# Patient Record
Sex: MALE | Race: WHITE | NOT HISPANIC OR LATINO | Employment: OTHER | URBAN - METROPOLITAN AREA
[De-identification: names, ages, dates, MRNs, and addresses within clinical notes are randomized per-mention and may not be internally consistent; named-entity substitution may affect disease eponyms.]

---

## 2017-02-27 RX ORDER — PANTOPRAZOLE SODIUM 40 MG/1
TABLET, DELAYED RELEASE ORAL
Qty: 60 TABLET | Refills: 5 | Status: SHIPPED | OUTPATIENT
Start: 2017-02-27 | End: 2017-08-17 | Stop reason: SDUPTHER

## 2017-02-27 RX ORDER — PRAVASTATIN SODIUM 40 MG
TABLET ORAL
Qty: 30 TABLET | Refills: 5 | Status: SHIPPED | OUTPATIENT
Start: 2017-02-27 | End: 2017-09-16 | Stop reason: SDUPTHER

## 2017-03-03 ENCOUNTER — TELEPHONE (OUTPATIENT)
Dept: INTERNAL MEDICINE | Facility: CLINIC | Age: 57
End: 2017-03-03

## 2017-03-03 RX ORDER — ACYCLOVIR 800 MG/1
800 TABLET ORAL
Qty: 35 TABLET | Refills: 0 | Status: SHIPPED | OUTPATIENT
Start: 2017-03-03 | End: 2017-03-10

## 2017-03-03 NOTE — TELEPHONE ENCOUNTER
----- Message from Stacey Vasquez sent at 3/3/2017  9:01 AM EST -----  PT CALLED WANTING TO KNOW IF HE COULD HAVE ANOTHER ROUND OF MEDICINE CALLED IN FOR SHINGLES?  HE CAN BE REACHED [phone]    PHARMACY- [address]

## 2017-03-03 NOTE — TELEPHONE ENCOUNTER
Call pharmacy and see what we called in before as far as dose of Valtrex or Acyclovir.     Also ask he if he would like a prednisone 5 mg taper as directed, I suggest he take this if his symptoms started in last 48 hours.

## 2017-03-06 ENCOUNTER — TELEPHONE (OUTPATIENT)
Dept: INTERNAL MEDICINE | Facility: CLINIC | Age: 57
End: 2017-03-06

## 2017-03-06 RX ORDER — ALBUTEROL SULFATE 90 UG/1
AEROSOL, METERED RESPIRATORY (INHALATION)
Qty: 1 INHALER | Refills: 5 | Status: SHIPPED | OUTPATIENT
Start: 2017-03-06 | End: 2022-11-29 | Stop reason: SDUPTHER

## 2017-03-06 NOTE — TELEPHONE ENCOUNTER
----- Message from Jasmina Bridges sent at 3/6/2017  9:51 AM EST -----  PHARMACY NEEDS AN UPDATED PRESCRIPTION FOR HIS albuterol (PROAIR HFA) 108 (90 BASE) MCG/ACT inhaler.    [facility] [place], [place] - [address] [place] [address] [phone] [facility] [phone]

## 2017-03-20 RX ORDER — AMLODIPINE BESYLATE 5 MG/1
TABLET ORAL
Qty: 30 TABLET | Refills: 5 | Status: SHIPPED | OUTPATIENT
Start: 2017-03-20 | End: 2022-08-12 | Stop reason: SDUPTHER

## 2017-06-19 RX ORDER — CITALOPRAM 40 MG/1
TABLET ORAL
Qty: 30 TABLET | Refills: 5 | Status: SHIPPED | OUTPATIENT
Start: 2017-06-19 | End: 2022-04-19 | Stop reason: SDUPTHER

## 2017-08-17 RX ORDER — PANTOPRAZOLE SODIUM 40 MG/1
TABLET, DELAYED RELEASE ORAL
Qty: 60 TABLET | Refills: 1 | Status: SHIPPED | OUTPATIENT
Start: 2017-08-17 | End: 2022-07-26 | Stop reason: SDUPTHER

## 2017-08-17 RX ORDER — PRAVASTATIN SODIUM 40 MG
TABLET ORAL
Qty: 30 TABLET | Refills: 5 | OUTPATIENT
Start: 2017-08-17

## 2017-09-18 RX ORDER — PRAVASTATIN SODIUM 40 MG
TABLET ORAL
Qty: 30 TABLET | Refills: 0 | Status: SHIPPED | OUTPATIENT
Start: 2017-09-18 | End: 2022-08-12 | Stop reason: SDUPTHER

## 2017-10-17 ENCOUNTER — TELEPHONE (OUTPATIENT)
Dept: INTERNAL MEDICINE | Facility: CLINIC | Age: 57
End: 2017-10-17

## 2022-01-26 ENCOUNTER — OFFICE VISIT (OUTPATIENT)
Dept: INTERNAL MEDICINE | Facility: CLINIC | Age: 62
End: 2022-01-26

## 2022-01-26 ENCOUNTER — LAB (OUTPATIENT)
Dept: LAB | Facility: HOSPITAL | Age: 62
End: 2022-01-26

## 2022-01-26 VITALS
OXYGEN SATURATION: 96 % | BODY MASS INDEX: 22.55 KG/M2 | HEIGHT: 68 IN | DIASTOLIC BLOOD PRESSURE: 82 MMHG | RESPIRATION RATE: 20 BRPM | SYSTOLIC BLOOD PRESSURE: 130 MMHG | WEIGHT: 148.8 LBS | TEMPERATURE: 97.1 F | HEART RATE: 76 BPM

## 2022-01-26 DIAGNOSIS — E78.5 HYPERLIPIDEMIA, UNSPECIFIED HYPERLIPIDEMIA TYPE: Chronic | ICD-10-CM

## 2022-01-26 DIAGNOSIS — J44.9 CHRONIC OBSTRUCTIVE PULMONARY DISEASE, UNSPECIFIED COPD TYPE: Chronic | ICD-10-CM

## 2022-01-26 DIAGNOSIS — Z12.5 ENCOUNTER FOR PROSTATE CANCER SCREENING: ICD-10-CM

## 2022-01-26 DIAGNOSIS — Z12.11 ENCOUNTER FOR SCREENING FOR MALIGNANT NEOPLASM OF COLON: ICD-10-CM

## 2022-01-26 DIAGNOSIS — R63.4 WEIGHT LOSS: ICD-10-CM

## 2022-01-26 DIAGNOSIS — I10 PRIMARY HYPERTENSION: Primary | ICD-10-CM

## 2022-01-26 DIAGNOSIS — Z72.0 TOBACCO ABUSE: Chronic | ICD-10-CM

## 2022-01-26 DIAGNOSIS — M25.50 MULTIPLE JOINT PAIN: ICD-10-CM

## 2022-01-26 DIAGNOSIS — R39.198 DECREASED URINE STREAM: ICD-10-CM

## 2022-01-26 DIAGNOSIS — F41.9 ANXIETY: Chronic | ICD-10-CM

## 2022-01-26 LAB
ALBUMIN SERPL-MCNC: 4.6 G/DL (ref 3.5–5.2)
ALBUMIN/GLOB SERPL: 1.8 G/DL
ALP SERPL-CCNC: 71 U/L (ref 39–117)
ALT SERPL W P-5'-P-CCNC: 19 U/L (ref 1–41)
ANION GAP SERPL CALCULATED.3IONS-SCNC: 12.5 MMOL/L (ref 5–15)
ASO AB SERPL-ACNC: NEGATIVE [IU]/ML
AST SERPL-CCNC: 18 U/L (ref 1–40)
BASOPHILS # BLD AUTO: 0.06 10*3/MM3 (ref 0–0.2)
BASOPHILS NFR BLD AUTO: 1.3 % (ref 0–1.5)
BILIRUB BLD-MCNC: NEGATIVE MG/DL
BILIRUB SERPL-MCNC: 0.4 MG/DL (ref 0–1.2)
BUN SERPL-MCNC: 8 MG/DL (ref 8–23)
BUN/CREAT SERPL: 9.6 (ref 7–25)
CALCIUM SPEC-SCNC: 9.2 MG/DL (ref 8.6–10.5)
CHLORIDE SERPL-SCNC: 93 MMOL/L (ref 98–107)
CHROMATIN AB SERPL-ACNC: <10 IU/ML (ref 0–14)
CLARITY, POC: CLEAR
CO2 SERPL-SCNC: 23.5 MMOL/L (ref 22–29)
COLOR UR: YELLOW
CREAT SERPL-MCNC: 0.83 MG/DL (ref 0.76–1.27)
CRP SERPL-MCNC: <0.3 MG/DL (ref 0–0.5)
DEPRECATED RDW RBC AUTO: 42.4 FL (ref 37–54)
EOSINOPHIL # BLD AUTO: 0.15 10*3/MM3 (ref 0–0.4)
EOSINOPHIL NFR BLD AUTO: 3.3 % (ref 0.3–6.2)
ERYTHROCYTE [DISTWIDTH] IN BLOOD BY AUTOMATED COUNT: 11.8 % (ref 12.3–15.4)
EXPIRATION DATE: NORMAL
GFR SERPL CREATININE-BSD FRML MDRD: 94 ML/MIN/1.73
GLOBULIN UR ELPH-MCNC: 2.6 GM/DL
GLUCOSE SERPL-MCNC: 86 MG/DL (ref 65–99)
GLUCOSE UR STRIP-MCNC: NEGATIVE MG/DL
HCT VFR BLD AUTO: 43.6 % (ref 37.5–51)
HGB BLD-MCNC: 15.5 G/DL (ref 13–17.7)
IMM GRANULOCYTES # BLD AUTO: 0.01 10*3/MM3 (ref 0–0.05)
IMM GRANULOCYTES NFR BLD AUTO: 0.2 % (ref 0–0.5)
KETONES UR QL: NEGATIVE
LEUKOCYTE EST, POC: NEGATIVE
LYMPHOCYTES # BLD AUTO: 1.38 10*3/MM3 (ref 0.7–3.1)
LYMPHOCYTES NFR BLD AUTO: 30.7 % (ref 19.6–45.3)
Lab: NORMAL
MCH RBC QN AUTO: 34.8 PG (ref 26.6–33)
MCHC RBC AUTO-ENTMCNC: 35.6 G/DL (ref 31.5–35.7)
MCV RBC AUTO: 97.8 FL (ref 79–97)
MONOCYTES # BLD AUTO: 0.58 10*3/MM3 (ref 0.1–0.9)
MONOCYTES NFR BLD AUTO: 12.9 % (ref 5–12)
NEUTROPHILS NFR BLD AUTO: 2.31 10*3/MM3 (ref 1.7–7)
NEUTROPHILS NFR BLD AUTO: 51.6 % (ref 42.7–76)
NITRITE UR-MCNC: NEGATIVE MG/ML
NRBC BLD AUTO-RTO: 0 /100 WBC (ref 0–0.2)
PH UR: 6 [PH] (ref 5–8)
PLATELET # BLD AUTO: 383 10*3/MM3 (ref 140–450)
PMV BLD AUTO: 8.3 FL (ref 6–12)
POTASSIUM SERPL-SCNC: 4.2 MMOL/L (ref 3.5–5.2)
PROT SERPL-MCNC: 7.2 G/DL (ref 6–8.5)
PROT UR STRIP-MCNC: NEGATIVE MG/DL
PSA SERPL-MCNC: 0.51 NG/ML (ref 0–4)
RBC # BLD AUTO: 4.46 10*6/MM3 (ref 4.14–5.8)
RBC # UR STRIP: NEGATIVE /UL
SODIUM SERPL-SCNC: 129 MMOL/L (ref 136–145)
SP GR UR: 1.01 (ref 1–1.03)
T4 FREE SERPL-MCNC: 1.31 NG/DL (ref 0.93–1.7)
TSH SERPL DL<=0.05 MIU/L-ACNC: 1.9 UIU/ML (ref 0.27–4.2)
URATE SERPL-MCNC: 5.1 MG/DL (ref 3.4–7)
UROBILINOGEN UR QL: NORMAL
WBC NRBC COR # BLD: 4.49 10*3/MM3 (ref 3.4–10.8)

## 2022-01-26 PROCEDURE — 84443 ASSAY THYROID STIM HORMONE: CPT | Performed by: NURSE PRACTITIONER

## 2022-01-26 PROCEDURE — 80053 COMPREHEN METABOLIC PANEL: CPT | Performed by: NURSE PRACTITIONER

## 2022-01-26 PROCEDURE — 86431 RHEUMATOID FACTOR QUANT: CPT | Performed by: NURSE PRACTITIONER

## 2022-01-26 PROCEDURE — 99204 OFFICE O/P NEW MOD 45 MIN: CPT | Performed by: NURSE PRACTITIONER

## 2022-01-26 PROCEDURE — 86063 ANTISTREPTOLYSIN O SCREEN: CPT | Performed by: NURSE PRACTITIONER

## 2022-01-26 PROCEDURE — 86038 ANTINUCLEAR ANTIBODIES: CPT | Performed by: NURSE PRACTITIONER

## 2022-01-26 PROCEDURE — 84439 ASSAY OF FREE THYROXINE: CPT | Performed by: NURSE PRACTITIONER

## 2022-01-26 PROCEDURE — 84550 ASSAY OF BLOOD/URIC ACID: CPT | Performed by: NURSE PRACTITIONER

## 2022-01-26 PROCEDURE — 36415 COLL VENOUS BLD VENIPUNCTURE: CPT | Performed by: NURSE PRACTITIONER

## 2022-01-26 PROCEDURE — 81002 URINALYSIS NONAUTO W/O SCOPE: CPT | Performed by: NURSE PRACTITIONER

## 2022-01-26 PROCEDURE — 85025 COMPLETE CBC W/AUTO DIFF WBC: CPT | Performed by: NURSE PRACTITIONER

## 2022-01-26 PROCEDURE — G0103 PSA SCREENING: HCPCS | Performed by: NURSE PRACTITIONER

## 2022-01-26 PROCEDURE — 86140 C-REACTIVE PROTEIN: CPT | Performed by: NURSE PRACTITIONER

## 2022-01-26 PROCEDURE — 86747 PARVOVIRUS ANTIBODY: CPT | Performed by: NURSE PRACTITIONER

## 2022-01-26 RX ORDER — CELECOXIB 200 MG/1
200 CAPSULE ORAL DAILY
COMMUNITY
Start: 2022-01-16 | End: 2022-01-26

## 2022-01-26 RX ORDER — CELECOXIB 100 MG/1
100 CAPSULE ORAL 2 TIMES DAILY PRN
Qty: 30 CAPSULE | Refills: 0 | Status: SHIPPED | OUTPATIENT
Start: 2022-01-26 | End: 2022-02-27 | Stop reason: SDUPTHER

## 2022-01-26 NOTE — PROGRESS NOTES
New Patient Office Visit      Patient Name: Luis Antonio Fairchild  : 1960   MRN: [id number]     Chief Complaint:    Chief Complaint   Patient presents with   • Hypertension     new patient   • Hyperlipidemia   • COPD       History of Present Illness: Luis Antonio Fairchild is a 61 y.o. male presents to clinic today to establish care.    Past Medical History:   Diagnosis Date   • Anxiety    • Arthritis    • Cancer (HCC) 2018    Squamous carcinoma oropharynx   • GERD (gastroesophageal reflux disease)    • History of IBS    • Hyperlipidemia    • Hypertension    • Pneumonia      Are you currently seeing any other doctors or specialists?  Ej Sellers MD    [facility] follows him yearly  I have requested records from .     Concerns: 30 lb weight loss since throat surgery in      Hypertension  The patient denies headaches, chest pain, dyspnea, edema, syncope, blurred vision or palpitations. He state that he is taking her medication as prescribed. He is not having medication side effects.    Hyperlipidemia  He is following a low fat diet.  He is taking medication for hyperlipidemia. He denies chest pain, shortness of breath, orthopnea, paroxysmal nocturnal dyspnea, dyspnea on exertion, edema, palpitations or syncope.    GERD  The patient reports a history of reflux and heartburn. Denies dysphagia, belching, abdominal pain, nighttime cough, cough, nausea, vomiting, odynophagia or early satiety. He denies  rectal bleeding. The GERD has no known aggravating factors. He does smoke. The patient has had an EGD.    Anxiety   The patient's energy level is good. Denies agoraphobia, panic attacks, harming self or others.  The patient sleeps well. The current medication regimen consists of citalopram 40 mg daily. The patient denies having medication side effects including nausea, headaches, anxiety, increased depression or fatigue.     Arthritis  The patient has a history of arthritis.  It affects his hands,  shoulders, knees and wrists.  He has been taking Celecoxib 200 mg once daily.  He would like to change it to 100 mg twice a day.    Smokes  46 pack year history.  He is not interested in quitting.  He has not tolerated nicotine patches in the past due to nausea.    History of COPD  Symptoms controlled with albuterol as needed. Denies wheezing and has an occasional cough.     Subjective     Review of System: Review of Systems   Constitutional: Positive for appetite change and fatigue. Negative for fever.   HENT: Negative for congestion and rhinorrhea.    Respiratory: Negative for shortness of breath and wheezing.    Cardiovascular: Negative for chest pain and palpitations.   Gastrointestinal: Negative for abdominal pain, diarrhea, nausea and vomiting.        GERD   Genitourinary: Positive for difficulty urinating (Decreased stream). Negative for dysuria, frequency and urgency.   Musculoskeletal: Positive for arthralgias and joint swelling. Negative for myalgias.   Skin: Negative for rash.   Neurological: Negative for headaches.   Hematological: Negative for adenopathy.   Psychiatric/Behavioral: Negative for dysphoric mood. The patient is nervous/anxious.         Past Medical History:   Past Medical History:   Diagnosis Date   • Anxiety    • Arthritis    • Cancer (HCC) 2018    Squamous carcinoma oropharynx   • GERD (gastroesophageal reflux disease)    • History of IBS    • Hyperlipidemia    • Hypertension    • Pneumonia        Past Surgical History:   Past Surgical History:   Procedure Laterality Date   • COLONOSCOPY     • SHOULDER SURGERY      Onset Date    • THROAT SURGERY     • WRIST SURGERY      Onset Date:        Family History:   Family History   Problem Relation Age of Onset   • Anxiety disorder Mother    • Arthritis Mother    • Stroke Father            • Hypertension Father    • Cancer Brother    • Anxiety disorder Brother    • Cancer Brother                Social History:  "  Social History     Socioeconomic History   • Marital status:    Tobacco Use   • Smoking status: Current Every Day Smoker     Packs/day: 1.00     Years: 46.00     Pack years: 46.00     Types: Cigarettes   • Smokeless tobacco: Never Used   Substance and Sexual Activity   • Alcohol use: Yes     Alcohol/week: 35.0 standard drinks     Types: 35 Cans of beer per week     Comment: nightly   • Drug use: Never   • Sexual activity: Yes     Partners: Female     Comment:         Medications:     Current Outpatient Medications:   •  albuterol (PROAIR HFA) 108 (90 BASE) MCG/ACT inhaler, Inhale 1-2 puffs every 4-6 hours PRN, Disp: 1 inhaler, Rfl: 5  •  amLODIPine (NORVASC) 5 MG tablet, take 1 tablet by mouth once daily, Disp: 30 tablet, Rfl: 5  •  citalopram (CeleXA) 40 MG tablet, take 1 tablet by mouth once daily, Disp: 30 tablet, Rfl: 5  •  pantoprazole (PROTONIX) 40 MG EC tablet, take 1 tablet by mouth twice a day, Disp: 60 tablet, Rfl: 1  •  pravastatin (PRAVACHOL) 40 MG tablet, take 1 tablet by mouth once daily, Disp: 30 tablet, Rfl: 0  •  celecoxib (CeleBREX) 100 MG capsule, Take 1 capsule by mouth 2 (Two) Times a Day As Needed for Mild Pain ., Disp: 30 capsule, Rfl: 0    Allergies:   No Known Allergies    Objective     Physical Exam:   Vital Signs:   Vitals:    01/26/22 0838   BP: 130/82   BP Location: Right arm   Patient Position: Sitting   Cuff Size: Adult   Pulse: 76   Resp: 20   Temp: 97.1 °F (36.2 °C)   TempSrc: Infrared   SpO2: 96%   Weight: 67.5 kg (148 lb 12.8 oz)   Height: 172.1 cm (67.75\")   PainSc:   4     Body mass index is 22.79 kg/m². Patient's Body mass index is 22.79 kg/m². indicating that he is within normal range (BMI 18.5-24.9). No BMI management plan needed..      Physical Exam  Constitutional:       General: He is not in acute distress.     Appearance: He is not ill-appearing.   HENT:      Head: Normocephalic.      Right Ear: Tympanic membrane normal.      Left Ear: Tympanic membrane " normal.      Nose: No rhinorrhea.      Mouth/Throat:      Pharynx: No posterior oropharyngeal erythema.   Eyes:      Extraocular Movements: Extraocular movements intact.      Pupils: Pupils are equal, round, and reactive to light.   Cardiovascular:      Rate and Rhythm: Normal rate and regular rhythm.      Heart sounds: Normal heart sounds. No murmur heard.      Pulmonary:      Breath sounds: Normal breath sounds.   Abdominal:      General: Bowel sounds are normal.      Tenderness: There is no abdominal tenderness.   Musculoskeletal:         General: No swelling or tenderness. Normal range of motion.   Lymphadenopathy:      Cervical: No cervical adenopathy.   Skin:     General: Skin is warm and dry.   Neurological:      General: No focal deficit present.      Mental Status: He is oriented to person, place, and time.   Psychiatric:         Mood and Affect: Mood normal.         Assessment / Plan      Assessment/Plan:   Diagnoses and all orders for this visit:    1. Primary hypertension (Primary)  -     CBC & Differential; Future  -     Comprehensive Metabolic Panel; Future  -     CBC & Differential  -     Comprehensive Metabolic Panel    2. Hyperlipidemia, unspecified hyperlipidemia type  Continue current medications    3. Chronic obstructive pulmonary disease, unspecified COPD type (HCC)  Continue albuterol    4. Anxiety  Continue current medications    5. Tobacco abuse  Encouraged to quit smoking    6. Encounter for screening for malignant neoplasm of colon  -     Ambulatory Referral For Screening Colonoscopy    7.  Multiple joint pain  -     CBC & Differential; Future  -     CHANDA; Future  -     Antistreptolysin O Screen; Future  -     C-reactive Protein; Future  -     Rheumatoid Factor; Future  -     Uric Acid; Future  -     Comprehensive Metabolic Panel; Future  -     Parvovirus B19 Antibody, IgG & IgM; Future  -celecoxib (CeleBREX) 100 MG capsule; Take 1 capsule by mouth 2 (Two) Times a Day As Needed for Mild Pain  .  Dispense: 30 capsule; Refill: 0      8. Decreased urine stream  -     PSA SCREENING; Future  -     POC Urinalysis Dipstick  -     PSA SCREENING    9. Encounter for prostate cancer screening  -     PSA SCREENING; Future  -     PSA SCREENING    10. Weight loss  -     TSH; Future  -     T4, free; Future  -     TSH  -     T4, free    Follow Up:   Return in about 4 weeks (around 2/23/2022).    KALEB Barrios  [facility]

## 2022-01-27 ENCOUNTER — TELEPHONE (OUTPATIENT)
Dept: INTERNAL MEDICINE | Facility: CLINIC | Age: 62
End: 2022-01-27

## 2022-01-27 LAB — ANA SER QL: NEGATIVE

## 2022-01-28 LAB
B19V IGG SER IA-ACNC: 6.9 INDEX (ref 0–0.8)
B19V IGM SER IA-ACNC: 0.1 INDEX (ref 0–0.8)

## 2022-01-28 NOTE — TELEPHONE ENCOUNTER
PSA is normal; Rheumatoid factor is negative; zia is negative. Other labs are stable; labs are in the normal range or mild abnormalities which are are not concerning. Your kidneys and liver function is normal; no evidence of diabetes. Blood counts are normal and thyroid function is normal.Na is low. I would like to recheck in one month and  I would like to do a low dose CT for lung cancer screening.

## 2022-01-28 NOTE — TELEPHONE ENCOUNTER
Patient notified.   He will come in for labwork in a month, please place orders if you have not already done so.  He would like to do the low dose CT, he states he has a history of lung cancer.

## 2022-01-30 DIAGNOSIS — Z12.2 SCREENING FOR LUNG CANCER: Primary | ICD-10-CM

## 2022-02-01 DIAGNOSIS — Z12.11 SCREENING FOR COLON CANCER: Primary | ICD-10-CM

## 2022-02-03 ENCOUNTER — TELEPHONE (OUTPATIENT)
Dept: INTERNAL MEDICINE | Facility: CLINIC | Age: 62
End: 2022-02-03

## 2022-02-03 NOTE — TELEPHONE ENCOUNTER
I discussed lung cancer screening with patient. Various guidelines recommend screening for lung cancers for individuals with a smoking history or identification of other risk factors. Patient has a 46 pack year history of smoking and has a personal history of throat cancer. We had a discussion of potential benefits and harms  Potential benefits from early detection include increasing their life expectancy and quality of life while maintaining a low rate of false-positive results to prevent additional unnecessary testing.  Potential harms from screening include over diagnosis resulting in unnecessary testing and procedures and associated complications, treatment exposure, anxiety related to screening tests, and increased costs. The decision to screen for lung cancer should occur after a shared decision-making conversation between patient and provider. Patient verbalized an understanding and would like to proceed with lung cancer screening.

## 2022-02-10 ENCOUNTER — HOSPITAL ENCOUNTER (OUTPATIENT)
Dept: CT IMAGING | Facility: HOSPITAL | Age: 62
Discharge: HOME | End: 2022-02-10
Admitting: NURSE PRACTITIONER

## 2022-02-10 DIAGNOSIS — Z12.2 SCREENING FOR LUNG CANCER: ICD-10-CM

## 2022-02-10 PROCEDURE — 71271 CT THORAX LUNG CANCER SCR C-: CPT

## 2022-02-13 DIAGNOSIS — IMO0001 LUNG NODULE < 6CM ON CT: Primary | ICD-10-CM

## 2022-02-18 DIAGNOSIS — Z01.812 BLOOD TESTS PRIOR TO TREATMENT OR PROCEDURE: Primary | ICD-10-CM

## 2022-02-21 ENCOUNTER — CLINICAL SUPPORT NO REQUIREMENTS (OUTPATIENT)
Dept: PULMONOLOGY | Facility: CLINIC | Age: 62
End: 2022-02-21

## 2022-02-21 DIAGNOSIS — Z01.812 BLOOD TESTS PRIOR TO TREATMENT OR PROCEDURE: ICD-10-CM

## 2022-02-21 PROCEDURE — U0004 COV-19 TEST NON-CDC HGH THRU: HCPCS | Performed by: INTERNAL MEDICINE

## 2022-02-21 PROCEDURE — 99211 OFF/OP EST MAY X REQ PHY/QHP: CPT | Performed by: INTERNAL MEDICINE

## 2022-02-22 LAB — SARS-COV-2 RNA NOSE QL NAA+PROBE: NOT DETECTED

## 2022-02-23 ENCOUNTER — OFFICE VISIT (OUTPATIENT)
Dept: PULMONOLOGY | Facility: CLINIC | Age: 62
End: 2022-02-23

## 2022-02-23 VITALS
OXYGEN SATURATION: 97 % | TEMPERATURE: 97.8 F | WEIGHT: 152.2 LBS | DIASTOLIC BLOOD PRESSURE: 72 MMHG | HEIGHT: 69 IN | HEART RATE: 73 BPM | SYSTOLIC BLOOD PRESSURE: 134 MMHG | BODY MASS INDEX: 22.54 KG/M2

## 2022-02-23 DIAGNOSIS — J44.9 COPD, SEVERE: Primary | ICD-10-CM

## 2022-02-23 DIAGNOSIS — F17.218 CIGARETTE NICOTINE DEPENDENCE WITH OTHER NICOTINE-INDUCED DISORDER: ICD-10-CM

## 2022-02-23 DIAGNOSIS — R91.1 LUNG NODULE: ICD-10-CM

## 2022-02-23 PROCEDURE — 99204 OFFICE O/P NEW MOD 45 MIN: CPT | Performed by: INTERNAL MEDICINE

## 2022-02-23 PROCEDURE — 94729 DIFFUSING CAPACITY: CPT | Performed by: INTERNAL MEDICINE

## 2022-02-23 PROCEDURE — 94726 PLETHYSMOGRAPHY LUNG VOLUMES: CPT | Performed by: INTERNAL MEDICINE

## 2022-02-23 PROCEDURE — 94060 EVALUATION OF WHEEZING: CPT | Performed by: INTERNAL MEDICINE

## 2022-02-23 RX ORDER — TIOTROPIUM BROMIDE INHALATION SPRAY 3.12 UG/1
2 SPRAY, METERED RESPIRATORY (INHALATION)
Qty: 1 EACH | Refills: 6 | Status: SHIPPED | OUTPATIENT
Start: 2022-02-23 | End: 2022-04-12

## 2022-02-23 RX ORDER — ALBUTEROL SULFATE 90 UG/1
4 AEROSOL, METERED RESPIRATORY (INHALATION) ONCE
Status: COMPLETED | OUTPATIENT
Start: 2022-02-23 | End: 2022-02-23

## 2022-02-23 RX ADMIN — ALBUTEROL SULFATE 4 PUFF: 90 AEROSOL, METERED RESPIRATORY (INHALATION) at 16:43

## 2022-02-23 NOTE — PROGRESS NOTES
New Pulmonary Patient Office Visit      Patient Name: Luis Antonio Fairchild    Referring Physician: Sulma Woodward APRN    Chief Complaint:    Chief Complaint   Patient presents with   • Lung Nodule       History of Present Illness: Luis Antonio Fairchild is a 61 y.o. male who is here today to establish care with Pulmonary.      61-year-old male with past medical history of hypertension, dyslipidemia, GERD, depression, history of throat cancer status post surgery at [facility] in 2018, chronic smoker with ongoing smoking of 1 pack/day presenting for initial evaluation along with his wife.  Patient states that he is having problem with shortness of breath going on for a number of years.  He is also bothered by nighttime cough.  States that he generally does not bring up any phlegm but he is mostly dry cough.  Can happen anytime of the day.  Denies any syncopal episodes.  Denies any urinary incontinence symptoms.  Also has intermittent wheezing.  Denies any chest tightness.  Denies any fevers, chills or night sweats.  Denies any hemoptysis.  States that he feels that his symptoms are slowly getting worse.  He follows up with  ENT and has cancer is treated and he has no metastatic disease.    Patient smokes 1 pack a day which she has been doing for 45+ years.  Also drinks alcohol with 3-4 beers a night.  Denies any illicit drug use.    Patient does have reflux which is controlled with proton pump inhibitors.  Denies any leg edema.  Denies any orthopnea or PND symptoms.  Denies any frequent pneumonias or infections.  No hospitalizations but had mild pneumonia last year.  Denies any COVID-19 infection.  States that his feet and hands always stay cold and hurt when he goes out in the cold weather.  Denies any pets exposure at home.  No black mold exposure.  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      Subjective      Review of Systems:   Review of Systems   Constitutional: Positive  for unexpected weight loss.   HENT: Positive for trouble swallowing.    Respiratory: Positive for cough and choking.    Cardiovascular: Negative.    Gastrointestinal: Positive for constipation, diarrhea and GERD.   Endocrine: Positive for cold intolerance.   Musculoskeletal: Positive for arthralgias, myalgias and neck stiffness.   Skin: Negative.    Neurological: Negative.    Hematological: Negative.    Psychiatric/Behavioral: Negative.    All other systems reviewed and are negative.      Past Medical History:   Past Medical History:   Diagnosis Date   • Anxiety    • Arthritis    • Cancer (HCC)     Squamous carcinoma oropharynx   • GERD (gastroesophageal reflux disease)    • History of IBS    • Hyperlipidemia    • Hypertension    • Pneumonia        Past Surgical History:   Past Surgical History:   Procedure Laterality Date   • COLONOSCOPY     • SHOULDER SURGERY      Onset Date    • THROAT SURGERY     • WRIST SURGERY      Onset Date:        Family History:   Family History   Problem Relation Age of Onset   • Anxiety disorder Mother    • Arthritis Mother    • Stroke Father            • Hypertension Father    • Cancer Brother    • Anxiety disorder Brother    • Cancer Brother                Social History:   Social History     Socioeconomic History   • Marital status:    Tobacco Use   • Smoking status: Current Every Day Smoker     Packs/day: 1.00     Years: 46.00     Pack years: 46.00     Types: Cigarettes   • Smokeless tobacco: Never Used   Vaping Use   • Vaping Use: Never used   Substance and Sexual Activity   • Alcohol use: Yes     Alcohol/week: 35.0 standard drinks     Types: 35 Cans of beer per week     Comment: nightly   • Drug use: Never   • Sexual activity: Yes     Partners: Female     Comment:         Medications:     Current Outpatient Medications:   •  albuterol (PROAIR HFA) 108 (90 BASE) MCG/ACT inhaler, Inhale 1-2 puffs every 4-6 hours PRN, Disp: 1  "inhaler, Rfl: 5  •  amLODIPine (NORVASC) 5 MG tablet, take 1 tablet by mouth once daily, Disp: 30 tablet, Rfl: 5  •  celecoxib (CeleBREX) 100 MG capsule, Take 1 capsule by mouth 2 (Two) Times a Day As Needed for Mild Pain ., Disp: 30 capsule, Rfl: 0  •  citalopram (CeleXA) 40 MG tablet, take 1 tablet by mouth once daily, Disp: 30 tablet, Rfl: 5  •  pantoprazole (PROTONIX) 40 MG EC tablet, take 1 tablet by mouth twice a day, Disp: 60 tablet, Rfl: 1  •  pravastatin (PRAVACHOL) 40 MG tablet, take 1 tablet by mouth once daily, Disp: 30 tablet, Rfl: 0  •  Sod Picosulfate-Mag Ox-Cit Acd 10-3.5-12 MG-GM -GM/160ML solution, Take 160 mL by mouth Take As Directed for 2 doses., Disp: 320 mL, Rfl: 0  •  tiotropium bromide monohydrate (Spiriva Respimat) 2.5 MCG/ACT aerosol solution inhaler, Inhale 2 puffs Daily., Disp: 1 each, Rfl: 6    Allergies:   No Known Allergies    Objective     Physical Exam:  Vital Signs:   Vitals:    02/23/22 1417   BP: 134/72   Pulse: 73   Temp: 97.8 °F (36.6 °C)   SpO2: 97%   Weight: 69 kg (152 lb 3.2 oz)   Height: 175.3 cm (69\")       Physical Exam  Vitals and nursing note reviewed.   Constitutional:       General: He is not in acute distress.     Appearance: He is well-developed. He is not diaphoretic.   HENT:      Head: Normocephalic and atraumatic.      Comments: Post surgical changes noted soft palate.     Nose: Nose normal.      Mouth/Throat:      Pharynx: No oropharyngeal exudate.   Eyes:      General: No scleral icterus.        Right eye: No discharge.         Left eye: No discharge.      Pupils: Pupils are equal, round, and reactive to light.   Neck:      Thyroid: No thyromegaly.      Trachea: No tracheal deviation.   Cardiovascular:      Rate and Rhythm: Normal rate and regular rhythm.      Heart sounds: Normal heart sounds. No murmur heard.  No friction rub. No gallop.    Pulmonary:      Effort: Pulmonary effort is normal. No respiratory distress.      Breath sounds: No stridor. No wheezing " or rales.      Comments: Poor air entry  Abdominal:      General: There is no distension.      Palpations: Abdomen is soft.      Tenderness: There is no abdominal tenderness.   Musculoskeletal:         General: No tenderness.      Cervical back: Neck supple.      Right lower leg: No edema.      Left lower leg: No edema.      Comments: Clubbing: none   Lymphadenopathy:      Cervical: No cervical adenopathy.   Neurological:      Mental Status: He is alert and oriented to person, place, and time.      Cranial Nerves: No cranial nerve deficit.      Coordination: Coordination normal.   Psychiatric:         Behavior: Behavior normal.         Thought Content: Thought content normal.         Judgment: Judgment normal.         Results Review:   I reviewed the patient's new clinical results.  Low-dose CT scan reviewed personally and showed 3 mm nodule in the right upper lobe which appears more like a scar.  No suspicious lung nodules noted.  Hyperinflation and emphysematous changes noted bilaterally.  No pleural effusions or significant adenopathy noted.    PFT IMPRESSION:   1. Available data suggests severe obstruction. FEV1 1.68L, 49% predicted.     2. Significant bronchodilator response noted in FVC which improved by 440 cc and 14%.  3. Lung volume reveals air trapping.       4. Airway resistance is elevated.  5. DLCO is moderately reduced and could be suggestive of emphysema, interstitial lung disease, significant anemia, Pulmonary vascular disease etc. Clinical correlation will be required.      Assessment / Plan      Assessment:   Problem List Items Addressed This Visit     None      Visit Diagnoses     COPD, severe (HCC)    -  Primary    Relevant Medications    tiotropium bromide monohydrate (Spiriva Respimat) 2.5 MCG/ACT aerosol solution inhaler    Cigarette nicotine dependence with other nicotine-induced disorder        Lung nodule              Plan:   1.  Patient with complains of cough and shortness of breath.   Patient has chronic smoking history with ongoing smoking.  PFTs reviewed and shows severe obstruction with severe reduction in DLCO suggesting emphysematous changes.  CT scan also suggest bilateral emphysematous changes.  Reviewed all the results in detail with the patient and his wife.  We talked about prognosis which is guarded given severity of his lung disease.  We talked about working diligently to quit smoking.  He understood the importance of that and work on that aspect.    2.  Patient does take as needed albuterol inhaler.  Discussed that there is bronchodilator response on his PFTs and given his severity of disease I would like to try him on Spiriva HandiHaler as a bronchodilator to see if he can improve things.  We will put him on 2.5 mcg dose 2 puffs twice a day.  Correct technique of using the inhaler reviewed.  Side effects discussed in detail.  Prescription called in.  If symptoms do not improve with cough and shortness of breath will consider adding long-acting beta agonist and/or steroids as well.    3.  Advised patient to stay active.  Counseled strongly to work on quitting smoking.  He wants to think about that.    4.  3 mm lung nodule noted right upper lobe which appears like old scar.  You do not have any old films from [facility] available.  He apparently is going to see them next month for his routine follow-up and generally get CT scans.  We will follow up on the reports from those to make sure not seeing any worsening disease process.  He understands that he will be at high risk of lung malignancy given he already has throat cancer and with his ongoing smoking he will be at high risk.    5.  Will check alpha-1 antitrypsin level on next visit.  If he has work-up outside then will recommend that to be added on.    6.  Patient has significant leg pain issues with exertional activity.  He also has significant coronary artery calcification.  Patient may have underlying peripheral arterial disease.  Will  recommend further work-up to evaluate that issue.    We will follow closely in pulmonary clinic for ongoing pulmonary issues.  Thank you for allowing me to participate in the care of this patient.      Follow Up:   6 months    Discussed plan of care in detail with patient today. Patient verbally understands and agrees.I spent 45 minutes on this date of service. This time includes time spent by me in the following activities:preparing for the visit, reviewing tests, obtaining and/or reviewing a separately obtained history, performing a medically appropriate examination, counseling the patient/family, ordering medications, tests, or procedures, and/or documenting information in the medical record.     Aleksandar Maldonado MD  Pulmonary Critical Care and Sleep Medicine

## 2022-02-28 RX ORDER — CELECOXIB 100 MG/1
100 CAPSULE ORAL 2 TIMES DAILY PRN
Qty: 30 CAPSULE | Refills: 2 | Status: SHIPPED | OUTPATIENT
Start: 2022-02-28 | End: 2022-03-01 | Stop reason: SDUPTHER

## 2022-03-01 ENCOUNTER — TELEPHONE (OUTPATIENT)
Dept: INTERNAL MEDICINE | Facility: CLINIC | Age: 62
End: 2022-03-01

## 2022-03-01 RX ORDER — CELECOXIB 100 MG/1
100 CAPSULE ORAL 2 TIMES DAILY PRN
Qty: 60 CAPSULE | Refills: 2 | Status: SHIPPED | OUTPATIENT
Start: 2022-03-01 | End: 2022-04-27 | Stop reason: SDUPTHER

## 2022-03-03 ENCOUNTER — OUTSIDE FACILITY SERVICE (OUTPATIENT)
Dept: GASTROENTEROLOGY | Facility: CLINIC | Age: 62
End: 2022-03-03

## 2022-03-03 PROCEDURE — 88305 TISSUE EXAM BY PATHOLOGIST: CPT | Performed by: INTERNAL MEDICINE

## 2022-03-03 PROCEDURE — 45385 COLONOSCOPY W/LESION REMOVAL: CPT | Performed by: INTERNAL MEDICINE

## 2022-03-03 PROCEDURE — 45380 COLONOSCOPY AND BIOPSY: CPT | Performed by: INTERNAL MEDICINE

## 2022-03-04 ENCOUNTER — LAB REQUISITION (OUTPATIENT)
Dept: LAB | Facility: HOSPITAL | Age: 62
End: 2022-03-04

## 2022-03-04 ENCOUNTER — TELEPHONE (OUTPATIENT)
Dept: INTERNAL MEDICINE | Facility: CLINIC | Age: 62
End: 2022-03-04

## 2022-03-04 DIAGNOSIS — Z12.11 ENCOUNTER FOR SCREENING FOR MALIGNANT NEOPLASM OF COLON: ICD-10-CM

## 2022-03-04 DIAGNOSIS — K64.8 OTHER HEMORRHOIDS: ICD-10-CM

## 2022-03-04 DIAGNOSIS — D12.5 BENIGN NEOPLASM OF SIGMOID COLON: ICD-10-CM

## 2022-03-04 DIAGNOSIS — K57.30 DIVERTICULOSIS OF LARGE INTESTINE WITHOUT PERFORATION OR ABSCESS WITHOUT BLEEDING: ICD-10-CM

## 2022-03-04 NOTE — TELEPHONE ENCOUNTER
I read he had leg pain with walking. Let him know we could do some testing if he wants to address this. Check to see if he can make an appnt.

## 2022-03-07 LAB
CYTO UR: NORMAL
LAB AP CASE REPORT: NORMAL
LAB AP CLINICAL INFORMATION: NORMAL
PATH REPORT.FINAL DX SPEC: NORMAL
PATH REPORT.GROSS SPEC: NORMAL

## 2022-03-09 ENCOUNTER — OFFICE VISIT (OUTPATIENT)
Dept: INTERNAL MEDICINE | Facility: CLINIC | Age: 62
End: 2022-03-09

## 2022-03-09 ENCOUNTER — LAB (OUTPATIENT)
Dept: LAB | Facility: HOSPITAL | Age: 62
End: 2022-03-09

## 2022-03-09 VITALS
OXYGEN SATURATION: 95 % | HEART RATE: 78 BPM | WEIGHT: 148.8 LBS | TEMPERATURE: 97.1 F | SYSTOLIC BLOOD PRESSURE: 124 MMHG | DIASTOLIC BLOOD PRESSURE: 76 MMHG | RESPIRATION RATE: 20 BRPM | BODY MASS INDEX: 22.04 KG/M2 | HEIGHT: 69 IN

## 2022-03-09 DIAGNOSIS — R06.02 SHORTNESS OF BREATH: ICD-10-CM

## 2022-03-09 DIAGNOSIS — I10 PRIMARY HYPERTENSION: Chronic | ICD-10-CM

## 2022-03-09 DIAGNOSIS — K58.0 IRRITABLE BOWEL SYNDROME WITH DIARRHEA: ICD-10-CM

## 2022-03-09 DIAGNOSIS — R10.11 RIGHT UPPER QUADRANT PAIN: Primary | ICD-10-CM

## 2022-03-09 DIAGNOSIS — I73.9 CLAUDICATION: ICD-10-CM

## 2022-03-09 DIAGNOSIS — Z11.59 ENCOUNTER FOR HEPATITIS C SCREENING TEST FOR LOW RISK PATIENT: ICD-10-CM

## 2022-03-09 DIAGNOSIS — R53.83 FATIGUE, UNSPECIFIED TYPE: ICD-10-CM

## 2022-03-09 LAB
25(OH)D3 SERPL-MCNC: 74.5 NG/ML (ref 30–100)
ALBUMIN SERPL-MCNC: 4.6 G/DL (ref 3.5–5.2)
ALBUMIN/GLOB SERPL: 1.6 G/DL
ALP SERPL-CCNC: 70 U/L (ref 39–117)
ALPHA1 GLOB MFR UR ELPH: 138 MG/DL (ref 90–200)
ALT SERPL W P-5'-P-CCNC: 17 U/L (ref 1–41)
ANION GAP SERPL CALCULATED.3IONS-SCNC: 14.9 MMOL/L (ref 5–15)
AST SERPL-CCNC: 17 U/L (ref 1–40)
BASOPHILS # BLD AUTO: 0.07 10*3/MM3 (ref 0–0.2)
BASOPHILS NFR BLD AUTO: 1.4 % (ref 0–1.5)
BILIRUB SERPL-MCNC: 0.4 MG/DL (ref 0–1.2)
BUN SERPL-MCNC: 10 MG/DL (ref 8–23)
BUN/CREAT SERPL: 11.9 (ref 7–25)
CALCIUM SPEC-SCNC: 9.4 MG/DL (ref 8.6–10.5)
CHLORIDE SERPL-SCNC: 92 MMOL/L (ref 98–107)
CO2 SERPL-SCNC: 24.1 MMOL/L (ref 22–29)
CREAT SERPL-MCNC: 0.84 MG/DL (ref 0.76–1.27)
DEPRECATED RDW RBC AUTO: 42.3 FL (ref 37–54)
EGFRCR SERPLBLD CKD-EPI 2021: 98.6 ML/MIN/1.73
EOSINOPHIL # BLD AUTO: 0.22 10*3/MM3 (ref 0–0.4)
EOSINOPHIL NFR BLD AUTO: 4.6 % (ref 0.3–6.2)
ERYTHROCYTE [DISTWIDTH] IN BLOOD BY AUTOMATED COUNT: 11.8 % (ref 12.3–15.4)
FOLATE SERPL-MCNC: 18.8 NG/ML (ref 4.78–24.2)
GLOBULIN UR ELPH-MCNC: 2.8 GM/DL
GLUCOSE SERPL-MCNC: 73 MG/DL (ref 65–99)
HCT VFR BLD AUTO: 44.6 % (ref 37.5–51)
HCV AB SER DONR QL: NORMAL
HGB BLD-MCNC: 15.7 G/DL (ref 13–17.7)
IMM GRANULOCYTES # BLD AUTO: 0.01 10*3/MM3 (ref 0–0.05)
IMM GRANULOCYTES NFR BLD AUTO: 0.2 % (ref 0–0.5)
LYMPHOCYTES # BLD AUTO: 1.52 10*3/MM3 (ref 0.7–3.1)
LYMPHOCYTES NFR BLD AUTO: 31.5 % (ref 19.6–45.3)
MCH RBC QN AUTO: 34.4 PG (ref 26.6–33)
MCHC RBC AUTO-ENTMCNC: 35.2 G/DL (ref 31.5–35.7)
MCV RBC AUTO: 97.6 FL (ref 79–97)
MONOCYTES # BLD AUTO: 0.54 10*3/MM3 (ref 0.1–0.9)
MONOCYTES NFR BLD AUTO: 11.2 % (ref 5–12)
NEUTROPHILS NFR BLD AUTO: 2.47 10*3/MM3 (ref 1.7–7)
NEUTROPHILS NFR BLD AUTO: 51.1 % (ref 42.7–76)
NRBC BLD AUTO-RTO: 0 /100 WBC (ref 0–0.2)
PLATELET # BLD AUTO: 390 10*3/MM3 (ref 140–450)
PMV BLD AUTO: 8.7 FL (ref 6–12)
POTASSIUM SERPL-SCNC: 4.3 MMOL/L (ref 3.5–5.2)
PROT SERPL-MCNC: 7.4 G/DL (ref 6–8.5)
RBC # BLD AUTO: 4.57 10*6/MM3 (ref 4.14–5.8)
SODIUM SERPL-SCNC: 131 MMOL/L (ref 136–145)
VIT B12 BLD-MCNC: 595 PG/ML (ref 211–946)
WBC NRBC COR # BLD: 4.83 10*3/MM3 (ref 3.4–10.8)

## 2022-03-09 PROCEDURE — 99214 OFFICE O/P EST MOD 30 MIN: CPT | Performed by: NURSE PRACTITIONER

## 2022-03-09 PROCEDURE — 80053 COMPREHEN METABOLIC PANEL: CPT | Performed by: NURSE PRACTITIONER

## 2022-03-09 PROCEDURE — 82746 ASSAY OF FOLIC ACID SERUM: CPT | Performed by: NURSE PRACTITIONER

## 2022-03-09 PROCEDURE — 82306 VITAMIN D 25 HYDROXY: CPT | Performed by: NURSE PRACTITIONER

## 2022-03-09 PROCEDURE — 86803 HEPATITIS C AB TEST: CPT | Performed by: NURSE PRACTITIONER

## 2022-03-09 PROCEDURE — 82103 ALPHA-1-ANTITRYPSIN TOTAL: CPT | Performed by: NURSE PRACTITIONER

## 2022-03-09 PROCEDURE — 36415 COLL VENOUS BLD VENIPUNCTURE: CPT | Performed by: NURSE PRACTITIONER

## 2022-03-09 PROCEDURE — 85025 COMPLETE CBC W/AUTO DIFF WBC: CPT | Performed by: NURSE PRACTITIONER

## 2022-03-09 PROCEDURE — 82607 VITAMIN B-12: CPT | Performed by: NURSE PRACTITIONER

## 2022-03-09 NOTE — PROGRESS NOTES
Patient Name: Luis Antonio Fairchild  : 1960   MRN: [id number]     Chief Complaint:    Chief Complaint   Patient presents with   • Leg Pain   • Irritable Bowel Syndrome       History of Present Illness: Luis Antonio Fairchild is a 61 y.o. male presents to clinic for leg pain and irritable bowel.    Pain in bilateral legs  Location: thighes  Radiates: down the leg  Severity: moderate  Duration: A few months   Timing:  Intermittent  Progression: waxing and waning  Quality: sharp   Aggravating factors: activity  Alleviating factors: tried rest, takes celecoxib.   Associated symptoms: no numbness; patient has significant coronary artery calcification on his low-dose CT scan.    IBS  Patient states he has had a history of IBS in the past.  He used to take medication for relief but is unsure of the name.  He used to take Metamucil which helped in the past but it no longer helps.  He states  approximately 30 minutes to an hour after eating he has diarrhea associated with cramping. He also has complaints of right upper quadrant pain intermittently.  He states his bowel movements are rarely formed.  Last formed stool was 2 days ago.  During colonoscopy they did see a large amount of stool in his colon despite prep.  He has a history of GERD and takes Protonix.  He has had endoscopy in the past. He had a recent colonoscopy 3/2/22; showed several polyps.  They will repeat his colonoscopy in 2 years.  Patient continues to be concerned about his 30 pound weight loss since his throat cancer in 2018.      Hypertension  The patient denies headaches, chest pain, dyspnea, edema, syncope, blurred vision or palpitations. He state that he is taking his medication as prescribed. He is not having medication side effects.    Hyperlipidemia.  He is taking medication for hyperlipidemia. He denies chest pain, shortness of breath, orthopnea, paroxysmal nocturnal dyspnea, dyspnea on exertion, edema, palpitations or syncope.  He does have what appears  to be claudication.      GERD  The patient reports a history of reflux and heartburn. Denies dysphagia, belching, abdominal pain, nighttime cough, cough, nausea, vomiting, odynophagia or early satiety. He denies  rectal bleeding. The GERD has no known aggravating factors. He does smoke. The patient has had an EGD.    Anxiety   The patient's energy level is good.  He is sleeps well and denies any suicidal ideation.  The current medication regimen consists of citalopram 40 mg daily. The patient denies having medication side effects including nausea, headaches, anxiety, increased depression or fatigue.     Smokes  46 pack year history.  He is trying to cut back.  He is smoking a half a pack a day.  He has not tolerated nicotine patches in the past due to nausea.    History of COPD  Patient recently started on Spiriva.  He has not noticed an improvement since beginning Spiriva.   Denies wheezing and has an occasional cough and some shortness of breath. Diagnoses and all orders for this visit:         Review of System: Review of Systems   Constitutional: Positive for activity change, fatigue and unexpected weight change. Negative for fever.   HENT: Negative for congestion and rhinorrhea.    Respiratory: Positive for cough (Chronic) and shortness of breath (Chronic). Negative for wheezing.    Cardiovascular: Negative for chest pain and palpitations.   Gastrointestinal: Positive for abdominal pain and diarrhea. Negative for blood in stool, constipation, nausea and vomiting.   Genitourinary: Negative for dysuria.   Musculoskeletal: Positive for myalgias.        Claudication   Skin: Negative for rash.   Neurological: Negative for headaches.   Hematological: Negative for adenopathy.   Psychiatric/Behavioral: Negative for dysphoric mood.        Medications:     Current Outpatient Medications:   •  albuterol (PROAIR HFA) 108 (90 BASE) MCG/ACT inhaler, Inhale 1-2 puffs every 4-6 hours PRN, Disp: 1 inhaler, Rfl: 5  •  amLODIPine  "(NORVASC) 5 MG tablet, take 1 tablet by mouth once daily, Disp: 30 tablet, Rfl: 5  •  celecoxib (CeleBREX) 100 MG capsule, Take 1 capsule by mouth 2 (Two) Times a Day As Needed for Mild Pain ., Disp: 60 capsule, Rfl: 2  •  citalopram (CeleXA) 40 MG tablet, take 1 tablet by mouth once daily, Disp: 30 tablet, Rfl: 5  •  pantoprazole (PROTONIX) 40 MG EC tablet, take 1 tablet by mouth twice a day, Disp: 60 tablet, Rfl: 1  •  pravastatin (PRAVACHOL) 40 MG tablet, take 1 tablet by mouth once daily, Disp: 30 tablet, Rfl: 0  •  tiotropium bromide monohydrate (Spiriva Respimat) 2.5 MCG/ACT aerosol solution inhaler, Inhale 2 puffs Daily., Disp: 1 each, Rfl: 6  •  linaclotide (Linzess) 72 MCG capsule capsule, Take 1 capsule by mouth Every Morning Before Breakfast., Disp: 30 capsule, Rfl: 0    Allergies:   No Known Allergies    Objective     Physical Exam:   Vital Signs:   Vitals:    03/09/22 0830   BP: 124/76   BP Location: Right arm   Patient Position: Sitting   Cuff Size: Adult   Pulse: 78   Resp: 20   Temp: 97.1 °F (36.2 °C)   TempSrc: Infrared   SpO2: 95%   Weight: 67.5 kg (148 lb 12.8 oz)   Height: 175.3 cm (69\")   PainSc:   4     Body mass index is 21.97 kg/m². Patient's Body mass index is 21.97 kg/m². indicating that he is within normal range (BMI 18.5-24.9). No BMI management plan needed..      Physical Exam  Constitutional:       General: He is not in acute distress.     Appearance: He is not ill-appearing.   HENT:      Head: Normocephalic.   Cardiovascular:      Rate and Rhythm: Normal rate and regular rhythm.      Pulses: Normal pulses.           Popliteal pulses are 2+ on the right side and 2+ on the left side.        Dorsalis pedis pulses are 2+ on the right side and 2+ on the left side.      Heart sounds: Normal heart sounds. No murmur heard.  Pulmonary:      Breath sounds: Normal breath sounds.   Abdominal:      General: Bowel sounds are normal.      Tenderness: There is no abdominal tenderness. "   Musculoskeletal:         General: No swelling or tenderness. Normal range of motion.      Right lower leg: No edema.      Left lower leg: No edema.   Lymphadenopathy:      Cervical: No cervical adenopathy.   Skin:     General: Skin is warm and dry.   Neurological:      General: No focal deficit present.      Mental Status: He is oriented to person, place, and time.   Psychiatric:         Mood and Affect: Mood normal.         Assessment / Plan      Assessment/Plan:   Diagnoses and all orders for this visit:    1. Right upper quadrant pain (Primary)  -     H. Pylori Antigen, Stool - Stool, Per Rectum; Future  Patient will stop protonix for two weeks and we will check  h pylori.        RUQ pain    2. Irritable bowel syndrome with diarrhea  -     linaclotide (Linzess) 72 MCG capsule capsule; Take 1 capsule by mouth Every Morning Before Breakfast.  Dispense: 30 capsule; Refill: 0    3. Primary hypertension  -     Comprehensive Metabolic Panel; Future  -     CBC & Differential; Future  -     Comprehensive Metabolic Panel  -     CBC & Differential    4. Fatigue, unspecified type  -     Vitamin D 25 Hydroxy; Future  -     Vitamin B12; Future  -     Folate; Future  -     Vitamin D 25 Hydroxy  -     Vitamin B12  -     Folate    5. Encounter for hepatitis C screening test for low risk patient  -     Hepatitis C Antibody; Future  -     Hepatitis C Antibody    6. Shortness of breath  -     Alpha - 1 - Antitrypsin; Future  -     Alpha - 1 - Antitrypsin    7. Claudication (HCC)  -     Duplex Lower Extremity Art / Grafts - Bilateral CAR; Future               Follow Up:   Return in about 4 weeks (around 4/6/2022) for Annual.    KALEB Barrios  [facility]

## 2022-03-11 ENCOUNTER — LAB (OUTPATIENT)
Dept: LAB | Facility: HOSPITAL | Age: 62
End: 2022-03-11

## 2022-03-11 ENCOUNTER — TELEPHONE (OUTPATIENT)
Dept: INTERNAL MEDICINE | Facility: CLINIC | Age: 62
End: 2022-03-11

## 2022-03-11 ENCOUNTER — LAB (OUTPATIENT)
Dept: INTERNAL MEDICINE | Facility: CLINIC | Age: 62
End: 2022-03-11

## 2022-03-11 DIAGNOSIS — R10.11 RIGHT UPPER QUADRANT PAIN: ICD-10-CM

## 2022-03-11 PROCEDURE — 87338 HPYLORI STOOL AG IA: CPT | Performed by: NURSE PRACTITIONER

## 2022-03-14 LAB — H PYLORI AG STL QL IA: NEGATIVE

## 2022-03-15 DIAGNOSIS — K58.0 IRRITABLE BOWEL SYNDROME WITH DIARRHEA: ICD-10-CM

## 2022-03-15 RX ORDER — LUBIPROSTONE 24 UG/1
24 CAPSULE ORAL 2 TIMES DAILY WITH MEALS
Status: CANCELLED | OUTPATIENT
Start: 2022-03-15

## 2022-03-16 ENCOUNTER — TELEPHONE (OUTPATIENT)
Dept: INTERNAL MEDICINE | Facility: CLINIC | Age: 62
End: 2022-03-16

## 2022-03-16 NOTE — TELEPHONE ENCOUNTER
Left voicemail advising of clinical results, office number given if pt has any questions or concerns.

## 2022-03-16 NOTE — TELEPHONE ENCOUNTER
Please call patient and let him know  I would like him to try samples of Amitiza since we were unable to get Linzess approved  by insurance. There are 2 doses available.

## 2022-04-01 ENCOUNTER — TELEPHONE (OUTPATIENT)
Dept: INTERNAL MEDICINE | Facility: CLINIC | Age: 62
End: 2022-04-01

## 2022-04-01 DIAGNOSIS — J01.90 ACUTE SINUSITIS, RECURRENCE NOT SPECIFIED, UNSPECIFIED LOCATION: Primary | ICD-10-CM

## 2022-04-01 RX ORDER — AMOXICILLIN 875 MG/1
875 TABLET, COATED ORAL 2 TIMES DAILY
Qty: 20 TABLET | Refills: 0 | Status: SHIPPED | OUTPATIENT
Start: 2022-04-01 | End: 2022-04-12

## 2022-04-01 NOTE — TELEPHONE ENCOUNTER
I will send in amoxicillin 875 mg to take twice a day for 10 days. Finish all  10 days of antibiotics even if feeling better. Follow-up if no improvement in symptoms.Take with probiotics/yogurt several times daily.

## 2022-04-01 NOTE — TELEPHONE ENCOUNTER
Caller: Luis Antonio Fairchild    Relationship: Self    Best call back number: [phone]    Who are you requesting to speak with (clinical staff, provider,  specific staff member): KEELY MAURICIO     What was the call regarding: PATIENT THINKS HE HAS A BAD SINUS INFECTION.  PATIENT STATES THAT HE IS GETTING BLOOD OUT OF HIS NOSE WHERE IT'S CRACKING     Do you require a callback: YES

## 2022-04-01 NOTE — TELEPHONE ENCOUNTER
Spoke to patient he stated that the whole front of face is having extreme pressure. When he blows his nose he does have blood in it. Patient has tried alkaseltzer and that has not helped much at all.

## 2022-04-12 ENCOUNTER — HOSPITAL ENCOUNTER (OUTPATIENT)
Dept: GENERAL RADIOLOGY | Facility: HOSPITAL | Age: 62
Discharge: HOME | End: 2022-04-12

## 2022-04-12 ENCOUNTER — OFFICE VISIT (OUTPATIENT)
Dept: INTERNAL MEDICINE | Facility: CLINIC | Age: 62
End: 2022-04-12

## 2022-04-12 ENCOUNTER — LAB (OUTPATIENT)
Dept: LAB | Facility: HOSPITAL | Age: 62
End: 2022-04-12

## 2022-04-12 VITALS
HEIGHT: 69 IN | TEMPERATURE: 97.1 F | RESPIRATION RATE: 22 BRPM | OXYGEN SATURATION: 96 % | HEART RATE: 83 BPM | SYSTOLIC BLOOD PRESSURE: 156 MMHG | BODY MASS INDEX: 21.92 KG/M2 | DIASTOLIC BLOOD PRESSURE: 80 MMHG | WEIGHT: 148 LBS

## 2022-04-12 DIAGNOSIS — R19.7 DIARRHEA, UNSPECIFIED TYPE: ICD-10-CM

## 2022-04-12 DIAGNOSIS — M25.561 ACUTE PAIN OF RIGHT KNEE: ICD-10-CM

## 2022-04-12 DIAGNOSIS — Z00.00 WELLNESS EXAMINATION: Primary | ICD-10-CM

## 2022-04-12 LAB
BASOPHILS # BLD AUTO: 0.05 10*3/MM3 (ref 0–0.2)
BASOPHILS NFR BLD AUTO: 1.1 % (ref 0–1.5)
DEPRECATED RDW RBC AUTO: 40.4 FL (ref 37–54)
EOSINOPHIL # BLD AUTO: 0.12 10*3/MM3 (ref 0–0.4)
EOSINOPHIL NFR BLD AUTO: 2.7 % (ref 0.3–6.2)
ERYTHROCYTE [DISTWIDTH] IN BLOOD BY AUTOMATED COUNT: 11.7 % (ref 12.3–15.4)
HCT VFR BLD AUTO: 41 % (ref 37.5–51)
HGB BLD-MCNC: 14.8 G/DL (ref 13–17.7)
IMM GRANULOCYTES # BLD AUTO: 0.01 10*3/MM3 (ref 0–0.05)
IMM GRANULOCYTES NFR BLD AUTO: 0.2 % (ref 0–0.5)
LYMPHOCYTES # BLD AUTO: 1.52 10*3/MM3 (ref 0.7–3.1)
LYMPHOCYTES NFR BLD AUTO: 33.8 % (ref 19.6–45.3)
MCH RBC QN AUTO: 34.3 PG (ref 26.6–33)
MCHC RBC AUTO-ENTMCNC: 36.1 G/DL (ref 31.5–35.7)
MCV RBC AUTO: 94.9 FL (ref 79–97)
MONOCYTES # BLD AUTO: 0.65 10*3/MM3 (ref 0.1–0.9)
MONOCYTES NFR BLD AUTO: 14.4 % (ref 5–12)
NEUTROPHILS NFR BLD AUTO: 2.15 10*3/MM3 (ref 1.7–7)
NEUTROPHILS NFR BLD AUTO: 47.8 % (ref 42.7–76)
NRBC BLD AUTO-RTO: 0 /100 WBC (ref 0–0.2)
PLATELET # BLD AUTO: 352 10*3/MM3 (ref 140–450)
PMV BLD AUTO: 8.4 FL (ref 6–12)
RBC # BLD AUTO: 4.32 10*6/MM3 (ref 4.14–5.8)
WBC NRBC COR # BLD: 4.5 10*3/MM3 (ref 3.4–10.8)

## 2022-04-12 PROCEDURE — 36415 COLL VENOUS BLD VENIPUNCTURE: CPT | Performed by: NURSE PRACTITIONER

## 2022-04-12 PROCEDURE — 73560 X-RAY EXAM OF KNEE 1 OR 2: CPT

## 2022-04-12 PROCEDURE — 80061 LIPID PANEL: CPT | Performed by: NURSE PRACTITIONER

## 2022-04-12 PROCEDURE — 80053 COMPREHEN METABOLIC PANEL: CPT | Performed by: NURSE PRACTITIONER

## 2022-04-12 PROCEDURE — 99396 PREV VISIT EST AGE 40-64: CPT | Performed by: NURSE PRACTITIONER

## 2022-04-12 PROCEDURE — 85025 COMPLETE CBC W/AUTO DIFF WBC: CPT | Performed by: NURSE PRACTITIONER

## 2022-04-12 NOTE — PROGRESS NOTES
Male Physical Note      Patient Name: Luis Antonio Fairchild  : 1960   MRN: [id number]     Chief Complaint:    Chief Complaint   Patient presents with   • Annual Exam       History of Present Illness: Luis Antonio Fairchild is a 61 y.o. male who is here today for their annual health maintenance and physical.    Are you currently seeing any other doctors or specialists?  Pulmonary- Joel   [facility]- [facility]      Regular dental visits: Yes, every 6 months  Regular eye exams: Yes, yearly, wears glasses     IBS  Patient states he has had a history of IBS in the past.  He has diarrhea several times daily; he tried Metamucil and it didn't help.  He tried linzess and amitiza without relief. He cannot afford these medications. Patient continues to be concerned about his 30 pound weight loss since his throat cancer in 2018.      Liver US tomorrow          Subjective      Review of Systems:   Review of Systems  Review of Systems   Constitutional: Negative for activity change, appetite change and fever.  No change in weight.  HENT: Negative for congestion, ear pain, rhinorrhea and sore throat.    Eyes: Negative for discharge and visual disturbance.   Respiratory: Negative for cough, wheezing and shortness of breath.    Cardiovascular: Negative for chest pain or palpitations.   Gastrointestinal: Diarrhea and RUQ abdominal pain; denies blood in stool  and vomiting  Endocrine: Negative for polyuria.   Genitourinary: Negative for difficulty urinating and dysuria.   Musculoskeletal: Negative for neck pain and neck stiffness; no back pain; Knee pain and leg pain  Skin: Negative for rash.   Neurological: Negative for headache numbness and tingling.   Hematological: Negative for adenopathy.   Psychiatric/Behavioral: Negative for  depression and anxiety    Past Medical History:   Past Medical History:   Diagnosis Date   • Anxiety 2016   • Arthritis    • Cancer (HCC) 2018    Squamous carcinoma oropharynx   • GERD (gastroesophageal reflux  disease)    • History of IBS    • Hyperlipidemia    • Hypertension    • Pneumonia        Past Surgical History:   Past Surgical History:   Procedure Laterality Date   • COLONOSCOPY     • SHOULDER SURGERY      Onset Date    • THROAT SURGERY     • WRIST SURGERY      Onset Date:        Family History:   Family History   Problem Relation Age of Onset   • Anxiety disorder Mother    • Arthritis Mother    • Stroke Father            • Hypertension Father    • Cancer Brother    • Anxiety disorder Brother    • Cancer Brother                Social History:   Social History     Socioeconomic History   • Marital status:    Tobacco Use   • Smoking status: Current Every Day Smoker     Packs/day: 1.00     Years: 46.00     Pack years: 46.00     Types: Cigarettes   • Smokeless tobacco: Never Used   Vaping Use   • Vaping Use: Never used   Substance and Sexual Activity   • Alcohol use: Yes     Alcohol/week: 35.0 standard drinks     Types: 35 Cans of beer per week     Comment: nightly   • Drug use: Never   • Sexual activity: Yes     Partners: Female     Comment:         Medications:     Current Outpatient Medications:   •  albuterol (PROAIR HFA) 108 (90 BASE) MCG/ACT inhaler, Inhale 1-2 puffs every 4-6 hours PRN, Disp: 1 inhaler, Rfl: 5  •  amLODIPine (NORVASC) 5 MG tablet, take 1 tablet by mouth once daily, Disp: 30 tablet, Rfl: 5  •  celecoxib (CeleBREX) 100 MG capsule, Take 1 capsule by mouth 2 (Two) Times a Day As Needed for Mild Pain ., Disp: 60 capsule, Rfl: 2  •  pantoprazole (PROTONIX) 40 MG EC tablet, take 1 tablet by mouth twice a day, Disp: 60 tablet, Rfl: 1  •  pravastatin (PRAVACHOL) 40 MG tablet, take 1 tablet by mouth once daily, Disp: 30 tablet, Rfl: 0  •  ciprofloxacin (CIPRO) 500 MG tablet, Take 1 tablet by mouth 2 (Two) Times a Day., Disp: 6 tablet, Rfl: 0  •  citalopram (CeleXA) 40 MG tablet, Take 1 tablet by mouth Daily., Disp: 90 tablet, Rfl: 0  •   "fluticasone-salmeterol (Advair Diskus) 100-50 MCG/DOSE DISKUS, Inhale 1 puff 2 (Two) Times a Day., Disp: 60 each, Rfl: 3    Allergies:   No Known Allergies    Immunizations:  Flu (Yearly): yearly  Colorectal Screening:     Last Completed Colonoscopy          COLORECTAL CANCER SCREENING (COLONOSCOPY - Every 2 Years) Next due on 3/3/2024    03/03/2022  SCANNED - COLONOSCOPY              Diet/Physical activity:Regular activity and diet  Sexual health: no concerns  PHQ 9: 3.     Objective     Physical Exam:  Vital Signs:   Vitals:    04/12/22 1444   BP: 156/80   BP Location: Right arm   Patient Position: Sitting   Cuff Size: Adult   Pulse: 83   Resp: 22   Temp: 97.1 °F (36.2 °C)   TempSrc: Infrared   SpO2: 96%   Weight: 67.1 kg (148 lb)   Height: 175.3 cm (69\")   PainSc:   8     Body mass index is 21.86 kg/m². Patient's Body mass index is 21.86 kg/m². indicating that he is within normal range (BMI 18.5-24.9). No BMI management plan needed..      Physical Exam  Vitals and nursing note reviewed.   Constitutional:       General: He is not in acute distress.     Appearance: Normal appearance. He is not ill-appearing.   HENT:      Head: Normocephalic.      Right Ear: Tympanic membrane, ear canal and external ear normal. There is no impacted cerumen.      Left Ear: Tympanic membrane, ear canal and external ear normal. There is no impacted cerumen.      Nose: No septal deviation, nasal tenderness or rhinorrhea.      Mouth/Throat:      Mouth: Mucous membranes are moist.      Pharynx: Oropharynx is clear. No oropharyngeal exudate or posterior oropharyngeal erythema.   Eyes:      General:         Right eye: No discharge.         Left eye: No discharge.      Extraocular Movements: Extraocular movements intact.      Conjunctiva/sclera: Conjunctivae normal.      Pupils: Pupils are equal, round, and reactive to light.   Neck:      Thyroid: No thyromegaly.      Vascular: No carotid bruit.   Cardiovascular:      Rate and Rhythm: " Normal rate and regular rhythm.      Pulses: Normal pulses.      Heart sounds: Normal heart sounds. No murmur heard.    No gallop.   Pulmonary:      Effort: Pulmonary effort is normal.      Breath sounds: Normal breath sounds. No wheezing, rhonchi or rales.   Abdominal:      General: Bowel sounds are normal.      Palpations: Abdomen is soft. There is no mass.      Tenderness: There is no abdominal tenderness. There is no right CVA tenderness, left CVA tenderness or rebound.   Genitourinary:     Comments: deferred  Musculoskeletal:         General: Tenderness (right knee generalized pain) present.      Cervical back: Normal range of motion.   Lymphadenopathy:      Cervical: No cervical adenopathy.   Skin:     General: Skin is warm and dry.      Capillary Refill: Capillary refill takes less than 2 seconds.      Findings: No erythema or rash.   Neurological:      General: No focal deficit present.      Mental Status: He is alert and oriented to person, place, and time.      Motor: No weakness.   Psychiatric:         Mood and Affect: Mood normal.         Behavior: Behavior is cooperative.         Thought Content: Thought content normal.         Cognition and Memory: He does not exhibit impaired recent memory.         Judgment: Judgment normal.         Procedures    Assessment / Plan      Assessment/Plan:   Diagnoses and all orders for this visit:    1. Wellness examination (Primary)  -     Gastrointestinal Panel, PCR - Stool, Per Rectum; Future  -     Clostridium Difficile Toxin, PCR - Stool, Per Rectum; Future  -     Ova & Parasite Examination - Stool, Per Rectum; Future  -     Comprehensive metabolic panel; Future  -     Lipid panel; Future  -     CBC w AUTO Differential; Future  -     Comprehensive metabolic panel  -     Lipid panel  -     CBC w AUTO Differential    2. Diarrhea, unspecified type  -     Gastrointestinal Panel, PCR - Stool, Per Rectum; Future  -     Clostridium Difficile Toxin, PCR - Stool, Per Rectum;  Future  -     Ova & Parasite Examination - Stool, Per Rectum; Future    3. Acute pain of right knee  -     XR Knee 1 or 2 View Right; Future             Follow Up:   Will discuss follow-up after testing     Healthcare Maintenance:   Counseling provided on     Education includes wearing seatbelt, use of sunscreen, sleeping 8 hours every night, and drinking 64 ounces of water daily.  Discussed a healthy well-balanced diet of healthy fats, lean protein, and carbohydrates focusing on a variety of vegetables and fruits. Encouraging 30minutes of cardio 5d weekly and muscle strengthening 3x weekly.        Luis Antonio Fairchild voices understanding and acceptance of this advice and will call back with any further questions or concerns. AVS with preventive healthcare tips printed for patient.     KALEB Barrios   [facility]

## 2022-04-13 ENCOUNTER — TELEPHONE (OUTPATIENT)
Dept: INTERNAL MEDICINE | Facility: CLINIC | Age: 62
End: 2022-04-13

## 2022-04-13 LAB
ALBUMIN SERPL-MCNC: 4.6 G/DL (ref 3.5–5.2)
ALBUMIN/GLOB SERPL: 1.8 G/DL
ALP SERPL-CCNC: 83 U/L (ref 39–117)
ALT SERPL W P-5'-P-CCNC: 27 U/L (ref 1–41)
ANION GAP SERPL CALCULATED.3IONS-SCNC: 11 MMOL/L (ref 5–15)
AST SERPL-CCNC: 27 U/L (ref 1–40)
BILIRUB SERPL-MCNC: 0.2 MG/DL (ref 0–1.2)
BUN SERPL-MCNC: 12 MG/DL (ref 8–23)
BUN/CREAT SERPL: 13.5 (ref 7–25)
CALCIUM SPEC-SCNC: 8.9 MG/DL (ref 8.6–10.5)
CHLORIDE SERPL-SCNC: 94 MMOL/L (ref 98–107)
CHOLEST SERPL-MCNC: 185 MG/DL (ref 0–200)
CO2 SERPL-SCNC: 24 MMOL/L (ref 22–29)
CREAT SERPL-MCNC: 0.89 MG/DL (ref 0.76–1.27)
EGFRCR SERPLBLD CKD-EPI 2021: 96.9 ML/MIN/1.73
GLOBULIN UR ELPH-MCNC: 2.5 GM/DL
GLUCOSE SERPL-MCNC: 186 MG/DL (ref 65–99)
HDLC SERPL-MCNC: 74 MG/DL (ref 40–60)
LDLC SERPL CALC-MCNC: 81 MG/DL (ref 0–100)
LDLC/HDLC SERPL: 1.01 {RATIO}
POTASSIUM SERPL-SCNC: 4.3 MMOL/L (ref 3.5–5.2)
PROT SERPL-MCNC: 7.1 G/DL (ref 6–8.5)
SODIUM SERPL-SCNC: 129 MMOL/L (ref 136–145)
TRIGL SERPL-MCNC: 181 MG/DL (ref 0–150)
VLDLC SERPL-MCNC: 30 MG/DL (ref 5–40)

## 2022-04-13 NOTE — TELEPHONE ENCOUNTER
Caller: [facility] [address] [place] [address] - [address] [facility] - [phone]  - [phone] FX    Relationship: Pharmacy [facility]    Best call back number: [phone]    What is the best time to reach you: TIL 7P    Who are you requesting to speak with (clinical staff, provider,  specific staff member): CLINICAL STAFF    Do you know the name of the person who called: CORA    What was the call regarding: CALLING REGARDING DOSAGE OF CITALOPRAM, PATIENT ADVISED HE WAS TAKING THIS ONCE DAILY AND IT HAS BEEN INCREASED TO TWICE DAILY AND THEY NEED A NEW PRESCRIPTION TO REFLECT THIS    Do you require a callback: YES

## 2022-04-18 ENCOUNTER — LAB (OUTPATIENT)
Dept: LAB | Facility: HOSPITAL | Age: 62
End: 2022-04-18

## 2022-04-18 ENCOUNTER — LAB (OUTPATIENT)
Dept: INTERNAL MEDICINE | Facility: CLINIC | Age: 62
End: 2022-04-18

## 2022-04-18 DIAGNOSIS — Z00.00 WELLNESS EXAMINATION: ICD-10-CM

## 2022-04-18 DIAGNOSIS — R19.7 DIARRHEA, UNSPECIFIED TYPE: ICD-10-CM

## 2022-04-18 LAB
ADV 40+41 DNA STL QL NAA+NON-PROBE: NOT DETECTED
ASTRO TYP 1-8 RNA STL QL NAA+NON-PROBE: NOT DETECTED
C CAYETANENSIS DNA STL QL NAA+NON-PROBE: NOT DETECTED
C COLI+JEJ+UPSA DNA STL QL NAA+NON-PROBE: NOT DETECTED
C DIFF TOX GENS STL QL NAA+PROBE: NOT DETECTED
CRYPTOSP DNA STL QL NAA+NON-PROBE: NOT DETECTED
E HISTOLYT DNA STL QL NAA+NON-PROBE: NOT DETECTED
EAEC PAA PLAS AGGR+AATA ST NAA+NON-PRB: NOT DETECTED
EC STX1+STX2 GENES STL QL NAA+NON-PROBE: NOT DETECTED
EPEC EAE GENE STL QL NAA+NON-PROBE: NOT DETECTED
ETEC LTA+ST1A+ST1B TOX ST NAA+NON-PROBE: NOT DETECTED
G LAMBLIA DNA STL QL NAA+NON-PROBE: NOT DETECTED
NOROVIRUS GI+II RNA STL QL NAA+NON-PROBE: NOT DETECTED
P SHIGELLOIDES DNA STL QL NAA+NON-PROBE: NOT DETECTED
RVA RNA STL QL NAA+NON-PROBE: DETECTED
S ENT+BONG DNA STL QL NAA+NON-PROBE: NOT DETECTED
SAPO I+II+IV+V RNA STL QL NAA+NON-PROBE: NOT DETECTED
SHIGELLA SP+EIEC IPAH ST NAA+NON-PROBE: NOT DETECTED
V CHOL+PARA+VUL DNA STL QL NAA+NON-PROBE: NOT DETECTED
V CHOLERAE DNA STL QL NAA+NON-PROBE: NOT DETECTED
Y ENTEROCOL DNA STL QL NAA+NON-PROBE: NOT DETECTED

## 2022-04-18 PROCEDURE — 87209 SMEAR COMPLEX STAIN: CPT | Performed by: NURSE PRACTITIONER

## 2022-04-18 PROCEDURE — 87493 C DIFF AMPLIFIED PROBE: CPT | Performed by: NURSE PRACTITIONER

## 2022-04-18 PROCEDURE — 87177 OVA AND PARASITES SMEARS: CPT | Performed by: NURSE PRACTITIONER

## 2022-04-18 PROCEDURE — 87507 IADNA-DNA/RNA PROBE TQ 12-25: CPT | Performed by: NURSE PRACTITIONER

## 2022-04-19 DIAGNOSIS — J44.9 CHRONIC OBSTRUCTIVE PULMONARY DISEASE, UNSPECIFIED COPD TYPE: ICD-10-CM

## 2022-04-19 DIAGNOSIS — K52.9 GASTROENTERITIS: Primary | ICD-10-CM

## 2022-04-19 RX ORDER — CIPROFLOXACIN 500 MG/1
500 TABLET, FILM COATED ORAL 2 TIMES DAILY
Qty: 6 TABLET | Refills: 0 | Status: SHIPPED | OUTPATIENT
Start: 2022-04-19 | End: 2022-05-11

## 2022-04-19 RX ORDER — CITALOPRAM 40 MG/1
40 TABLET ORAL DAILY
Qty: 90 TABLET | Refills: 0 | Status: SHIPPED | OUTPATIENT
Start: 2022-04-19 | End: 2022-08-12 | Stop reason: SDUPTHER

## 2022-04-19 NOTE — TELEPHONE ENCOUNTER
I called and left him a message about his positive stool studies and an antibiotic I sent in. I refilled his citalopram. Also called him in a new inhaler. Please schedule him a f/u in four weeks.

## 2022-04-21 NOTE — TELEPHONE ENCOUNTER
Patient has been notified and verb good understanding. Patient has already picked up all of his medications. Patient has also been scheduled for 4 week follow up.

## 2022-04-21 NOTE — TELEPHONE ENCOUNTER
I left a message on the patients voicemail to call our office back, office number provided.

## 2022-04-25 ENCOUNTER — TELEPHONE (OUTPATIENT)
Dept: INTERNAL MEDICINE | Facility: CLINIC | Age: 62
End: 2022-04-25

## 2022-04-25 NOTE — TELEPHONE ENCOUNTER
I called patient and discussed the US of abdomen. Patient has intermittent LUQ pain.  I recommended a CT of abdomen/spleen to get a better look at the spleen (due to possible calcified granuloma).   Patient will let me know if his pain does not improve when he gets back from vacation.

## 2022-04-27 LAB
O+P SPEC MICRO: NORMAL
O+P STL TRI STN: NORMAL

## 2022-04-27 NOTE — TELEPHONE ENCOUNTER
Caller: Gurinder Luis Antoniohernesto Hanson    Relationship: Self    Best call back number: [phone]    Requested Prescriptions:   Requested Prescriptions     Pending Prescriptions Disp Refills   • celecoxib (CeleBREX) 100 MG capsule 60 capsule 2     Sig: Take 1 capsule by mouth 2 (Two) Times a Day As Needed for Mild Pain .        Pharmacy where request should be sent: [facility] [address] [place], [place] [phone] [place] [phone] FX     Additional details provided by patient: PATIENT NEEDS A NEW PRESCRIPTION    Does the patient have less than a 3 day supply:  [x] Yes  [] No    Sandra Neal Rep   04/27/22 14:26 EDT

## 2022-04-28 RX ORDER — CELECOXIB 100 MG/1
100 CAPSULE ORAL 2 TIMES DAILY PRN
Qty: 60 CAPSULE | Refills: 2 | Status: SHIPPED | OUTPATIENT
Start: 2022-04-28 | End: 2022-05-11

## 2022-05-11 ENCOUNTER — OFFICE VISIT (OUTPATIENT)
Dept: INTERNAL MEDICINE | Facility: CLINIC | Age: 62
End: 2022-05-11

## 2022-05-11 VITALS
HEART RATE: 81 BPM | TEMPERATURE: 98.2 F | DIASTOLIC BLOOD PRESSURE: 68 MMHG | SYSTOLIC BLOOD PRESSURE: 110 MMHG | OXYGEN SATURATION: 97 % | BODY MASS INDEX: 21.8 KG/M2 | WEIGHT: 147.6 LBS

## 2022-05-11 DIAGNOSIS — I73.9 CLAUDICATION: Primary | ICD-10-CM

## 2022-05-11 DIAGNOSIS — M19.90 OSTEOARTHRITIS, UNSPECIFIED OSTEOARTHRITIS TYPE, UNSPECIFIED SITE: Chronic | ICD-10-CM

## 2022-05-11 PROCEDURE — 99214 OFFICE O/P EST MOD 30 MIN: CPT | Performed by: NURSE PRACTITIONER

## 2022-05-11 RX ORDER — CELECOXIB 200 MG/1
200 CAPSULE ORAL 2 TIMES DAILY
Qty: 180 CAPSULE | Refills: 0 | Status: SHIPPED | OUTPATIENT
Start: 2022-05-11 | End: 2022-08-12 | Stop reason: SDUPTHER

## 2022-05-11 NOTE — PROGRESS NOTES
Patient Name: Luis Antonio Fairchild  : 1960   MRN: [id number]     Chief Complaint:    Chief Complaint   Patient presents with   • Weight Loss     4 wk fu       History of Present Illness: Luis Antonio Fairchild is a 61 y.o. male presents to clinic for follow-up of several concerns.    Abdominal pain improved after antibiotics. He had  Rotavirus.      Arthritis  The patient has a history of arthritis.  It affects his hands, shoulders, knees and wrists.  He has been taking Celecoxib 100 mg twice a day. He would like to increase it to 200 mg BID.     Smokes  46 pack year history.  He is trying nicotine patches prescribed by his head and neck surgeon.      History of COPD  Symptoms controlled with albuterol as needed; uses it 2 x week. . Denies wheezing and dyspnea and has an occasional cough. He is using Advair twice a day.    Patient has significant pain in bilateral legs with walking. He has to stop or sit down due to pain. The location is his thighs. Radiates down the legs. He rates the pain as moderate. The pain started over the last 3-4 months and is worsening. Quality is dull pain. Alleviating factors are  rest, takes celecoxib. He denies any numbness. Risk factors are significant coronary artery calcification on his low-dose CT scan.    Bilateral Artery Duplex  was scheduled in April but too expensive at tat location I will try a different location.     Subjective     Review of System: Review of Systems   Constitutional: Positive for unexpected weight change (stable for a few months). Negative for fatigue and fever.   HENT: Negative for congestion and rhinorrhea.    Respiratory: Positive for cough. Negative for shortness of breath and wheezing.    Cardiovascular: Negative for chest pain and palpitations.   Gastrointestinal: Negative for abdominal pain, diarrhea, nausea and vomiting.   Musculoskeletal: Positive for arthralgias and myalgias.        Medications:     Current Outpatient Medications:   •  albuterol (PROAIR  HFA) 108 (90 BASE) MCG/ACT inhaler, Inhale 1-2 puffs every 4-6 hours PRN, Disp: 1 inhaler, Rfl: 5  •  amLODIPine (NORVASC) 5 MG tablet, take 1 tablet by mouth once daily, Disp: 30 tablet, Rfl: 5  •  citalopram (CeleXA) 40 MG tablet, Take 1 tablet by mouth Daily., Disp: 90 tablet, Rfl: 0  •  fluticasone-salmeterol (Advair Diskus) 100-50 MCG/DOSE DISKUS, Inhale 1 puff 2 (Two) Times a Day., Disp: 60 each, Rfl: 3  •  pantoprazole (PROTONIX) 40 MG EC tablet, take 1 tablet by mouth twice a day, Disp: 60 tablet, Rfl: 1  •  pravastatin (PRAVACHOL) 40 MG tablet, take 1 tablet by mouth once daily, Disp: 30 tablet, Rfl: 0  •  celecoxib (CeleBREX) 200 MG capsule, Take 1 capsule by mouth 2 (Two) Times a Day., Disp: 180 capsule, Rfl: 0    Allergies:   No Known Allergies    Objective     Physical Exam:   Vital Signs:   Vitals:    05/11/22 1249   BP: 110/68   BP Location: Right arm   Patient Position: Sitting   Cuff Size: Adult   Pulse: 81   Temp: 98.2 °F (36.8 °C)   TempSrc: Temporal   SpO2: 97%   Weight: 67 kg (147 lb 9.6 oz)     Body mass index is 21.8 kg/m². BMI is within normal parameters. No follow-up required.      Physical Exam  Constitutional:       General: He is not in acute distress.     Appearance: He is not ill-appearing.   HENT:      Head: Normocephalic.   Cardiovascular:      Rate and Rhythm: Normal rate and regular rhythm.      Heart sounds: Normal heart sounds. No murmur heard.  Pulmonary:      Effort: No respiratory distress.      Breath sounds: Wheezing (left upper lobes ) present.   Abdominal:      General: Bowel sounds are normal.      Tenderness: There is no abdominal tenderness.   Musculoskeletal:         General: No tenderness.   Lymphadenopathy:      Cervical: No cervical adenopathy.   Skin:     General: Skin is warm and dry.   Neurological:      General: No focal deficit present.      Mental Status: He is oriented to person, place, and time.   Psychiatric:         Mood and Affect: Mood normal.          Assessment / Plan      Assessment/Plan:   Diagnoses and all orders for this visit:    1. Claudication (HCC) (Primary)  -     Duplex Lower Extremity Art / Grafts - Bilateral CAR; Future    2. Osteoarthritis, unspecified osteoarthritis type, unspecified site  -     celecoxib (CeleBREX) 200 MG capsule; Take 1 capsule by mouth 2 (Two) Times a Day.  Dispense: 180 capsule; Refill: 0     Explained and discussed patient's condition and plan of care.  Discussed when to follow-up.  Discussed possible red flags and how to follow-up with those.  Viewed patient's medications and discussed common side effects. Patient to continue current medications as advised.  Be compliant with medications. Patient to let me know if he has any changes in health, does not tolerate medication, or any future concerns about treatment. ER for emergencies. Patient verbalized an understanding and agreement with plan of care.              Follow Up:   Return in about 3 months (around 8/11/2022).    KALEB Barrios  [facility]

## 2022-05-31 ENCOUNTER — TELEPHONE (OUTPATIENT)
Dept: INTERNAL MEDICINE | Facility: CLINIC | Age: 62
End: 2022-05-31

## 2022-05-31 NOTE — TELEPHONE ENCOUNTER
Pt called stating he has had a bad episode of pain this weekend with both legs. Pt stated he has little or no strength in legs, starting at hip down to shin bone. Pt is taking Celebrex 200 mg tablet; 1 capsules by mouth 2 (two) Times a day.   Please advise?

## 2022-05-31 NOTE — TELEPHONE ENCOUNTER
I called patient and he is going to schedule his duplex of both legs. I will call him with results.

## 2022-06-07 ENCOUNTER — TELEPHONE (OUTPATIENT)
Dept: INTERNAL MEDICINE | Facility: CLINIC | Age: 62
End: 2022-06-07

## 2022-06-07 NOTE — TELEPHONE ENCOUNTER
Caller: Luis Antonio Fairchild    Relationship to patient: Self    Best call back number:[phone]    FYI: PATIENT STATED THAT HE WAS LETTING PROVIDER KNOW HE HAD ULTRASOUND DONE ON 06/06/22

## 2022-06-09 ENCOUNTER — TELEPHONE (OUTPATIENT)
Dept: INTERNAL MEDICINE | Facility: CLINIC | Age: 62
End: 2022-06-09

## 2022-06-13 NOTE — TELEPHONE ENCOUNTER
Attempt #3 I left a message on the patients voicemail to call our office back, office number provided.   Please advise

## 2022-06-20 NOTE — TELEPHONE ENCOUNTER
Attempt #3 I left a message on the patients voicemail to call our office back, office number provided.     Please advise. Ok to close note?

## 2022-07-25 ENCOUNTER — PATIENT MESSAGE (OUTPATIENT)
Dept: INTERNAL MEDICINE | Facility: CLINIC | Age: 62
End: 2022-07-25

## 2022-07-26 RX ORDER — PANTOPRAZOLE SODIUM 40 MG/1
40 TABLET, DELAYED RELEASE ORAL 2 TIMES DAILY
Qty: 60 TABLET | Refills: 0 | Status: SHIPPED | OUTPATIENT
Start: 2022-07-26 | End: 2022-08-12 | Stop reason: SDUPTHER

## 2022-07-26 NOTE — TELEPHONE ENCOUNTER
From: Luis Antonio Fairchild  To: KALEB Barrios  Sent: 7/25/2022 9:31 PM EDT  Subject: Refill     I need a refill on my Pantoprazole. I use [facility] [place]. Thank you

## 2022-08-12 ENCOUNTER — OFFICE VISIT (OUTPATIENT)
Dept: INTERNAL MEDICINE | Facility: CLINIC | Age: 62
End: 2022-08-12

## 2022-08-12 ENCOUNTER — HOSPITAL ENCOUNTER (OUTPATIENT)
Dept: GENERAL RADIOLOGY | Facility: HOSPITAL | Age: 62
Discharge: HOME | End: 2022-08-12
Admitting: NURSE PRACTITIONER

## 2022-08-12 VITALS
RESPIRATION RATE: 18 BRPM | DIASTOLIC BLOOD PRESSURE: 82 MMHG | BODY MASS INDEX: 21.48 KG/M2 | OXYGEN SATURATION: 99 % | HEART RATE: 62 BPM | SYSTOLIC BLOOD PRESSURE: 142 MMHG | TEMPERATURE: 97.8 F | HEIGHT: 69 IN | WEIGHT: 145 LBS

## 2022-08-12 DIAGNOSIS — F41.9 ANXIETY: ICD-10-CM

## 2022-08-12 DIAGNOSIS — M19.90 OSTEOARTHRITIS, UNSPECIFIED OSTEOARTHRITIS TYPE, UNSPECIFIED SITE: Chronic | ICD-10-CM

## 2022-08-12 DIAGNOSIS — M54.41 ACUTE RIGHT-SIDED LOW BACK PAIN WITH RIGHT-SIDED SCIATICA: Primary | ICD-10-CM

## 2022-08-12 DIAGNOSIS — K21.9 GASTROESOPHAGEAL REFLUX DISEASE, UNSPECIFIED WHETHER ESOPHAGITIS PRESENT: ICD-10-CM

## 2022-08-12 DIAGNOSIS — M54.41 ACUTE RIGHT-SIDED LOW BACK PAIN WITH RIGHT-SIDED SCIATICA: ICD-10-CM

## 2022-08-12 DIAGNOSIS — I10 PRIMARY HYPERTENSION: ICD-10-CM

## 2022-08-12 DIAGNOSIS — E78.5 HYPERLIPIDEMIA, UNSPECIFIED HYPERLIPIDEMIA TYPE: ICD-10-CM

## 2022-08-12 LAB
BASOPHILS # BLD AUTO: 0.07 10*3/MM3 (ref 0–0.2)
BASOPHILS NFR BLD AUTO: 1.1 % (ref 0–1.5)
CHOLEST SERPL-MCNC: 233 MG/DL (ref 0–200)
DEPRECATED RDW RBC AUTO: 43.4 FL (ref 37–54)
EOSINOPHIL # BLD AUTO: 0.16 10*3/MM3 (ref 0–0.4)
EOSINOPHIL NFR BLD AUTO: 2.6 % (ref 0.3–6.2)
ERYTHROCYTE [DISTWIDTH] IN BLOOD BY AUTOMATED COUNT: 12.1 % (ref 12.3–15.4)
HCT VFR BLD AUTO: 42.3 % (ref 37.5–51)
HDLC SERPL-MCNC: 77 MG/DL (ref 40–60)
HGB BLD-MCNC: 14.6 G/DL (ref 13–17.7)
IMM GRANULOCYTES # BLD AUTO: 0.02 10*3/MM3 (ref 0–0.05)
IMM GRANULOCYTES NFR BLD AUTO: 0.3 % (ref 0–0.5)
LDLC SERPL CALC-MCNC: 129 MG/DL (ref 0–100)
LDLC/HDLC SERPL: 1.62 {RATIO}
LYMPHOCYTES # BLD AUTO: 1.44 10*3/MM3 (ref 0.7–3.1)
LYMPHOCYTES NFR BLD AUTO: 23.5 % (ref 19.6–45.3)
MCH RBC QN AUTO: 34 PG (ref 26.6–33)
MCHC RBC AUTO-ENTMCNC: 34.5 G/DL (ref 31.5–35.7)
MCV RBC AUTO: 98.6 FL (ref 79–97)
MONOCYTES # BLD AUTO: 0.67 10*3/MM3 (ref 0.1–0.9)
MONOCYTES NFR BLD AUTO: 10.9 % (ref 5–12)
NEUTROPHILS NFR BLD AUTO: 3.77 10*3/MM3 (ref 1.7–7)
NEUTROPHILS NFR BLD AUTO: 61.6 % (ref 42.7–76)
NRBC BLD AUTO-RTO: 0 /100 WBC (ref 0–0.2)
PLATELET # BLD AUTO: 354 10*3/MM3 (ref 140–450)
PMV BLD AUTO: 8.5 FL (ref 6–12)
RBC # BLD AUTO: 4.29 10*6/MM3 (ref 4.14–5.8)
TRIGL SERPL-MCNC: 156 MG/DL (ref 0–150)
TSH SERPL DL<=0.05 MIU/L-ACNC: 1.51 UIU/ML (ref 0.27–4.2)
VLDLC SERPL-MCNC: 27 MG/DL (ref 5–40)
WBC NRBC COR # BLD: 6.13 10*3/MM3 (ref 3.4–10.8)

## 2022-08-12 PROCEDURE — 84443 ASSAY THYROID STIM HORMONE: CPT | Performed by: NURSE PRACTITIONER

## 2022-08-12 PROCEDURE — 85025 COMPLETE CBC W/AUTO DIFF WBC: CPT | Performed by: NURSE PRACTITIONER

## 2022-08-12 PROCEDURE — 80061 LIPID PANEL: CPT | Performed by: NURSE PRACTITIONER

## 2022-08-12 PROCEDURE — 72100 X-RAY EXAM L-S SPINE 2/3 VWS: CPT

## 2022-08-12 PROCEDURE — 99214 OFFICE O/P EST MOD 30 MIN: CPT | Performed by: NURSE PRACTITIONER

## 2022-08-12 PROCEDURE — 80053 COMPREHEN METABOLIC PANEL: CPT | Performed by: NURSE PRACTITIONER

## 2022-08-12 RX ORDER — FAMOTIDINE 20 MG/1
20 TABLET, FILM COATED ORAL 2 TIMES DAILY PRN
COMMUNITY
Start: 2022-05-10 | End: 2022-08-12 | Stop reason: SDUPTHER

## 2022-08-12 RX ORDER — CITALOPRAM 40 MG/1
40 TABLET ORAL DAILY
Qty: 90 TABLET | Refills: 1 | Status: SHIPPED | OUTPATIENT
Start: 2022-08-12 | End: 2023-02-08 | Stop reason: SDUPTHER

## 2022-08-12 RX ORDER — FAMOTIDINE 20 MG/1
20 TABLET, FILM COATED ORAL 2 TIMES DAILY PRN
Qty: 180 TABLET | Refills: 1 | Status: SHIPPED | OUTPATIENT
Start: 2022-08-12 | End: 2022-11-28

## 2022-08-12 RX ORDER — PRAVASTATIN SODIUM 40 MG
40 TABLET ORAL DAILY
Qty: 90 TABLET | Refills: 1 | Status: SHIPPED | OUTPATIENT
Start: 2022-08-12 | End: 2023-02-08 | Stop reason: SDUPTHER

## 2022-08-12 RX ORDER — PANTOPRAZOLE SODIUM 40 MG/1
40 TABLET, DELAYED RELEASE ORAL 2 TIMES DAILY
Qty: 180 TABLET | Refills: 1 | Status: SHIPPED | OUTPATIENT
Start: 2022-08-12 | End: 2022-09-14 | Stop reason: SDUPTHER

## 2022-08-12 RX ORDER — CELECOXIB 200 MG/1
200 CAPSULE ORAL 2 TIMES DAILY
Qty: 180 CAPSULE | Refills: 0 | Status: SHIPPED | OUTPATIENT
Start: 2022-08-12 | End: 2022-11-10 | Stop reason: SDUPTHER

## 2022-08-12 RX ORDER — AMLODIPINE BESYLATE 5 MG/1
5 TABLET ORAL DAILY
Qty: 30 TABLET | Refills: 5 | Status: SHIPPED | OUTPATIENT
Start: 2022-08-12 | End: 2023-02-09 | Stop reason: SDUPTHER

## 2022-08-12 NOTE — PROGRESS NOTES
Patient Name: Luis Antonio Fairchild  : 1960   MRN: [id number]     Chief Complaint:    Chief Complaint   Patient presents with   • Claudication     3 month follow up       History of Present Illness: Luis Antonio Fairchild is a 61 y.o. male presents to clinic for follow-up:    Arthritis  It affects his hands, shoulders, knees and wrists.  He has been taking Celecoxib 200 mg twice a day. He helps some.     Smokes  46 pack year history.  He is using nicotine patches.     History of COPD/asthma  Symptoms controlled with albuterol as needed; uses rarely; 2x-3x  a week. Denies wheezing and dyspnea; he has a slight cough.  He is using Advair twice a day.    Hypertension  The patient denies headaches, chest pain, dyspnea, edema, syncope, blurred vision or palpitations. They state that they are taking their medication as prescribed. They are not having medication side effects.    HLD  He is taking medication for hyperlipidemia. He denies chest pain, shortness of breath, orthopnea, paroxysmal nocturnal dyspnea, dyspnea on exertion, edema, palpitations or syncope.     GERD  Sx controlled with pantoprazole 40 mg BID and pepcid.     Right low back pain and radiates to hip and right thigh. He has noticed it worsening after sitting. He also has  Pain with walking excessive. He has to stop or sit down due to pain. The location is his right thigh. It radiates down the legs. He has left knee pain. He rates the pain as moderate. The pain started over the last 6 months and is worsening. Quality is dull pain. Timing is constant.  Alleviating factors are  rest, takes celecoxib. He denies any numbness. Risk factors are significant coronary artery calcification on his low-dose CT scan.  Duplex Lower Extremity Art / Grafts - Bilateral CAR (2022 12:44)    Subjective     Review of System: Review of Systems   Constitutional: Negative for fatigue and fever.   HENT: Negative for congestion and rhinorrhea.    Respiratory: Positive for cough.  "Negative for shortness of breath and wheezing.    Cardiovascular: Negative for chest pain and palpitations.   Gastrointestinal: Negative for abdominal pain, diarrhea, nausea and vomiting.   Genitourinary: Negative for dysuria.   Musculoskeletal: Positive for arthralgias and myalgias.   Skin: Negative for rash.   Neurological: Negative for headaches.   Hematological: Negative for adenopathy.   Psychiatric/Behavioral: Negative for dysphoric mood.        Medications:     Current Outpatient Medications:   •  albuterol (PROAIR HFA) 108 (90 BASE) MCG/ACT inhaler, Inhale 1-2 puffs every 4-6 hours PRN, Disp: 1 inhaler, Rfl: 5  •  amLODIPine (NORVASC) 5 MG tablet, Take 1 tablet by mouth Daily., Disp: 30 tablet, Rfl: 5  •  celecoxib (CeleBREX) 200 MG capsule, Take 1 capsule by mouth 2 (Two) Times a Day., Disp: 180 capsule, Rfl: 0  •  citalopram (CeleXA) 40 MG tablet, Take 1 tablet by mouth Daily., Disp: 90 tablet, Rfl: 1  •  famotidine (PEPCID) 20 MG tablet, Take 1 tablet by mouth 2 (Two) Times a Day As Needed for Heartburn., Disp: 180 tablet, Rfl: 1  •  fluticasone-salmeterol (Advair Diskus) 100-50 MCG/DOSE DISKUS, Inhale 1 puff 2 (Two) Times a Day., Disp: 60 each, Rfl: 3  •  pantoprazole (PROTONIX) 40 MG EC tablet, Take 1 tablet by mouth 2 (Two) Times a Day., Disp: 180 tablet, Rfl: 1  •  pravastatin (PRAVACHOL) 40 MG tablet, Take 1 tablet by mouth Daily., Disp: 90 tablet, Rfl: 1    Allergies:   No Known Allergies    Objective     Physical Exam:   Vital Signs:   Vitals:    08/12/22 0916   BP: 142/82   BP Location: Right arm   Patient Position: Sitting   Cuff Size: Adult   Pulse: 62   Resp: 18   Temp: 97.8 °F (36.6 °C)   TempSrc: Infrared   SpO2: 99%   Weight: 65.8 kg (145 lb)   Height: 175.3 cm (69\")   PainSc:   6     Body mass index is 21.41 kg/m². BMI is within normal parameters. No other follow-up for BMI required.      Physical Exam  Constitutional:       General: He is not in acute distress.     Appearance: He is not " ill-appearing.   HENT:      Head: Normocephalic.   Cardiovascular:      Rate and Rhythm: Normal rate and regular rhythm.      Heart sounds: Normal heart sounds. No murmur heard.  Pulmonary:      Breath sounds: Normal breath sounds.   Abdominal:      General: Bowel sounds are normal.      Tenderness: There is no abdominal tenderness.   Musculoskeletal:      Comments: The patient has a slight spinal curvature   Palpation reveals tenderness and tight paraspinal muscles in    Lumbar spine. The back has slight pain with flexion and nearly full ROM,  No pain with extension.    Lymphadenopathy:      Cervical: No cervical adenopathy.   Skin:     General: Skin is warm and dry.   Neurological:      General: No focal deficit present.      Mental Status: He is oriented to person, place, and time.   Psychiatric:         Mood and Affect: Mood normal.         Assessment / Plan      Assessment/Plan:   Diagnoses and all orders for this visit:    1. Acute right-sided low back pain with right-sided sciatica (Primary)  -     XR Spine Lumbar 2 or 3 View; Future    2. Osteoarthritis, unspecified osteoarthritis type, unspecified site  -     celecoxib (CeleBREX) 200 MG capsule; Take 1 capsule by mouth 2 (Two) Times a Day.  Dispense: 180 capsule; Refill: 0    3. Gastroesophageal reflux disease, unspecified whether esophagitis present  -     famotidine (PEPCID) 20 MG tablet; Take 1 tablet by mouth 2 (Two) Times a Day As Needed for Heartburn.  Dispense: 180 tablet; Refill: 1  -     pantoprazole (PROTONIX) 40 MG EC tablet; Take 1 tablet by mouth 2 (Two) Times a Day.  Dispense: 180 tablet; Refill: 1    4. Hyperlipidemia, unspecified hyperlipidemia type  -     pravastatin (PRAVACHOL) 40 MG tablet; Take 1 tablet by mouth Daily.  Dispense: 90 tablet; Refill: 1  -     Lipid Panel; Future  -     Lipid Panel    5. Anxiety  -     citalopram (CeleXA) 40 MG tablet; Take 1 tablet by mouth Daily.  Dispense: 90 tablet; Refill: 1    6. Primary  hypertension  -     amLODIPine (NORVASC) 5 MG tablet; Take 1 tablet by mouth Daily.  Dispense: 30 tablet; Refill: 5  -     Comprehensive Metabolic Panel; Future  -     TSH; Future  -     CBC & Differential; Future  -     Comprehensive Metabolic Panel  -     TSH  -     CBC & Differential       Explained and discussed patient's condition and plan of care.  Discussed when to follow-up.  Discussed possible red flags and how to follow-up with those.  Viewed patient's medications and discussed common side effects. Patient to continue current medications as advised.  Be compliant with medications. Patient to let me know if they have any worsening, no improvement, does not tolerate medication, or any future concerns about treatment. ER for emergencies.  Patient verbalized an understanding and agreement with plan of care.          Follow Up:   Return in about 6 months (around 2/12/2023) for Recheck.    KALEB Barrios  [facility]

## 2022-08-13 LAB
ALBUMIN SERPL-MCNC: 4.7 G/DL (ref 3.5–5.2)
ALBUMIN/GLOB SERPL: 2 G/DL
ALP SERPL-CCNC: 63 U/L (ref 39–117)
ALT SERPL W P-5'-P-CCNC: 15 U/L (ref 1–41)
ANION GAP SERPL CALCULATED.3IONS-SCNC: 10 MMOL/L (ref 5–15)
AST SERPL-CCNC: 18 U/L (ref 1–40)
BILIRUB SERPL-MCNC: 0.3 MG/DL (ref 0–1.2)
BUN SERPL-MCNC: 10 MG/DL (ref 8–23)
BUN/CREAT SERPL: 13.2 (ref 7–25)
CALCIUM SPEC-SCNC: 9.5 MG/DL (ref 8.6–10.5)
CHLORIDE SERPL-SCNC: 93 MMOL/L (ref 98–107)
CO2 SERPL-SCNC: 25 MMOL/L (ref 22–29)
CREAT SERPL-MCNC: 0.76 MG/DL (ref 0.76–1.27)
EGFRCR SERPLBLD CKD-EPI 2021: 101.6 ML/MIN/1.73
GLOBULIN UR ELPH-MCNC: 2.3 GM/DL
GLUCOSE SERPL-MCNC: 86 MG/DL (ref 65–99)
POTASSIUM SERPL-SCNC: 4.5 MMOL/L (ref 3.5–5.2)
PROT SERPL-MCNC: 7 G/DL (ref 6–8.5)
SODIUM SERPL-SCNC: 128 MMOL/L (ref 136–145)

## 2022-08-15 ENCOUNTER — TELEPHONE (OUTPATIENT)
Dept: INTERNAL MEDICINE | Facility: CLINIC | Age: 62
End: 2022-08-15

## 2022-08-15 DIAGNOSIS — I73.9 CLAUDICATION: Primary | ICD-10-CM

## 2022-08-15 RX ORDER — SODIUM BICARBONATE 325 MG/1
325 TABLET ORAL 2 TIMES DAILY
Qty: 60 TABLET | Refills: 0 | Status: SHIPPED | OUTPATIENT
Start: 2022-08-15 | End: 2022-10-03

## 2022-08-15 NOTE — TELEPHONE ENCOUNTER
I left a message on the patients voicemail to call our office back, office number provided.     HUB Read:  Lumbar spine shows minimal arthritis. Alignment is normal. Patient expressed he was going to see a chiropractor.  If he does not go to a chiropractor I would recommend physical therapy.  Let me know if you would rather do physical therapy.  If he goes to the chiropractor let me know if there is no improvement.  Leg pain could be multifactorial from the back or possibly atherosclerotic disease.  His CT scan shows calcifications in his heart and the x-ray shows calcifications in his aorta.   Although arterial ultrasound of legs was normal a CT angiogram could be checked of the legs to see if there is any deficits no seen on the US. His sodium remains low so I would like to try him on a sodium supplement to see if this would help legs.  I would like to recheck sodium in 1 month.  If worsening let me know.  Other labs were stable. Please let me know if you have any more questions or future concerns. Follow-up if worsening. Please let me know if you are tolerating medications.   KALEB Barrios

## 2022-08-15 NOTE — TELEPHONE ENCOUNTER
Caller: Luis Antonio Fairchild    Relationship: Self    Best call back number [phone]    I RELAYED THE MESSAGE TO THE PATIENT-     I TRANSFER THE PATIENT TO THE OFFICE DUE TO THE PATIENTS CONCERNS REGARDING THE CT SCAN     Angie Portillo LPN RB    8/15/22 10:06 AM  Note    I left a message on the patients voicemail to call our office back, office number provided.      HUB Read:  Lumbar spine shows minimal arthritis. Alignment is normal. Patient expressed he was going to see a chiropractor.  If he does not go to a chiropractor I would recommend physical therapy.  Let me know if you would rather do physical therapy.  If he goes to the chiropractor let me know if there is no improvement.  Leg pain could be multifactorial from the back or possibly atherosclerotic disease.  His CT scan shows calcifications in his heart and the x-ray shows calcifications in his aorta.   Although arterial ultrasound of legs was normal a CT angiogram could be checked of the legs to see if there is any deficits no seen on the US. His sodium remains low so I would like to try him on a sodium supplement to see if this would help legs.  I would like to recheck sodium in 1 month.  If worsening let me know.  Other labs were stable. Please let me know if you have any more questions or future concerns. Follow-up if worsening. Please let me know if you are tolerating medications.   KALEB Barrios

## 2022-08-15 NOTE — TELEPHONE ENCOUNTER
----- Message from KALEB Barrios sent at 8/14/2022  9:37 AM EDT -----  Lumbar spine shows minimal arthritis. Alignment is normal. Patient expressed he was going to see a chiropractor.  If he does not go to a chiropractor I would recommend physical therapy.  Let me know if you would rather do physical therapy.  If he goes to the chiropractor let me know if there is no improvement.  Leg pain could be multifactorial from the back or possibly atherosclerotic disease.  His CT scan shows calcifications in his heart and the x-ray shows calcifications in his aorta.   Although arterial ultrasound of legs was normal a CT angiogram could be checked of the legs to see if there is any deficits no seen on the US. His sodium remains low so I would like to try him on a sodium supplement to see if this would help legs.  I would like to recheck sodium in 1 month.  If worsening let me know.  Other labs were stable. Please let me know if you have any more questions or future concerns. Follow-up if worsening. Please let me know if you are tolerating medications.   KALEB Barrios

## 2022-08-15 NOTE — TELEPHONE ENCOUNTER
Patient notified and verbalized understanding.    He states is he going to go to chiropractor.     He agrees to having the CT angiogram done on his legs

## 2022-08-17 ENCOUNTER — TELEPHONE (OUTPATIENT)
Dept: INTERNAL MEDICINE | Facility: CLINIC | Age: 62
End: 2022-08-17

## 2022-09-02 ENCOUNTER — HOSPITAL ENCOUNTER (OUTPATIENT)
Dept: CT IMAGING | Facility: HOSPITAL | Age: 62
Discharge: HOME | End: 2022-09-02
Admitting: NURSE PRACTITIONER

## 2022-09-02 DIAGNOSIS — I73.9 CLAUDICATION: ICD-10-CM

## 2022-09-02 PROCEDURE — 75635 CT ANGIO ABDOMINAL ARTERIES: CPT

## 2022-09-02 PROCEDURE — 0 IOPAMIDOL PER 1 ML: Performed by: NURSE PRACTITIONER

## 2022-09-02 RX ADMIN — IOPAMIDOL 123 ML: 755 INJECTION, SOLUTION INTRAVENOUS at 08:50

## 2022-09-06 ENCOUNTER — TELEPHONE (OUTPATIENT)
Dept: INTERNAL MEDICINE | Facility: CLINIC | Age: 62
End: 2022-09-06

## 2022-09-06 RX ORDER — EZETIMIBE 10 MG/1
10 TABLET ORAL DAILY
Qty: 90 TABLET | Refills: 0 | Status: SHIPPED | OUTPATIENT
Start: 2022-09-06 | End: 2022-09-14 | Stop reason: SDUPTHER

## 2022-09-06 NOTE — TELEPHONE ENCOUNTER
----- Message from KALEB Barrios sent at 9/5/2022  4:16 PM EDT -----  CT showed mild atherosclerotic disease  in both legs as well as aortoiliac arteries, and renal arteries. I would like to add zetia to cholesterol regimen to lower bad cholesterol.  Let me know if chiropractor has helped his legs/back.

## 2022-09-08 ENCOUNTER — TELEPHONE (OUTPATIENT)
Dept: INTERNAL MEDICINE | Facility: CLINIC | Age: 62
End: 2022-09-08

## 2022-09-08 DIAGNOSIS — K21.9 GASTROESOPHAGEAL REFLUX DISEASE, UNSPECIFIED WHETHER ESOPHAGITIS PRESENT: ICD-10-CM

## 2022-09-08 NOTE — TELEPHONE ENCOUNTER
Caller: GurinderLuis Antonio Valentin    Relationship: Self    Best call back number: [phone]    What medications are you currently taking:   Current Outpatient Medications on File Prior to Visit   Medication Sig Dispense Refill   • albuterol (PROAIR HFA) 108 (90 BASE) MCG/ACT inhaler Inhale 1-2 puffs every 4-6 hours PRN 1 inhaler 5   • amLODIPine (NORVASC) 5 MG tablet Take 1 tablet by mouth Daily. 30 tablet 5   • celecoxib (CeleBREX) 200 MG capsule Take 1 capsule by mouth 2 (Two) Times a Day. 180 capsule 0   • citalopram (CeleXA) 40 MG tablet Take 1 tablet by mouth Daily. 90 tablet 1   • ezetimibe (Zetia) 10 MG tablet Take 1 tablet by mouth Daily. 90 tablet 0   • famotidine (PEPCID) 20 MG tablet Take 1 tablet by mouth 2 (Two) Times a Day As Needed for Heartburn. 180 tablet 1   • fluticasone-salmeterol (Advair Diskus) 100-50 MCG/DOSE DISKUS Inhale 1 puff 2 (Two) Times a Day. 60 each 3   • pantoprazole (PROTONIX) 40 MG EC tablet Take 1 tablet by mouth 2 (Two) Times a Day. 180 tablet 1   • pravastatin (PRAVACHOL) 40 MG tablet Take 1 tablet by mouth Daily. 90 tablet 1   • sodium bicarbonate 325 MG tablet Take 1 tablet by mouth 2 (Two) Times a Day. 60 tablet 0     No current facility-administered medications on file prior to visit.        Which medication are you concerned about: ezetimibe (Zetia) 10 MG tablet    Who prescribed you this medication: KEELY MAURICIO    What are your concerns: LUIS ANTONIO IS CONCERNED ABOUT ADDING THIS TO HIS CURRENT MEDICATIONS

## 2022-09-12 NOTE — TELEPHONE ENCOUNTER
I tried to call patient and was unable to get him.  I left a message for him to return my call and let me know  his questions and concerns.

## 2022-09-14 RX ORDER — PANTOPRAZOLE SODIUM 40 MG/1
40 TABLET, DELAYED RELEASE ORAL 2 TIMES DAILY
Qty: 60 TABLET | Refills: 5 | Status: SHIPPED | OUTPATIENT
Start: 2022-09-14 | End: 2023-02-28 | Stop reason: SDUPTHER

## 2022-09-14 RX ORDER — EZETIMIBE 10 MG/1
10 TABLET ORAL DAILY
Qty: 30 TABLET | Refills: 5 | Status: SHIPPED | OUTPATIENT
Start: 2022-09-14 | End: 2023-03-29

## 2022-09-14 NOTE — TELEPHONE ENCOUNTER
Patient called back and states his Protonix and Zetia was called in as a 90 day supply and it's too expensive. He states he needs those sent in as a month supply and with refills so he can afford it.

## 2022-10-03 RX ORDER — SODIUM BICARBONATE 325 MG/1
TABLET ORAL
Qty: 60 TABLET | Refills: 3 | Status: SHIPPED | OUTPATIENT
Start: 2022-10-03 | End: 2023-02-09 | Stop reason: SDUPTHER

## 2022-10-14 ENCOUNTER — TELEPHONE (OUTPATIENT)
Dept: INTERNAL MEDICINE | Facility: CLINIC | Age: 62
End: 2022-10-14

## 2022-10-14 NOTE — TELEPHONE ENCOUNTER
Caller: Luis Antonio Fairchild    Relationship to patient: Self    Best call back number: [phone]    Chief complaint: FOLLOW UP ON RIGHT ARM PAIN     Type of visit: OFFICE VISIT     Requested date: AS SOON AS POSSIBLE     Additional notes:  PATIENT WOULD LIKE TO BE SEEN IN THE OFFICE AS SOON AS POSSIBLE WITH KEELY MANUEL FOR A FOLLOW UP ON HIS RIGHT ARM PAIN     KEELY MANUEL IS BOOKED TIL NOVEMBER 2022

## 2022-10-18 ENCOUNTER — OFFICE VISIT (OUTPATIENT)
Dept: INTERNAL MEDICINE | Facility: CLINIC | Age: 62
End: 2022-10-18

## 2022-10-18 VITALS
DIASTOLIC BLOOD PRESSURE: 58 MMHG | SYSTOLIC BLOOD PRESSURE: 108 MMHG | HEART RATE: 71 BPM | RESPIRATION RATE: 18 BRPM | TEMPERATURE: 98.2 F | OXYGEN SATURATION: 96 % | WEIGHT: 151 LBS | BODY MASS INDEX: 22.3 KG/M2

## 2022-10-18 DIAGNOSIS — F17.200 TOBACCO USE DISORDER: ICD-10-CM

## 2022-10-18 DIAGNOSIS — M12.811 ROTATOR CUFF ARTHROPATHY OF RIGHT SHOULDER: Primary | ICD-10-CM

## 2022-10-18 PROCEDURE — 99406 BEHAV CHNG SMOKING 3-10 MIN: CPT | Performed by: STUDENT IN AN ORGANIZED HEALTH CARE EDUCATION/TRAINING PROGRAM

## 2022-10-18 PROCEDURE — 99213 OFFICE O/P EST LOW 20 MIN: CPT | Performed by: STUDENT IN AN ORGANIZED HEALTH CARE EDUCATION/TRAINING PROGRAM

## 2022-10-18 RX ORDER — LIDOCAINE 50 MG/G
1 PATCH TOPICAL EVERY 24 HOURS
Qty: 30 EACH | Refills: 0 | Status: SHIPPED | OUTPATIENT
Start: 2022-10-18 | End: 2023-01-30

## 2022-10-18 RX ORDER — BUPROPION HYDROCHLORIDE 150 MG/1
150 TABLET ORAL EVERY MORNING
Qty: 30 TABLET | Refills: 1 | Status: SHIPPED | OUTPATIENT
Start: 2022-10-18 | End: 2022-11-28

## 2022-10-18 NOTE — ASSESSMENT & PLAN NOTE
-   Injury occurred approximately 1 month prior to clinical exam on 10/18/2022  -   Patient with persistent right shoulder pain and decreased functionality  -   Physical exam indicative of right rotator cuff arthropathy with associated pain and weakness with empty can testing and external rotation  Plan:  -   Ambulatory referral to physical therapy ordered on 10/18/2022 for additional evaluation and management  -   Initiate 10/18/2022: Lidocaine transdermal patch and diclofenac gel for pain relief acutely  -   Continue ibuprofen/Tylenol as needed

## 2022-10-18 NOTE — ASSESSMENT & PLAN NOTE
Luis Antonio Fairchild  reports that he has been smoking cigarettes. He has a 46.00 pack-year smoking history. He has never used smokeless tobacco.. I have educated him on the risk of diseases from using tobacco products such as cancer, COPD and heart disease.     I advised him to quit and he is willing to quit. We have discussed the following method/s for tobacco cessation:  Counseling Prescription Medicaiton.  Together we have set a quit date for 1 month from today.  He will follow up with me in 4 months or sooner to check on his progress.  -   Initiate 10/18/2022: Bupropion 150 mg extended release every morning  -   Continue transdermal nicotine patches as previously prescribed    I spent 5 minutes counseling the patient.

## 2022-11-08 ENCOUNTER — PRIOR AUTHORIZATION (OUTPATIENT)
Dept: INTERNAL MEDICINE | Facility: CLINIC | Age: 62
End: 2022-11-08

## 2022-11-09 ENCOUNTER — TELEPHONE (OUTPATIENT)
Dept: INTERNAL MEDICINE | Facility: CLINIC | Age: 62
End: 2022-11-09

## 2022-11-09 NOTE — TELEPHONE ENCOUNTER
PA has been approved for the Lidocaine 5% patch.   Patient and pharmacy both notified   Good verb was given.

## 2022-11-10 DIAGNOSIS — M19.90 OSTEOARTHRITIS, UNSPECIFIED OSTEOARTHRITIS TYPE, UNSPECIFIED SITE: Chronic | ICD-10-CM

## 2022-11-10 RX ORDER — CELECOXIB 200 MG/1
200 CAPSULE ORAL 2 TIMES DAILY
Qty: 180 CAPSULE | Refills: 0 | Status: SHIPPED | OUTPATIENT
Start: 2022-11-10 | End: 2022-12-23

## 2022-11-10 NOTE — TELEPHONE ENCOUNTER
Caller: Gurinder Luis Antonio Lee    Relationship: Self    Best call back number: [phone]  Requested Prescriptions:   Requested Prescriptions     Pending Prescriptions Disp Refills   • celecoxib (CeleBREX) 200 MG capsule 180 capsule 0     Sig: Take 1 capsule by mouth 2 (Two) Times a Day.        Pharmacy where request should be sent: [facility] [address] [place], [place] [phone] [place] [phone] [place]     Additional details provided by patient:   PATIENT HAS 4 PILLS LEFT OF MEDICATION     Does the patient have less than a 3 day supply:  [x] Yes  [] No    Sandra Anne Rep   11/10/22 12:21 EST

## 2022-11-28 ENCOUNTER — OFFICE VISIT (OUTPATIENT)
Dept: INTERNAL MEDICINE | Facility: CLINIC | Age: 62
End: 2022-11-28

## 2022-11-28 VITALS
TEMPERATURE: 99.1 F | RESPIRATION RATE: 20 BRPM | WEIGHT: 144 LBS | SYSTOLIC BLOOD PRESSURE: 130 MMHG | DIASTOLIC BLOOD PRESSURE: 70 MMHG | OXYGEN SATURATION: 94 % | HEART RATE: 91 BPM | BODY MASS INDEX: 21.27 KG/M2

## 2022-11-28 DIAGNOSIS — J44.1 COPD WITH EXACERBATION: ICD-10-CM

## 2022-11-28 DIAGNOSIS — N40.1 BENIGN PROSTATIC HYPERPLASIA WITH URINARY FREQUENCY: Primary | ICD-10-CM

## 2022-11-28 DIAGNOSIS — R35.0 BENIGN PROSTATIC HYPERPLASIA WITH URINARY FREQUENCY: Primary | ICD-10-CM

## 2022-11-28 LAB
EXPIRATION DATE: NORMAL
FLUAV AG UPPER RESP QL IA.RAPID: NOT DETECTED
FLUBV AG UPPER RESP QL IA.RAPID: NOT DETECTED
INTERNAL CONTROL: NORMAL
Lab: NORMAL
SARS-COV-2 RNA RESP QL NAA+PROBE: NOT DETECTED

## 2022-11-28 PROCEDURE — 87428 SARSCOV & INF VIR A&B AG IA: CPT | Performed by: STUDENT IN AN ORGANIZED HEALTH CARE EDUCATION/TRAINING PROGRAM

## 2022-11-28 PROCEDURE — 99214 OFFICE O/P EST MOD 30 MIN: CPT | Performed by: STUDENT IN AN ORGANIZED HEALTH CARE EDUCATION/TRAINING PROGRAM

## 2022-11-28 RX ORDER — PREDNISONE 20 MG/1
20 TABLET ORAL DAILY
Qty: 5 TABLET | Refills: 0 | Status: SHIPPED | OUTPATIENT
Start: 2022-11-28 | End: 2022-12-03

## 2022-11-28 RX ORDER — TAMSULOSIN HYDROCHLORIDE 0.4 MG/1
1 CAPSULE ORAL DAILY
Qty: 30 CAPSULE | Refills: 2 | Status: SHIPPED | OUTPATIENT
Start: 2022-11-28 | End: 2023-02-28

## 2022-11-28 NOTE — PROGRESS NOTES
Follow Up Office Visit      Date: 2022   Patient Name: Luis Antonio Fairchild  : 1960   MRN: [id number]     Chief Complaint:    Chief Complaint   Patient presents with   • Prostate Check       History of Present Illness: Luis Antonio Fairchild is a 62 y.o. male with history of hypertension, hyperlipidemia, IBS, GERD with Anderson's esophagus, anxiety, COPD who is here today for changes in urination and cough.    HPI    Decreased urination  Patient states over the prior months he is noted increasing difficulty starting his urinary stream.  States his stream is much weaker and smaller in volume than previously.  He has been urinating more frequently throughout the day, with urgency to urinate.  He has also had increase in nocturia, waking to urinate 2-3 times per night.  Denies personal or family history of prostate disease or cancer.  No fevers, chills, weight loss, night sweats.  Denies symptoms of dysuria, burning with urination, hematuria, trauma to the area, perineal pain or groin pain.  PSA 10 months ago was 0.510    COPD  Compliant with wixela, albuterol.   No fevers or chills.  Over the past 1 to 2 weeks he has noted increase in cough and shortness of breath.  He has had change in his sputum production with increased sputum.  No fevers or chills, no pleuritic chest pain.  Continues to smoke 1/2 pack/day.  Previously tried Wellbutrin, which did not work for him.  Has also tried nicotine patches and nicotine gum.  Is not interested in any other medication at this time.  Denies known sick contacts.          Subjective      Review of Systems:   Review of Systems    I have reviewed the patients family history, social history, past medical history, past surgical history and have updated it as appropriate.     Medications:     Current Outpatient Medications:   •  albuterol (PROAIR HFA) 108 (90 BASE) MCG/ACT inhaler, Inhale 1-2 puffs every 4-6 hours PRN, Disp: 1 inhaler, Rfl: 5  •  amLODIPine (NORVASC) 5 MG tablet,  Take 1 tablet by mouth Daily., Disp: 30 tablet, Rfl: 5  •  celecoxib (CeleBREX) 200 MG capsule, Take 1 capsule by mouth 2 (Two) Times a Day., Disp: 180 capsule, Rfl: 0  •  citalopram (CeleXA) 40 MG tablet, Take 1 tablet by mouth Daily., Disp: 90 tablet, Rfl: 1  •  Diclofenac Sodium (VOLTAREN) 1 % gel gel, Apply 4 g topically to the appropriate area as directed 4 (Four) Times a Day As Needed (shoulder pain)., Disp: 50 g, Rfl: 0  •  ezetimibe (Zetia) 10 MG tablet, Take 1 tablet by mouth Daily., Disp: 30 tablet, Rfl: 5  •  Fluticasone-Salmeterol (ADVAIR/WIXELA) 100-50 MCG/ACT DISKUS, Inhale 1 puff 2 (Two) Times a Day., Disp: 1 each, Rfl: 3  •  lidocaine (LIDODERM) 5 %, Place 1 patch on the skin as directed by provider Daily. Remove & Discard patch within 12 hours or as directed by MD, Disp: 30 each, Rfl: 0  •  pantoprazole (PROTONIX) 40 MG EC tablet, Take 1 tablet by mouth 2 (Two) Times a Day., Disp: 60 tablet, Rfl: 5  •  pravastatin (PRAVACHOL) 40 MG tablet, Take 1 tablet by mouth Daily., Disp: 90 tablet, Rfl: 1  •  sodium bicarbonate 325 MG tablet, Take 1 tablet by mouth twice daily, Disp: 60 tablet, Rfl: 3  •  predniSONE (DELTASONE) 20 MG tablet, Take 1 tablet by mouth Daily for 5 days., Disp: 5 tablet, Rfl: 0  •  tamsulosin (FLOMAX) 0.4 MG capsule 24 hr capsule, Take 1 capsule by mouth Daily., Disp: 30 capsule, Rfl: 2    Allergies:   No Known Allergies    Objective     Physical Exam: Please see above  Vital Signs:   Vitals:    11/28/22 0920   BP: 130/70   BP Location: Left arm   Patient Position: Sitting   Cuff Size: Adult   Pulse: 91   Resp: 20   Temp: 99.1 °F (37.3 °C)   TempSrc: Temporal   SpO2: 94%   Weight: 65.3 kg (144 lb)   PainSc: 0-No pain     Body mass index is 21.27 kg/m².    Physical Exam  Vitals reviewed. Exam conducted with a chaperone present (Mildred reed present for prostate exam).   Constitutional:       General: He is not in acute distress.     Appearance: Normal appearance. He is normal weight.  He is not ill-appearing or toxic-appearing.   HENT:      Head: Normocephalic and atraumatic.      Right Ear: External ear normal.      Left Ear: External ear normal.      Nose: Congestion present.      Mouth/Throat:      Mouth: Mucous membranes are moist.      Pharynx: No oropharyngeal exudate or posterior oropharyngeal erythema.   Eyes:      General: No scleral icterus.        Right eye: No discharge.         Left eye: No discharge.      Extraocular Movements: Extraocular movements intact.      Pupils: Pupils are equal, round, and reactive to light.   Cardiovascular:      Rate and Rhythm: Normal rate and regular rhythm.      Pulses: Normal pulses.      Heart sounds: Normal heart sounds. No murmur heard.    No friction rub. No gallop.   Pulmonary:      Effort: Pulmonary effort is normal. No respiratory distress.      Breath sounds: No stridor. Wheezing (Faint end expiratory wheezes bilaterally) present. No rhonchi or rales.   Abdominal:      General: Abdomen is flat. There is no distension.      Palpations: Abdomen is soft.   Genitourinary:     Testes: Normal.      Prostate: Normal.      Comments: Prostate examined via digital rectal exam.  No pain or tenderness to palpation the prostate.  No focal nodularity noted.  Soft and boggy prostate noted.  Musculoskeletal:         General: No swelling or deformity. Normal range of motion.      Cervical back: Normal range of motion. No rigidity or tenderness.   Lymphadenopathy:      Cervical: No cervical adenopathy.   Skin:     General: Skin is warm and dry.      Capillary Refill: Capillary refill takes less than 2 seconds.      Coloration: Skin is not jaundiced or pale.   Neurological:      General: No focal deficit present.      Mental Status: He is alert and oriented to person, place, and time. Mental status is at baseline.   Psychiatric:         Mood and Affect: Mood normal.         Behavior: Behavior normal.         Judgment: Judgment normal.          Procedures    Results:   Labs:   TSH   Date Value Ref Range Status   08/12/2022 1.510 0.270 - 4.200 uIU/mL Final        Imaging:   No valid procedures specified.     Assessment / Plan      Assessment/Plan:   Problem List Items Addressed This Visit        Genitourinary and Reproductive     Benign prostatic hyperplasia with urinary frequency - Primary    Overview     Diagnosed 11/28/2022.  PSA within normal limits January 2022         Current Assessment & Plan     This is a new problem.  Patient notes increasing urinary frequency, nocturia, hesitancy and weakened stream.  Digital rectal exam normal today.  Prior PSA within normal limits.  We will start patient on Flomax 0.4 mg daily.  Informational handout on BPH and medication provided in AVS.         Relevant Medications    tamsulosin (FLOMAX) 0.4 MG capsule 24 hr capsule       Pulmonary and Pneumonias    COPD with exacerbation (HCC)    Overview     Compliant with Advair 1 puff twice daily.  Albuterol as needed.         Current Assessment & Plan     COPD is worsening.  Discussed monitoring symptoms and use of quick-relief medications and contacting us early in the course of exacerbations.  Warning signs of respiratory distress were reviewed with the patient.   Counseled to avoid exposure to cigarette smoke.  Medication changes per orders.   Patient with increasing shortness of breath and sputum production consistent with COPD exacerbation.  Will test for influenza and COVID today.  We will treat symptomatically with prednisone for 5-day course.  Counseled on compliance with his Advair.  Encourage use of as needed albuterol for cough and wheezing.             Relevant Medications    predniSONE (DELTASONE) 20 MG tablet    Other Relevant Orders    Covid-19 + Flu A&B AG, Veritor         Follow Up:   Return in about 3 months (around 2/28/2023) for Recheck BPH on flomax.      Nathen Mojica MD  [facility] [place], [place] [facility]

## 2022-11-28 NOTE — ASSESSMENT & PLAN NOTE
This is a new problem.  Patient notes increasing urinary frequency, nocturia, hesitancy and weakened stream.  Digital rectal exam normal today.  Prior PSA within normal limits.  We will start patient on Flomax 0.4 mg daily.  Informational handout on BPH and medication provided in AVS.

## 2022-11-28 NOTE — ASSESSMENT & PLAN NOTE
COPD is worsening.  Discussed monitoring symptoms and use of quick-relief medications and contacting us early in the course of exacerbations.  Warning signs of respiratory distress were reviewed with the patient.   Counseled to avoid exposure to cigarette smoke.  Medication changes per orders.   Patient with increasing shortness of breath and sputum production consistent with COPD exacerbation.  Will test for influenza and COVID today.  We will treat symptomatically with prednisone for 5-day course.  Counseled on compliance with his Advair.  Encourage use of as needed albuterol for cough and wheezing.

## 2022-11-29 RX ORDER — ALBUTEROL SULFATE 90 UG/1
AEROSOL, METERED RESPIRATORY (INHALATION)
Qty: 1 G | Refills: 5 | Status: SHIPPED | OUTPATIENT
Start: 2022-11-29 | End: 2022-12-06 | Stop reason: SDUPTHER

## 2022-11-29 NOTE — TELEPHONE ENCOUNTER
Caller: Gurinder Luis Antoniohernesto Hanson    Relationship: Self    Best call back number: [phone]    Requested Prescriptions:   Requested Prescriptions     Pending Prescriptions Disp Refills   • albuterol sulfate HFA (ProAir HFA) 108 (90 Base) MCG/ACT inhaler       Sig: Inhale 1-2 puffs every 4-6 hours PRN        Pharmacy where request should be sent: [place] [facility] [phone] [place] [phone] [facility]     Additional details provided by patient: PATIENT HAS AT LEAST 3 DAYS.    Does the patient have less than a 3 day supply:  [x] Yes  [] No    Would you like a call back once the refill request has been completed: [x] Yes [] No    If the office needs to give you a call back, can they leave a voicemail: [x] Yes [] No    Sandra Chicas Rep   11/29/22 09:29 EST

## 2022-12-06 ENCOUNTER — TELEMEDICINE (OUTPATIENT)
Dept: INTERNAL MEDICINE | Facility: CLINIC | Age: 62
End: 2022-12-06

## 2022-12-06 DIAGNOSIS — J18.9 COMMUNITY ACQUIRED PNEUMONIA, UNSPECIFIED LATERALITY: Primary | ICD-10-CM

## 2022-12-06 DIAGNOSIS — J44.1 COPD WITH EXACERBATION: ICD-10-CM

## 2022-12-06 PROCEDURE — 99214 OFFICE O/P EST MOD 30 MIN: CPT | Performed by: STUDENT IN AN ORGANIZED HEALTH CARE EDUCATION/TRAINING PROGRAM

## 2022-12-06 RX ORDER — AZITHROMYCIN 250 MG/1
TABLET, FILM COATED ORAL
Qty: 6 TABLET | Refills: 0 | Status: SHIPPED | OUTPATIENT
Start: 2022-12-06 | End: 2023-01-30

## 2022-12-06 RX ORDER — ALBUTEROL SULFATE 90 UG/1
AEROSOL, METERED RESPIRATORY (INHALATION)
Qty: 1 G | Refills: 5 | Status: SHIPPED | OUTPATIENT
Start: 2022-12-06

## 2022-12-06 NOTE — PROGRESS NOTES
Follow Up Office Visit      Date: 2022   Patient Name: Luis Antonio Fairchild  : 1960   MRN: [id number]     Chief Complaint:    Chief Complaint   Patient presents with   • Cough     This was an audio and video enabled telemedicine encounter. Patient was present in his home. Provider present in clinic at [address] [place] [address], [address] [place] [id number]. Patient consented to telehealth visit.       History of Present Illness: Luis Antonio Fairchild is a 62 y.o. male who is here today for cough and generalized malaise.    HPI   Patient was seen in clinic approximately 1 week ago at which time he was diagnosed with COPD with acute exacerbation.  He was treated with prednisone for 5 days.  At that time he tested negative for influenza and COVID. He reports he completed the steroid course and his symptoms improved slightly, but over the past 2-3 days he has had worsening cough productive of sputum, shortness of breath requiring his albuterol inhaler, subjective fever and chills, body aches and diarrhea. Has signficant sinus congestion and pain. Denies pleuritic chest pain, dyspnea, inability to tolerate PO.        Subjective      Review of Systems:   Review of Systems    I have reviewed the patients family history, social history, past medical history, past surgical history and have updated it as appropriate.     Medications:     Current Outpatient Medications:   •  albuterol sulfate HFA (ProAir HFA) 108 (90 Base) MCG/ACT inhaler, Inhale 1-2 puffs every 4-6 hours PRN, Disp: 1 g, Rfl: 5  •  amLODIPine (NORVASC) 5 MG tablet, Take 1 tablet by mouth Daily., Disp: 30 tablet, Rfl: 5  •  azithromycin (Zithromax Z-Forrest) 250 MG tablet, Take 2 tablets the first day, then 1 tablet daily for 4 days., Disp: 6 tablet, Rfl: 0  •  celecoxib (CeleBREX) 200 MG capsule, Take 1 capsule by mouth 2 (Two) Times a Day., Disp: 180 capsule, Rfl: 0  •  citalopram (CeleXA) 40 MG tablet, Take 1 tablet by mouth Daily., Disp: 90 tablet, Rfl: 1  •   Diclofenac Sodium (VOLTAREN) 1 % gel gel, Apply 4 g topically to the appropriate area as directed 4 (Four) Times a Day As Needed (shoulder pain)., Disp: 50 g, Rfl: 0  •  ezetimibe (Zetia) 10 MG tablet, Take 1 tablet by mouth Daily., Disp: 30 tablet, Rfl: 5  •  Fluticasone-Salmeterol (ADVAIR/WIXELA) 100-50 MCG/ACT DISKUS, Inhale 1 puff 2 (Two) Times a Day., Disp: 1 each, Rfl: 3  •  lidocaine (LIDODERM) 5 %, Place 1 patch on the skin as directed by provider Daily. Remove & Discard patch within 12 hours or as directed by MD, Disp: 30 each, Rfl: 0  •  pantoprazole (PROTONIX) 40 MG EC tablet, Take 1 tablet by mouth 2 (Two) Times a Day., Disp: 60 tablet, Rfl: 5  •  pravastatin (PRAVACHOL) 40 MG tablet, Take 1 tablet by mouth Daily., Disp: 90 tablet, Rfl: 1  •  sodium bicarbonate 325 MG tablet, Take 1 tablet by mouth twice daily, Disp: 60 tablet, Rfl: 3  •  tamsulosin (FLOMAX) 0.4 MG capsule 24 hr capsule, Take 1 capsule by mouth Daily., Disp: 30 capsule, Rfl: 2    Allergies:   No Known Allergies    Objective     Physical Exam: Please see above  Vital Signs: There were no vitals filed for this visit.  There is no height or weight on file to calculate BMI.    Physical Exam  Constitutional:       General: He is not in acute distress.     Appearance: Normal appearance. He is not ill-appearing or toxic-appearing.   HENT:      Head: Normocephalic and atraumatic.      Mouth/Throat:      Mouth: Mucous membranes are moist.   Eyes:      Extraocular Movements: Extraocular movements intact.   Pulmonary:      Effort: Pulmonary effort is normal. No respiratory distress.   Neurological:      General: No focal deficit present.      Mental Status: He is alert and oriented to person, place, and time.   Psychiatric:         Mood and Affect: Mood normal.         Behavior: Behavior normal.         Thought Content: Thought content normal.         Procedures    Results:   Labs:   TSH   Date Value Ref Range Status   08/12/2022 1.510 0.270 -  4.200 uIU/mL Final        Imaging:   No valid procedures specified.     Assessment / Plan      Assessment/Plan:   62yoM with history of COPD (recently treated with steroid for COPD exacerbation) and prior viral URI who presents with acute on chronic cough productive of sputum, wheezing, subjective fever, malaise. Will treat patient for CAP with Z-pack at this time. Counseled to use albuterol every 4 hours during this acute illness. Counseled on red flag symptoms and indications to seek emergency care. Patient voiced understanding.   Problem List Items Addressed This Visit        Pulmonary and Pneumonias    COPD with exacerbation (HCC)    Overview     Compliant with Advair 1 puff twice daily.  Albuterol as needed.         Relevant Medications    albuterol sulfate HFA (ProAir HFA) 108 (90 Base) MCG/ACT inhaler   Other Visit Diagnoses     Community acquired pneumonia, unspecified laterality    -  Primary    Relevant Medications    azithromycin (Zithromax Z-Forrest) 250 MG tablet    albuterol sulfate HFA (ProAir HFA) 108 (90 Base) MCG/ACT inhaler            Follow Up:   Return if symptoms worsen or fail to improve.    Nathen Mojica MD  [facility] [place] [facility]

## 2022-12-23 DIAGNOSIS — M19.90 OSTEOARTHRITIS, UNSPECIFIED OSTEOARTHRITIS TYPE, UNSPECIFIED SITE: Chronic | ICD-10-CM

## 2022-12-23 RX ORDER — CELECOXIB 200 MG/1
CAPSULE ORAL
Qty: 180 CAPSULE | Refills: 0 | Status: SHIPPED | OUTPATIENT
Start: 2022-12-23 | End: 2023-02-09 | Stop reason: SDUPTHER

## 2023-01-23 DIAGNOSIS — J44.9 CHRONIC OBSTRUCTIVE PULMONARY DISEASE, UNSPECIFIED COPD TYPE: ICD-10-CM

## 2023-01-23 RX ORDER — FLUTICASONE PROPIONATE AND SALMETEROL 100; 50 UG/1; UG/1
POWDER RESPIRATORY (INHALATION)
Qty: 60 EACH | Refills: 0 | Status: SHIPPED | OUTPATIENT
Start: 2023-01-23 | End: 2023-02-05

## 2023-01-23 NOTE — TELEPHONE ENCOUNTER
What was the call regarding: PATIENT WOULD LIKE TO KNOW IF THERE IS PRESCRIPTION FOR DELSYM THAT CAN BE PRESCRIBED. PATIENT HAS BEEN GOING THROUGH 2-3 BOTTLES A WEEK FOR HIS BREATHING AND BREAKING UP THE STUFF IN HIS CHEST. IT IS COSTLY OTC. PLEASE ADVISE        Rx Refill Note  Requested Prescriptions     Pending Prescriptions Disp Refills   • Fluticasone-Salmeterol (ADVAIR/WIXELA) 100-50 MCG/ACT DISKUS [Pharmacy Med Name: Fluticasone-Salmeterol 100-50 MCG/DOSE Inhalation Aerosol Powder Breath Activated] 60 each 0     Sig: INHALE 1 DOSE BY MOUTH TWICE DAILY      Last office visit with prescribing clinician: 8/12/2022   Last telemedicine visit with prescribing clinician: 2/6/2023   Next office visit with prescribing clinician: 2/6/2023

## 2023-01-23 NOTE — TELEPHONE ENCOUNTER
Caller: Luis Antonio Fairchild    Relationship: Self    Best call back number: [phone]  What is the best time to reach you: ANY TIME    Who are you requesting to speak with (clinical staff, provider,  specific staff member): KEELY MAURICIO    Do you know the name of the person who called: SELF    What was the call regarding: PATIENT WOULD LIKE TO KNOW IF THERE IS PRESCRIPTION FOR DELSYM THAT CAN BE PRESCRIBED. PATIENT HAS BEEN GOING THROUGH 2-3 BOTTLES A WEEK FOR HIS BREATHING AND BREAKING UP THE STUFF IN HIS CHEST. IT IS COSTLY OTC. PLEASE ADVISE    Do you require a callback: YES

## 2023-01-24 NOTE — TELEPHONE ENCOUNTER
LVM for pt to return call. Office number provided.     HUB MAY READ AND DOCUMENT/SCHEDULE     What inhalers is he using? I would recommend he come in for a visit to evaluate his COPD

## 2023-01-25 NOTE — TELEPHONE ENCOUNTER
PATIENT CALLED BACK AND PER HUB TO READ ADVISED THE PATIENT AN APPOINTMENT WAS MADE FOR Monday 1/30/23 PATIENT STATED HE TAKES ALBUTEROL FOR RESCUE INHALER AND HE TAKES FLUTICASONE ONCE IN THE MORNING AND ONCE AT NIGHT DAILY

## 2023-01-30 ENCOUNTER — LAB (OUTPATIENT)
Dept: LAB | Facility: HOSPITAL | Age: 63
End: 2023-01-30
Payer: COMMERCIAL

## 2023-01-30 ENCOUNTER — HOSPITAL ENCOUNTER (OUTPATIENT)
Dept: GENERAL RADIOLOGY | Facility: HOSPITAL | Age: 63
Discharge: HOME | End: 2023-01-30
Payer: COMMERCIAL

## 2023-01-30 ENCOUNTER — OFFICE VISIT (OUTPATIENT)
Dept: INTERNAL MEDICINE | Facility: CLINIC | Age: 63
End: 2023-01-30
Payer: COMMERCIAL

## 2023-01-30 VITALS
HEART RATE: 79 BPM | DIASTOLIC BLOOD PRESSURE: 72 MMHG | RESPIRATION RATE: 20 BRPM | WEIGHT: 143 LBS | BODY MASS INDEX: 21.18 KG/M2 | HEIGHT: 69 IN | TEMPERATURE: 98.7 F | OXYGEN SATURATION: 94 % | SYSTOLIC BLOOD PRESSURE: 140 MMHG

## 2023-01-30 DIAGNOSIS — G89.29 CHRONIC RIGHT SHOULDER PAIN: ICD-10-CM

## 2023-01-30 DIAGNOSIS — I10 PRIMARY HYPERTENSION: ICD-10-CM

## 2023-01-30 DIAGNOSIS — E78.5 HYPERLIPIDEMIA, UNSPECIFIED HYPERLIPIDEMIA TYPE: ICD-10-CM

## 2023-01-30 DIAGNOSIS — K21.9 GASTROESOPHAGEAL REFLUX DISEASE, UNSPECIFIED WHETHER ESOPHAGITIS PRESENT: ICD-10-CM

## 2023-01-30 DIAGNOSIS — R32 URINARY INCONTINENCE, UNSPECIFIED TYPE: ICD-10-CM

## 2023-01-30 DIAGNOSIS — R63.4 UNINTENTIONAL WEIGHT LOSS: ICD-10-CM

## 2023-01-30 DIAGNOSIS — R01.1 MURMUR: ICD-10-CM

## 2023-01-30 DIAGNOSIS — J44.9 COPD, SEVERE: Primary | ICD-10-CM

## 2023-01-30 DIAGNOSIS — R91.1 LUNG NODULE: ICD-10-CM

## 2023-01-30 DIAGNOSIS — M25.511 CHRONIC RIGHT SHOULDER PAIN: ICD-10-CM

## 2023-01-30 DIAGNOSIS — Z72.0 TOBACCO ABUSE: ICD-10-CM

## 2023-01-30 DIAGNOSIS — E87.1 CHRONIC HYPONATREMIA: Primary | ICD-10-CM

## 2023-01-30 DIAGNOSIS — I10 PRIMARY HYPERTENSION: Chronic | ICD-10-CM

## 2023-01-30 DIAGNOSIS — M67.911 TENDINOPATHY OF RIGHT ROTATOR CUFF: ICD-10-CM

## 2023-01-30 LAB
ALBUMIN SERPL-MCNC: 5 G/DL (ref 3.5–5.2)
ALBUMIN/GLOB SERPL: 1.7 G/DL
ALP SERPL-CCNC: 102 U/L (ref 39–117)
ALT SERPL W P-5'-P-CCNC: 18 U/L (ref 1–41)
ANION GAP SERPL CALCULATED.3IONS-SCNC: 11 MMOL/L (ref 5–15)
AST SERPL-CCNC: 24 U/L (ref 1–40)
BASOPHILS # BLD AUTO: 0.07 10*3/MM3 (ref 0–0.2)
BASOPHILS NFR BLD AUTO: 0.8 % (ref 0–1.5)
BILIRUB SERPL-MCNC: 0.4 MG/DL (ref 0–1.2)
BILIRUB UR QL STRIP: NEGATIVE
BUN SERPL-MCNC: 9 MG/DL (ref 8–23)
BUN/CREAT SERPL: 10 (ref 7–25)
CALCIUM SPEC-SCNC: 9.7 MG/DL (ref 8.6–10.5)
CHLORIDE SERPL-SCNC: 90 MMOL/L (ref 98–107)
CLARITY UR: CLEAR
CO2 SERPL-SCNC: 27 MMOL/L (ref 22–29)
COLOR UR: YELLOW
CREAT SERPL-MCNC: 0.9 MG/DL (ref 0.76–1.27)
DEPRECATED RDW RBC AUTO: 41.7 FL (ref 37–54)
EGFRCR SERPLBLD CKD-EPI 2021: 96 ML/MIN/1.73
EOSINOPHIL # BLD AUTO: 0.19 10*3/MM3 (ref 0–0.4)
EOSINOPHIL NFR BLD AUTO: 2.3 % (ref 0.3–6.2)
ERYTHROCYTE [DISTWIDTH] IN BLOOD BY AUTOMATED COUNT: 12 % (ref 12.3–15.4)
GLOBULIN UR ELPH-MCNC: 2.9 GM/DL
GLUCOSE SERPL-MCNC: 95 MG/DL (ref 65–99)
GLUCOSE UR STRIP-MCNC: NEGATIVE MG/DL
HCT VFR BLD AUTO: 43 % (ref 37.5–51)
HGB BLD-MCNC: 15.4 G/DL (ref 13–17.7)
HGB UR QL STRIP.AUTO: NEGATIVE
IMM GRANULOCYTES # BLD AUTO: 0.06 10*3/MM3 (ref 0–0.05)
IMM GRANULOCYTES NFR BLD AUTO: 0.7 % (ref 0–0.5)
KETONES UR QL STRIP: NEGATIVE
LEUKOCYTE ESTERASE UR QL STRIP.AUTO: NEGATIVE
LYMPHOCYTES # BLD AUTO: 1.62 10*3/MM3 (ref 0.7–3.1)
LYMPHOCYTES NFR BLD AUTO: 19.6 % (ref 19.6–45.3)
MCH RBC QN AUTO: 34.5 PG (ref 26.6–33)
MCHC RBC AUTO-ENTMCNC: 35.8 G/DL (ref 31.5–35.7)
MCV RBC AUTO: 96.2 FL (ref 79–97)
MONOCYTES # BLD AUTO: 0.85 10*3/MM3 (ref 0.1–0.9)
MONOCYTES NFR BLD AUTO: 10.3 % (ref 5–12)
NEUTROPHILS NFR BLD AUTO: 5.47 10*3/MM3 (ref 1.7–7)
NEUTROPHILS NFR BLD AUTO: 66.3 % (ref 42.7–76)
NITRITE UR QL STRIP: NEGATIVE
NRBC BLD AUTO-RTO: 0 /100 WBC (ref 0–0.2)
PH UR STRIP.AUTO: 8 [PH] (ref 5–8)
PLATELET # BLD AUTO: 449 10*3/MM3 (ref 140–450)
PMV BLD AUTO: 8.3 FL (ref 6–12)
POTASSIUM SERPL-SCNC: 4.5 MMOL/L (ref 3.5–5.2)
PROT SERPL-MCNC: 7.9 G/DL (ref 6–8.5)
PROT UR QL STRIP: NEGATIVE
RBC # BLD AUTO: 4.47 10*6/MM3 (ref 4.14–5.8)
SODIUM SERPL-SCNC: 128 MMOL/L (ref 136–145)
SP GR UR STRIP: 1.01 (ref 1–1.03)
UROBILINOGEN UR QL STRIP: NORMAL
WBC NRBC COR # BLD: 8.26 10*3/MM3 (ref 3.4–10.8)

## 2023-01-30 PROCEDURE — 99214 OFFICE O/P EST MOD 30 MIN: CPT | Performed by: NURSE PRACTITIONER

## 2023-01-30 PROCEDURE — 80053 COMPREHEN METABOLIC PANEL: CPT

## 2023-01-30 PROCEDURE — 85025 COMPLETE CBC W/AUTO DIFF WBC: CPT

## 2023-01-30 PROCEDURE — 81003 URINALYSIS AUTO W/O SCOPE: CPT

## 2023-01-30 PROCEDURE — 73030 X-RAY EXAM OF SHOULDER: CPT

## 2023-01-30 RX ORDER — MULTIPLE VITAMINS W/ MINERALS TAB 9MG-400MCG
1 TAB ORAL DAILY
COMMUNITY

## 2023-01-30 RX ORDER — FAMOTIDINE 20 MG/1
20 TABLET, FILM COATED ORAL 2 TIMES DAILY
COMMUNITY
End: 2023-02-28

## 2023-01-30 RX ORDER — VARENICLINE TARTRATE 1 MG/1
1 TABLET, FILM COATED ORAL 2 TIMES DAILY
Qty: 56 TABLET | Refills: 1 | Status: SHIPPED | OUTPATIENT
Start: 2023-02-27 | End: 2023-03-26

## 2023-01-30 NOTE — PROGRESS NOTES
"Patient Name: Luis Antonio Fairchild  : 1960   MRN: [id number]     Chief Complaint:    Chief Complaint   Patient presents with   • COPD     Cough especially when out in the cold air. Been going on for a few months. No fever. No other symptoms        History of Present Illness:     Luis Antonio Fairchild is a 62-year-old male who presents today for cough.      He states that when he coughs, he can feel it phlegm in his throat coming from lungs. When he feels like he has phlegm stuck in his throat and start coughing a lot, he will experience urinary incontinence. Currently, he is using his albuterol inhaler 3 or 4 times daily. He is also using Advair inhaler. He was prescribed Spiriva but has not used it. Over the past 2 months, his wheezing has increased. He will experience dyspnea upon exertion. Also, when he goes outside in the cold weather, or if the wind is blowing, he will experience shortness of breath and feel as if \"his lungs are freezing.\" He denies sinus symptoms. He states he is using Advair consistently. He will experience dyspnea daily depending on what he is doing.      He is currently still smoking. In the past, he has used Chantix, patches, and gum for smoking cessation. He quit smoking for 6 months in the past, however he started smoking again and has not been able to stop since. When he tries to quit smoking, he states he ends up smoking more. He is willing to try Chantix again for smoking cessation.     Approximately 2 weeks ago, he noticed the urinary incontinence. He denies fever of chills. 3 weeks ago, he was experiencing fever and chills and started taking Delsym. He states Delsym improved his breathing since it made it easier to get phlegm out of his throat. He also states he has issues with his urinary stream. He denies seeing a urologist.      He also has been losing weight with no changes to his diet.      In 2023, he has a follow up for his throat cancer. He denies feelings of swollen glands. His " symptom before finding out he had cancer was a sore throat. He denies chest tightness, sore throat, or bloody sputum. He denies angina or a history of COVID-19.      On 12/2022, he was treated with antibiotics and steroids for pneumonia.      He states his mood has been stable since taking citalopram.      He snores while sleeping, but denies being tested for sleep apnea in the past.     He is taking famotidine and pantoprazole daily.      The patient has been experiencing right shoulder pain. He had x-rays obtained in 2016, but since then his pain has moved into his arm and elbow. His range of motion is affected from his pain. He has been doing physical therapy at home for 1 year.         Subjective     Review of System: Review of Systems   Respiratory: Positive for cough, shortness of breath and wheezing.    Musculoskeletal: Positive for arthralgias.   All other systems reviewed and are negative.       Medications:     Current Outpatient Medications:   •  albuterol sulfate HFA (ProAir HFA) 108 (90 Base) MCG/ACT inhaler, Inhale 1-2 puffs every 4-6 hours PRN, Disp: 1 g, Rfl: 5  •  amLODIPine (NORVASC) 5 MG tablet, Take 1 tablet by mouth Daily., Disp: 30 tablet, Rfl: 5  •  Calcium Carbonate-Vit D-Min (CALCIUM 1200 PO), Take  by mouth., Disp: , Rfl:   •  celecoxib (CeleBREX) 200 MG capsule, Take 1 capsule by mouth twice daily, Disp: 180 capsule, Rfl: 0  •  citalopram (CeleXA) 40 MG tablet, Take 1 tablet by mouth Daily., Disp: 90 tablet, Rfl: 1  •  ezetimibe (Zetia) 10 MG tablet, Take 1 tablet by mouth Daily., Disp: 30 tablet, Rfl: 5  •  famotidine (PEPCID) 20 MG tablet, Take 20 mg by mouth 2 (Two) Times a Day., Disp: , Rfl:   •  Fluticasone-Salmeterol (ADVAIR/WIXELA) 100-50 MCG/ACT DISKUS, INHALE 1 DOSE BY MOUTH TWICE DAILY, Disp: 60 each, Rfl: 0  •  multivitamin with minerals (CENTRUM MEN PO), Take 1 tablet by mouth Daily., Disp: , Rfl:   •  pantoprazole (PROTONIX) 40 MG EC tablet, Take 1 tablet by mouth 2 (Two)  "Times a Day., Disp: 60 tablet, Rfl: 5  •  pravastatin (PRAVACHOL) 40 MG tablet, Take 1 tablet by mouth Daily., Disp: 90 tablet, Rfl: 1  •  sodium bicarbonate 325 MG tablet, Take 1 tablet by mouth twice daily, Disp: 60 tablet, Rfl: 3  •  tamsulosin (FLOMAX) 0.4 MG capsule 24 hr capsule, Take 1 capsule by mouth Daily., Disp: 30 capsule, Rfl: 2  •  VITAMIN D, CHOLECALCIFEROL, PO, Take  by mouth., Disp: , Rfl:   •  Diclofenac Sodium (VOLTAREN) 1 % gel gel, Apply 4 g topically to the appropriate area as directed 4 (Four) Times a Day As Needed (shoulder pain)., Disp: 50 g, Rfl: 0  •  tiotropium bromide monohydrate (SPIRIVA RESPIMAT) 2.5 MCG/ACT aerosol solution inhaler, Inhale 2 puffs Daily., Disp: 4 g, Rfl: 2  •  [START ON 2/27/2023] varenicline (Chantix Continuing Month Forrest) 1 MG tablet, Take 1 tablet by mouth 2 (Two) Times a Day for 56 days., Disp: 56 tablet, Rfl: 1    Allergies:   No Known Allergies    Objective     Physical Exam:   Vital Signs:   Vitals:    01/30/23 0851   BP: 140/72   BP Location: Right arm   Patient Position: Sitting   Cuff Size: Adult   Pulse: 79   Resp: 20   Temp: 98.7 °F (37.1 °C)   TempSrc: Infrared   SpO2: 94%   Weight: 64.9 kg (143 lb)   Height: 175.3 cm (69\")   PainSc:   4     Body mass index is 21.12 kg/m². BMI is within normal parameters. No other follow-up for BMI required.      Physical Exam  Constitutional:       General: He is not in acute distress.     Appearance: He is not ill-appearing.   HENT:      Head: Normocephalic.   Neck:      Vascular: No carotid bruit.   Cardiovascular:      Rate and Rhythm: Normal rate and regular rhythm.      Heart sounds: Murmur heard.   Pulmonary:      Comments: Diminished breath sounds  Musculoskeletal:      Comments: Right shoulder crepitus; decreased flexion and extension; pain with internal rotation; deltoid tenderness to palpation   Lymphadenopathy:      Cervical: No cervical adenopathy.   Neurological:      General: No focal deficit present.      " Mental Status: He is oriented to person, place, and time.   Psychiatric:         Mood and Affect: Mood normal.         Assessment / Plan      Assessment/Plan:   Diagnoses and all orders for this visit:    1. COPD, severe (HCC) (Primary)  -     tiotropium bromide monohydrate (SPIRIVA RESPIMAT) 2.5 MCG/ACT aerosol solution inhaler; Inhale 2 puffs Daily.  Dispense: 4 g; Refill: 2  -     CT Chest Without Contrast; Future    2. Tobacco abuse  -     varenicline (Chantix Continuing Month Forrest) 1 MG tablet; Take 1 tablet by mouth 2 (Two) Times a Day for 56 days.  Dispense: 56 tablet; Refill: 1    3. Urinary incontinence, unspecified type  -     Urinalysis With Culture If Indicated -; Future    4. Hyperlipidemia, unspecified hyperlipidemia type    5. Primary hypertension  -     Comprehensive Metabolic Panel; Future  -     CBC & Differential; Future    6. Gastroesophageal reflux disease, unspecified whether esophagitis present    7. Murmur  -     Adult Transthoracic Echo Complete W/ Cont if Necessary Per Protocol; Future    8. Lung nodule  -     CT Chest Without Contrast; Future    9. Chronic right shoulder pain  -     XR Shoulder 2+ View Right; Future    10. Unintentional weight loss    11. Tendinopathy of right rotator cuff  -     Ambulatory Referral to Orthopedic Surgery         1. COPD    - The patient was advised to start using Spiriva as prescribed. He was advised to continue Advair daily and albuterol as needed. A CT of the chest will also be obtained due to his history of lung nodules and emphysema. He was also advised to make a follow up with his pulmonologist.     2. Tobacco use    - Chantix was prescribed. The patient was counseled on the importance of smoking cessation.      3. Urinary incontinence   - Urinalysis will be obtained in the office today.      4. Gastroesophageal reflux disease   - Advised the patient to discontinue Pepcid and see if how he does. If his symptoms worsen, he was advised to start taking  Pepcid again.      5. Heart murmur   - Echocardiogram will be obtained for murmur heard during exam today and his increasing dyspnea.      6. Right shoulder pain  - X-ray will be obtained for further evaluation of worsening pain.  Referral to ortho for rotator cuff tendinopathy. Patient has not had improvement with home exercise program.   XR Shoulder 2+ View Right (01/30/2023 09:45)  IMPRESSION:  No acute osseous findings. Progression of mild degenerative change of  the acromioclavicular joint, and worsened enthesopathic change at the  greater tuberosity suggestive of underlying rotator cuff tendinopathy.    7. Labs   - Labs will be obtained today in office.      - The patient will follow up in 2 weeks. I would like to do a walking test and check for desaturations. Also I will discuss referral for sleep study.     Follow Up:   Return in about 2 weeks (around 2/13/2023) for Recheck.    KALEB Barrios  [facility]    Transcribed from ambient dictation for KALEB Barrios by Alessia Man, Quality .  01/30/23   11:22 EST    Patient or patient representative verbalized consent to the visit recording.  I have personally performed the services described in this document as transcribed by the above individual, and it is both accurate and complete.

## 2023-01-31 ENCOUNTER — TELEPHONE (OUTPATIENT)
Dept: INTERNAL MEDICINE | Facility: CLINIC | Age: 63
End: 2023-01-31
Payer: COMMERCIAL

## 2023-01-31 DIAGNOSIS — E87.1 CHRONIC HYPONATREMIA: Primary | ICD-10-CM

## 2023-02-02 ENCOUNTER — OFFICE VISIT (OUTPATIENT)
Dept: ORTHOPEDIC SURGERY | Facility: CLINIC | Age: 63
End: 2023-02-02
Payer: COMMERCIAL

## 2023-02-02 VITALS
HEIGHT: 69 IN | SYSTOLIC BLOOD PRESSURE: 130 MMHG | WEIGHT: 143.08 LBS | DIASTOLIC BLOOD PRESSURE: 80 MMHG | BODY MASS INDEX: 21.19 KG/M2

## 2023-02-02 DIAGNOSIS — M25.511 RIGHT SHOULDER PAIN, UNSPECIFIED CHRONICITY: Primary | ICD-10-CM

## 2023-02-02 DIAGNOSIS — M19.011 OSTEOARTHRITIS OF RIGHT AC (ACROMIOCLAVICULAR) JOINT: ICD-10-CM

## 2023-02-02 DIAGNOSIS — F17.200 TOBACCO USE DISORDER: ICD-10-CM

## 2023-02-02 DIAGNOSIS — M75.101 ROTATOR CUFF SYNDROME OF RIGHT SHOULDER: ICD-10-CM

## 2023-02-02 DIAGNOSIS — M75.51 BURSITIS OF RIGHT SHOULDER: ICD-10-CM

## 2023-02-02 DIAGNOSIS — M75.41 IMPINGEMENT SYNDROME OF RIGHT SHOULDER: ICD-10-CM

## 2023-02-02 PROCEDURE — 20610 DRAIN/INJ JOINT/BURSA W/O US: CPT | Performed by: PHYSICIAN ASSISTANT

## 2023-02-02 PROCEDURE — 99204 OFFICE O/P NEW MOD 45 MIN: CPT | Performed by: PHYSICIAN ASSISTANT

## 2023-02-02 RX ADMIN — TRIAMCINOLONE ACETONIDE 40 MG: 40 INJECTION, SUSPENSION INTRA-ARTICULAR; INTRAMUSCULAR at 14:01

## 2023-02-02 RX ADMIN — LIDOCAINE HYDROCHLORIDE 4 ML: 10 INJECTION, SOLUTION EPIDURAL; INFILTRATION; INTRACAUDAL; PERINEURAL at 14:01

## 2023-02-02 NOTE — PROGRESS NOTES
Procedure   - Large Joint Arthrocentesis: R subacromial bursa on 2/2/2023 2:01 PM  Indications: pain  Details: (23g) needle, posterior approach  Medications: 4 mL lidocaine PF 1% 1 %; 40 mg triamcinolone acetonide 40 MG/ML  Outcome: tolerated well, no immediate complications  Procedure, treatment alternatives, risks and benefits explained, specific risks discussed. Consent was given by the patient. Immediately prior to procedure a time out was called to verify the correct patient, procedure, equipment, support staff and site/side marked as required. Patient was prepped and draped in the usual sterile fashion.

## 2023-02-02 NOTE — PROGRESS NOTES
[facility] Note    Subjective     Chief Complaint   Patient presents with   • Right Shoulder - Pain        HPI  Luis Antonio Fairchild is a 62 y.o. male.  Right-hand-dominant.  New patient presents for evaluation of right shoulder pain.  Symptoms/pain have been ongoing for over 6 months.  CYNTHIA: Reports he was lifting a heavy dresser lost balance and attempted to catch it resulting in an injury to his right shoulder.  Localizes the pain predominantly to the posterior and lateral aspect of the shoulder.    Pain scale: 7/10.  Severity of the pain moderate to severe.  Quality of the pain aching, shooting.  Associated symptoms giving away/buckling.  Activity related to pain lying on the affected side, movement of shoulder.  Pain eased by ice, heat, medication.  No reported numbness or tingling.  Prior treatments Tylenol, home exercise program (using the exercises he used when he injured his left shoulder).    Notes difficulty with lifting, reaching, dressing, overhead activities.    Denies fever, chills, night sweats or other constitutional symptoms.      Past Medical History:   Diagnosis Date   • Anxiety    • Arthritis    • Cancer ([facility]) 2018    Squamous carcinoma oropharynx   • COPD (chronic obstructive pulmonary disease) ([facility])    • GERD (gastroesophageal reflux disease)    • History of IBS    • Hyperlipidemia    • Hypertension    • Irritable bowel syndrome    • Pneumonia       Past Surgical History:   Procedure Laterality Date   • COLONOSCOPY     • SHOULDER SURGERY      Onset Date    • THROAT SURGERY     • WRIST SURGERY      Onset Date:       Family History   Problem Relation Age of Onset   • Anxiety disorder Mother    • Arthritis Mother    • Stroke Father            • Hypertension Father    • Cancer Brother    • Anxiety disorder Brother    • Cancer Brother              Social History     Socioeconomic History   • Marital status:    Tobacco Use   • Smoking  status: Every Day     Packs/day: 1.00     Years: 46.00     Pack years: 46.00     Types: Cigarettes   • Smokeless tobacco: Never   Vaping Use   • Vaping Use: Never used   Substance and Sexual Activity   • Alcohol use: Yes     Alcohol/week: 35.0 standard drinks     Types: 35 Cans of beer per week     Comment: nightly   • Drug use: Never   • Sexual activity: Yes     Partners: Female     Birth control/protection: None     Comment:        Current Outpatient Medications on File Prior to Visit   Medication Sig Dispense Refill   • albuterol sulfate HFA (ProAir HFA) 108 (90 Base) MCG/ACT inhaler Inhale 1-2 puffs every 4-6 hours PRN 1 g 5   • amLODIPine (NORVASC) 5 MG tablet Take 1 tablet by mouth Daily. 30 tablet 5   • Calcium Carbonate-Vit D-Min (CALCIUM 1200 PO) Take  by mouth.     • celecoxib (CeleBREX) 200 MG capsule Take 1 capsule by mouth twice daily 180 capsule 0   • citalopram (CeleXA) 40 MG tablet Take 1 tablet by mouth Daily. 90 tablet 1   • Diclofenac Sodium (VOLTAREN) 1 % gel gel Apply 4 g topically to the appropriate area as directed 4 (Four) Times a Day As Needed (shoulder pain). 50 g 0   • ezetimibe (Zetia) 10 MG tablet Take 1 tablet by mouth Daily. 30 tablet 5   • famotidine (PEPCID) 20 MG tablet Take 20 mg by mouth 2 (Two) Times a Day.     • Fluticasone-Salmeterol (ADVAIR/WIXELA) 100-50 MCG/ACT DISKUS INHALE 1 DOSE BY MOUTH TWICE DAILY 60 each 0   • multivitamin with minerals tablet tablet Take 1 tablet by mouth Daily.     • pantoprazole (PROTONIX) 40 MG EC tablet Take 1 tablet by mouth 2 (Two) Times a Day. 60 tablet 5   • pravastatin (PRAVACHOL) 40 MG tablet Take 1 tablet by mouth Daily. 90 tablet 1   • sodium bicarbonate 325 MG tablet Take 1 tablet by mouth twice daily 60 tablet 3   • tamsulosin (FLOMAX) 0.4 MG capsule 24 hr capsule Take 1 capsule by mouth Daily. 30 capsule 2   • tiotropium bromide monohydrate (SPIRIVA RESPIMAT) 2.5 MCG/ACT aerosol solution inhaler Inhale 2 puffs Daily. 4 g 2   •  "[START ON 2/27/2023] varenicline (Chantix Continuing Month Forrest) 1 MG tablet Take 1 tablet by mouth 2 (Two) Times a Day for 56 days. 56 tablet 1   • VITAMIN D, CHOLECALCIFEROL, PO Take  by mouth.       No current facility-administered medications on file prior to visit.      No Known Allergies     The following portions of the patient's history were reviewed and updated as appropriate: allergies, current medications, past family history, past medical history, past social history, past surgical history and problem list.    Review of Systems   Constitutional: Negative.    HENT: Negative.    Eyes: Negative.    Respiratory: Negative.    Cardiovascular: Negative.    Gastrointestinal: Negative.    Endocrine: Negative.    Genitourinary: Negative.    Musculoskeletal: Positive for arthralgias.   Skin: Negative.    Allergic/Immunologic: Negative.    Neurological: Negative.    Hematological: Negative.    Psychiatric/Behavioral: Negative.         Objective      Physical Exam  /80   Ht 175.3 cm (69.02\")   Wt 64.9 kg (143 lb 1.3 oz)   BMI 21.12 kg/m²     Body mass index is 21.12 kg/m².    GENERAL APPEARANCE: awake, alert & oriented x 3, in no acute distress and well developed, well nourished  PSYCH: normal mood and affect  LUNGS:  breathing nonlabored, no wheezing  EYES: sclera anicteric, pupils equal  CARDIOVASCULAR: palpable pulses. Capillary refill less than 2 seconds  INTEGUMENTARY: skin intact, no clubbing, cyanosis  NEUROLOGIC:  Normal Sensation         Ortho Exam  Right shoulder  Skin: Intact without any erythema, warmth or swelling.  Tenderness: Positive global tenderness with increased pain noted to the posterior lateral aspect of the shoulder.    Motion: Active /170, Abd 120, ER (elbows at side) 65, IR L5.  Impingement: Neer positive.  Hernández positive.  Rotator cuff: Jaron/Empty can positive. Drop arm negative. Liff-off/modified lift-off negative. Bear hug positive.  ACJ: Adduction cross body " positive.  Biceps: Speed's negative.  Deltoid: Intact  Strength: 4-/5 SS, IS, SubSc  Motor: Grossly intact to Ax/MSC/R/U/M/AIN/PIN  Sensory: Grossly intact to Ax/MSC/R/U/M nerve distributions.  Vascular: 2+ radial pulse with brisk capillary refill into each digit.      Imaging/Studies  Reviewed plain film imaging performed on 12/30/2023.    DATE OF EXAM: 1/30/2023 9:42 AM     PROCEDURE: XR SHOULDER 2+ VW RIGHT-     INDICATIONS: right shoulder pain; M25.511-Pain in right shoulder;  G89.29-Other chronic pain     COMPARISON: No comparisons available.     TECHNIQUE: Right shoulder radiographs 11/23/2016     FINDINGS:  No soft tissue swelling. New mild enthesopathic change at the greater  tuberosity. No acute fracture or malalignment. No bony erosion or  periostitis. Normal appearance of the glenohumeral joint. Mild  degenerative change of the acromioclavicular joint, progressed since  2016. No high-grade narrowing of the subacromial space to suggest  massive rotator cuff tearing. Grossly unremarkable appearance of the  partially imaged right hemithorax.      IMPRESSION:  No acute osseous findings. Progression of mild degenerative change of  the acromioclavicular joint, and worsened enthesopathic change at the  greater tuberosity suggestive of underlying rotator cuff tendinopathy.     This report was finalized on 1/30/2023 9:50 AM by John Hicks MD.    Assessment/Plan        ICD-10-CM ICD-9-CM   1. Right shoulder pain, unspecified chronicity  M25.511 719.41   2. Rotator cuff syndrome of right shoulder  M75.101 726.10   3. Impingement syndrome of right shoulder  M75.41 726.2   4. Bursitis of right shoulder  M75.51 726.10   5. Osteoarthritis of right AC (acromioclavicular) joint  M19.011 715.91   6. Tobacco use disorder  F17.200 305.1       Orders Placed This Encounter   Procedures   • - Large Joint Arthrocentesis: R subacromial bursa        -Right shoulder pain due to rotator cuff syndrome, impingement, bursitis, AC  joint osteoarthritis.  -Reviewed imaging with the patient.  -Offered formal therapy but patient declined.  He will continue with home shoulder exercises.  -Offered and accepted right shoulder subacromial corticosteroid injection. Injection given today.  -Tobacco use--discussed the importance of smoking cessation, reviewed the adverse effects of tobacco use: increased risk of tendonitis (more difficult to treat and resolve), hardening of the arteries, gangrene, amputation, etc. Included in the counseling were treatments options for cessation: quitting cold turkey, medication, nicotine step-down programs and smoking cessation programs. Furthermore, I explained to the patient the importance of abstaining from nicotine usage as nicotine is known to increase risk of complications associated with surgery including but not limited to infection, decreased wound healing, fracture/fusion nonunion, slowed soft tissue healing, and increased surgery associated pain. (5 minutes spent counseling patient)--patient currently on Chantix.  -Recommend OTC NSAIDs/pain medication as needed.  -Follow up in 6 weeks for repeat evaluation.  -Questions and concerns answered.    After discussing the risks, benefits, indications of injection, the patient gave consent to proceed.  His right shoulder, subacromial space was confirmed as the correct site to be injected with a timeout.  It was then prepped using Hibiclens and injected with a mixture of 4 cc of 1% plain lidocaine and 1 cc of Kenalog (40 mg per mL), without any resistance through the posterior lateral approach, patient in seated position.  Area was cleaned, hemostasis was achieved and a Band-Aid was applied over the injection site.  The patient tolerated procedure well.  I instructed the patient on signs and symptoms of infection.  They should report to the emergency department or return to clinic if any of these develop, for further evaluation and treatment.  Recommended modifying  activity for the next 48 hours to include rest, ice, elevation and oral pain medication as needed.        Medical Decision Making  Management Options : over-the-counter medicine, prescription/IM medicine and physical/occupational therapy  Data/Risk: radiology tests       Nedra Duffy PA-C  02/02/23  14:09 EST               EMR Dragon/Transcription disclaimer:  Much of this encounter note is an electronic transcription of spoken language to printed text. Electronic transcription of spoken language may permit erroneous, or at times, nonsensical words or phrases to be inadvertently transcribed. Although I have reviewed the note for such errors, some may still exist.

## 2023-02-03 ENCOUNTER — TELEPHONE (OUTPATIENT)
Dept: INTERNAL MEDICINE | Facility: CLINIC | Age: 63
End: 2023-02-03
Payer: COMMERCIAL

## 2023-02-03 NOTE — TELEPHONE ENCOUNTER
Caller: ANGELIA NOBLE      Best call back number: [phone]    What medications are you currently taking:   Current Outpatient Medications on File Prior to Visit   Medication Sig Dispense Refill   • albuterol sulfate HFA (ProAir HFA) 108 (90 Base) MCG/ACT inhaler Inhale 1-2 puffs every 4-6 hours PRN 1 g 5   • amLODIPine (NORVASC) 5 MG tablet Take 1 tablet by mouth Daily. 30 tablet 5   • Calcium Carbonate-Vit D-Min (CALCIUM 1200 PO) Take  by mouth.     • celecoxib (CeleBREX) 200 MG capsule Take 1 capsule by mouth twice daily 180 capsule 0   • citalopram (CeleXA) 40 MG tablet Take 1 tablet by mouth Daily. 90 tablet 1   • Diclofenac Sodium (VOLTAREN) 1 % gel gel Apply 4 g topically to the appropriate area as directed 4 (Four) Times a Day As Needed (shoulder pain). 50 g 0   • ezetimibe (Zetia) 10 MG tablet Take 1 tablet by mouth Daily. 30 tablet 5   • famotidine (PEPCID) 20 MG tablet Take 20 mg by mouth 2 (Two) Times a Day.     • Fluticasone-Salmeterol (ADVAIR/WIXELA) 100-50 MCG/ACT DISKUS INHALE 1 DOSE BY MOUTH TWICE DAILY 60 each 0   • multivitamin with minerals tablet tablet Take 1 tablet by mouth Daily.     • pantoprazole (PROTONIX) 40 MG EC tablet Take 1 tablet by mouth 2 (Two) Times a Day. 60 tablet 5   • pravastatin (PRAVACHOL) 40 MG tablet Take 1 tablet by mouth Daily. 90 tablet 1   • sodium bicarbonate 325 MG tablet Take 1 tablet by mouth twice daily 60 tablet 3   • tamsulosin (FLOMAX) 0.4 MG capsule 24 hr capsule Take 1 capsule by mouth Daily. 30 capsule 2   • tiotropium bromide monohydrate (SPIRIVA RESPIMAT) 2.5 MCG/ACT aerosol solution inhaler Inhale 2 puffs Daily. 4 g 2   • [START ON 2/27/2023] varenicline (Chantix Continuing Month Forrest) 1 MG tablet Take 1 tablet by mouth 2 (Two) Times a Day for 56 days. 56 tablet 1   • VITAMIN D, CHOLECALCIFEROL, PO Take  by mouth.       No current facility-administered medications on file prior to visit.        Which medication are you concerned about: tiotropium  bromide monohydrate (SPIRIVA RESPIMAT) 2.5 MCG/ACT aerosol solution inhaler      What are your concerns: PATIENT STATED THE ABOVE MEDICATION IS $400.00 A MONTH AND HE CAN NOT AFFORD THAT . IS THERE ANYTHING ELSE HE CAN BE PRESCRIBED CALLBACK PATIENT TO ADVISE

## 2023-02-05 RX ORDER — FLUTICASONE FUROATE, UMECLIDINIUM BROMIDE AND VILANTEROL TRIFENATATE 100; 62.5; 25 UG/1; UG/1; UG/1
1 POWDER RESPIRATORY (INHALATION)
Qty: 28 EACH | Refills: 2 | Status: SHIPPED | OUTPATIENT
Start: 2023-02-05 | End: 2023-02-06

## 2023-02-06 ENCOUNTER — TELEPHONE (OUTPATIENT)
Dept: INTERNAL MEDICINE | Facility: CLINIC | Age: 63
End: 2023-02-06

## 2023-02-06 DIAGNOSIS — J44.9 COPD, SEVERE: Primary | ICD-10-CM

## 2023-02-06 RX ORDER — TRIAMCINOLONE ACETONIDE 40 MG/ML
40 INJECTION, SUSPENSION INTRA-ARTICULAR; INTRAMUSCULAR
Status: COMPLETED | OUTPATIENT
Start: 2023-02-02 | End: 2023-02-02

## 2023-02-06 RX ORDER — LIDOCAINE HYDROCHLORIDE 10 MG/ML
4 INJECTION, SOLUTION EPIDURAL; INFILTRATION; INTRACAUDAL; PERINEURAL
Status: COMPLETED | OUTPATIENT
Start: 2023-02-02 | End: 2023-02-02

## 2023-02-06 NOTE — TELEPHONE ENCOUNTER
I sent in trelegy. If this is affordable he will no longer need to use the fluticasone/salmeterol (advair/wixela) with the Trelegy. He can use albuterol as needed. If its too expensive let me know.

## 2023-02-06 NOTE — TELEPHONE ENCOUNTER
Left voice mail message for Pt to call back for message. Office number given.    Hub please relay message from PCP  I sent in trelegy. If this is affordable he will no longer need to use the fluticasone/salmeterol (advair/wixela) with the Trelegy. He can use albuterol as needed. If its too expensive let me know.

## 2023-02-06 NOTE — TELEPHONE ENCOUNTER
Caller: Luis Antonio Fairchild    Relationship: Self    Best call back number:[phone]    What medications are you currently taking:   Current Outpatient Medications on File Prior to Visit   Medication Sig Dispense Refill   • albuterol sulfate HFA (ProAir HFA) 108 (90 Base) MCG/ACT inhaler Inhale 1-2 puffs every 4-6 hours PRN 1 g 5   • amLODIPine (NORVASC) 5 MG tablet Take 1 tablet by mouth Daily. 30 tablet 5   • Calcium Carbonate-Vit D-Min (CALCIUM 1200 PO) Take  by mouth.     • celecoxib (CeleBREX) 200 MG capsule Take 1 capsule by mouth twice daily 180 capsule 0   • citalopram (CeleXA) 40 MG tablet Take 1 tablet by mouth Daily. 90 tablet 1   • Diclofenac Sodium (VOLTAREN) 1 % gel gel Apply 4 g topically to the appropriate area as directed 4 (Four) Times a Day As Needed (shoulder pain). 50 g 0   • ezetimibe (Zetia) 10 MG tablet Take 1 tablet by mouth Daily. 30 tablet 5   • famotidine (PEPCID) 20 MG tablet Take 20 mg by mouth 2 (Two) Times a Day.     • Fluticasone-Umeclidin-Vilant (Trelegy Ellipta) 100-62.5-25 MCG/ACT inhaler Inhale 1 puff Daily. 28 each 2   • multivitamin with minerals tablet tablet Take 1 tablet by mouth Daily.     • pantoprazole (PROTONIX) 40 MG EC tablet Take 1 tablet by mouth 2 (Two) Times a Day. 60 tablet 5   • pravastatin (PRAVACHOL) 40 MG tablet Take 1 tablet by mouth Daily. 90 tablet 1   • sodium bicarbonate 325 MG tablet Take 1 tablet by mouth twice daily 60 tablet 3   • tamsulosin (FLOMAX) 0.4 MG capsule 24 hr capsule Take 1 capsule by mouth Daily. 30 capsule 2   • [START ON 2/27/2023] varenicline (Chantix Continuing Month Forrest) 1 MG tablet Take 1 tablet by mouth 2 (Two) Times a Day for 56 days. 56 tablet 1   • VITAMIN D, CHOLECALCIFEROL, PO Take  by mouth.       No current facility-administered medications on file prior to visit.            Which medication are you concerned about: SAPPHIRE SAENZ    Who prescribed you this medication: KEELY MAURICIO    What are your concerns: PATIENT  STATES THIS MEDICATION WOULD COST OVER $600 OOP. IS THERE ANYTHING COMPARABLE TO THIS THAT HIS INSURANCE WOULD COVER OR WOULD BE CHEAPER OOP? MAYBE ANY COPAY CARDS OR SAMPLES. PLEASE ADVISE AND CALL PATIENT BACK

## 2023-02-07 ENCOUNTER — TELEPHONE (OUTPATIENT)
Dept: INTERNAL MEDICINE | Facility: CLINIC | Age: 63
End: 2023-02-07
Payer: COMMERCIAL

## 2023-02-07 NOTE — TELEPHONE ENCOUNTER
I sent in Spiriva HandiHaler.. This is a little different then the spiriva Respimat.  If this is affordable he will use it with advair. Let me know if he is able to get it.

## 2023-02-08 DIAGNOSIS — F41.9 ANXIETY: ICD-10-CM

## 2023-02-08 DIAGNOSIS — E78.5 HYPERLIPIDEMIA, UNSPECIFIED HYPERLIPIDEMIA TYPE: ICD-10-CM

## 2023-02-08 NOTE — TELEPHONE ENCOUNTER
Caller: Luis Antonio Fairchild Valentin    Relationship: Self    Best call back number: [phone]    Requested Prescriptions:   Requested Prescriptions     Pending Prescriptions Disp Refills   • citalopram (CeleXA) 40 MG tablet 90 tablet 1     Sig: Take 1 tablet by mouth Daily.   • pravastatin (PRAVACHOL) 40 MG tablet 90 tablet 1     Sig: Take 1 tablet by mouth Daily.        Pharmacy where request should be sent: [facility] [address] [place], [place] [phone] [place], [place] [phone] FX     Additional details provided by patient: PATIENT IS COMPLETELY OUT AND NEEDS ASAP     Does the patient have less than a 3 day supply:  [x] Yes  [] No    Would you like a call back once the refill request has been completed: [x] Yes [] No    If the office needs to give you a call back, can they leave a voicemail: [x] Yes [] No    Sandra Hall Rep   02/08/23 15:54 EST

## 2023-02-08 NOTE — TELEPHONE ENCOUNTER
"  Denied. Coverage is provided when the member has had a 30-day trial of Spiriva Respimat. Standards Used in Making the Decision for Non-Formulary medication coverage include: Sections 6 and 7 in your Evidence of Coverage (\"Prescription Drugs\" and \"What is Not Covered\"), the [facility] Policy for Medical Necessity for Non-Formulary Medications, and Coverage Review Incruse Ellipta, Seebri, Spiriva Handihaler, and [facility] Criteria.    Please advise  "

## 2023-02-09 ENCOUNTER — LAB (OUTPATIENT)
Dept: LAB | Facility: HOSPITAL | Age: 63
End: 2023-02-09
Payer: COMMERCIAL

## 2023-02-09 ENCOUNTER — OFFICE VISIT (OUTPATIENT)
Dept: INTERNAL MEDICINE | Facility: CLINIC | Age: 63
End: 2023-02-09
Payer: COMMERCIAL

## 2023-02-09 ENCOUNTER — HOSPITAL ENCOUNTER (OUTPATIENT)
Dept: GENERAL RADIOLOGY | Facility: HOSPITAL | Age: 63
Discharge: HOME | End: 2023-02-09
Payer: COMMERCIAL

## 2023-02-09 VITALS
SYSTOLIC BLOOD PRESSURE: 100 MMHG | WEIGHT: 146.38 LBS | DIASTOLIC BLOOD PRESSURE: 60 MMHG | TEMPERATURE: 96.8 F | RESPIRATION RATE: 18 BRPM | OXYGEN SATURATION: 94 % | HEART RATE: 55 BPM | BODY MASS INDEX: 21.61 KG/M2

## 2023-02-09 DIAGNOSIS — E87.1 CHRONIC HYPONATREMIA: ICD-10-CM

## 2023-02-09 DIAGNOSIS — R09.89 LUNG CRACKLES: ICD-10-CM

## 2023-02-09 DIAGNOSIS — J44.1 COPD WITH EXACERBATION: ICD-10-CM

## 2023-02-09 DIAGNOSIS — I10 PRIMARY HYPERTENSION: ICD-10-CM

## 2023-02-09 DIAGNOSIS — J06.9 VIRAL URI WITH COUGH: Primary | ICD-10-CM

## 2023-02-09 DIAGNOSIS — M19.90 OSTEOARTHRITIS, UNSPECIFIED OSTEOARTHRITIS TYPE, UNSPECIFIED SITE: Chronic | ICD-10-CM

## 2023-02-09 LAB
EXPIRATION DATE: NORMAL
EXPIRATION DATE: NORMAL
FLUAV AG NPH QL: NEGATIVE
FLUBV AG NPH QL: NEGATIVE
INTERNAL CONTROL: NORMAL
INTERNAL CONTROL: NORMAL
Lab: NORMAL
Lab: NORMAL
OSMOLALITY SERPL: 267 MOSM/KG (ref 280–301)
OSMOLALITY UR: 377 MOSM/KG
SARS-COV-2 AG UPPER RESP QL IA.RAPID: NOT DETECTED
SODIUM UR-SCNC: 26 MMOL/L
T4 FREE SERPL-MCNC: 1.39 NG/DL (ref 0.93–1.7)
TSH SERPL DL<=0.05 MIU/L-ACNC: 1.47 UIU/ML (ref 0.27–4.2)

## 2023-02-09 PROCEDURE — 83930 ASSAY OF BLOOD OSMOLALITY: CPT

## 2023-02-09 PROCEDURE — 84439 ASSAY OF FREE THYROXINE: CPT

## 2023-02-09 PROCEDURE — 84300 ASSAY OF URINE SODIUM: CPT

## 2023-02-09 PROCEDURE — 87804 INFLUENZA ASSAY W/OPTIC: CPT | Performed by: STUDENT IN AN ORGANIZED HEALTH CARE EDUCATION/TRAINING PROGRAM

## 2023-02-09 PROCEDURE — 99214 OFFICE O/P EST MOD 30 MIN: CPT | Performed by: STUDENT IN AN ORGANIZED HEALTH CARE EDUCATION/TRAINING PROGRAM

## 2023-02-09 PROCEDURE — 84443 ASSAY THYROID STIM HORMONE: CPT

## 2023-02-09 PROCEDURE — 71046 X-RAY EXAM CHEST 2 VIEWS: CPT

## 2023-02-09 PROCEDURE — 83935 ASSAY OF URINE OSMOLALITY: CPT

## 2023-02-09 PROCEDURE — 36415 COLL VENOUS BLD VENIPUNCTURE: CPT

## 2023-02-09 PROCEDURE — 87426 SARSCOV CORONAVIRUS AG IA: CPT | Performed by: STUDENT IN AN ORGANIZED HEALTH CARE EDUCATION/TRAINING PROGRAM

## 2023-02-09 RX ORDER — PRAVASTATIN SODIUM 40 MG
40 TABLET ORAL DAILY
Qty: 90 TABLET | Refills: 1 | Status: SHIPPED | OUTPATIENT
Start: 2023-02-09

## 2023-02-09 RX ORDER — CITALOPRAM 40 MG/1
40 TABLET ORAL DAILY
Qty: 90 TABLET | Refills: 1 | Status: SHIPPED | OUTPATIENT
Start: 2023-02-09

## 2023-02-09 RX ORDER — PREDNISONE 20 MG/1
40 TABLET ORAL DAILY
Qty: 10 TABLET | Refills: 0 | Status: SHIPPED | OUTPATIENT
Start: 2023-02-09 | End: 2023-02-14

## 2023-02-09 RX ORDER — IPRATROPIUM BROMIDE 17 UG/1
2 AEROSOL, METERED RESPIRATORY (INHALATION)
Qty: 12.9 G | Refills: 11 | Status: SHIPPED | OUTPATIENT
Start: 2023-02-09 | End: 2023-03-29

## 2023-02-09 NOTE — PROGRESS NOTES
Follow Up Office Visit      Date: 2023   Patient Name: Luis Antonio Fairchild  : 1960   MRN: [id number]     Chief Complaint:    Chief Complaint   Patient presents with   • Cough   • Chills   • Generalized Body Aches       History of Present Illness: Luis Antonio Fairchild is a 62 y.o. male.    HPI    Chest congestion.  The patient reports that for a couple of days he has had a cough that is sometimes productive a thick, dark green, almost brown phlegm. He denies seeing blood in the phlegm. He has had fevers, chills, and myalgias. The patient awakened this morning wet with perspiration. Occasionally it is painful to takes a deep breath, and he is more short of breath than usual. He is nauseous, no emesis. He has been unable to eat since yesterday, and is forcing himself to drink water. Denies diarrhea. He has been with his brother-in-law who was diagnosed with COVID-19 3 to 4 days ago. He is concerned that he has COVID-19 in light of his COPD. The patient took Lien-Ada Plus Cold.    COPD.  He last had steroids in 2022. He is using his as-needed inhaler.    Medication refills.  The patient states he needs refills, but left his list at home. He will make the request via [employer].    Subjective      Review of Systems:   Review of Systems   Constitutional: Positive for chills, diaphoresis and fever.   Respiratory: Positive for cough, chest tightness and shortness of breath.         Phlegm is thick, dark green, almost brown.  Occasionally painful to take a deep breath.   Gastrointestinal: Positive for nausea. Negative for diarrhea and vomiting.        No appetite.   Musculoskeletal: Positive for myalgias.       I have reviewed the patients family history, social history, past medical history, past surgical history and have updated it as appropriate.     Medications:     Current Outpatient Medications:   •  albuterol sulfate HFA (ProAir HFA) 108 (90 Base) MCG/ACT inhaler, Inhale 1-2 puffs every 4-6 hours PRN, Disp:  1 g, Rfl: 5  •  amLODIPine (NORVASC) 5 MG tablet, Take 1 tablet by mouth Daily., Disp: 30 tablet, Rfl: 5  •  Calcium Carbonate-Vit D-Min (CALCIUM 1200 PO), Take  by mouth., Disp: , Rfl:   •  celecoxib (CeleBREX) 200 MG capsule, Take 1 capsule by mouth twice daily, Disp: 180 capsule, Rfl: 0  •  citalopram (CeleXA) 40 MG tablet, Take 1 tablet by mouth Daily., Disp: 90 tablet, Rfl: 1  •  Diclofenac Sodium (VOLTAREN) 1 % gel gel, Apply 4 g topically to the appropriate area as directed 4 (Four) Times a Day As Needed (shoulder pain)., Disp: 50 g, Rfl: 0  •  ezetimibe (Zetia) 10 MG tablet, Take 1 tablet by mouth Daily., Disp: 30 tablet, Rfl: 5  •  famotidine (PEPCID) 20 MG tablet, Take 20 mg by mouth 2 (Two) Times a Day., Disp: , Rfl:   •  multivitamin with minerals tablet tablet, Take 1 tablet by mouth Daily., Disp: , Rfl:   •  pantoprazole (PROTONIX) 40 MG EC tablet, Take 1 tablet by mouth 2 (Two) Times a Day., Disp: 60 tablet, Rfl: 5  •  pravastatin (PRAVACHOL) 40 MG tablet, Take 1 tablet by mouth Daily., Disp: 90 tablet, Rfl: 1  •  sodium bicarbonate 325 MG tablet, Take 1 tablet by mouth twice daily, Disp: 60 tablet, Rfl: 3  •  tamsulosin (FLOMAX) 0.4 MG capsule 24 hr capsule, Take 1 capsule by mouth Daily., Disp: 30 capsule, Rfl: 2  •  tiotropium (Spiriva HandiHaler) 18 MCG per inhalation capsule, Place 1 capsule into inhaler and inhale Daily., Disp: 90 capsule, Rfl: 1  •  [START ON 2/27/2023] varenicline (Chantix Continuing Month Forrest) 1 MG tablet, Take 1 tablet by mouth 2 (Two) Times a Day for 56 days., Disp: 56 tablet, Rfl: 1  •  VITAMIN D, CHOLECALCIFEROL, PO, Take  by mouth., Disp: , Rfl:   •  ipratropium (Atrovent HFA) 17 MCG/ACT inhaler, Inhale 2 puffs 4 (Four) Times a Day., Disp: 12.9 g, Rfl: 11  •  predniSONE (DELTASONE) 20 MG tablet, Take 2 tablets by mouth Daily for 5 days., Disp: 10 tablet, Rfl: 0    Allergies:   No Known Allergies    Objective     Physical Exam: Please see above  Vital Signs:   Vitals:     02/09/23 0841   BP: 100/60   BP Location: Left arm   Patient Position: Sitting   Cuff Size: Adult   Pulse: 55   Resp: 18   Temp: 96.8 °F (36 °C)   TempSrc: Temporal   SpO2: 94%   Weight: 66.4 kg (146 lb 6 oz)   PainSc: 0-No pain     Body mass index is 21.61 kg/m².    Physical Exam  Vitals reviewed.   Constitutional:       General: He is not in acute distress.     Appearance: Normal appearance. He is normal weight. He is not ill-appearing or toxic-appearing.   HENT:      Head: Normocephalic and atraumatic.      Right Ear: External ear normal.      Left Ear: External ear normal.      Nose: Congestion present.      Mouth/Throat:      Mouth: Mucous membranes are moist.   Eyes:      General: No scleral icterus.        Right eye: No discharge.         Left eye: No discharge.      Extraocular Movements: Extraocular movements intact.      Pupils: Pupils are equal, round, and reactive to light.   Cardiovascular:      Rate and Rhythm: Normal rate and regular rhythm.      Pulses: Normal pulses.      Heart sounds: Normal heart sounds. No murmur heard.    No friction rub. No gallop.   Pulmonary:      Effort: Pulmonary effort is normal. No respiratory distress.      Breath sounds: No stridor. Rales (faint crackles at LLL, cleared with cough) present. No rhonchi. Wheezes: Faint end expiratory wheezes bilaterally.   Abdominal:      General: Abdomen is flat. There is no distension.      Palpations: Abdomen is soft.   Musculoskeletal:         General: No swelling or deformity. Normal range of motion.      Cervical back: Normal range of motion. No rigidity or tenderness.   Lymphadenopathy:      Cervical: No cervical adenopathy.   Skin:     General: Skin is warm and dry.      Capillary Refill: Capillary refill takes less than 2 seconds.      Coloration: Skin is not jaundiced or pale.   Neurological:      General: No focal deficit present.      Mental Status: He is alert and oriented to person, place, and time. Mental status is at  baseline.   Psychiatric:         Mood and Affect: Mood normal.         Behavior: Behavior normal.         Judgment: Judgment normal.         Procedures    Results:   Labs:   TSH   Date Value Ref Range Status   08/12/2022 1.510 0.270 - 4.200 uIU/mL Final        Imaging:   No valid procedures specified.     Assessment / Plan      Assessment/Plan:   Problem List Items Addressed This Visit        Pulmonary and Pneumonias    COPD with exacerbation (HCC)    Overview     Compliant with Advair 1 puff twice daily.  Albuterol as needed.         Current Assessment & Plan     Chronic. Worsening. Symptoms consistent with acute exacerbation. Atrovent inhaler prescribed. Prednisone 40 mg for 5 days prescribed.             Relevant Medications    predniSONE (DELTASONE) 20 MG tablet    ipratropium (Atrovent HFA) 17 MCG/ACT inhaler   Other Visit Diagnoses     Viral URI with cough    -  Primary    COVID-19 and influenza tests negative. Chest x-ray ordered.    Relevant Orders    POC Influenza A / B (Completed)    POCT VERITOR SARS-CoV-2 Antigen (Completed)    Lung crackles        Relevant Orders    XR Chest PA & Lateral (Completed)            Follow Up:   Return if symptoms worsen or fail to improve.      Nathen Mojica MD  [facility]    Transcribed from ambient dictation for Nathen Mojica MD by Suma Abreu.  02/09/23   10:49 EST    Patient or patient representative verbalized consent to the visit recording.  I have personally performed the services described in this document as transcribed by the above individual, and it is both accurate and complete.

## 2023-02-09 NOTE — ASSESSMENT & PLAN NOTE
Chronic. Worsening. Symptoms consistent with acute exacerbation. Atrovent inhaler prescribed. Prednisone 40 mg for 5 days prescribed.

## 2023-02-10 RX ORDER — CELECOXIB 200 MG/1
200 CAPSULE ORAL 2 TIMES DAILY
Qty: 180 CAPSULE | Refills: 0 | Status: SHIPPED | OUTPATIENT
Start: 2023-02-10

## 2023-02-10 RX ORDER — AMLODIPINE BESYLATE 5 MG/1
5 TABLET ORAL DAILY
Qty: 30 TABLET | Refills: 5 | Status: SHIPPED | OUTPATIENT
Start: 2023-02-10

## 2023-02-10 RX ORDER — SODIUM BICARBONATE 325 MG/1
325 TABLET ORAL 2 TIMES DAILY
Qty: 60 TABLET | Refills: 3 | Status: SHIPPED | OUTPATIENT
Start: 2023-02-10 | End: 2023-02-10

## 2023-02-10 NOTE — TELEPHONE ENCOUNTER
Rx Refill Note  Requested Prescriptions     Pending Prescriptions Disp Refills   • sodium bicarbonate 325 MG tablet 60 tablet 3     Sig: Take 1 tablet by mouth 2 (Two) Times a Day.      Last office visit with prescribing clinician: 1/30/2023   Last telemedicine visit with prescribing clinician: 2/9/2023   Next office visit with prescribing clinician: 2/9/2023                           Jeffrey Tran MA  02/10/23, 10:07 EST

## 2023-02-14 ENCOUNTER — TELEPHONE (OUTPATIENT)
Dept: INTERNAL MEDICINE | Facility: CLINIC | Age: 63
End: 2023-02-14

## 2023-02-14 NOTE — TELEPHONE ENCOUNTER
Please call patient and please schedule first available.  You can contact me if you have a question on the time.

## 2023-02-28 ENCOUNTER — OFFICE VISIT (OUTPATIENT)
Dept: INTERNAL MEDICINE | Facility: CLINIC | Age: 63
End: 2023-02-28
Payer: COMMERCIAL

## 2023-02-28 VITALS
RESPIRATION RATE: 18 BRPM | OXYGEN SATURATION: 95 % | HEIGHT: 69 IN | DIASTOLIC BLOOD PRESSURE: 80 MMHG | HEART RATE: 76 BPM | TEMPERATURE: 98.4 F | SYSTOLIC BLOOD PRESSURE: 138 MMHG | BODY MASS INDEX: 21.95 KG/M2 | WEIGHT: 148.2 LBS

## 2023-02-28 DIAGNOSIS — K21.9 GASTROESOPHAGEAL REFLUX DISEASE, UNSPECIFIED WHETHER ESOPHAGITIS PRESENT: ICD-10-CM

## 2023-02-28 DIAGNOSIS — D00.00 SQUAMOUS CELL CARCINOMA IN SITU OF ORAL CAVITY: Primary | ICD-10-CM

## 2023-02-28 DIAGNOSIS — E87.1 CHRONIC HYPONATREMIA: ICD-10-CM

## 2023-02-28 DIAGNOSIS — J44.9 COPD, SEVERE: ICD-10-CM

## 2023-02-28 LAB
ANION GAP SERPL CALCULATED.3IONS-SCNC: 12.2 MMOL/L (ref 5–15)
BUN SERPL-MCNC: 7 MG/DL (ref 8–23)
BUN/CREAT SERPL: 9.6 (ref 7–25)
CALCIUM SPEC-SCNC: 9.6 MG/DL (ref 8.6–10.5)
CHLORIDE SERPL-SCNC: 90 MMOL/L (ref 98–107)
CO2 SERPL-SCNC: 24.8 MMOL/L (ref 22–29)
CREAT SERPL-MCNC: 0.73 MG/DL (ref 0.76–1.27)
EGFRCR SERPLBLD CKD-EPI 2021: 102.2 ML/MIN/1.73
GLUCOSE SERPL-MCNC: 85 MG/DL (ref 65–99)
POTASSIUM SERPL-SCNC: 4.4 MMOL/L (ref 3.5–5.2)
SODIUM SERPL-SCNC: 127 MMOL/L (ref 136–145)

## 2023-02-28 PROCEDURE — 36415 COLL VENOUS BLD VENIPUNCTURE: CPT | Performed by: NURSE PRACTITIONER

## 2023-02-28 PROCEDURE — 80048 BASIC METABOLIC PNL TOTAL CA: CPT | Performed by: NURSE PRACTITIONER

## 2023-02-28 PROCEDURE — 99214 OFFICE O/P EST MOD 30 MIN: CPT | Performed by: NURSE PRACTITIONER

## 2023-02-28 RX ORDER — PANTOPRAZOLE SODIUM 40 MG/1
40 TABLET, DELAYED RELEASE ORAL 2 TIMES DAILY
Qty: 180 TABLET | Refills: 1 | Status: SHIPPED | OUTPATIENT
Start: 2023-02-28

## 2023-02-28 NOTE — PROGRESS NOTES
"Patient Name: Luis Antonio Fairchild  : 1960   MRN: [id number]     Chief Complaint:    Chief Complaint   Patient presents with   • Hypertension     Follow up       History of Present Illness: Luis Antonio Fairchild is a 62 y.o. male presents to clinic     Luis Antonio Fairchild is a 62-year-old male who presents today for cough.        CT Soft Tissue Neck w IV Contrast (2022 08:48)      He states that when he coughs, he can feel it phlegm in his throat coming from lungs. When he feels like he has phlegm stuck in his throat and start coughing a lot, he will experience urinary incontinence. Currently, he is using his albuterol inhaler 3 or 4 times daily. He is also using Advair inhaler. He was prescribed Spiriva but has not used it. Over the past 2 months, his wheezing has increased. He will experience dyspnea upon exertion. Also, when he goes outside in the cold weather, or if the wind is blowing, he will experience shortness of breath and feel as if \"his lungs are freezing.\" He denies sinus symptoms. He states he is using Advair consistently. He will experience dyspnea daily depending on what he is doing.      He is currently still smoking. In the past, he has used Chantix, patches, and gum for smoking cessation. He quit smoking for 6 months in the past, however he started smoking again and has not been able to stop since. When he tries to quit smoking, he states he ends up smoking more. He is willing to try Chantix again for smoking cessation.     Approximately 2 weeks ago, he noticed the urinary incontinence. He denies fever of chills. 3 weeks ago, he was experiencing fever and chills and started taking Delsym. He states Delsym improved his breathing since it made it easier to get phlegm out of his throat. He also states he has issues with his urinary stream. He denies seeing a urologist.      He also has been losing weight with no changes to his diet.      In 2023, he has a follow up for his throat cancer. He denies " feelings of swollen glands. His symptom before finding out he had cancer was a sore throat. He denies chest tightness, sore throat, or bloody sputum. He denies angina or a history of COVID-19.      On 12/2022, he was treated with antibiotics and steroids for pneumonia.      He states his mood has been stable since taking citalopram.

## 2023-02-28 NOTE — PROGRESS NOTES
Patient Name: Luis Antonio Fairchild  : 1960   MRN: [id number]     Chief Complaint:    Chief Complaint   Patient presents with   • Hypertension     Follow up       History of Present Illness: Luis Antonio Fairchild is a 62 y.o. male who presents for follow up.  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 Symptoms at the time of diagnosis included sore throat and trouble swallowing. He admits to smoking 1 to 2 packs of cigarettes daily and notes that he uses Chantix. He does not feel any masses. The patient previously had an unexplainable 30 pound weight loss in  but has since maintained his weight around 150 pounds. He occasionally has a 5 pound fluctuation. He admits that although significantly improved, his cough persists and occasionally he goes into a coughing attack. He notes coughing up phlegm, an occasional wheeze, lingering non-productive chest congestion,  Without chest pain, and palpitations. He reports shortness of breath on exertion or when moving heavy objects. He does not use oxygen. The patient was recently prescribed antibiotics and steroids by Dr. Mojica. He denies effectiveness. He reports occasionally feeling tired.    Today in the clinic his blood pressure is normal.    He admits reflux is managed with Protonix and denies taking Pepcid. He is taking Zetia, Cymbalta, and Celebrex as prescribed.  Lab Results   Component Value Date    GLUCOSE 95 2023    BUN 9 2023    CREATININE 0.90 2023    EGFR 96.0 2023    BCR 10.0 2023    K 4.5 2023    CO2 27.0  01/30/2023    CALCIUM 9.7 01/30/2023    ALBUMIN 5.0 01/30/2023    AST 24 01/30/2023    ALT 18 01/30/2023     Sodium has been 128 over the last 6 months.  Urine sodium was 26, urine osm 377, serum osm 267 concerning for SIADH.  He has a follow-up with his head and neck surgeon in April 2023. CT of chest is pending.       Subjective     Review of System: Review of Systems   Constitutional: Positive for unexpected weight change.   Respiratory: Positive for cough (productive) and wheezing.         Medications:     Current Outpatient Medications:   •  albuterol sulfate HFA (ProAir HFA) 108 (90 Base) MCG/ACT inhaler, Inhale 1-2 puffs every 4-6 hours PRN, Disp: 1 g, Rfl: 5  •  amLODIPine (NORVASC) 5 MG tablet, Take 1 tablet by mouth Daily., Disp: 30 tablet, Rfl: 5  •  Calcium Carbonate-Vit D-Min (CALCIUM 1200 PO), Take  by mouth., Disp: , Rfl:   •  celecoxib (CeleBREX) 200 MG capsule, Take 1 capsule by mouth 2 (Two) Times a Day., Disp: 180 capsule, Rfl: 0  •  citalopram (CeleXA) 40 MG tablet, Take 1 tablet by mouth Daily., Disp: 90 tablet, Rfl: 1  •  Diclofenac Sodium (VOLTAREN) 1 % gel gel, Apply 4 g topically to the appropriate area as directed 4 (Four) Times a Day As Needed (shoulder pain)., Disp: 50 g, Rfl: 0  •  ezetimibe (Zetia) 10 MG tablet, Take 1 tablet by mouth Daily., Disp: 30 tablet, Rfl: 5  •  ipratropium (Atrovent HFA) 17 MCG/ACT inhaler, Inhale 2 puffs 4 (Four) Times a Day., Disp: 12.9 g, Rfl: 11  •  multivitamin with minerals tablet tablet, Take 1 tablet by mouth Daily., Disp: , Rfl:   •  pantoprazole (PROTONIX) 40 MG EC tablet, Take 1 tablet by mouth 2 (Two) Times a Day., Disp: 180 tablet, Rfl: 1  •  pravastatin (PRAVACHOL) 40 MG tablet, Take 1 tablet by mouth Daily., Disp: 90 tablet, Rfl: 1  •  varenicline (Chantix Continuing Month Forrest) 1 MG tablet, Take 1 tablet by mouth 2 (Two) Times a Day for 56 days., Disp: 56 tablet, Rfl: 1  •  VITAMIN D, CHOLECALCIFEROL, PO, Take  by mouth., Disp: , Rfl:   •   "Budeson-Glycopyrrol-Formoterol (BREZTRI) 160-9-4.8 MCG/ACT aerosol inhaler, Inhale 2 puffs 2 (Two) Times a Day., Disp: 10.7 g, Rfl: 0    Allergies:   No Known Allergies    Objective     Physical Exam:   Vital Signs:   Vitals:    02/28/23 0805   BP: 138/80   BP Location: Right arm   Patient Position: Sitting   Cuff Size: Adult   Pulse: 76   Resp: 18   Temp: 98.4 °F (36.9 °C)   TempSrc: Infrared   SpO2: 95%   Weight: 67.2 kg (148 lb 3.2 oz)   Height: 175.3 cm (69\")   PainSc: 0-No pain     Body mass index is 21.89 kg/m². BMI is within normal parameters. No other follow-up for BMI required.      Physical Exam  HENT:      Mouth/Throat:      Comments: Pharynx is clear.  Neck:      Comments: No cervical adenopathy  Cardiovascular:      Rate and Rhythm: Normal rate.      Heart sounds: No murmur heard.  Pulmonary:      Breath sounds: Normal breath sounds.   Abdominal:      General: Bowel sounds are normal.      Palpations: Abdomen is soft.      Tenderness: There is no abdominal tenderness.         Assessment / Plan      Assessment/Plan:   Diagnoses and all orders for this visit:    1. 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 Chronic hyponatremia  -     Basic metabolic panel    3. Gastroesophageal reflux disease, unspecified whether esophagitis present  -     pantoprazole (PROTONIX) 40 MG EC tablet; Take 1 tablet by mouth 2 (Two) Times a Day.  Dispense: 180 tablet; Refill: 1    4. COPD, severe (HCC)  -     Budeson-Glycopyrrol-Formoterol (BREZTRI) 160-9-4.8 MCG/ACT aerosol inhaler; Inhale 2 puffs 2 (Two) Times a Day.  Dispense: 10.7 g; Refill: 0       1 .History Head and neck cancer.  Repeat CT of head and neck at .    2. COPD.  Patient provided with 2 month supply of Breztri. He will take 2 puffs in the morning and 2 at night. Chest x-ray 2 weeks ago revealed emphysema. The patient will continue on Chantix.  Continue to quit smoking    3.  " Hyponatremia/SIADH symptoms.  Labs reveal concerning for  SIADH symptoms, will rule out reoccurrence of head and neck cancer. Recheck sodium.    4. GERD  I have refilled his medications.        Patient verbalized an understanding and agreement with plan of care.      Follow Up:   I will discuss follow-up pending tests.   KALEB Barrios  [facility]  Transcribed from ambient dictation for KALEB Barrios by Madison Pollard.  02/28/23   11:23 EST    Patient or patient representative verbalized consent to the visit recording.  I have personally performed the services described in this document as transcribed by the above individual, and it is both accurate and complete.

## 2023-03-01 ENCOUNTER — TELEPHONE (OUTPATIENT)
Dept: INTERNAL MEDICINE | Facility: CLINIC | Age: 63
End: 2023-03-01
Payer: COMMERCIAL

## 2023-03-01 NOTE — TELEPHONE ENCOUNTER
I left a message on the patients voicemail to call our office back, office number provided.     HUB read:  Sodium continues to be low.  follow-up as discussed at last appointment.

## 2023-03-01 NOTE — TELEPHONE ENCOUNTER
----- Message from KALEB Barrios sent at 3/1/2023  2:46 PM EST -----  Sodium continues to be low.  follow-up as discussed at last appointment.

## 2023-03-02 NOTE — TELEPHONE ENCOUNTER
Left third message for him to call back regarding his lab work with office phone number. I have left a message with his wife, Jada that we need him to return our call. Message forwarded to Sulma for review.

## 2023-03-03 ENCOUNTER — TELEPHONE (OUTPATIENT)
Dept: ORTHOPEDIC SURGERY | Facility: CLINIC | Age: 63
End: 2023-03-03
Payer: COMMERCIAL

## 2023-03-03 ENCOUNTER — TRANSCRIBE ORDERS (OUTPATIENT)
Dept: ADMINISTRATIVE | Facility: HOSPITAL | Age: 63
End: 2023-03-03
Payer: COMMERCIAL

## 2023-03-03 DIAGNOSIS — N18.31 CHRONIC KIDNEY DISEASE (CKD) STAGE G3A/A1, MODERATELY DECREASED GLOMERULAR FILTRATION RATE (GFR) BETWEEN 45-59 ML/MIN/1.73 SQUARE METER AND ALBUMINURIA CREATININE RATIO LESS THAN 30 MG/G (CMS/H*: Primary | ICD-10-CM

## 2023-03-03 NOTE — TELEPHONE ENCOUNTER
Patient returned the call to our office. He was read the message from Angie Portillo LPN. He scheduled a follow up appointment for 3/29/23. Call: [phone]

## 2023-03-10 DIAGNOSIS — J44.9 CHRONIC OBSTRUCTIVE PULMONARY DISEASE, UNSPECIFIED COPD TYPE: ICD-10-CM

## 2023-03-10 RX ORDER — FLUTICASONE PROPIONATE AND SALMETEROL 100; 50 UG/1; UG/1
POWDER RESPIRATORY (INHALATION)
Qty: 60 EACH | Refills: 0 | Status: SHIPPED | OUTPATIENT
Start: 2023-03-10 | End: 2023-03-29

## 2023-03-16 ENCOUNTER — HOSPITAL ENCOUNTER (OUTPATIENT)
Dept: CT IMAGING | Facility: HOSPITAL | Age: 63
Discharge: HOME | End: 2023-03-16
Admitting: NURSE PRACTITIONER
Payer: COMMERCIAL

## 2023-03-16 DIAGNOSIS — D00.00 SQUAMOUS CELL CARCINOMA IN SITU OF ORAL CAVITY: ICD-10-CM

## 2023-03-16 DIAGNOSIS — R91.1 LUNG NODULE: ICD-10-CM

## 2023-03-16 DIAGNOSIS — J44.9 COPD, SEVERE: ICD-10-CM

## 2023-03-16 PROCEDURE — 70491 CT SOFT TISSUE NECK W/DYE: CPT

## 2023-03-16 PROCEDURE — 25510000001 IOPAMIDOL 61 % SOLUTION: Performed by: NURSE PRACTITIONER

## 2023-03-16 PROCEDURE — 71250 CT THORAX DX C-: CPT

## 2023-03-16 RX ADMIN — IOPAMIDOL 85 ML: 612 INJECTION, SOLUTION INTRAVENOUS at 10:45

## 2023-03-17 ENCOUNTER — TELEPHONE (OUTPATIENT)
Dept: INTERNAL MEDICINE | Facility: CLINIC | Age: 63
End: 2023-03-17
Payer: COMMERCIAL

## 2023-03-17 NOTE — TELEPHONE ENCOUNTER
I left a message on the patients voicemail to call our office back, office number provided.     HUB read: CT of neck    Please send to Dr. Sellers at .     Let patient know there are several small lymph nodes that appear benign.

## 2023-03-17 NOTE — TELEPHONE ENCOUNTER
----- Message from KALEB Barrios sent at 3/17/2023 12:52 PM EDT -----  Please send to Dr. Sellers at .     Let patient know there are several small lymph nodes that appear benign.

## 2023-03-18 ENCOUNTER — DOCUMENTATION (OUTPATIENT)
Dept: INTERNAL MEDICINE | Facility: CLINIC | Age: 63
End: 2023-03-18
Payer: COMMERCIAL

## 2023-03-18 DIAGNOSIS — J18.9 COMMUNITY ACQUIRED PNEUMONIA, UNSPECIFIED LATERALITY: Primary | ICD-10-CM

## 2023-03-18 RX ORDER — DOXYCYCLINE HYCLATE 100 MG/1
100 CAPSULE ORAL 2 TIMES DAILY
Qty: 20 CAPSULE | Refills: 0 | Status: SHIPPED | OUTPATIENT
Start: 2023-03-18 | End: 2023-03-29

## 2023-03-19 ENCOUNTER — HOSPITAL ENCOUNTER (OUTPATIENT)
Dept: CARDIOLOGY | Facility: HOSPITAL | Age: 63
Discharge: HOME | End: 2023-03-19
Admitting: NURSE PRACTITIONER
Payer: COMMERCIAL

## 2023-03-19 ENCOUNTER — APPOINTMENT (OUTPATIENT)
Dept: CT IMAGING | Facility: HOSPITAL | Age: 63
End: 2023-03-19
Payer: COMMERCIAL

## 2023-03-19 VITALS — HEIGHT: 69 IN | BODY MASS INDEX: 21.92 KG/M2 | WEIGHT: 148 LBS

## 2023-03-19 DIAGNOSIS — R01.1 MURMUR: ICD-10-CM

## 2023-03-19 LAB
BH CV ECHO MEAS - AO MAX PG: 8.5 MMHG
BH CV ECHO MEAS - AO MEAN PG: 4.6 MMHG
BH CV ECHO MEAS - AO ROOT DIAM: 4.1 CM
BH CV ECHO MEAS - AO V2 MAX: 143.3 CM/SEC
BH CV ECHO MEAS - AO V2 VTI: 24.9 CM
BH CV ECHO MEAS - AVA(I,D): 1.52 CM2
BH CV ECHO MEAS - EDV(CUBED): 70.8 ML
BH CV ECHO MEAS - EDV(MOD-SP2): 66.4 ML
BH CV ECHO MEAS - EDV(MOD-SP4): 70 ML
BH CV ECHO MEAS - EF(MOD-BP): 56.2 %
BH CV ECHO MEAS - EF(MOD-SP2): 55.9 %
BH CV ECHO MEAS - EF(MOD-SP4): 62.1 %
BH CV ECHO MEAS - ESV(CUBED): 16.6 ML
BH CV ECHO MEAS - ESV(MOD-SP2): 29.3 ML
BH CV ECHO MEAS - ESV(MOD-SP4): 26.5 ML
BH CV ECHO MEAS - FS: 38.4 %
BH CV ECHO MEAS - IVS/LVPW: 0.97 CM
BH CV ECHO MEAS - IVSD: 0.9 CM
BH CV ECHO MEAS - LA DIMENSION: 3.8 CM
BH CV ECHO MEAS - LAT PEAK E' VEL: 11.1 CM/SEC
BH CV ECHO MEAS - LV DIASTOLIC VOL/BSA (35-75): 38.6 CM2
BH CV ECHO MEAS - LV MASS(C)D: 118.6 GRAMS
BH CV ECHO MEAS - LV MAX PG: 1.6 MMHG
BH CV ECHO MEAS - LV MEAN PG: 0.87 MMHG
BH CV ECHO MEAS - LV SYSTOLIC VOL/BSA (12-30): 14.6 CM2
BH CV ECHO MEAS - LV V1 MAX: 63.3 CM/SEC
BH CV ECHO MEAS - LV V1 VTI: 11.2 CM
BH CV ECHO MEAS - LVIDD: 4.1 CM
BH CV ECHO MEAS - LVIDS: 2.5 CM
BH CV ECHO MEAS - LVOT AREA: 3.4 CM2
BH CV ECHO MEAS - LVOT DIAM: 2.08 CM
BH CV ECHO MEAS - LVPWD: 0.93 CM
BH CV ECHO MEAS - MED PEAK E' VEL: 7.6 CM/SEC
BH CV ECHO MEAS - MV A MAX VEL: 53.3 CM/SEC
BH CV ECHO MEAS - MV DEC SLOPE: 333.1 CM/SEC2
BH CV ECHO MEAS - MV DEC TIME: 0.19 MSEC
BH CV ECHO MEAS - MV E MAX VEL: 62.2 CM/SEC
BH CV ECHO MEAS - MV E/A: 1.17
BH CV ECHO MEAS - MV MAX PG: 2.45 MMHG
BH CV ECHO MEAS - MV MEAN PG: 0.77 MMHG
BH CV ECHO MEAS - MV V2 VTI: 12.2 CM
BH CV ECHO MEAS - MVA(VTI): 3.1 CM2
BH CV ECHO MEAS - PA ACC TIME: 0.11 SEC
BH CV ECHO MEAS - PA PR(ACCEL): 30.7 MMHG
BH CV ECHO MEAS - PA V2 MAX: 96.4 CM/SEC
BH CV ECHO MEAS - RAP SYSTOLE: 3 MMHG
BH CV ECHO MEAS - RVSP: 23 MMHG
BH CV ECHO MEAS - SI(MOD-SP2): 20.4 ML/M2
BH CV ECHO MEAS - SI(MOD-SP4): 24 ML/M2
BH CV ECHO MEAS - SV(LVOT): 38 ML
BH CV ECHO MEAS - SV(MOD-SP2): 37.1 ML
BH CV ECHO MEAS - SV(MOD-SP4): 43.5 ML
BH CV ECHO MEAS - TAPSE (>1.6): 2.17 CM
BH CV ECHO MEAS - TR MAX PG: 20 MMHG
BH CV ECHO MEAS - TR MAX VEL: 223.7 CM/SEC
BH CV ECHO MEASUREMENTS AVERAGE E/E' RATIO: 6.65
BH CV VAS BP RIGHT ARM: NORMAL MMHG
BH CV XLRA - RV BASE: 3.5 CM
BH CV XLRA - RV LENGTH: 6.7 CM
BH CV XLRA - RV MID: 2.7 CM
BH CV XLRA - TDI S': 16.3 CM/SEC
IVRT: 109 MSEC
MAXIMAL PREDICTED HEART RATE: 157 BPM
STRESS TARGET HR: 133 BPM

## 2023-03-19 PROCEDURE — 93306 TTE W/DOPPLER COMPLETE: CPT

## 2023-03-19 PROCEDURE — 93306 TTE W/DOPPLER COMPLETE: CPT | Performed by: INTERNAL MEDICINE

## 2023-03-26 DIAGNOSIS — J44.9 CHRONIC OBSTRUCTIVE PULMONARY DISEASE, UNSPECIFIED COPD TYPE: Primary | ICD-10-CM

## 2023-03-26 DIAGNOSIS — Z72.0 TOBACCO ABUSE: ICD-10-CM

## 2023-03-26 RX ORDER — VARENICLINE TARTRATE 1 MG/1
TABLET, FILM COATED ORAL
Qty: 56 TABLET | Refills: 0 | Status: SHIPPED | OUTPATIENT
Start: 2023-03-26 | End: 2023-03-29

## 2023-03-26 NOTE — TELEPHONE ENCOUNTER
Please call patient and make sure he started his antibiotics. I would like to get him in with pulmonary; I will put in a referral since it has been a year. I would like to do a speech consult and have them check his swallowing and make sure it is not going into his lung. Let me know if he is agreeable.

## 2023-03-27 ENCOUNTER — TELEPHONE (OUTPATIENT)
Dept: INTERNAL MEDICINE | Facility: CLINIC | Age: 63
End: 2023-03-27

## 2023-03-27 NOTE — TELEPHONE ENCOUNTER
Caller: Luis Antonio Fairchild    Relationship to patient: Self    Best call back number: [phone]    THE HUB READ THE FOLLOWING MESSAGE TO THE PATIENT    HUB read:  Please call patient and make sure he started his antibiotics. I would like to get him in with pulmonary; I will put in a referral since it has been a year. I would like to do a speech consult and have them check his swallowing and make sure it is not going into his lung. Let me know if he is agreeable.       PATIENT SAID TO MILLER RIGGS KNOW HE HAS SCHEDULED AN APPOINTMENT FOR Friday 03/31/23

## 2023-03-27 NOTE — TELEPHONE ENCOUNTER
I left a message on the patients voicemail to call our office back, office number provided.     HUB read:  Please call patient and make sure he started his antibiotics. I would like to get him in with pulmonary; I will put in a referral since it has been a year. I would like to do a speech consult and have them check his swallowing and make sure it is not going into his lung. Let me know if he is agreeable.

## 2023-03-29 ENCOUNTER — TELEPHONE (OUTPATIENT)
Dept: INTERNAL MEDICINE | Facility: CLINIC | Age: 63
End: 2023-03-29

## 2023-03-29 ENCOUNTER — OFFICE VISIT (OUTPATIENT)
Dept: INTERNAL MEDICINE | Facility: CLINIC | Age: 63
End: 2023-03-29
Payer: COMMERCIAL

## 2023-03-29 VITALS
BODY MASS INDEX: 22.04 KG/M2 | WEIGHT: 148.8 LBS | HEIGHT: 69 IN | SYSTOLIC BLOOD PRESSURE: 138 MMHG | TEMPERATURE: 98.6 F | OXYGEN SATURATION: 95 % | DIASTOLIC BLOOD PRESSURE: 72 MMHG | HEART RATE: 82 BPM | RESPIRATION RATE: 16 BRPM

## 2023-03-29 DIAGNOSIS — J44.9 COPD, SEVERE: Primary | ICD-10-CM

## 2023-03-29 DIAGNOSIS — Z01.89 ENCOUNTER FOR LABORATORY TEST: ICD-10-CM

## 2023-03-29 DIAGNOSIS — E87.1 HYPONATREMIA: ICD-10-CM

## 2023-03-29 NOTE — PROGRESS NOTES
"Patient Name: Luis Antonio Fairchild  : 1960   MRN: [id number]     Chief Complaint:    Chief Complaint   Patient presents with   • COPD     Follow up       History of Present Illness: Luis Antonio Fairchild is a 62 y.o. male.    Aspiration  The patient reports he has noticed improvement in his symptoms with the antibiotic that he finished last night. He reports he has an appointment with the pulmonologist on 2023. He states he occasionally gets \"choked\", although it has improved from the past. The patient states he knows he is getting fluid and possibly food, in his lungs. He is inquiring about another round of antibiotics to further help hid breathing. He states he has not been able to take a deep breath before finishing the antibiotic. The patient inquires if is symptoms can be due to his emphysema progressing. He states he is still using Breztri, Advair, and albuterol inhalers.    History of cancer  He reports a history of cancer in the back of his throat. The patient states he was informed by the surgeon that he may have scar tissues and would be able to remove it if needed but the patient originally opted out. The patient does have an appointment with Dr. Sol who ordered a CT neck for him. He denies any symptoms of memory issues, dizziness, headaches, or weakness.    Social history  The patient reports he drinks 3-4 beers a day.  CT Soft Tissue Neck With Contrast (2023 10:55)  Findings:  Evaluation is limited by lack of prior comparison, given provided history of for pharyngeal cancer. Evaluation of the oropharyngeal and remaining aerodigestive tract soft tissues demonstrates no evidence of focal mass or other definite luminal   irregularity. Posttreatment and postoperative changes are present along the left neck consistent with prior radiation and fang dissection. Several small lymph nodes are present on the left, nonspecific in the absence of prior exam for comparison,   however nonenlarged, for example " measuring 6 mm short axis on image 30. The thyroid is homogeneous. Vascular structures appear patent. The right-sided neck soft tissues demonstrate no pathologic adenopathy. The osseous structures demonstrate mild   spondylosis, without evidence of fracture or aggressive osseous lesion. A posterior cervical lymph node in the low neck on the left measures 8 mm, somewhat abnormal pathology appearing rounded.     IMPRESSION:  Impression:  Several small lymph nodes are present, favored benign and reactive, with evaluation limited due to lack of prior exams for comparison, given provided history of treated head and neck cancer. There is otherwise no definite evidence of current   aerodigestive tract mass concerning for local recurrence. No additional acute findings are present.     CT Chest Without Contrast Diagnostic (03/16/2023 10:55)    Findings:  There is coronary artery calcification. There are few small mediastinal lymph nodes which are probably not clinically significant. There is calcification in the left hilar area compatible with prior granulomatous exposure.     There is some atelectasis in the right middle lobe and lingula as well as basilar areas. There is suggested debris within the right lower lobe bronchi. There is some prominence of markings within the right lower lobe that may relate to more acute   inflammatory infectious process. There are no pleural effusions. There is a noncalcified pulmonary nodule in the left lower lobe measuring 5.4 mm on image 40.     There are some underlying emphysematous changes.     Lower slices through the upper abdomen reveal nodularity to the right adrenal gland measuring about 1.16 cm and on the left 1 cm that could be reflective of benign adenomas.     IMPRESSION:  Impression:  1.There is some bronchial mucous plugging/debris in the right lower lobe with some underlying interstitial changes noted this could reflect an underlying inflammatory infectious process.  2.There  are some atelectatic changes within the lungs.  3.Emphysematous changes are present.  4.Noncalcified pulmonary nodule left lower lobe. Based on the Fleischner criteria follow-up CT in 12 months could be obtained. Patient may also be a candidate for low dose screening CT.  5.Small adrenal nodular areas that could be reflective of incidental adenomas     Subjective     Review of System: Review of Systems   Respiratory: Cough: productive.    Gastrointestinal:        Choking on food and liquids        Medications:     Current Outpatient Medications:   •  albuterol sulfate HFA (ProAir HFA) 108 (90 Base) MCG/ACT inhaler, Inhale 1-2 puffs every 4-6 hours PRN, Disp: 1 g, Rfl: 5  •  amLODIPine (NORVASC) 5 MG tablet, Take 1 tablet by mouth Daily., Disp: 30 tablet, Rfl: 5  •  Calcium Carbonate-Vit D-Min (CALCIUM 1200 PO), Take  by mouth., Disp: , Rfl:   •  celecoxib (CeleBREX) 200 MG capsule, Take 1 capsule by mouth 2 (Two) Times a Day., Disp: 180 capsule, Rfl: 0  •  citalopram (CeleXA) 40 MG tablet, Take 1 tablet by mouth Daily., Disp: 90 tablet, Rfl: 1  •  multivitamin with minerals tablet tablet, Take 1 tablet by mouth Daily., Disp: , Rfl:   •  pantoprazole (PROTONIX) 40 MG EC tablet, Take 1 tablet by mouth 2 (Two) Times a Day., Disp: 180 tablet, Rfl: 1  •  pravastatin (PRAVACHOL) 40 MG tablet, Take 1 tablet by mouth Daily., Disp: 90 tablet, Rfl: 1  •  VITAMIN D, CHOLECALCIFEROL, PO, Take  by mouth., Disp: , Rfl:   •  Budeson-Glycopyrrol-Formoterol (BREZTRI) 160-9-4.8 MCG/ACT aerosol inhaler, Inhale 2 puffs 2 (Two) Times a Day., Disp: 10.7 g, Rfl: 11  •  dexamethasone (DECADRON) 1 MG tablet, Take 1 tablet by mouth 1 (One) Time for 1 dose. At 11 PM the night before labs., Disp: 1 tablet, Rfl: 0  •  ezetimibe (ZETIA) 10 MG tablet, Take 1 tablet by mouth Daily., Disp: , Rfl:     Allergies:   No Known Allergies    Objective     Physical Exam:   Vital Signs:   Vitals:    03/29/23 1212   BP: 138/72   BP Location: Right arm  "  Patient Position: Sitting   Cuff Size: Adult   Pulse: 82   Resp: 16   Temp: 98.6 °F (37 °C)   TempSrc: Infrared   SpO2: 95%   Weight: 67.5 kg (148 lb 12.8 oz)   Height: 175.3 cm (69\")   PainSc:   3     Body mass index is 21.97 kg/m². BMI is within normal parameters. No other follow-up for BMI required.      Physical Exam  Constitutional:       General: He is not in acute distress.     Appearance: He is not ill-appearing.   HENT:      Head: Normocephalic.   Cardiovascular:      Rate and Rhythm: Normal rate and regular rhythm.      Heart sounds: Normal heart sounds. No murmur heard.  Pulmonary:      Breath sounds: Normal breath sounds.   Abdominal:      General: Bowel sounds are normal.      Tenderness: There is no abdominal tenderness.   Neurological:      General: No focal deficit present.      Mental Status: He is oriented to person, place, and time.   Psychiatric:         Mood and Affect: Mood normal.         Assessment / Plan      Assessment/Plan:   Diagnoses and all orders for this visit:    1. COPD, severe (Primary)    2. Hyponatremia  -     Cortisol; Future    3. Encounter for laboratory test  -     dexamethasone (DECADRON) 1 MG tablet; Take 1 tablet by mouth 1 (One) Time for 1 dose. At 11 PM the night before labs.  Dispense: 1 tablet; Refill: 0    COPD  I advised the patient I will hold off on prescribing another antibiotic until he is seen by pulmonary.   Patient wants to delay swallow test until he says his surgeon at .  I will send ct of chest and neck to .   I discussed with patient that his symptoms could be due to a COPD flare-up or infectious process.  He may need to additional imaging of his neck and chest.   I advised the patient to let me know if he gets worse.  I advised the patient he can discontinue Advair; continue Breztri; samples provided.    Hyponatremia  Discussed imaging to determine cause of low sodium. Will consider imaging of brain and abdomen/pelvis. He would like to talk to " Gal about this.   We will obtain dexamethasone suppression testing to check his cortisol.    I'll send a steroid for him to take just 1 pill the night before.    I spent 45  minutes on this date of service. This time includes time spent by me in the following activities: preparing for the visit, reviewing tests, obtaining and/or reviewing a separately obtained history, performing a medically appropriate examination, counseling the patient/family/caregiver, ordering medications, tests,  and/or documenting information in the medical record.    Follow Up:   Return in about 4 weeks (around 4/26/2023) for Recheck.    KALEB Barrios  [facility]    Transcribed from ambient dictation for KALEB Barrios by Talia Long.  03/29/23   17:53 EDT    Patient or patient representative verbalized consent to the visit recording.  I have personally performed the services described in this document as transcribed by the above individual, and it is both accurate and complete.

## 2023-03-31 ENCOUNTER — OFFICE VISIT (OUTPATIENT)
Dept: PULMONOLOGY | Facility: CLINIC | Age: 63
End: 2023-03-31
Payer: COMMERCIAL

## 2023-03-31 VITALS
SYSTOLIC BLOOD PRESSURE: 144 MMHG | WEIGHT: 146.38 LBS | RESPIRATION RATE: 16 BRPM | HEIGHT: 69 IN | TEMPERATURE: 98.4 F | BODY MASS INDEX: 21.68 KG/M2 | OXYGEN SATURATION: 95 % | HEART RATE: 70 BPM | DIASTOLIC BLOOD PRESSURE: 88 MMHG

## 2023-03-31 DIAGNOSIS — F17.211 CIGARETTE NICOTINE DEPENDENCE IN REMISSION: ICD-10-CM

## 2023-03-31 DIAGNOSIS — Z23 IMMUNIZATION DUE: Primary | ICD-10-CM

## 2023-03-31 DIAGNOSIS — J44.9 COPD, MODERATE: ICD-10-CM

## 2023-03-31 DIAGNOSIS — R91.1 LUNG NODULE: ICD-10-CM

## 2023-03-31 PROCEDURE — 99214 OFFICE O/P EST MOD 30 MIN: CPT | Performed by: INTERNAL MEDICINE

## 2023-03-31 PROCEDURE — 94060 EVALUATION OF WHEEZING: CPT | Performed by: INTERNAL MEDICINE

## 2023-03-31 PROCEDURE — 94726 PLETHYSMOGRAPHY LUNG VOLUMES: CPT | Performed by: INTERNAL MEDICINE

## 2023-03-31 PROCEDURE — 94729 DIFFUSING CAPACITY: CPT | Performed by: INTERNAL MEDICINE

## 2023-03-31 PROCEDURE — 90677 PCV20 VACCINE IM: CPT | Performed by: INTERNAL MEDICINE

## 2023-03-31 PROCEDURE — 90471 IMMUNIZATION ADMIN: CPT | Performed by: INTERNAL MEDICINE

## 2023-03-31 RX ORDER — EZETIMIBE 10 MG/1
1 TABLET ORAL DAILY
COMMUNITY
Start: 2023-03-29

## 2023-03-31 RX ORDER — ALBUTEROL SULFATE 90 UG/1
4 AEROSOL, METERED RESPIRATORY (INHALATION) ONCE
Status: COMPLETED | OUTPATIENT
Start: 2023-03-31 | End: 2023-03-31

## 2023-03-31 RX ADMIN — ALBUTEROL SULFATE 4 PUFF: 90 AEROSOL, METERED RESPIRATORY (INHALATION) at 09:54

## 2023-03-31 NOTE — PROGRESS NOTES
Follow Up Office Visit      Patient Name: Luis Antonio Fairchild    Chief Complaint:    Chief Complaint   Patient presents with   • COPD     F/U       History of Present Illness: Luis Antonio Fairchild is a 62 y.o. male who is here today for follow up of COPD, lung nodule    61-year-old male with past medical history of hypertension, dyslipidemia, GERD, depression, history of throat cancer status post surgery at [facility] in 2018, chronic smoker.  Patient was previously seen with worsening shortness of breath and found to have severe COPD with FEV1 of 49% predicted and also lung nodule in the left lower lobe.  Patient was placed on Bretzri inhaler and he is here for follow-up today.  He states that things are going well.  He has not had any flareup of his COPD in the interim.  He denies any ER visits or hospitalization.  No prednisone use.  Clinically he is improved significantly as far as breathing is concerned.  He still has some cough in the morning.  His mucus is thick.  He has tried Mucinex.  He keeps himself hydrated.  After morning he normally does not have any significant sputum production.  Denies any fevers, chills.  Denies any unexpected weight loss.  He feels that he may be aspirating things where he had throat cancer in the past.  He is due to see ENT soon.  Previously has been seen by speech and underwent speech evaluation and no clear-cut pathology noted palpation.  Will defer further management of that to ENT team.  There are some changes noted in the right lower lobe which could be suggestive of ongoing slow aspiration.  Advised patient to monitor his food intake and take smaller portions and small pieces of food so as to avoid further aspiration.    Offered him alpha-1 antitrypsin level and we will do it today.  Will also give Prevnar 20 immunization today.    Subjective      Review of Systems:   Review of Systems   Constitutional: Negative.    HENT: Negative.    Respiratory: Positive for cough  "(with thick sputum) and shortness of breath.    Cardiovascular: Negative.    Gastrointestinal: Negative.    Endocrine: Negative.    Musculoskeletal: Positive for arthralgias and myalgias.   Skin: Negative.    Neurological: Negative.    Hematological: Negative.    Psychiatric/Behavioral: Negative.    All other systems reviewed and are negative.      The following portions of the patient's history were reviewed and updated as appropriate: allergies, current medications, past family history, past medical history, past social history, past surgical history and problem list.    Objective     Physical Exam:  Vital Signs:   Vitals:    03/31/23 0909   BP: 144/88   BP Location: Left arm   Patient Position: Sitting   Cuff Size: Adult   Pulse: 70   Resp: 16   Temp: 98.4 °F (36.9 °C)   SpO2: 95%  Comment: room air at rest   Weight: 66.4 kg (146 lb 6 oz)   Height: 175.3 cm (69.02\")     Body mass index is 21.61 kg/m².    Physical Exam  Vitals and nursing note reviewed.   Constitutional:       General: He is not in acute distress.     Appearance: He is well-developed. He is not diaphoretic.   HENT:      Head: Normocephalic and atraumatic.      Comments: Post surgical changes noted soft palate.     Nose: Nose normal.      Mouth/Throat:      Pharynx: No oropharyngeal exudate.   Eyes:      General: No scleral icterus.        Right eye: No discharge.         Left eye: No discharge.      Pupils: Pupils are equal, round, and reactive to light.    Cardiovascular:      Rate and Rhythm: Normal rate and regular rhythm.      Heart sounds: Normal heart sounds. No murmur heard.  No friction rub. No gallop.    Pulmonary:      Effort: Pulmonary effort is normal. No respiratory distress.      Breath sounds: No stridor. No wheezing or rales.      Comments: Poor air entry  Musculoskeletal:         General: No tenderness.      Cervical back: Neck supple.      Right lower leg: No edema.      Left lower leg: No edema.      Comments: Clubbing: none  "   Neurological:      Mental Status: He is alert and oriented to person, place, and time.      Cranial Nerves: No cranial nerve deficit.      Coordination: Coordination normal.   Psychiatric:         Behavior: Behavior normal.         Thought Content: Thought content normal.         Judgment: Judgment normal.       Results Review:   I reviewed the patient's new clinical results.     Recent CT scan of the chest reviewed and compared with older films.  Left lower lobe lung nodule is stable.  No significant adenopathy noted.  There is some mucous plugging and inflammatory changes noted in right lower lobe.  No pleural effusions.  No other suspicious lung nodules or masses noted.    PFT IMPRESSION:   1. Available data suggests moderate obstruction.    2. No significant bronchodilator response, but this does not preclude their clinical use.  3. Lung volume reveals normal.       4. Airway resistance is elevated.  5. DLCO is mildly reduced and could be suggestive of emphysema, interstitial lung disease, significant anemia, Pulmonary vascular disease etc. Clinical correlation will be required.  6. Comparing this study to study done on 2/22, best FVC was 3.51 and now it is 4.93, best FEV1 was 1.8 and now it is 2.2 and DLCO was 9.92 and now it is 13.3. So overall these PFTs are showing significant improvement.    Assessment / Plan      Assessment:   Problem List Items Addressed This Visit    None  Visit Diagnoses     Immunization due    -  Primary    Relevant Orders    Pneumococcal Conjugate Vaccine 20-Valent (PCV20) (Completed)    COPD, moderate (HCC)        Relevant Medications    Budeson-Glycopyrrol-Formoterol (BREZTRI) 160-9-4.8 MCG/ACT aerosol inhaler    albuterol sulfate HFA (PROVENTIL HFA;VENTOLIN HFA;PROAIR HFA) inhaler 4 puff (Completed)    Other Relevant Orders    Pulmonary Function Test (Completed)    Cigarette nicotine dependence in remission        Lung nodule        Relevant Medications     Budeson-Glycopyrrol-Formoterol (BREZTRI) 160-9-4.8 MCG/ACT aerosol inhaler    Other Relevant Orders    CT Chest Without Contrast Diagnostic          Plan:   1.  Patient with a history of severe COPD currently doing well with current inhaler therapy.  PFTs reviewed and show significant improvement from before.  Reviewed with patient that this is all reassuring.  He is doing okay with current medications and denies any side effects.  We will continue the same prescription.  He will continue to take precautions to prevent thrush.  Prescription renewed.  2.  CT scan reviewed and lung nodule is stable.   he will need continued annual follow-up with CT scans for lung cancer screening.  3.  Right lower lobe shows some inflammatory changes and some mucus plugging.  Wondering if it is due to ongoing aspiration.  He is due to see ENT soon and he will discuss with them about further management.  Patient also feels that sometimes food going windpipe and then he feels choked and has to cough it out.  Previously was seen by speech therapy as well and no adjustments to her diet needed to be made at that time.  May need to revisit them again.  Will defer management to ENT team for now.  4.  Continue current inhaler therapy.  Advised to keep himself hydrated. Mucinex as needed for now.  5.  Patient tells me that he has quit smoking last month.  He was commended on that and advised to stay off of smoking.  He seems determined not to resume smoking again.  6.  Alpha-1 antitrypsin level offered and implications of that discussed with patient.  We will go ahead and test him today.  7.  Offered Prevnar 20 immunization and that was given to him today as well.  Annual influenza vaccination recommended.    We will follow him annually with a repeat CT scan.  We will see him sooner if needed.    Follow Up:   1 year or sooner as needed    Discussed plan of care in detail with patient today. Patient verbally understands and agrees.     Aleksandar  MARIE Maldonado MD  Pulmonary Critical Care and Sleep Medicine

## 2023-04-02 ENCOUNTER — TELEPHONE (OUTPATIENT)
Dept: INTERNAL MEDICINE | Facility: CLINIC | Age: 63
End: 2023-04-02
Payer: COMMERCIAL

## 2023-04-02 RX ORDER — DEXAMETHASONE 1 MG
1 TABLET ORAL ONCE
Qty: 1 TABLET | Refills: 0 | Status: SHIPPED | OUTPATIENT
Start: 2023-04-02 | End: 2023-04-02

## 2023-04-03 NOTE — TELEPHONE ENCOUNTER
Please call patient and let him know I sent in dexamethasone 1 mg to take at 11 pm. He will come to lab at 8 am for labs.

## 2023-04-04 ENCOUNTER — LAB (OUTPATIENT)
Dept: INTERNAL MEDICINE | Facility: CLINIC | Age: 63
End: 2023-04-04
Payer: COMMERCIAL

## 2023-04-04 DIAGNOSIS — E87.1 HYPONATREMIA: ICD-10-CM

## 2023-04-04 LAB — CORTIS SERPL-MCNC: 1.2 MCG/DL

## 2023-04-04 PROCEDURE — 36415 COLL VENOUS BLD VENIPUNCTURE: CPT | Performed by: NURSE PRACTITIONER

## 2023-04-04 PROCEDURE — 82533 TOTAL CORTISOL: CPT | Performed by: NURSE PRACTITIONER

## 2023-04-10 ENCOUNTER — TELEPHONE (OUTPATIENT)
Dept: INTERNAL MEDICINE | Facility: CLINIC | Age: 63
End: 2023-04-10
Payer: COMMERCIAL

## 2023-04-10 DIAGNOSIS — J44.9 COPD, SEVERE: Primary | ICD-10-CM

## 2023-04-10 DIAGNOSIS — K21.9 GASTROESOPHAGEAL REFLUX DISEASE, UNSPECIFIED WHETHER ESOPHAGITIS PRESENT: ICD-10-CM

## 2023-04-10 DIAGNOSIS — J44.1 COPD WITH EXACERBATION: ICD-10-CM

## 2023-04-10 RX ORDER — ALBUTEROL SULFATE 90 UG/1
AEROSOL, METERED RESPIRATORY (INHALATION)
Qty: 1 G | Refills: 5 | Status: SHIPPED | OUTPATIENT
Start: 2023-04-10

## 2023-04-10 RX ORDER — PANTOPRAZOLE SODIUM 40 MG/1
40 TABLET, DELAYED RELEASE ORAL 2 TIMES DAILY
Qty: 180 TABLET | Refills: 0 | Status: SHIPPED | OUTPATIENT
Start: 2023-04-10

## 2023-04-10 NOTE — TELEPHONE ENCOUNTER
Caller: Luis Antonio Fairchild    Relationship: Self    Best call back number: [phone]    Requested Prescriptions:   Requested Prescriptions     Pending Prescriptions Disp Refills   • pantoprazole (PROTONIX) 40 MG EC tablet 180 tablet 1     Sig: Take 1 tablet by mouth 2 (Two) Times a Day.   • albuterol sulfate HFA (ProAir HFA) 108 (90 Base) MCG/ACT inhaler 1 g 5     Sig: Inhale 1-2 puffs every 4-6 hours PRN        Pharmacy where request should be sent: [facility] [phone] [place] [phone] [facility]     Last office visit with prescribing clinician: 3/29/2023   Last telemedicine visit with prescribing clinician: 4/26/2023   Next office visit with prescribing clinician: 4/26/2023    Additional details provided by patient:     Does the patient have less than a 3 day supply:  [x] Yes  [] No    Would you like a call back once the refill request has been completed: [] Yes [x] No    If the office needs to give you a call back, can they leave a voicemail: [] Yes [x] No    Yovani Hanson, PCT   04/10/23 12:50 EDT

## 2023-04-26 ENCOUNTER — HOSPITAL ENCOUNTER (OUTPATIENT)
Dept: GENERAL RADIOLOGY | Facility: HOSPITAL | Age: 63
Discharge: HOME | End: 2023-04-26
Payer: COMMERCIAL

## 2023-04-26 ENCOUNTER — OFFICE VISIT (OUTPATIENT)
Dept: INTERNAL MEDICINE | Facility: CLINIC | Age: 63
End: 2023-04-26
Payer: COMMERCIAL

## 2023-04-26 VITALS
HEIGHT: 69 IN | TEMPERATURE: 98.2 F | DIASTOLIC BLOOD PRESSURE: 62 MMHG | SYSTOLIC BLOOD PRESSURE: 120 MMHG | BODY MASS INDEX: 22.07 KG/M2 | HEART RATE: 80 BPM | WEIGHT: 149 LBS | RESPIRATION RATE: 16 BRPM

## 2023-04-26 DIAGNOSIS — E87.1 HYPONATREMIA: ICD-10-CM

## 2023-04-26 DIAGNOSIS — M25.551 RIGHT HIP PAIN: ICD-10-CM

## 2023-04-26 DIAGNOSIS — M25.551 RIGHT HIP PAIN: Primary | ICD-10-CM

## 2023-04-26 DIAGNOSIS — J69.0 ASPIRATION PNEUMONIA OF RIGHT LOWER LOBE, UNSPECIFIED ASPIRATION PNEUMONIA TYPE: ICD-10-CM

## 2023-04-26 PROCEDURE — 80048 BASIC METABOLIC PNL TOTAL CA: CPT | Performed by: NURSE PRACTITIONER

## 2023-04-26 PROCEDURE — 73502 X-RAY EXAM HIP UNI 2-3 VIEWS: CPT

## 2023-04-26 RX ORDER — VARENICLINE TARTRATE 1 MG/1
1 TABLET, FILM COATED ORAL
COMMUNITY
Start: 2023-03-26

## 2023-04-26 RX ORDER — DOXYCYCLINE HYCLATE 100 MG/1
1 CAPSULE ORAL 2 TIMES DAILY
COMMUNITY
Start: 2023-03-18 | End: 2023-04-26

## 2023-04-26 RX ORDER — METHYLPREDNISOLONE 4 MG/1
TABLET ORAL
Qty: 21 TABLET | Refills: 0 | Status: SHIPPED | OUTPATIENT
Start: 2023-04-26

## 2023-04-26 RX ORDER — FLUTICASONE PROPIONATE AND SALMETEROL 100; 50 UG/1; UG/1
1 POWDER RESPIRATORY (INHALATION) 2 TIMES DAILY
COMMUNITY
Start: 2023-03-10

## 2023-04-26 NOTE — PROGRESS NOTES
"Patient Name: Luis Antonio Fairchild  : 1960   MRN: [id number]     Chief Complaint:    Chief Complaint   Patient presents with   • COPD, severe     Follow up.        History of Present Illness: Luis Antonio Fairchild is a 62 y.o. male presents to clinic today for a follow-up visit and evaluation of right hip pain.    Right hip pain  Mr. Fairchild reports that approximately 2 weeks ago he started experiencing a pain in his right hip that will occasionally radiate down his right upper leg and even down into his right ankle on occasion. He does not remember what he was doing at the time the pain started. His right hip pain is exacerbated by movement but notes that if he pushes through the pain will eventually ease up. However, when he relaxes for a while, the pain resumes. He can bend and flex his hips and legs; however, he experiences pain in his right hip joint with flexion. He denies any tenderness to the right hip area. He has tried over-the-counter Tylenol Arthritis and 2 or 3 kinds of arthritis rubs. He denies any numbness or tingling. He denies any loss of bowel or bladder control. He denies any back problems or pain. He does currently utilize celecoxib (Celebrex) 200 mg twice a day for osteoarthritis. He reports his pain level right now is 3 or 4 out of 10.     Difficulty swallowing  He followed up with his otolaryngologist and was advised that \"everything looked fine.\" He did not undergo an endoscopy. The provider reviewed the patient's previous CT soft tissue neck. He did mention to the ENT provider that he was having trouble swallowing, but the provider did not seem too concerned. The patient has undergone a swallowing test in the past.     Hyponatremia  He has a history of low sodium. He queries if there is any food or supplement that he could take to improve his sodium level. He questions if low sodium could cause lack of energy, noting that his energy level has decreased.      Chronic obstructive pulmonary disease " (COPD)  The patient reports his breathing has improved. The inhaler has really helped.     Medication management  He currently utilizes Celexa for anxiety. He takes Chantix for smoking cessation which has helped, noting craving only once or twice daily.         Subjective     Review of System: Review of Systems   Constitutional: Positive for fatigue.   HENT: Positive for trouble swallowing.    Musculoskeletal: Positive for arthralgias. Negative for back pain.        Positive for chronic bilateral knee pain. Positive for right hip pain and upper right leg with occasional radiating pain to right ankle. Negative for right hip tenderness. Negative for numbness and tingling.   All other systems reviewed and are negative.         Medications:     Current Outpatient Medications:   •  albuterol sulfate HFA (ProAir HFA) 108 (90 Base) MCG/ACT inhaler, Inhale 1-2 puffs every 4-6 hours PRN, Disp: 1 g, Rfl: 5  •  amLODIPine (NORVASC) 5 MG tablet, Take 1 tablet by mouth Daily., Disp: 30 tablet, Rfl: 5  •  Budeson-Glycopyrrol-Formoterol (BREZTRI) 160-9-4.8 MCG/ACT aerosol inhaler, Inhale 2 puffs 2 (Two) Times a Day., Disp: 10.7 g, Rfl: 11  •  Calcium Carbonate-Vit D-Min (CALCIUM 1200 PO), Take  by mouth., Disp: , Rfl:   •  celecoxib (CeleBREX) 200 MG capsule, Take 1 capsule by mouth 2 (Two) Times a Day., Disp: 180 capsule, Rfl: 0  •  citalopram (CeleXA) 40 MG tablet, Take 1 tablet by mouth Daily., Disp: 90 tablet, Rfl: 1  •  ezetimibe (ZETIA) 10 MG tablet, Take 1 tablet by mouth Daily., Disp: , Rfl:   •  Fluticasone-Salmeterol (ADVAIR/WIXELA) 100-50 MCG/ACT DISKUS, Inhale 1 each 2 (Two) Times a Day., Disp: , Rfl:   •  multivitamin with minerals tablet tablet, Take 1 tablet by mouth Daily., Disp: , Rfl:   •  pantoprazole (PROTONIX) 40 MG EC tablet, Take 1 tablet by mouth 2 (Two) Times a Day., Disp: 180 tablet, Rfl: 0  •  pravastatin (PRAVACHOL) 40 MG tablet, Take 1 tablet by mouth Daily., Disp: 90 tablet, Rfl: 1  •  varenicline  "(CHANTIX) 1 MG tablet, Take 1 tablet by mouth., Disp: , Rfl:   •  VITAMIN D, CHOLECALCIFEROL, PO, Take  by mouth., Disp: , Rfl:   •  methylPREDNISolone (MEDROL) 4 MG dose pack, Take as directed on package instructions., Disp: 21 tablet, Rfl: 0    Allergies:   No Known Allergies    Objective     Physical Exam:   Vital Signs:   Vitals:    04/26/23 1148   BP: 120/62   BP Location: Right arm   Patient Position: Sitting   Cuff Size: Adult   Pulse: 80   Resp: 16   Temp: 98.2 °F (36.8 °C)   TempSrc: Infrared   Weight: 67.6 kg (149 lb)   Height: 175.3 cm (69\")   PainSc:   4   PainLoc: Hip  Comment: right     Body mass index is 22 kg/m². BMI is within normal parameters. No other follow-up for BMI required.      Physical Exam  Constitutional:       General: He is not in acute distress.     Appearance: He is not ill-appearing.   HENT:      Head: Normocephalic.   Cardiovascular:      Rate and Rhythm: Normal rate and regular rhythm.      Heart sounds: Normal heart sounds. No murmur heard.  Pulmonary:      Breath sounds: Normal breath sounds.   Abdominal:      General: Bowel sounds are normal.      Tenderness: There is no abdominal tenderness.   Musculoskeletal:      Comments: Palpation of spine reveals no tenderness. The back has full ROM with flexion  and  extension, with a little pain radiates to right hip . Negative leg raise bilaterally. ROM of right hip painful on lateral hip. Left hip ROM non-painful.    Neurological:      General: No focal deficit present.      Mental Status: He is oriented to person, place, and time.   Psychiatric:         Mood and Affect: Mood normal.         Assessment / Plan      Assessment/Plan:   Diagnoses and all orders for this visit:    1. Right hip pain (Primary)  -     XR Hip With or Without Pelvis 2 - 3 View Right; Future  -     methylPREDNISolone (MEDROL) 4 MG dose pack; Take as directed on package instructions.  Dispense: 21 tablet; Refill: 0    2. Hyponatremia  -     Basic metabolic panel; " Future  -     Basic metabolic panel    3. Aspiration pneumonia of right lower lobe, unspecified aspiration pneumonia type    1. Right hip pain  The patient will continue Celebrex twice daily. He will start a course in steroids. A prescription for methylprednisolone (Medrol) 4 mg dose pack has been sent. An order has been placed for an x-ray hip. If he feels no improvement in right hip pain within a couple of weeks, he will contact the office and we will discuss physical therapy.     2. Difficulty swallowing  An order has been placed for a swallow test to rule out aspiration.     3.Hyponatremia   Lab work completed on 02/28/2023 with basic metabolic panel showing sodium level is low at 127 mmol/L. An order has been placed for lab work to include BMP.       Follow Up:     KALEB Barrios  [facility]    Transcribed from ambient dictation for KALEB Barrios by Amanda Owens.  04/26/23   14:25 EDT    Patient or patient representative verbalized consent to the visit recording.  I have personally performed the services described in this document as transcribed by the above individual, and it is both accurate and complete.

## 2023-04-27 LAB
ANION GAP SERPL CALCULATED.3IONS-SCNC: 14.5 MMOL/L (ref 5–15)
BUN SERPL-MCNC: 12 MG/DL (ref 8–23)
BUN/CREAT SERPL: 12.9 (ref 7–25)
CALCIUM SPEC-SCNC: 9.5 MG/DL (ref 8.6–10.5)
CHLORIDE SERPL-SCNC: 93 MMOL/L (ref 98–107)
CO2 SERPL-SCNC: 23.5 MMOL/L (ref 22–29)
CREAT SERPL-MCNC: 0.93 MG/DL (ref 0.76–1.27)
EGFRCR SERPLBLD CKD-EPI 2021: 92.3 ML/MIN/1.73
GLUCOSE SERPL-MCNC: 75 MG/DL (ref 65–99)
POTASSIUM SERPL-SCNC: 4.4 MMOL/L (ref 3.5–5.2)
SODIUM SERPL-SCNC: 131 MMOL/L (ref 136–145)

## 2023-05-04 ENCOUNTER — TELEPHONE (OUTPATIENT)
Dept: INTERNAL MEDICINE | Facility: CLINIC | Age: 63
End: 2023-05-04
Payer: COMMERCIAL

## 2023-05-04 NOTE — TELEPHONE ENCOUNTER
----- Message from KALEB Barrios sent at 5/4/2023  1:06 PM EDT -----  Hip x-ray shows arthritis in both hips and SI joint.  There is no evidence of a fracture.  Sodium has improved a little.  We can evaluate your abdomen and pelvis with a CT to further evaluate hyponatremia.

## 2023-05-04 NOTE — TELEPHONE ENCOUNTER
I left a message on the patients voicemail to call our office back, office number provided.     HUB read:  Hip x-ray shows arthritis in both hips and SI joint.  There is no evidence of a fracture.  Sodium has improved a little.  We can evaluate your abdomen and pelvis with a CT to further evaluate hyponatremia.

## 2023-05-05 NOTE — TELEPHONE ENCOUNTER
Called patient and good verb was given. Stated he is going out of town and the CT would have to be sometime after the 27th.

## 2023-05-12 DIAGNOSIS — R63.4 UNINTENTIONAL WEIGHT LOSS: Primary | ICD-10-CM

## 2023-05-12 DIAGNOSIS — D00.00 SQUAMOUS CELL CARCINOMA IN SITU OF ORAL CAVITY: ICD-10-CM

## 2023-05-12 DIAGNOSIS — E87.1 HYPONATREMIA: ICD-10-CM

## 2023-05-15 ENCOUNTER — TELEPHONE (OUTPATIENT)
Dept: INTERNAL MEDICINE | Facility: CLINIC | Age: 63
End: 2023-05-15
Payer: COMMERCIAL

## 2023-05-15 DIAGNOSIS — J44.1 COPD WITH EXACERBATION: Primary | ICD-10-CM

## 2023-05-15 RX ORDER — LEVOFLOXACIN 750 MG/1
750 TABLET ORAL DAILY
Qty: 5 TABLET | Refills: 0 | Status: SHIPPED | OUTPATIENT
Start: 2023-05-15 | End: 2023-05-20 | Stop reason: HOSPADM

## 2023-05-15 NOTE — ADDENDUM NOTE
Addended by: FERNANDEZ LUGO on: 5/15/2023 03:35 PM     Modules accepted: Level of Service, SmartSet

## 2023-05-15 NOTE — TELEPHONE ENCOUNTER
I called patient and he is having shortness of breath without wheezing. He is having body aches. I will send in an antibiotic to start tonight. He will go to ER if severe symptoms or worsening. He will come to the clinic in the morning for an appointment and xray

## 2023-05-15 NOTE — TELEPHONE ENCOUNTER
Patient and states he has COPD and gets short of breath at rest often. He states he doesn't want an appointment. He states Sulma usually calls something in for a lung infection.

## 2023-05-16 ENCOUNTER — TELEPHONE (OUTPATIENT)
Dept: INTERNAL MEDICINE | Facility: CLINIC | Age: 63
End: 2023-05-16

## 2023-05-16 NOTE — TELEPHONE ENCOUNTER
Caller: Luis Antonio Fairchild    Relationship: Self    Best call back number: [phone]    What is the best time to reach you: ANYTIME      What was the call regarding: PATIENT STATED HE WENT TO THE [facility] LAST NIGHT AND WAS DIAGNOSED WITH PNEUMONIA AND TOOK XRAYS. HE WENT ON AND CANCELLED THE APPT FOR TODAY BUT HE WANTED TO LET PCP KNOW AND IF SHE STILL NEEDS HIM TO COME IN TO CALL AND HE CAN MAKE ANOTHER APPT    Do you require a callback: YES

## 2023-05-16 NOTE — TELEPHONE ENCOUNTER
I called patient and left a message. He can continue levaquin; follow-up in two weeks or sooner if worsening.

## 2023-05-17 ENCOUNTER — TELEPHONE (OUTPATIENT)
Dept: INTERNAL MEDICINE | Facility: CLINIC | Age: 63
End: 2023-05-17

## 2023-05-18 ENCOUNTER — HOSPITAL ENCOUNTER (INPATIENT)
Facility: HOSPITAL | Age: 63
LOS: 2 days | Discharge: HOME | End: 2023-05-20
Attending: HOSPITALIST | Admitting: INTERNAL MEDICINE
Payer: COMMERCIAL

## 2023-05-18 ENCOUNTER — APPOINTMENT (OUTPATIENT)
Dept: CT IMAGING | Facility: HOSPITAL | Age: 63
End: 2023-05-18
Payer: COMMERCIAL

## 2023-05-18 ENCOUNTER — OFFICE VISIT (OUTPATIENT)
Dept: INTERNAL MEDICINE | Facility: CLINIC | Age: 63
End: 2023-05-18
Payer: COMMERCIAL

## 2023-05-18 VITALS
DIASTOLIC BLOOD PRESSURE: 70 MMHG | OXYGEN SATURATION: 93 % | HEIGHT: 69 IN | HEART RATE: 64 BPM | RESPIRATION RATE: 16 BRPM | SYSTOLIC BLOOD PRESSURE: 134 MMHG | BODY MASS INDEX: 22.07 KG/M2 | TEMPERATURE: 97.7 F | WEIGHT: 149 LBS

## 2023-05-18 DIAGNOSIS — M54.41 ACUTE RIGHT-SIDED LOW BACK PAIN WITH RIGHT-SIDED SCIATICA: Primary | ICD-10-CM

## 2023-05-18 DIAGNOSIS — E87.1 LOW SODIUM LEVELS: ICD-10-CM

## 2023-05-18 DIAGNOSIS — R11.0 NAUSEA: ICD-10-CM

## 2023-05-18 DIAGNOSIS — M54.41 ACUTE RIGHT-SIDED LOW BACK PAIN WITH RIGHT-SIDED SCIATICA: ICD-10-CM

## 2023-05-18 PROBLEM — M25.551 RIGHT HIP PAIN: Status: ACTIVE | Noted: 2023-05-18

## 2023-05-18 PROBLEM — J18.9 PNEUMONIA: Status: ACTIVE | Noted: 2023-05-18

## 2023-05-18 PROBLEM — F10.10 ALCOHOL ABUSE: Status: ACTIVE | Noted: 2023-05-18

## 2023-05-18 LAB
ALBUMIN SERPL-MCNC: 3.5 G/DL (ref 3.5–5.2)
ALBUMIN/GLOB SERPL: 1.2 G/DL
ALP SERPL-CCNC: 107 U/L (ref 39–117)
ALT SERPL W P-5'-P-CCNC: 18 U/L (ref 1–41)
ANION GAP SERPL CALCULATED.3IONS-SCNC: 11 MMOL/L (ref 5–15)
ANION GAP SERPL CALCULATED.3IONS-SCNC: 12 MMOL/L (ref 5–15)
AST SERPL-CCNC: 29 U/L (ref 1–40)
B PARAPERT DNA SPEC QL NAA+PROBE: NOT DETECTED
B PERT DNA SPEC QL NAA+PROBE: NOT DETECTED
BASOPHILS # BLD AUTO: 0.05 10*3/MM3 (ref 0–0.2)
BASOPHILS NFR BLD AUTO: 0.4 % (ref 0–1.5)
BILIRUB SERPL-MCNC: 0.2 MG/DL (ref 0–1.2)
BUN SERPL-MCNC: 11 MG/DL (ref 8–23)
BUN SERPL-MCNC: 7 MG/DL (ref 8–23)
BUN/CREAT SERPL: 10.1 (ref 7–25)
BUN/CREAT SERPL: 12.9 (ref 7–25)
C PNEUM DNA NPH QL NAA+NON-PROBE: NOT DETECTED
CALCIUM SPEC-SCNC: 9.2 MG/DL (ref 8.6–10.5)
CALCIUM SPEC-SCNC: 9.5 MG/DL (ref 8.6–10.5)
CHLORIDE SERPL-SCNC: 86 MMOL/L (ref 98–107)
CHLORIDE SERPL-SCNC: 88 MMOL/L (ref 98–107)
CO2 SERPL-SCNC: 25 MMOL/L (ref 22–29)
CO2 SERPL-SCNC: 25 MMOL/L (ref 22–29)
CREAT SERPL-MCNC: 0.69 MG/DL (ref 0.76–1.27)
CREAT SERPL-MCNC: 0.85 MG/DL (ref 0.76–1.27)
D-LACTATE SERPL-SCNC: 0.7 MMOL/L (ref 0.5–2)
DEPRECATED RDW RBC AUTO: 43.2 FL (ref 37–54)
EGFRCR SERPLBLD CKD-EPI 2021: 104 ML/MIN/1.73
EGFRCR SERPLBLD CKD-EPI 2021: 97.6 ML/MIN/1.73
EOSINOPHIL # BLD AUTO: 0.06 10*3/MM3 (ref 0–0.4)
EOSINOPHIL NFR BLD AUTO: 0.4 % (ref 0.3–6.2)
ERYTHROCYTE [DISTWIDTH] IN BLOOD BY AUTOMATED COUNT: 12.4 % (ref 12.3–15.4)
FLUAV SUBTYP SPEC NAA+PROBE: NOT DETECTED
FLUBV RNA ISLT QL NAA+PROBE: NOT DETECTED
GLOBULIN UR ELPH-MCNC: 3 GM/DL
GLUCOSE SERPL-MCNC: 113 MG/DL (ref 65–99)
GLUCOSE SERPL-MCNC: 120 MG/DL (ref 65–99)
HADV DNA SPEC NAA+PROBE: NOT DETECTED
HCOV 229E RNA SPEC QL NAA+PROBE: NOT DETECTED
HCOV HKU1 RNA SPEC QL NAA+PROBE: NOT DETECTED
HCOV NL63 RNA SPEC QL NAA+PROBE: NOT DETECTED
HCOV OC43 RNA SPEC QL NAA+PROBE: NOT DETECTED
HCT VFR BLD AUTO: 37.6 % (ref 37.5–51)
HGB BLD-MCNC: 13.2 G/DL (ref 13–17.7)
HMPV RNA NPH QL NAA+NON-PROBE: NOT DETECTED
HPIV1 RNA ISLT QL NAA+PROBE: NOT DETECTED
HPIV2 RNA SPEC QL NAA+PROBE: NOT DETECTED
HPIV3 RNA NPH QL NAA+PROBE: NOT DETECTED
HPIV4 P GENE NPH QL NAA+PROBE: NOT DETECTED
IMM GRANULOCYTES # BLD AUTO: 0.1 10*3/MM3 (ref 0–0.05)
IMM GRANULOCYTES NFR BLD AUTO: 0.7 % (ref 0–0.5)
LYMPHOCYTES # BLD AUTO: 2.04 10*3/MM3 (ref 0.7–3.1)
LYMPHOCYTES NFR BLD AUTO: 14.6 % (ref 19.6–45.3)
M PNEUMO IGG SER IA-ACNC: NOT DETECTED
MCH RBC QN AUTO: 33.1 PG (ref 26.6–33)
MCHC RBC AUTO-ENTMCNC: 35.1 G/DL (ref 31.5–35.7)
MCV RBC AUTO: 94.2 FL (ref 79–97)
MONOCYTES # BLD AUTO: 1.31 10*3/MM3 (ref 0.1–0.9)
MONOCYTES NFR BLD AUTO: 9.4 % (ref 5–12)
NEUTROPHILS NFR BLD AUTO: 10.37 10*3/MM3 (ref 1.7–7)
NEUTROPHILS NFR BLD AUTO: 74.5 % (ref 42.7–76)
NRBC BLD AUTO-RTO: 0 /100 WBC (ref 0–0.2)
OSMOLALITY SERPL: 268 MOSM/KG (ref 275–295)
PLATELET # BLD AUTO: 432 10*3/MM3 (ref 140–450)
PMV BLD AUTO: 8 FL (ref 6–12)
POTASSIUM SERPL-SCNC: 3.7 MMOL/L (ref 3.5–5.2)
POTASSIUM SERPL-SCNC: 4.2 MMOL/L (ref 3.5–5.2)
PROCALCITONIN SERPL-MCNC: 0.2 NG/ML (ref 0–0.25)
PROT SERPL-MCNC: 6.5 G/DL (ref 6–8.5)
RBC # BLD AUTO: 3.99 10*6/MM3 (ref 4.14–5.8)
RHINOVIRUS RNA SPEC NAA+PROBE: DETECTED
RSV RNA NPH QL NAA+NON-PROBE: NOT DETECTED
SARS-COV-2 RNA NPH QL NAA+NON-PROBE: NOT DETECTED
SODIUM SERPL-SCNC: 122 MMOL/L (ref 136–145)
SODIUM SERPL-SCNC: 125 MMOL/L (ref 136–145)
SODIUM UR-SCNC: <20 MMOL/L
TSH SERPL DL<=0.05 MIU/L-ACNC: 1.21 UIU/ML (ref 0.27–4.2)
WBC NRBC COR # BLD: 13.93 10*3/MM3 (ref 3.4–10.8)

## 2023-05-18 PROCEDURE — 93005 ELECTROCARDIOGRAM TRACING: CPT | Performed by: INTERNAL MEDICINE

## 2023-05-18 PROCEDURE — 94640 AIRWAY INHALATION TREATMENT: CPT

## 2023-05-18 PROCEDURE — 0202U NFCT DS 22 TRGT SARS-COV-2: CPT | Performed by: INTERNAL MEDICINE

## 2023-05-18 PROCEDURE — 25010000002 MORPHINE PER 10 MG: Performed by: INTERNAL MEDICINE

## 2023-05-18 PROCEDURE — 94799 UNLISTED PULMONARY SVC/PX: CPT

## 2023-05-18 PROCEDURE — 81003 URINALYSIS AUTO W/O SCOPE: CPT | Performed by: INTERNAL MEDICINE

## 2023-05-18 PROCEDURE — 83935 ASSAY OF URINE OSMOLALITY: CPT | Performed by: INTERNAL MEDICINE

## 2023-05-18 PROCEDURE — 25510000001 IOPAMIDOL PER 1 ML: Performed by: INTERNAL MEDICINE

## 2023-05-18 PROCEDURE — 25010000002 ENOXAPARIN PER 10 MG: Performed by: INTERNAL MEDICINE

## 2023-05-18 PROCEDURE — 85025 COMPLETE CBC W/AUTO DIFF WBC: CPT | Performed by: INTERNAL MEDICINE

## 2023-05-18 PROCEDURE — 84300 ASSAY OF URINE SODIUM: CPT | Performed by: INTERNAL MEDICINE

## 2023-05-18 PROCEDURE — 87449 NOS EACH ORGANISM AG IA: CPT | Performed by: INTERNAL MEDICINE

## 2023-05-18 PROCEDURE — 83930 ASSAY OF BLOOD OSMOLALITY: CPT | Performed by: INTERNAL MEDICINE

## 2023-05-18 PROCEDURE — 84145 PROCALCITONIN (PCT): CPT | Performed by: INTERNAL MEDICINE

## 2023-05-18 PROCEDURE — 87070 CULTURE OTHR SPECIMN AEROBIC: CPT | Performed by: INTERNAL MEDICINE

## 2023-05-18 PROCEDURE — 71275 CT ANGIOGRAPHY CHEST: CPT

## 2023-05-18 PROCEDURE — 84443 ASSAY THYROID STIM HORMONE: CPT | Performed by: INTERNAL MEDICINE

## 2023-05-18 PROCEDURE — 87205 SMEAR GRAM STAIN: CPT | Performed by: INTERNAL MEDICINE

## 2023-05-18 PROCEDURE — 99223 1ST HOSP IP/OBS HIGH 75: CPT | Performed by: INTERNAL MEDICINE

## 2023-05-18 PROCEDURE — 80053 COMPREHEN METABOLIC PANEL: CPT | Performed by: NURSE PRACTITIONER

## 2023-05-18 PROCEDURE — 0 METHYLPREDNISOLONE PER 125 MG: Performed by: INTERNAL MEDICINE

## 2023-05-18 PROCEDURE — 83605 ASSAY OF LACTIC ACID: CPT | Performed by: INTERNAL MEDICINE

## 2023-05-18 PROCEDURE — 25010000002 THIAMINE PER 100 MG: Performed by: INTERNAL MEDICINE

## 2023-05-18 RX ORDER — FOLIC ACID 1 MG/1
1 TABLET ORAL DAILY
Status: DISCONTINUED | OUTPATIENT
Start: 2023-05-19 | End: 2023-05-20 | Stop reason: HOSPADM

## 2023-05-18 RX ORDER — SODIUM CHLORIDE 9 MG/ML
40 INJECTION, SOLUTION INTRAVENOUS AS NEEDED
Status: DISCONTINUED | OUTPATIENT
Start: 2023-05-18 | End: 2023-05-20 | Stop reason: HOSPADM

## 2023-05-18 RX ORDER — LORAZEPAM 1 MG/1
1 TABLET ORAL
Status: DISCONTINUED | OUTPATIENT
Start: 2023-05-18 | End: 2023-05-20 | Stop reason: HOSPADM

## 2023-05-18 RX ORDER — LEVOFLOXACIN 750 MG/1
750 TABLET ORAL DAILY
Status: DISCONTINUED | OUTPATIENT
Start: 2023-05-19 | End: 2023-05-18

## 2023-05-18 RX ORDER — NICOTINE 21 MG/24HR
1 PATCH, TRANSDERMAL 24 HOURS TRANSDERMAL EVERY 24 HOURS
Status: DISCONTINUED | OUTPATIENT
Start: 2023-05-18 | End: 2023-05-20 | Stop reason: HOSPADM

## 2023-05-18 RX ORDER — SODIUM CHLORIDE 0.9 % (FLUSH) 0.9 %
10 SYRINGE (ML) INJECTION AS NEEDED
Status: DISCONTINUED | OUTPATIENT
Start: 2023-05-18 | End: 2023-05-20 | Stop reason: HOSPADM

## 2023-05-18 RX ORDER — NALOXONE HCL 0.4 MG/ML
0.4 VIAL (ML) INJECTION
Status: DISCONTINUED | OUTPATIENT
Start: 2023-05-18 | End: 2023-05-20 | Stop reason: HOSPADM

## 2023-05-18 RX ORDER — KETOROLAC TROMETHAMINE 30 MG/ML
30 INJECTION, SOLUTION INTRAMUSCULAR; INTRAVENOUS ONCE
Status: DISCONTINUED | OUTPATIENT
Start: 2023-05-18 | End: 2023-05-18 | Stop reason: HOSPADM

## 2023-05-18 RX ORDER — MULTIPLE VITAMINS W/ MINERALS TAB 9MG-400MCG
1 TAB ORAL DAILY
Status: DISCONTINUED | OUTPATIENT
Start: 2023-05-18 | End: 2023-05-20 | Stop reason: HOSPADM

## 2023-05-18 RX ORDER — LORAZEPAM 2 MG/ML
2 INJECTION INTRAMUSCULAR
Status: DISCONTINUED | OUTPATIENT
Start: 2023-05-18 | End: 2023-05-20 | Stop reason: HOSPADM

## 2023-05-18 RX ORDER — BISACODYL 10 MG
10 SUPPOSITORY, RECTAL RECTAL DAILY PRN
Status: DISCONTINUED | OUTPATIENT
Start: 2023-05-18 | End: 2023-05-20 | Stop reason: HOSPADM

## 2023-05-18 RX ORDER — IPRATROPIUM BROMIDE AND ALBUTEROL SULFATE 2.5; .5 MG/3ML; MG/3ML
3 SOLUTION RESPIRATORY (INHALATION) EVERY 4 HOURS PRN
Status: DISCONTINUED | OUTPATIENT
Start: 2023-05-18 | End: 2023-05-20 | Stop reason: HOSPADM

## 2023-05-18 RX ORDER — POLYETHYLENE GLYCOL 3350 17 G/17G
17 POWDER, FOR SOLUTION ORAL DAILY PRN
Status: DISCONTINUED | OUTPATIENT
Start: 2023-05-18 | End: 2023-05-20 | Stop reason: HOSPADM

## 2023-05-18 RX ORDER — AMLODIPINE BESYLATE 5 MG/1
5 TABLET ORAL DAILY
Status: DISCONTINUED | OUTPATIENT
Start: 2023-05-19 | End: 2023-05-20 | Stop reason: HOSPADM

## 2023-05-18 RX ORDER — KETOROLAC TROMETHAMINE 10 MG/1
10 TABLET, FILM COATED ORAL EVERY 6 HOURS PRN
Qty: 20 TABLET | Refills: 0 | Status: SHIPPED | OUTPATIENT
Start: 2023-05-18

## 2023-05-18 RX ORDER — ACETAMINOPHEN 160 MG/5ML
650 SOLUTION ORAL EVERY 4 HOURS PRN
Status: DISCONTINUED | OUTPATIENT
Start: 2023-05-18 | End: 2023-05-20 | Stop reason: HOSPADM

## 2023-05-18 RX ORDER — ENOXAPARIN SODIUM 100 MG/ML
40 INJECTION SUBCUTANEOUS NIGHTLY
Status: DISCONTINUED | OUTPATIENT
Start: 2023-05-18 | End: 2023-05-20 | Stop reason: HOSPADM

## 2023-05-18 RX ORDER — THIAMINE HYDROCHLORIDE 100 MG/ML
200 INJECTION, SOLUTION INTRAMUSCULAR; INTRAVENOUS EVERY 8 HOURS SCHEDULED
Status: DISCONTINUED | OUTPATIENT
Start: 2023-05-18 | End: 2023-05-20 | Stop reason: HOSPADM

## 2023-05-18 RX ORDER — CHOLECALCIFEROL (VITAMIN D3) 125 MCG
5 CAPSULE ORAL NIGHTLY PRN
Status: DISCONTINUED | OUTPATIENT
Start: 2023-05-18 | End: 2023-05-20 | Stop reason: HOSPADM

## 2023-05-18 RX ORDER — DOXYCYCLINE HYCLATE 100 MG/1
100 CAPSULE ORAL
COMMUNITY
Start: 2023-05-15 | End: 2023-05-18

## 2023-05-18 RX ORDER — AMOXICILLIN 250 MG
2 CAPSULE ORAL 2 TIMES DAILY
Status: DISCONTINUED | OUTPATIENT
Start: 2023-05-18 | End: 2023-05-20 | Stop reason: HOSPADM

## 2023-05-18 RX ORDER — BUDESONIDE AND FORMOTEROL FUMARATE DIHYDRATE 160; 4.5 UG/1; UG/1
1 AEROSOL RESPIRATORY (INHALATION)
Status: DISCONTINUED | OUTPATIENT
Start: 2023-05-18 | End: 2023-05-20 | Stop reason: HOSPADM

## 2023-05-18 RX ORDER — DOXYCYCLINE HYCLATE 100 MG/1
CAPSULE ORAL
COMMUNITY
Start: 2023-05-16 | End: 2023-05-18 | Stop reason: SDUPTHER

## 2023-05-18 RX ORDER — IPRATROPIUM BROMIDE AND ALBUTEROL SULFATE 2.5; .5 MG/3ML; MG/3ML
3 SOLUTION RESPIRATORY (INHALATION)
Status: DISCONTINUED | OUTPATIENT
Start: 2023-05-18 | End: 2023-05-20 | Stop reason: HOSPADM

## 2023-05-18 RX ORDER — SODIUM CHLORIDE 9 MG/ML
75 INJECTION, SOLUTION INTRAVENOUS CONTINUOUS
Status: DISCONTINUED | OUTPATIENT
Start: 2023-05-18 | End: 2023-05-20 | Stop reason: HOSPADM

## 2023-05-18 RX ORDER — METHYLPREDNISOLONE SODIUM SUCCINATE 40 MG/ML
40 INJECTION, POWDER, LYOPHILIZED, FOR SOLUTION INTRAMUSCULAR; INTRAVENOUS EVERY 12 HOURS
Status: DISCONTINUED | OUTPATIENT
Start: 2023-05-18 | End: 2023-05-20 | Stop reason: HOSPADM

## 2023-05-18 RX ORDER — MORPHINE SULFATE 2 MG/ML
2 INJECTION, SOLUTION INTRAMUSCULAR; INTRAVENOUS
Status: DISCONTINUED | OUTPATIENT
Start: 2023-05-18 | End: 2023-05-20 | Stop reason: HOSPADM

## 2023-05-18 RX ORDER — CITALOPRAM 20 MG/1
20 TABLET ORAL DAILY
Status: DISCONTINUED | OUTPATIENT
Start: 2023-05-19 | End: 2023-05-20 | Stop reason: HOSPADM

## 2023-05-18 RX ORDER — SODIUM CHLORIDE 0.9 % (FLUSH) 0.9 %
10 SYRINGE (ML) INJECTION EVERY 12 HOURS SCHEDULED
Status: DISCONTINUED | OUTPATIENT
Start: 2023-05-18 | End: 2023-05-20 | Stop reason: HOSPADM

## 2023-05-18 RX ORDER — DOXYCYCLINE 100 MG/1
100 CAPSULE ORAL EVERY 12 HOURS SCHEDULED
Status: DISCONTINUED | OUTPATIENT
Start: 2023-05-18 | End: 2023-05-20 | Stop reason: HOSPADM

## 2023-05-18 RX ORDER — CYCLOBENZAPRINE HCL 10 MG
10 TABLET ORAL 3 TIMES DAILY PRN
Status: DISCONTINUED | OUTPATIENT
Start: 2023-05-18 | End: 2023-05-20 | Stop reason: HOSPADM

## 2023-05-18 RX ORDER — BISACODYL 5 MG/1
5 TABLET, DELAYED RELEASE ORAL DAILY PRN
Status: DISCONTINUED | OUTPATIENT
Start: 2023-05-18 | End: 2023-05-20 | Stop reason: HOSPADM

## 2023-05-18 RX ORDER — LORAZEPAM 1 MG/1
2 TABLET ORAL
Status: DISCONTINUED | OUTPATIENT
Start: 2023-05-18 | End: 2023-05-20 | Stop reason: HOSPADM

## 2023-05-18 RX ORDER — ACETAMINOPHEN 650 MG/1
650 SUPPOSITORY RECTAL EVERY 4 HOURS PRN
Status: DISCONTINUED | OUTPATIENT
Start: 2023-05-18 | End: 2023-05-20 | Stop reason: HOSPADM

## 2023-05-18 RX ORDER — PREDNISONE 10 MG/1
10 TABLET ORAL DAILY
COMMUNITY

## 2023-05-18 RX ORDER — ONDANSETRON 4 MG/1
4 TABLET, FILM COATED ORAL EVERY 8 HOURS PRN
Qty: 20 TABLET | Refills: 0 | Status: SHIPPED | OUTPATIENT
Start: 2023-05-18

## 2023-05-18 RX ORDER — PANTOPRAZOLE SODIUM 40 MG/1
40 TABLET, DELAYED RELEASE ORAL 2 TIMES DAILY
Status: DISCONTINUED | OUTPATIENT
Start: 2023-05-18 | End: 2023-05-20 | Stop reason: HOSPADM

## 2023-05-18 RX ORDER — CYCLOBENZAPRINE HCL 10 MG
TABLET ORAL
COMMUNITY
Start: 2023-05-17 | End: 2023-05-20 | Stop reason: HOSPADM

## 2023-05-18 RX ORDER — PRAVASTATIN SODIUM 40 MG
40 TABLET ORAL DAILY
Status: DISCONTINUED | OUTPATIENT
Start: 2023-05-19 | End: 2023-05-20 | Stop reason: HOSPADM

## 2023-05-18 RX ORDER — ACETAMINOPHEN 325 MG/1
650 TABLET ORAL EVERY 4 HOURS PRN
Status: DISCONTINUED | OUTPATIENT
Start: 2023-05-18 | End: 2023-05-20 | Stop reason: HOSPADM

## 2023-05-18 RX ORDER — ALBUTEROL SULFATE 2.5 MG/3ML
2.5 SOLUTION RESPIRATORY (INHALATION) EVERY 4 HOURS PRN
Status: DISCONTINUED | OUTPATIENT
Start: 2023-05-18 | End: 2023-05-20 | Stop reason: HOSPADM

## 2023-05-18 RX ORDER — LORAZEPAM 2 MG/ML
1 INJECTION INTRAMUSCULAR
Status: DISCONTINUED | OUTPATIENT
Start: 2023-05-18 | End: 2023-05-20 | Stop reason: HOSPADM

## 2023-05-18 RX ADMIN — THIAMINE HYDROCHLORIDE 200 MG: 100 INJECTION, SOLUTION INTRAMUSCULAR; INTRAVENOUS at 21:59

## 2023-05-18 RX ADMIN — IPRATROPIUM BROMIDE AND ALBUTEROL SULFATE 3 ML: 2.5; .5 SOLUTION RESPIRATORY (INHALATION) at 20:57

## 2023-05-18 RX ADMIN — ENOXAPARIN SODIUM 40 MG: 40 INJECTION SUBCUTANEOUS at 21:51

## 2023-05-18 RX ADMIN — MULTIPLE VITAMINS W/ MINERALS TAB 1 TABLET: TAB at 21:52

## 2023-05-18 RX ADMIN — MORPHINE SULFATE 2 MG: 2 INJECTION, SOLUTION INTRAMUSCULAR; INTRAVENOUS at 21:51

## 2023-05-18 RX ADMIN — KETOROLAC TROMETHAMINE 30 MG: 30 INJECTION, SOLUTION INTRAMUSCULAR; INTRAVENOUS at 12:02

## 2023-05-18 RX ADMIN — PANTOPRAZOLE SODIUM 40 MG: 40 TABLET, DELAYED RELEASE ORAL at 21:52

## 2023-05-18 RX ADMIN — IOPAMIDOL 82 ML: 755 INJECTION, SOLUTION INTRAVENOUS at 22:54

## 2023-05-18 RX ADMIN — Medication 1 PATCH: at 21:51

## 2023-05-18 RX ADMIN — Medication 10 ML: at 21:54

## 2023-05-18 RX ADMIN — BUDESONIDE AND FORMOTEROL FUMARATE DIHYDRATE 1 PUFF: 160; 4.5 AEROSOL RESPIRATORY (INHALATION) at 20:57

## 2023-05-18 RX ADMIN — METHYLPREDNISOLONE SODIUM SUCCINATE 40 MG: 40 INJECTION INTRAMUSCULAR; INTRAVENOUS at 21:51

## 2023-05-18 RX ADMIN — DOXYCYCLINE 100 MG: 100 CAPSULE ORAL at 21:52

## 2023-05-18 RX ADMIN — SODIUM CHLORIDE 75 ML/HR: 9 INJECTION, SOLUTION INTRAVENOUS at 22:00

## 2023-05-18 NOTE — PROGRESS NOTES
Patient Name: Luis Antonio Fairchild  : 1960   MRN: [id number]     Chief Complaint:    Chief Complaint   Patient presents with   • Hip Pain     ER Follow up    • low sodium       History of Present Illness: Luis Antonio Fairchild is a 62 y.o. male presents to clinic for followup after ER visit. He is accompanied by his wife.    Hyponatremia, acute on chronic right-sided low back pain with right-sided sciatica, right hip pain, nausea  The patient reports weakness. His wife will states that on the evening of 2023, the patient developed right hip pain and could not sleep due to pain. He reports that his pain originates in his right low back, radiates to his posterior hip and lower extremity, and may be aggravated by ambulation though occurs at rest as well. His pain has been present intermittently for approximately 1 year though worsened recently and is the worst pain he has experienced. He is able to perform partial lumbar flexion, though full lumbar flexion causes pain. He denies suffering a fall and denies numbness and tingling of bilateral lower extremities, genital numbness, and bowel or bladder incontinence. Approximately 2 doses of muscle relaxers as well as tramadol were unhelpful. He has a history of bilateral hip arthrosis. The patient was evaluated at [facility] at 9:00 a.m. on 2023 for severe right hip pain and was provided with 2 doses of Percocet and a Toradol injection, which were unhelpful. He was administered a Dilaudid injection, which partially alleviated his pain.     Right hip x-rays demonstrated several small avulsions from the superior lateral right acetabulum.     CT scan of the right hip demonstrated no significant degenerative change, fracture, joint effusion, or loose bodies.     CT scan of the lumbar spine demonstrated degenerative disc disease and disc bulges present at L3-4, L4-5, and L5-S1.     CT scan of the abdomen and pelvis showed distended urinary bladder  "and distended gallbladder. Atelectasis and nodularity along the left hemidiaphragm. Follow-up to resolution recommended.     Blood work revealed his sodium had decreased to 121 mmol/L.     Admission was recommended, and he declined. He was administered \"a bag\" of intravenous sodium chloride and discharged at approximately 4:00 to 4:30 p.m. He was not prescribed an anti-inflammatory medication.     The patient has a history of chronic hyponatremia which had somewhat improved, last sodium level was 131 mmol/L on 04/28/2023.      His pain is currently 7/10. He is taking Tylenol and ibuprofen.. The tylenol was helpful. He is not taking a diuretic. The patient states that \"pain pills,\" aside from Tylenol, are causing him nausea. Percocet and similar medications have caused emesis.    Bilateral pneumonia  Patient called our office  on 5/15/23 with concerns of shortness of breath and cough. Levaquin was prescribed and patient was scheduled to be seen the next day.    Later that night  patient was evaluated at [facility] on 05/15/2023 due to worsening shortness of breath and  diagnosed with bilateral pneumonia. ER prescribed doxycycline. He did not start the doxy. He started Levaquin and is taking an oral steroid, the name of which they cannot recall, which has improved his breathing difficulties. He is not taking doxycycline and does not use supplemental oxygen at home. On 05/17/2023, his oxygen saturation was in the 80s percent range according to his wife.     His cough has improved. He experienced dysphagia in the past and denies dysphagia currently. Potential aspiration was a concern on past CT scan of the chest. Swallowing test was ordered; but he has not completed.  The patient underwent surgery 2018 for tonsil cancer and follows with Dr. Sellers at . Patient states surgery has not recommended a revision.    He drinks water, drinking 24-32 oz per day, and chews gum. He has cut back on his alcohol. "     Health maintenance  The patient requests refills of regular medications.    Subjective     Review of System: Review of Systems   Musculoskeletal: Positive for back pain.        Positive for right hip pain, right lower extremity pain.   Neurological: Positive for weakness.      A review of systems was performed, and the pertinent positives are noted in the HPI.      Medications:   No current facility-administered medications for this visit.  No current outpatient medications on file.    Facility-Administered Medications Ordered in Other Visits:   •  acetaminophen (TYLENOL) tablet 650 mg, 650 mg, Oral, Q4H PRN **OR** acetaminophen (TYLENOL) 160 MG/5ML solution 650 mg, 650 mg, Oral, Q4H PRN **OR** acetaminophen (TYLENOL) suppository 650 mg, 650 mg, Rectal, Q4H PRN, Malachi, Meghann G, DO  •  albuterol (PROVENTIL) nebulizer solution 0.083% 2.5 mg/3mL, 2.5 mg, Nebulization, Q4H PRN, Malachi, Meghann G, DO  •  [START ON 5/19/2023] amLODIPine (NORVASC) tablet 5 mg, 5 mg, Oral, Daily, Malachi, Meghann G, DO  •  sennosides-docusate (PERICOLACE) 8.6-50 MG per tablet 2 tablet, 2 tablet, Oral, BID **AND** polyethylene glycol (MIRALAX) packet 17 g, 17 g, Oral, Daily PRN **AND** bisacodyl (DULCOLAX) EC tablet 5 mg, 5 mg, Oral, Daily PRN **AND** bisacodyl (DULCOLAX) suppository 10 mg, 10 mg, Rectal, Daily PRN, Malachi, Meghann G, DO  •  budesonide-formoterol (SYMBICORT) 160-4.5 MCG/ACT inhaler 1 puff, 1 puff, Inhalation, BID - RT, Meghann Ly, DO, 1 puff at 05/18/23 2057  •  Calcium Replacement - Follow Nurse / BPA Driven Protocol, , Does not apply, PRN, Malachi, Meghann G, DO  •  [START ON 5/19/2023] citalopram (CeleXA) tablet 20 mg, 20 mg, Oral, Daily, Malachi, Meghann G, DO  •  cyclobenzaprine (FLEXERIL) tablet 10 mg, 10 mg, Oral, TID PRN, Meghann Ly DO  •  doxycycline (MONODOX) capsule 100 mg, 100 mg, Oral, Q12H, Meghann Ly DO, 100 mg at 05/18/23 2152  •  Enoxaparin Sodium (LOVENOX) syringe 40 mg, 40 mg, Subcutaneous,  Nightly, Malachi, Meghann G, DO, 40 mg at 05/18/23 2151  •  [START ON 5/19/2023] folic acid (FOLVITE) tablet 1 mg, 1 mg, Oral, Daily, Malachi, Meghann G, DO  •  ipratropium-albuterol (DUO-NEB) nebulizer solution 3 mL, 3 mL, Nebulization, Q4H PRN, Malachi, Meghann G, DO  •  ipratropium-albuterol (DUO-NEB) nebulizer solution 3 mL, 3 mL, Nebulization, 4x Daily - RT, Malachi, Meghann G, DO, 3 mL at 05/18/23 2057  •  LORazepam (ATIVAN) tablet 1 mg, 1 mg, Oral, Q2H PRN **OR** LORazepam (ATIVAN) injection 1 mg, 1 mg, Intravenous, Q2H PRN **OR** LORazepam (ATIVAN) tablet 2 mg, 2 mg, Oral, Q1H PRN **OR** LORazepam (ATIVAN) injection 2 mg, 2 mg, Intravenous, Q1H PRN **OR** LORazepam (ATIVAN) injection 2 mg, 2 mg, Intravenous, Q15 Min PRN **OR** LORazepam (ATIVAN) injection 2 mg, 2 mg, Intramuscular, Q15 Min PRN, Malachi, Meghann G, DO  •  Magnesium Standard Dose Replacement - Follow Nurse / BPA Driven Protocol, , Does not apply, PRN, Malachi, Meghann G, DO  •  melatonin tablet 5 mg, 5 mg, Oral, Nightly PRN, Malachi, Meghann G, DO  •  methylPREDNISolone sodium succinate (SOLU-Medrol) injection 40 mg, 40 mg, Intravenous, Q12H, Malachi, Meghann G, DO, 40 mg at 05/18/23 2151  •  morphine injection 2 mg, 2 mg, Intravenous, Q3H PRN, 2 mg at 05/18/23 2151 **AND** naloxone (NARCAN) injection 0.4 mg, 0.4 mg, Intravenous, Q5 Min PRN, Malachi, Meghann G, DO  •  multivitamin with minerals 1 tablet, 1 tablet, Oral, Daily, Malachi, Meghann G, DO, 1 tablet at 05/18/23 2152  •  nicotine (NICODERM CQ) 21 MG/24HR patch 1 patch, 1 patch, Transdermal, Q24H, Meghann Ly DO, 1 patch at 05/18/23 2151  •  pantoprazole (PROTONIX) EC tablet 40 mg, 40 mg, Oral, BID, Meghann Ly DO, 40 mg at 05/18/23 2152  •  Phosphorus Replacement - Follow Nurse / BPA Driven Protocol, , Does not apply, PRN, Meghann Ly DO  •  Potassium Replacement - Follow Nurse / BPA Driven Protocol, , Does not apply, PRN, Meghann Ly DO  •  [START ON 5/19/2023] pravastatin (PRAVACHOL) tablet  "40 mg, 40 mg, Oral, Daily, Malachi, Meghann G, DO  •  sodium chloride 0.9 % flush 10 mL, 10 mL, Intravenous, Q12H, Malachi, Meghann G, DO, 10 mL at 05/18/23 2154  •  sodium chloride 0.9 % flush 10 mL, 10 mL, Intravenous, PRN, Malachi, Meghann G, DO  •  sodium chloride 0.9 % infusion 40 mL, 40 mL, Intravenous, PRN, Malachi, Meghann G, DO  •  sodium chloride 0.9 % infusion, 75 mL/hr, Intravenous, Continuous, Malachi, Meghann G, DO, Last Rate: 75 mL/hr at 05/18/23 2200, 75 mL/hr at 05/18/23 2200  •  thiamine (B-1) injection 200 mg, 200 mg, Intravenous, Q8H, 200 mg at 05/18/23 2159 **FOLLOWED BY** [START ON 5/24/2023] thiamine (VITAMIN B-1) tablet 100 mg, 100 mg, Oral, Daily, Malachi, Meghann G, DO    Allergies:   No Known Allergies    Objective     Physical Exam:   Vital Signs:   Vitals:    05/18/23 1029   BP: 134/70   BP Location: Right arm   Patient Position: Sitting   Cuff Size: Adult   Pulse: 64   Resp: 16   Temp: 97.7 °F (36.5 °C)   TempSrc: Infrared   SpO2: 93%   Weight: 67.6 kg (149 lb)   Height: 175.3 cm (69\")   PainSc:   7     Body mass index is 22 kg/m². BMI is within normal parameters. No other follow-up for BMI required.      Physical Exam  Constitutional:       General: He is not in acute distress.     Appearance: He is not ill-appearing.   HENT:      Head: Normocephalic.   Cardiovascular:      Rate and Rhythm: Normal rate and regular rhythm.      Heart sounds: Normal heart sounds. No murmur heard.  Pulmonary:      Breath sounds: Normal breath sounds.   Abdominal:      General: Bowel sounds are normal.      Tenderness: There is no abdominal tenderness.   Musculoskeletal:      Comments:   Palpation reveals tenderness and tight paraspinal muscles in    Lumbar  Spine and right posterior hip; right lateral hip tenderness. Hip has decreased ROM . The back has decreased flexion, decreased extension, and pain with flexion and extension.    Neurological:      General: No focal deficit present.      Mental Status: He is oriented to " person, place, and time.   Psychiatric:         Mood and Affect: Mood normal.         Assessment / Plan      Assessment/Plan:   Diagnoses and all orders for this visit:    1. Acute right-sided low back pain with right-sided sciatica (Primary)  -     ketorolac (TORADOL) injection 30 mg  -     ketorolac (TORADOL) 10 MG tablet; Take 1 tablet by mouth Every 6 (Six) Hours As Needed for Moderate Pain.  Dispense: 20 tablet; Refill: 0  -     Ambulatory Referral to Physical Therapy Evaluate and treat  -     Ambulatory Referral to Neurosurgery    2. Low sodium levels  -     Basic metabolic panel; Future  -     Basic metabolic panel    3. Nausea  -     ondansetron (Zofran) 4 MG tablet; Take 1 tablet by mouth Every 8 (Eight) Hours As Needed for Nausea or Vomiting.  Dispense: 20 tablet; Refill: 0         1. Hyponatremia  - Sodium was 121 mmol/L on 05/17/2023. Stat sodium level will be obtained today, and he will be contacted with the result. The patient was warned that he will require admission if his severe hyponatremia persists. In that case or if his weakness worsens, he was advised to present to [facility] Emergency Room.    2. Acute on chronic right-sided low back pain with right-sided sciatica and hip pain  - Repeat Toradol injection will be administered.  - Oral Toradol was prescribed, to be taken with food up to 4 times per day for 5 days. If he develops severe nausea, he was advised to hold oral Toradol until he completes his steroid course.  - He was advised to not take ibuprofen and Celebrex together and to hold ibuprofen and Celebrex while taking Toradol. He may take Extra-Strength Tylenol 500 mg (2 tablets) up to 4 times per day.  - Physical therapy referral was placed.  - Neurosurgery referral was placed. He was advised to obtain a disk of his lumbar spine CT scan.  - He was instructed to return to the emergency room if he develops severe pain, lower extremity numbness or tingling, genital numbness, or bowel or  bladder incontinence.  3. Nausea  - Oral Zofran was prescribed.    4. Pneumonia  - Oxygen saturation  93% on room air.   - His wife will contact the office with the name of the patient's oral steroid.    6. Health maintenance  - The patient was informed that refills are available for his regular medications.    I called patient and let him know his sodium is 122; also spoke with hospitalist Dr. Pastrana and they have accepted him for admission. Patient will go to [facility] for admission due to hyponatremia.   Follow Up:   He will follow-up after discharge.   KALEB Barrios  [facility]    Transcribed from ambient dictation for KALEB Barrios by Deepa Estes.  05/18/23   14:41 EDT    Patient or patient representative verbalized consent to the visit recording.  I have personally performed the services described in this document as transcribed by the above individual, and it is both accurate and complete.

## 2023-05-19 LAB
ANION GAP SERPL CALCULATED.3IONS-SCNC: 10 MMOL/L (ref 5–15)
ANION GAP SERPL CALCULATED.3IONS-SCNC: 11 MMOL/L (ref 5–15)
ANION GAP SERPL CALCULATED.3IONS-SCNC: 11 MMOL/L (ref 5–15)
ANION GAP SERPL CALCULATED.3IONS-SCNC: 9 MMOL/L (ref 5–15)
BASOPHILS # BLD AUTO: 0.04 10*3/MM3 (ref 0–0.2)
BASOPHILS NFR BLD AUTO: 0.4 % (ref 0–1.5)
BILIRUB UR QL STRIP: NEGATIVE
BUN SERPL-MCNC: 11 MG/DL (ref 8–23)
BUN SERPL-MCNC: 7 MG/DL (ref 8–23)
BUN SERPL-MCNC: 7 MG/DL (ref 8–23)
BUN SERPL-MCNC: 9 MG/DL (ref 8–23)
BUN/CREAT SERPL: 10.6 (ref 7–25)
BUN/CREAT SERPL: 10.8 (ref 7–25)
BUN/CREAT SERPL: 11.8 (ref 7–25)
BUN/CREAT SERPL: 17.2 (ref 7–25)
CALCIUM SPEC-SCNC: 9 MG/DL (ref 8.6–10.5)
CHLORIDE SERPL-SCNC: 89 MMOL/L (ref 98–107)
CHLORIDE SERPL-SCNC: 90 MMOL/L (ref 98–107)
CLARITY UR: CLEAR
CO2 SERPL-SCNC: 25 MMOL/L (ref 22–29)
CO2 SERPL-SCNC: 27 MMOL/L (ref 22–29)
COLOR UR: YELLOW
CREAT SERPL-MCNC: 0.64 MG/DL (ref 0.76–1.27)
CREAT SERPL-MCNC: 0.65 MG/DL (ref 0.76–1.27)
CREAT SERPL-MCNC: 0.66 MG/DL (ref 0.76–1.27)
CREAT SERPL-MCNC: 0.76 MG/DL (ref 0.76–1.27)
DEPRECATED RDW RBC AUTO: 43.1 FL (ref 37–54)
EGFRCR SERPLBLD CKD-EPI 2021: 101 ML/MIN/1.73
EGFRCR SERPLBLD CKD-EPI 2021: 105.4 ML/MIN/1.73
EGFRCR SERPLBLD CKD-EPI 2021: 105.9 ML/MIN/1.73
EGFRCR SERPLBLD CKD-EPI 2021: 106.4 ML/MIN/1.73
EOSINOPHIL # BLD AUTO: 0 10*3/MM3 (ref 0–0.4)
EOSINOPHIL NFR BLD AUTO: 0 % (ref 0.3–6.2)
ERYTHROCYTE [DISTWIDTH] IN BLOOD BY AUTOMATED COUNT: 12.5 % (ref 12.3–15.4)
GLUCOSE SERPL-MCNC: 163 MG/DL (ref 65–99)
GLUCOSE SERPL-MCNC: 168 MG/DL (ref 65–99)
GLUCOSE SERPL-MCNC: 186 MG/DL (ref 65–99)
GLUCOSE SERPL-MCNC: 273 MG/DL (ref 65–99)
GLUCOSE UR STRIP-MCNC: NEGATIVE MG/DL
HBA1C MFR BLD: 5.9 % (ref 4.8–5.6)
HCT VFR BLD AUTO: 39.6 % (ref 37.5–51)
HGB BLD-MCNC: 13.6 G/DL (ref 13–17.7)
HGB UR QL STRIP.AUTO: NEGATIVE
IMM GRANULOCYTES # BLD AUTO: 0.09 10*3/MM3 (ref 0–0.05)
IMM GRANULOCYTES NFR BLD AUTO: 0.8 % (ref 0–0.5)
KETONES UR QL STRIP: ABNORMAL
L PNEUMO1 AG UR QL IA: NEGATIVE
LEUKOCYTE ESTERASE UR QL STRIP.AUTO: NEGATIVE
LYMPHOCYTES # BLD AUTO: 0.52 10*3/MM3 (ref 0.7–3.1)
LYMPHOCYTES NFR BLD AUTO: 4.9 % (ref 19.6–45.3)
MAGNESIUM SERPL-MCNC: 1.9 MG/DL (ref 1.6–2.4)
MCH RBC QN AUTO: 32.1 PG (ref 26.6–33)
MCHC RBC AUTO-ENTMCNC: 34.3 G/DL (ref 31.5–35.7)
MCV RBC AUTO: 93.4 FL (ref 79–97)
MONOCYTES # BLD AUTO: 0.16 10*3/MM3 (ref 0.1–0.9)
MONOCYTES NFR BLD AUTO: 1.5 % (ref 5–12)
NEUTROPHILS NFR BLD AUTO: 9.81 10*3/MM3 (ref 1.7–7)
NEUTROPHILS NFR BLD AUTO: 92.4 % (ref 42.7–76)
NITRITE UR QL STRIP: NEGATIVE
NRBC BLD AUTO-RTO: 0 /100 WBC (ref 0–0.2)
OSMOLALITY UR: 277 MOSM/KG (ref 300–1100)
PH UR STRIP.AUTO: 7 [PH] (ref 5–8)
PLATELET # BLD AUTO: 458 10*3/MM3 (ref 140–450)
PMV BLD AUTO: 7.6 FL (ref 6–12)
POTASSIUM SERPL-SCNC: 3.8 MMOL/L (ref 3.5–5.2)
POTASSIUM SERPL-SCNC: 3.9 MMOL/L (ref 3.5–5.2)
POTASSIUM SERPL-SCNC: 4 MMOL/L (ref 3.5–5.2)
POTASSIUM SERPL-SCNC: 4.2 MMOL/L (ref 3.5–5.2)
PROT UR QL STRIP: NEGATIVE
RBC # BLD AUTO: 4.24 10*6/MM3 (ref 4.14–5.8)
S PNEUM AG SPEC QL LA: NEGATIVE
SODIUM SERPL-SCNC: 125 MMOL/L (ref 136–145)
SODIUM SERPL-SCNC: 125 MMOL/L (ref 136–145)
SODIUM SERPL-SCNC: 126 MMOL/L (ref 136–145)
SODIUM SERPL-SCNC: 127 MMOL/L (ref 136–145)
SODIUM SERPL-SCNC: 127 MMOL/L (ref 136–145)
SODIUM SERPL-SCNC: 128 MMOL/L (ref 136–145)
SP GR UR STRIP: 1.01 (ref 1–1.03)
UROBILINOGEN UR QL STRIP: ABNORMAL
WBC NRBC COR # BLD: 10.62 10*3/MM3 (ref 3.4–10.8)

## 2023-05-19 PROCEDURE — 25010000002 METHYLPREDNISOLONE PER 40 MG: Performed by: INTERNAL MEDICINE

## 2023-05-19 PROCEDURE — 94799 UNLISTED PULMONARY SVC/PX: CPT

## 2023-05-19 PROCEDURE — 97161 PT EVAL LOW COMPLEX 20 MIN: CPT

## 2023-05-19 PROCEDURE — 83036 HEMOGLOBIN GLYCOSYLATED A1C: CPT | Performed by: INTERNAL MEDICINE

## 2023-05-19 PROCEDURE — 85025 COMPLETE CBC W/AUTO DIFF WBC: CPT | Performed by: INTERNAL MEDICINE

## 2023-05-19 PROCEDURE — 80048 BASIC METABOLIC PNL TOTAL CA: CPT | Performed by: INTERNAL MEDICINE

## 2023-05-19 PROCEDURE — 94761 N-INVAS EAR/PLS OXIMETRY MLT: CPT

## 2023-05-19 PROCEDURE — 83735 ASSAY OF MAGNESIUM: CPT | Performed by: INTERNAL MEDICINE

## 2023-05-19 PROCEDURE — 0 METHYLPREDNISOLONE PER 125 MG: Performed by: INTERNAL MEDICINE

## 2023-05-19 PROCEDURE — 25010000002 THIAMINE PER 100 MG: Performed by: INTERNAL MEDICINE

## 2023-05-19 PROCEDURE — 94664 DEMO&/EVAL PT USE INHALER: CPT

## 2023-05-19 PROCEDURE — 99232 SBSQ HOSP IP/OBS MODERATE 35: CPT | Performed by: INTERNAL MEDICINE

## 2023-05-19 PROCEDURE — 84295 ASSAY OF SERUM SODIUM: CPT | Performed by: INTERNAL MEDICINE

## 2023-05-19 PROCEDURE — 25010000002 MORPHINE PER 10 MG: Performed by: INTERNAL MEDICINE

## 2023-05-19 PROCEDURE — 25010000002 ENOXAPARIN PER 10 MG: Performed by: INTERNAL MEDICINE

## 2023-05-19 RX ADMIN — Medication 10 ML: at 08:57

## 2023-05-19 RX ADMIN — METHYLPREDNISOLONE SODIUM SUCCINATE 40 MG: 40 INJECTION INTRAMUSCULAR; INTRAVENOUS at 08:59

## 2023-05-19 RX ADMIN — PRAVASTATIN SODIUM 40 MG: 40 TABLET ORAL at 08:56

## 2023-05-19 RX ADMIN — Medication 10 ML: at 20:10

## 2023-05-19 RX ADMIN — CITALOPRAM HYDROBROMIDE 20 MG: 20 TABLET ORAL at 08:56

## 2023-05-19 RX ADMIN — THIAMINE HYDROCHLORIDE 200 MG: 100 INJECTION, SOLUTION INTRAMUSCULAR; INTRAVENOUS at 06:41

## 2023-05-19 RX ADMIN — CYCLOBENZAPRINE 10 MG: 10 TABLET, FILM COATED ORAL at 20:13

## 2023-05-19 RX ADMIN — ENOXAPARIN SODIUM 40 MG: 40 INJECTION SUBCUTANEOUS at 20:09

## 2023-05-19 RX ADMIN — BUDESONIDE AND FORMOTEROL FUMARATE DIHYDRATE 1 PUFF: 160; 4.5 AEROSOL RESPIRATORY (INHALATION) at 19:56

## 2023-05-19 RX ADMIN — AMLODIPINE BESYLATE 5 MG: 5 TABLET ORAL at 08:57

## 2023-05-19 RX ADMIN — THIAMINE HYDROCHLORIDE 200 MG: 100 INJECTION, SOLUTION INTRAMUSCULAR; INTRAVENOUS at 21:49

## 2023-05-19 RX ADMIN — PANTOPRAZOLE SODIUM 40 MG: 40 TABLET, DELAYED RELEASE ORAL at 08:57

## 2023-05-19 RX ADMIN — FOLIC ACID 1 MG: 1 TABLET ORAL at 08:57

## 2023-05-19 RX ADMIN — MORPHINE SULFATE 2 MG: 2 INJECTION, SOLUTION INTRAMUSCULAR; INTRAVENOUS at 08:56

## 2023-05-19 RX ADMIN — IPRATROPIUM BROMIDE AND ALBUTEROL SULFATE 3 ML: 2.5; .5 SOLUTION RESPIRATORY (INHALATION) at 16:17

## 2023-05-19 RX ADMIN — SODIUM CHLORIDE 75 ML/HR: 9 INJECTION, SOLUTION INTRAVENOUS at 12:40

## 2023-05-19 RX ADMIN — IPRATROPIUM BROMIDE AND ALBUTEROL SULFATE 3 ML: 2.5; .5 SOLUTION RESPIRATORY (INHALATION) at 19:56

## 2023-05-19 RX ADMIN — PANTOPRAZOLE SODIUM 40 MG: 40 TABLET, DELAYED RELEASE ORAL at 20:09

## 2023-05-19 RX ADMIN — THIAMINE HYDROCHLORIDE 200 MG: 100 INJECTION, SOLUTION INTRAMUSCULAR; INTRAVENOUS at 15:18

## 2023-05-19 RX ADMIN — SENNOSIDES AND DOCUSATE SODIUM 2 TABLET: 50; 8.6 TABLET ORAL at 08:56

## 2023-05-19 RX ADMIN — IPRATROPIUM BROMIDE AND ALBUTEROL SULFATE 3 ML: 2.5; .5 SOLUTION RESPIRATORY (INHALATION) at 07:39

## 2023-05-19 RX ADMIN — MULTIPLE VITAMINS W/ MINERALS TAB 1 TABLET: TAB at 12:38

## 2023-05-19 RX ADMIN — MORPHINE SULFATE 2 MG: 2 INJECTION, SOLUTION INTRAMUSCULAR; INTRAVENOUS at 01:35

## 2023-05-19 RX ADMIN — CYCLOBENZAPRINE 10 MG: 10 TABLET, FILM COATED ORAL at 03:14

## 2023-05-19 RX ADMIN — ACETAMINOPHEN 650 MG: 325 TABLET ORAL at 18:06

## 2023-05-19 RX ADMIN — SENNOSIDES AND DOCUSATE SODIUM 2 TABLET: 50; 8.6 TABLET ORAL at 20:09

## 2023-05-19 RX ADMIN — ACETAMINOPHEN 650 MG: 325 TABLET ORAL at 03:14

## 2023-05-19 RX ADMIN — BUDESONIDE AND FORMOTEROL FUMARATE DIHYDRATE 1 PUFF: 160; 4.5 AEROSOL RESPIRATORY (INHALATION) at 07:39

## 2023-05-19 RX ADMIN — DOXYCYCLINE 100 MG: 100 CAPSULE ORAL at 08:56

## 2023-05-19 RX ADMIN — DOXYCYCLINE 100 MG: 100 CAPSULE ORAL at 20:09

## 2023-05-19 RX ADMIN — CYCLOBENZAPRINE 10 MG: 10 TABLET, FILM COATED ORAL at 12:39

## 2023-05-19 RX ADMIN — Medication 1 PATCH: at 20:10

## 2023-05-19 RX ADMIN — METHYLPREDNISOLONE SODIUM SUCCINATE 40 MG: 40 INJECTION INTRAMUSCULAR; INTRAVENOUS at 21:54

## 2023-05-19 NOTE — PLAN OF CARE
Goal Outcome Evaluation:  Plan of Care Reviewed With: patient        Progress: no change  Outcome Evaluation: PT eval completed. Pt ambulated 250 feet total CGA with FWx with pain limiting function with pt utilizing BUE support on walker to assist with pain on RLE. Pt demonstrated pain limiting function, RLE weakness d/t pain, mild balance deficits, and decreased functional endurance warranting IPPT POC. d/c rec home with assist and OPPT.

## 2023-05-19 NOTE — PAYOR COMM NOTE
"Luis Antonio Noble (62 y.o. Male)     Jud Yee, LETICIA  Utilization Review  Ftswx-[phone]  [phone]      Meghann Ly DO  (NPI: [id number])       [facility]  [address] [facility] [address]    NPI [id number]    Tax id -[id number]        Date of Birth   1960    Social Security Number       Address   [address] [facility] [address]    Home Phone   [phone]    MRN   [id number]       M[employer]on   None    Marital Status                               Admission Date   23    Admission Type   Urgent    Admitting Provider   Ashish Garcia MD    Attending Provider   Ashish Garcia MD    Department, Room/Bed   [facility] 3E, S338/1       Discharge Date       Discharge Disposition       Discharge Destination                               Attending Provider: Ashish Garcia MD    Allergies: No Known Allergies    Isolation: Droplet   Infection: Rhinovirus  (23)   Code Status: CPR    Ht: 175.3 cm (69\")   Wt: 66.6 kg (146 lb 13.2 oz)    Admission Cmt: None   Principal Problem: Hyponatremia [E87.1]                 Active Insurance as of 2023     Primary Coverage     Payor Plan Insurance Group Employer/Plan Group    [employer] [facility] [employer] [facility] [id number]     Payor Plan Address Payor Plan Phone Number Payor Plan Fax Number Effective Dates    PO    2022 - None Entered    [facility] [id number]       Subscriber Name Subscriber Birth Date Member ID       LUIS ANTONIO NOBLE 1960 [id number]                 Emergency Contacts      (Rel.) Home Phone Work Phone Mobile Phone    Jada Noble (Spouse) [phone] -- --               History & Physical      Meghann Ly DO at 23              [facility] Medicine Services  HISTORY AND PHYSICAL    Patient Name: Luis Antonio Noble  : 1960  MRN: [id number]  Primary Care Physician: Sulma Woodward APRN  Date of admission: " 5/18/2023      Subjective    Subjective     Chief Complaint:  Hyponatremia    HPI:  Luis Antonio Fairchild is a 62 y.o. 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  Patient reports that 1 week ago he began to develop a cough worse than baseline, productive of brown sputum.  He reports associated malaise, nausea.  He denies fever, vomiting, diarrhea.  He was seen at [facility] ED on 5/15 where he was diagnosed with pneumonia, per pt he was prescribed Levaquin and a steroid (per EMR it appears he was prescribed Doxycycline and prednisone).  He began taking Levaquin last night.  On Tuesday he began to develop severe right hip pain, increased from baseline.  He reports severe pain that radiates to his right lateral leg.  He is able to bear weight on the leg.  He was seen once again in [facility] ED on 5/17/2023 where he was found to have hyponatremia with a sodium level 121.  Admission was recommended, patient refused.  He was seen by his PCP today with repeat labs and found to have persistent low sodium at 122.  He denies lower extremity or saddle paresthesias, bowel or bladder incontinence or leg weakness.      Review of Systems   Constitutional: Positive for activity change and appetite change. Negative for fever.   HENT: Negative.    Eyes: Negative.    Respiratory: Positive for cough, chest tightness and shortness of breath.    Cardiovascular: Negative.  Negative for chest pain.   Gastrointestinal: Positive for nausea. Negative for diarrhea.   Endocrine: Negative.    Genitourinary: Negative.    Musculoskeletal: Positive for myalgias. Negative for back pain.   Skin: Negative.    Allergic/Immunologic: Negative.    Neurological: Negative.  Negative for weakness and numbness.   Hematological: Negative.     Psychiatric/Behavioral: Negative.         Personal History     Past Medical History:   Diagnosis Date   • Anxiety 2016   • Arthritis    • Cancer 2018    Squamous carcinoma oropharynx   • COPD (chronic obstructive pulmonary disease) 2021   • GERD (gastroesophageal reflux disease) 2014   • History of IBS    • Hyperlipidemia    • Hypertension    • Irritable bowel syndrome    • Pneumonia 2021             Past Surgical History:   Procedure Laterality Date   • COLONOSCOPY     • SHOULDER SURGERY      Onset Date 2008   • THROAT SURGERY     • WRIST SURGERY      Onset Date: 2012       Family History: family history includes Anxiety disorder in his brother and mother; Arthritis in his mother; Cancer in his brother and brother; Hypertension in his father; Stroke in his father.     Social History:  reports that he has been smoking cigarettes. He started smoking about 49 years ago. He has a 11.50 pack-year smoking history. He has never used smokeless tobacco. He reports current alcohol use of about 35.0 standard drinks per week. He reports that he does not use drugs.  Social History     Social History Narrative   • Not on file       Medications:  Available home medication information reviewed.  Facility-Administered Medications Prior to Admission   Medication Dose Route Frequency Provider Last Rate Last Admin   • [COMPLETED] ketorolac (TORADOL) injection 30 mg  30 mg Intramuscular Once Breeding, Sulma, APRN   30 mg at 05/18/23 1202     Medications Prior to Admission   Medication Sig Dispense Refill Last Dose   • albuterol sulfate HFA (ProAir HFA) 108 (90 Base) MCG/ACT inhaler Inhale 1-2 puffs every 4-6 hours PRN 1 g 5 5/18/2023   • amLODIPine (NORVASC) 5 MG tablet Take 1 tablet by mouth Daily. 30 tablet 5 5/18/2023   • Budeson-Glycopyrrol-Formoterol (BREZTRI) 160-9-4.8 MCG/ACT aerosol inhaler Inhale 2 puffs 2 (Two) Times a Day. 10.7 g 11 5/18/2023   • Calcium Carbonate-Vit D-Min (CALCIUM 1200 PO) Take  by mouth.   5/18/2023    • celecoxib (CeleBREX) 200 MG capsule Take 1 capsule by mouth 2 (Two) Times a Day. 180 capsule 0 5/18/2023   • citalopram (CeleXA) 40 MG tablet Take 1 tablet by mouth Daily. 90 tablet 1 5/18/2023   • ezetimibe (ZETIA) 10 MG tablet Take 1 tablet by mouth Daily.   5/18/2023   • levoFLOXacin (Levaquin) 750 MG tablet Take 1 tablet by mouth Daily. 5 tablet 0 5/17/2023   • multivitamin with minerals tablet tablet Take 1 tablet by mouth Daily.   5/18/2023   • ondansetron (Zofran) 4 MG tablet Take 1 tablet by mouth Every 8 (Eight) Hours As Needed for Nausea or Vomiting. 20 tablet 0 5/17/2023   • pantoprazole (PROTONIX) 40 MG EC tablet Take 1 tablet by mouth 2 (Two) Times a Day. 180 tablet 0 5/18/2023   • pravastatin (PRAVACHOL) 40 MG tablet Take 1 tablet by mouth Daily. 90 tablet 1 5/17/2023   • predniSONE (DELTASONE) 10 MG tablet Take 1 tablet by mouth Daily.   Patient Taking Differently   • VITAMIN D, CHOLECALCIFEROL, PO Take  by mouth.   5/18/2023   • cyclobenzaprine (FLEXERIL) 10 MG tablet  (Patient not taking: Reported on 5/18/2023)   Not Taking   • Fluticasone-Salmeterol (ADVAIR/WIXELA) 100-50 MCG/ACT DISKUS Inhale 1 each 2 (Two) Times a Day. (Patient not taking: Reported on 5/18/2023)   Not Taking   • ketorolac (TORADOL) 10 MG tablet Take 1 tablet by mouth Every 6 (Six) Hours As Needed for Moderate Pain. 20 tablet 0 Unknown   • varenicline (CHANTIX) 1 MG tablet Take 1 tablet by mouth. (Patient not taking: Reported on 5/18/2023)   Not Taking       No Known Allergies    Objective    Objective     Vital Signs:   Temp:  [97.7 °F (36.5 °C)] 97.7 °F (36.5 °C)  Heart Rate:  [64-87] 87  Resp:  [16-18] 18  BP: (134-150)/(70-79) 150/79       Physical Exam   Constitutional: Awake, alert  Eyes: PERRLA, sclerae anicteric, no conjunctival injection  HENT: NCAT, mucous membranes moist  Neck: Supple, no thyromegaly, no lymphadenopathy, trachea midline  Respiratory: Moderate air movement, bilateral wheezes, nonlabored respirations    Cardiovascular: RRR, no murmurs, rubs, or gallops, palpable pedal pulses bilaterally  Gastrointestinal: Positive bowel sounds, soft, nontender, nondistended  Musculoskeletal: No bilateral ankle edema, no clubbing or cyanosis to extremities  Psychiatric: Appropriate affect, cooperative  Neurologic: Oriented x 3, strength symmetric in all extremities, Cranial Nerves grossly intact to confrontation, speech clear  Skin: No rashes      Result Review:  I have personally reviewed the results from the time of this admission to 5/18/2023 20:25 EDT and agree with these findings:  [x]  Laboratory list / accordion  [x]  Microbiology  []  Radiology  [x]  EKG/Telemetry   []  Cardiology/Vascular   []  Pathology  [x]  Old records  []  Other:      LAB RESULTS:          Lab 05/18/23  1141   SODIUM 122*   POTASSIUM 4.2   CHLORIDE 86*   CO2 25.0   ANION GAP 11.0   BUN 7*   CREATININE 0.69*   EGFR 104.0   GLUCOSE 120*   CALCIUM 9.5         Lab 05/17/23  1145   LIPASE 30*                     UA        1/30/2023    09:52   Urinalysis   Specific Gravity, UA 1.008     Ketones, UA Negative     Blood, UA Negative     Leukocytes, UA Negative     Nitrite, UA Negative         Microbiology Results (last 10 days)     ** No results found for the last 240 hours. **          CT Abdomen Pelvis Without Contrast    Result Date: 5/17/2023  CT SCAN OF THE ABDOMEN AND PELVIS WITHOUT CONTRAST     5/17/2023 12:12 PM HISTORY: Right posterior hip/flank pain, nausea. COMPARISON: None. PROCEDURE: Axial images were obtained from the lung bases to the pubic symphysis by computed tomography. This study was performed with techniques to keep radiation doses as low as reasonably achievable, (ALARA). Individualized dose reduction techniques using automated exposure control or adjustment of mA and/or kV according to the patient size were employed. FINDINGS: ABDOMEN: There is atelectasis and nodularity at the left lung base.  The heart size is normal. A small hiatal  hernia is noted. The stomach is distended. The limited non-contrast images of the liver are unremarkable. The gallbladder is distended. The spleen is unremarkable. There is thickening of the left adrenal gland. The aorta is normal in caliber. There is no significant free fluid or adenopathy.  There is no nephrolithiasis. There is no hydronephrosis. PELVIS:  The GI tract demonstrates no obstruction. The appendix is normal. The urinary bladder is distended. There is no fluid or adenopathy.     Impression: Distended urinary bladder. Distended gallbladder. Atelectasis and nodularity along the left hemidiaphragm. Follow-up to resolution recommended. CT SCAN RIGHT  HIP .     5/17/2023 12:12 PM HISTORY: Severe right hip pain. COMPARISON: None. PROCEDURE: Axial images were obtained through the hip by computed tomography.  Sagittal and coronal reconstruction images were performed . This study was performed with techniques to keep radiation doses as low as reasonably achievable, (ALARA). Individualized dose reduction techniques using automated exposure control or adjustment of mA and/or kV according to the patient size were employed. FINDINGS:  There is no fracture or joint effusion .  No loose bodies are identified .  The bones are normally mineralized .  There is no significant degenerative change . IMPRESSION:  No acute process . Images reviewed, interpreted, and dictated by Dr. FLORENCE Salazar. Transcribed by KEVIN Spangler(R).    CT Lumbar Spine Without Contrast    Result Date: 5/17/2023  CT LUMBAR SPINE  5/17/2023 10:39 AM HISTORY: Acute Lumbar back pain. PROCEDURE: Axial images were obtained through the lumbar spine by computed tomography. Reconstruction images were also performed. This study was performed with techniques to keep radiation doses as low as reasonably achievable, (ALARA). Individualized dose reduction techniques using automated exposure control or adjustment of mA and/or kV according to the  patient size were employed. FINDINGS: The disc spaces are mildly diffusely degenerated. Disc bulges are present at L3-4, L4-5, L5-S1. Urinary bladder is markedly distended.  There is no subluxation. No acute fracture.    Impression: No acute fracture. Degenerative disc disease. Images reviewed, interpreted, and dictated by FLORENCE Salazar MD    CT hip right without contrast    Result Date: 5/17/2023  CT SCAN OF THE ABDOMEN AND PELVIS WITHOUT CONTRAST     5/17/2023 12:12 PM HISTORY: Right posterior hip/flank pain, nausea. COMPARISON: None. PROCEDURE: Axial images were obtained from the lung bases to the pubic symphysis by computed tomography. This study was performed with techniques to keep radiation doses as low as reasonably achievable, (ALARA). Individualized dose reduction techniques using automated exposure control or adjustment of mA and/or kV according to the patient size were employed. FINDINGS: ABDOMEN: There is atelectasis and nodularity at the left lung base.  The heart size is normal. A small hiatal hernia is noted. The stomach is distended. The limited non-contrast images of the liver are unremarkable. The gallbladder is distended. The spleen is unremarkable. There is thickening of the left adrenal gland. The aorta is normal in caliber. There is no significant free fluid or adenopathy.  There is no nephrolithiasis. There is no hydronephrosis. PELVIS:  The GI tract demonstrates no obstruction. The appendix is normal. The urinary bladder is distended. There is no fluid or adenopathy.     Impression: Distended urinary bladder. Distended gallbladder. Atelectasis and nodularity along the left hemidiaphragm. Follow-up to resolution recommended. CT SCAN RIGHT  HIP .     5/17/2023 12:12 PM HISTORY: Severe right hip pain. COMPARISON: None. PROCEDURE: Axial images were obtained through the hip by computed tomography.  Sagittal and coronal reconstruction images were performed . This study was performed with  techniques to keep radiation doses as low as reasonably achievable, (ALARA). Individualized dose reduction techniques using automated exposure control or adjustment of mA and/or kV according to the patient size were employed. FINDINGS:  There is no fracture or joint effusion .  No loose bodies are identified .  The bones are normally mineralized .  There is no significant degenerative change . IMPRESSION:  No acute process . Images reviewed, interpreted, and dictated by Dr. FLORENCE Salazar. Transcribed by KEVIN Spangler(R).    XR hip 2 views right    Result Date: 5/17/2023  RIGHT HIP HISTORY: Right hip pain. FINDINGS: There are several small avulsions from the superior lateral right acetabulum. The hip joint appears well-maintained. The femoral head has a normal rounded contour. The left hip is included on the AP field-of-view and appears normal.    Impression: Small old avulsions from the right acetabulum.      Results for orders placed during the hospital encounter of 03/19/23    Adult Transthoracic Echo Complete W/ Cont if Necessary Per Protocol    Interpretation Summary  •  Left ventricular systolic function is normal. Calculated left ventricular EF = 56.2% Left ventricular ejection fraction appears to be 56 - 60%.  •  Left ventricular diastolic function was normal.  •  Estimated right ventricular systolic pressure from tricuspid regurgitation is normal (<35 mmHg). Calculated right ventricular systolic pressure from tricuspid regurgitation is 23 mmHg.      Assessment & Plan   Assessment & Plan     Active Hospital Problems    Diagnosis  POA   • **Hyponatremia [E87.1]  Unknown   • Pneumonia [J18.9]  Unknown   • Right hip pain [M25.551]  Unknown   • Alcohol abuse [F10.10]  Unknown   • Benign prostatic hyperplasia with urinary frequency [N40.1, R35.0]  Yes     Diagnosed 11/28/2022.  PSA within normal limits January 2022     • Tobacco use disorder [F17.200]  Yes   • Anxiety [F41.9]  Yes   • Anderson's  esophagus [K22.70]  Yes   • Hyperlipidemia [E78.5]  Yes   • Hypertension [I10]  Yes   • Osteoarthritis [M19.90]  Yes   • Gastroesophageal reflux disease without esophagitis [K21.9]  Yes   Luis Antonio Fairchild is a 62 y.o. 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      Hyponatremia  --Check urine osmolality, serum osmolality, urine sodium  --IV fluids depending   --Every 4 hours BMP   --Not to correct more than 8 mmol/L/day    Pneumonia  COPD exacerbation  - Prescribed doxycycline, prednisone from outside ED  - Continue doxycycline, start Solu-Medrol  - DuoNebs scheduled and as needed  - Respiratory PCR  - Sputum culture  - I-S  - Urine antigens  - CTA chest pending    BPH  - Markedly distended urinary bladder seen on outside imaging  - Follow acute urinary retention protocol  - Benefit from initiating flomax    Chronic tobacco use  --counseled on cessation  --nicotine patch    Alcohol dependency/withdrawal  --CIWA protocol  -- As needed Ativan  --thiamine, folic acid  --fall precautions  --counseled on cessation  --seizure precautions    Osteoarthritis  Right hip pain  - Likely secondary to sciatica  - Imaging obtained at outside ED shows bulging disc in the lumbar spine, denies back pain  - CT right hip at outside ED with no acute process  - May benefit from orthopedic surgery consult in a.m.  - PT/OT  - Pain control    Chronic tobacco use  --counseled on cessation  --nicotine patch    Hypertension  Hyperlipidemia  - Continue home meds    GERD  - History of Anderson's esophagus  - Continue PPI    DVT prophylaxis:  Lovenox      CODE STATUS:  Full code  Code Status and Medical Interventions:   Ordered at: 05/18/23 2012     Level Of Support Discussed With:    Patient     Code Status (Patient has no pulse and is not  breathing):    CPR (Attempt to Resuscitate)     Medical Interventions (Patient has pulse or is breathing):    Full Support       Expected Discharge   Expected discharge date/ time has not been documented.     Meghann Ly DO  05/18/23      Electronically signed by Meghann Ly DO at 05/18/23 2145       Emergency Department Notes    No notes of this type exist for this encounter.         Vital Signs (last day)     Date/Time Temp Temp src Pulse Resp BP Patient Position SpO2    05/19/23 0739 -- -- 72 -- -- -- 97    05/19/23 0700 97.6 (36.4) Oral 87 18 156/97 Lying 95    05/19/23 0409 98 (36.7) Oral 71 16 143/84 Lying 94    05/18/23 2325 98.3 (36.8) Oral 83 18 146/89 Lying 91    05/18/23 2057 -- -- 80 16 -- -- 93    05/18/23 1926 98 (36.7) Oral 91 16 149/76 Lying 88    05/18/23 1800 97.7 (36.5) Axillary -- 18 -- -- --    05/18/23 1725 97.7 (36.5) Axillary 87 18 150/79 Lying 90          Oxygen Therapy (last day)     Date/Time SpO2 Device (Oxygen Therapy) Flow (L/min) Oxygen Concentration (%) ETCO2 (mmHg)    05/19/23 0739 97 nasal cannula 2 -- --    05/19/23 0700 95 -- -- -- --    05/19/23 0409 94 -- -- -- --    05/18/23 2325 91 -- -- -- --    05/18/23 2057 93 room air -- -- --    05/18/23 2000 -- room air -- -- --    05/18/23 1926 88 -- -- -- --    05/18/23 1806 -- room air -- -- --    05/18/23 1725 90 room air -- -- --          Lines, Drains & Airways     Active LDAs     Name Placement date Placement time Site Days    Peripheral IV 05/18/23 1755 Anterior;Left Forearm 05/18/23  1755  Forearm  less than 1                  Current Facility-Administered Medications   Medication Dose Route Frequency Provider Last Rate Last Admin   • acetaminophen (TYLENOL) tablet 650 mg  650 mg Oral Q4H PRN Meghann Ly DO   650 mg at 05/19/23 0314    Or   • acetaminophen (TYLENOL) 160 MG/5ML solution 650 mg  650 mg Oral Q4H PRN Meghann Ly DO        Or   • acetaminophen (TYLENOL) suppository 650 mg  650 mg Rectal Q4H PRN  Malachi, Meghann G, DO       • albuterol (PROVENTIL) nebulizer solution 0.083% 2.5 mg/3mL  2.5 mg Nebulization Q4H PRN MalachiZahida mathure G, DO       • amLODIPine (NORVASC) tablet 5 mg  5 mg Oral Daily Malachi, Meghann G, DO   5 mg at 05/19/23 0857   • sennosides-docusate (PERICOLACE) 8.6-50 MG per tablet 2 tablet  2 tablet Oral BID MalachiZahida mathure G, DO   2 tablet at 05/19/23 0856    And   • polyethylene glycol (MIRALAX) packet 17 g  17 g Oral Daily PRN Malachi, Meghann G, DO        And   • bisacodyl (DULCOLAX) EC tablet 5 mg  5 mg Oral Daily PRN Malachi, Meghann G, DO        And   • bisacodyl (DULCOLAX) suppository 10 mg  10 mg Rectal Daily PRN MalachiZahida mathure G, DO       • budesonide-formoterol (SYMBICORT) 160-4.5 MCG/ACT inhaler 1 puff  1 puff Inhalation BID - RT MalachiKristal mathursie G, DO   1 puff at 05/19/23 0739   • Calcium Replacement - Follow Nurse / BPA Driven Protocol   Does not apply PRN MalachirKistal mathursie G, DO       • citalopram (CeleXA) tablet 20 mg  20 mg Oral Daily MalachiKristalMeghann G, DO   20 mg at 05/19/23 0856   • cyclobenzaprine (FLEXERIL) tablet 10 mg  10 mg Oral TID PRN MalachiZahida mathure G, DO   10 mg at 05/19/23 0314   • doxycycline (MONODOX) capsule 100 mg  100 mg Oral Q12H Malachi, Meghann G, DO   100 mg at 05/19/23 0856   • Enoxaparin Sodium (LOVENOX) syringe 40 mg  40 mg Subcutaneous Nightly MalachiZahidae G, DO   40 mg at 05/18/23 2151   • folic acid (FOLVITE) tablet 1 mg  1 mg Oral Daily Malachi, Meghann G, DO   1 mg at 05/19/23 0857   • ipratropium-albuterol (DUO-NEB) nebulizer solution 3 mL  3 mL Nebulization Q4H PRN Malachi, Meghann G, DO       • ipratropium-albuterol (DUO-NEB) nebulizer solution 3 mL  3 mL Nebulization 4x Daily - RT Malachi, Meghann G, DO   3 mL at 05/19/23 0739   • LORazepam (ATIVAN) tablet 1 mg  1 mg Oral Q2H PRN Malachi, Meghann G, DO        Or   • LORazepam (ATIVAN) injection 1 mg  1 mg Intravenous Q2H PRN Malachi, Meghann G, DO        Or   • LORazepam (ATIVAN) tablet 2 mg  2 mg Oral Q1H PRN Malachi, Meghann G, DO        Or    • LORazepam (ATIVAN) injection 2 mg  2 mg Intravenous Q1H PRN Malachi, Meghann G, DO        Or   • LORazepam (ATIVAN) injection 2 mg  2 mg Intravenous Q15 Min PRN Malachi, Meghann G, DO        Or   • LORazepam (ATIVAN) injection 2 mg  2 mg Intramuscular Q15 Min PRN Malachi, Meghann G, DO       • Magnesium Standard Dose Replacement - Follow Nurse / BPA Driven Protocol   Does not apply PRN Malachi, Meghann G, DO       • melatonin tablet 5 mg  5 mg Oral Nightly PRN Malachi, Meghann G, DO       • methylPREDNISolone sodium succinate (SOLU-Medrol) injection 40 mg  40 mg Intravenous Q12H Malachi, Meghann G, DO   40 mg at 05/19/23 0859   • morphine injection 2 mg  2 mg Intravenous Q3H PRN Malachi, Meghann G, DO   2 mg at 05/19/23 0856    And   • naloxone (NARCAN) injection 0.4 mg  0.4 mg Intravenous Q5 Min PRN Malachi, Meghann G, DO       • multivitamin with minerals 1 tablet  1 tablet Oral Daily MalachiKristalMeghann G, DO   1 tablet at 05/18/23 2152   • nicotine (NICODERM CQ) 21 MG/24HR patch 1 patch  1 patch Transdermal Q24H MalachiKristalMeghann G, DO   1 patch at 05/18/23 2151   • pantoprazole (PROTONIX) EC tablet 40 mg  40 mg Oral BID MalachiKristalMeghann G, DO   40 mg at 05/19/23 0857   • Phosphorus Replacement - Follow Nurse / BPA Driven Protocol   Does not apply PRN Malachi, Meghann G, DO       • Potassium Replacement - Follow Nurse / BPA Driven Protocol   Does not apply PRN Malachi, Meghann G, DO       • pravastatin (PRAVACHOL) tablet 40 mg  40 mg Oral Daily Malachi, Meghann G, DO   40 mg at 05/19/23 0856   • sodium chloride 0.9 % flush 10 mL  10 mL Intravenous Q12H Malachi, Meghann G, DO   10 mL at 05/19/23 0857   • sodium chloride 0.9 % flush 10 mL  10 mL Intravenous PRN Malachi, Meghann G, DO       • sodium chloride 0.9 % infusion 40 mL  40 mL Intravenous PRN Malachi, Meghann G, DO       • sodium chloride 0.9 % infusion  75 mL/hr Intravenous Continuous Meghann Ly G, DO 75 mL/hr at 05/18/23 2200 75 mL/hr at 05/18/23 2200   • thiamine (B-1) injection 200 mg  200 mg  Intravenous Q8H Malachi, Meghann G, DO   200 mg at 05/19/23 0641    Followed by   • [START ON 5/24/2023] thiamine (VITAMIN B-1) tablet 100 mg  100 mg Oral Daily Malachi, Meghann G, DO           Lab Results (last 24 hours)     Procedure Component Value Units Date/Time    Basic Metabolic Panel [[id number]]  (Abnormal) Collected: 05/19/23 0800    Specimen: Blood Updated: 05/19/23 1000     Glucose 168 mg/dL      BUN 7 mg/dL      Creatinine 0.66 mg/dL      Sodium 128 mmol/L      Potassium 3.9 mmol/L      Chloride 90 mmol/L      CO2 27.0 mmol/L      Calcium 9.0 mg/dL      BUN/Creatinine Ratio 10.6     Anion Gap 11.0 mmol/L      eGFR 105.4 mL/min/1.73     Narrative:      GFR Normal >60  Chronic Kidney Disease <60  Kidney Failure <15      S. Pneumo Ag Urine or CSF - Urine, Urine, Clean Catch [[id number]]  (Normal) Collected: 05/18/23 2202    Specimen: Urine, Clean Catch Updated: 05/19/23 0951     Strep Pneumo Ag Negative    Legionella Antigen, Urine - Urine, Urine, Clean Catch [[id number]]  (Normal) Collected: 05/18/23 2202    Specimen: Urine, Clean Catch Updated: 05/19/23 0950     LEGIONELLA ANTIGEN, URINE Negative    Magnesium [[id number]]  (Normal) Collected: 05/19/23 0341    Specimen: Blood Updated: 05/19/23 0426     Magnesium 1.9 mg/dL     Basic Metabolic Panel [[id number]]  (Abnormal) Collected: 05/19/23 0341    Specimen: Blood Updated: 05/19/23 0426     Glucose 163 mg/dL      BUN 7 mg/dL      Creatinine 0.65 mg/dL      Sodium 125 mmol/L      Potassium 3.8 mmol/L      Chloride 89 mmol/L      CO2 27.0 mmol/L      Calcium 9.0 mg/dL      BUN/Creatinine Ratio 10.8     Anion Gap 9.0 mmol/L      eGFR 105.9 mL/min/1.73     Narrative:      GFR Normal >60  Chronic Kidney Disease <60  Kidney Failure <15      Hemoglobin A1c [[id number]]  (Abnormal) Collected: 05/19/23 0341    Specimen: Blood Updated: 05/19/23 0414     Hemoglobin A1C 5.90 %     Narrative:      Hemoglobin A1C Ranges:    Increased Risk for Diabetes  5.7% to 6.4%  Diabetes                      >= 6.5%  Diabetic Goal                < 7.0%    CBC Auto Differential [[id number]]  (Abnormal) Collected: 05/19/23 0341    Specimen: Blood Updated: 05/19/23 0406     WBC 10.62 10*3/mm3      RBC 4.24 10*6/mm3      Hemoglobin 13.6 g/dL      Hematocrit 39.6 %      MCV 93.4 fL      MCH 32.1 pg      MCHC 34.3 g/dL      RDW 12.5 %      RDW-SD 43.1 fl      MPV 7.6 fL      Platelets 458 10*3/mm3      Neutrophil % 92.4 %      Lymphocyte % 4.9 %      Monocyte % 1.5 %      Eosinophil % 0.0 %      Basophil % 0.4 %      Immature Grans % 0.8 %      Neutrophils, Absolute 9.81 10*3/mm3      Lymphocytes, Absolute 0.52 10*3/mm3      Monocytes, Absolute 0.16 10*3/mm3      Eosinophils, Absolute 0.00 10*3/mm3      Basophils, Absolute 0.04 10*3/mm3      Immature Grans, Absolute 0.09 10*3/mm3      nRBC 0.0 /100 WBC     Urinalysis With Culture If Indicated - Urine, Clean Catch [[id number]]  (Abnormal) Collected: 05/18/23 2217    Specimen: Urine, Clean Catch Updated: 05/19/23 0323     Color, UA Yellow     Appearance, UA Clear     pH, UA 7.0     Specific Gravity, UA 1.013     Glucose, UA Negative     Ketones, UA Trace     Bilirubin, UA Negative     Blood, UA Negative     Protein, UA Negative     Leuk Esterase, UA Negative     Nitrite, UA Negative     Urobilinogen, UA 1.0 E.U./dL    Narrative:      In absence of clinical symptoms, the presence of pyuria, bacteria, and/or nitrites on the urinalysis result does not correlate with infection.  Urine microscopic not indicated.    Basic Metabolic Panel [[id number]]  (Abnormal) Collected: 05/19/23 0005    Specimen: Blood Updated: 05/19/23 0128     Glucose 186 mg/dL      BUN 9 mg/dL      Creatinine 0.76 mg/dL      Sodium 126 mmol/L      Potassium 4.0 mmol/L      Chloride 89 mmol/L      CO2 27.0 mmol/L      Calcium 9.0 mg/dL      BUN/Creatinine Ratio 11.8     Anion Gap 10.0 mmol/L      eGFR 101.0 mL/min/1.73     Narrative:      GFR Normal >60  Chronic Kidney Disease <60  Kidney Failure  <15      Osmolality, Urine - Urine, Clean Catch [[id number]]  (Abnormal) Collected: 05/18/23 2202    Specimen: Urine, Clean Catch Updated: 05/19/23 0012     Osmolality, Urine 277 mOsm/kg     Respiratory Culture - Sputum, Cough [[id number]] Collected: 05/18/23 2239    Specimen: Sputum from Cough Updated: 05/18/23 2324     Gram Stain Many (4+) WBCs per low power field      Few (2+) Mixed bacterial morphotypes seen on Gram Stain      Rare (1+) Epithelial cells per low power field      Rare (1+) Budding yeast with pseudohyphae    COVID PRE-OP / PRE-PROCEDURE SCREENING ORDER (NO ISOLATION) - Swab, Nasopharynx [[id number]]  (Abnormal) Collected: 05/18/23 2212    Specimen: Swab from Nasopharynx Updated: 05/18/23 2312    Narrative:      The following orders were created for panel order COVID PRE-OP / PRE-PROCEDURE SCREENING ORDER (NO ISOLATION) - Swab, Nasopharynx.  Procedure                               Abnormality         Status                     ---------                               -----------         ------                     Respiratory Panel PCR w/...[[id number]]  Abnormal            Final result                 Please view results for these tests on the individual orders.    Respiratory Panel PCR w/COVID-19(SARS-CoV-2) JODI/MOE/JAYLIN/PAD/COR/MAD/XIANG In-House, NP Swab in UTM/VTM, 3-4 HR TAT - Swab, Nasopharynx [[id number]]  (Abnormal) Collected: 05/18/23 2212    Specimen: Swab from Nasopharynx Updated: 05/18/23 2312     ADENOVIRUS, PCR Not Detected     Coronavirus 229E Not Detected     Coronavirus HKU1 Not Detected     Coronavirus NL63 Not Detected     Coronavirus OC43 Not Detected     COVID19 Not Detected     Human Metapneumovirus Not Detected     Human Rhinovirus/Enterovirus Detected     Influenza A PCR Not Detected     Influenza B PCR Not Detected     Parainfluenza Virus 1 Not Detected     Parainfluenza Virus 2 Not Detected     Parainfluenza Virus 3 Not Detected     Parainfluenza Virus 4 Not Detected     RSV, PCR  Not Detected     Bordetella pertussis pcr Not Detected     Bordetella parapertussis PCR Not Detected     Chlamydophila pneumoniae PCR Not Detected     Mycoplasma pneumo by PCR Not Detected    Narrative:      In the setting of a positive respiratory panel with a viral infection PLUS a negative procalcitonin without other underlying concern for bacterial infection, consider observing off antibiotics or discontinuation of antibiotics and continue supportive care. If the respiratory panel is positive for atypical bacterial infection (Bordetella pertussis, Chlamydophila pneumoniae, or Mycoplasma pneumoniae), consider antibiotic de-escalation to target atypical bacterial infection.    Sodium, Urine, Random - Urine, Clean Catch [[id number]] Collected: 05/18/23 2202    Specimen: Urine, Clean Catch Updated: 05/18/23 2302     Sodium, Urine <20 mmol/L     Narrative:      Reference intervals for random urine have not been established.  Clinical usage is dependent upon physician's interpretation in combination with other laboratory tests.       Comprehensive Metabolic Panel [[id number]]  (Abnormal) Collected: 05/18/23 2000    Specimen: Blood Updated: 05/18/23 2140     Glucose 113 mg/dL      BUN 11 mg/dL      Creatinine 0.85 mg/dL      Sodium 125 mmol/L      Potassium 3.7 mmol/L      Comment: Slight hemolysis detected by analyzer. Results may be affected.        Chloride 88 mmol/L      CO2 25.0 mmol/L      Calcium 9.2 mg/dL      Total Protein 6.5 g/dL      Albumin 3.5 g/dL      ALT (SGPT) 18 U/L      AST (SGOT) 29 U/L      Alkaline Phosphatase 107 U/L      Total Bilirubin 0.2 mg/dL      Globulin 3.0 gm/dL      Comment: Calculated Result        A/G Ratio 1.2 g/dL      BUN/Creatinine Ratio 12.9     Anion Gap 12.0 mmol/L      eGFR 97.6 mL/min/1.73     Narrative:      GFR Normal >60  Chronic Kidney Disease <60  Kidney Failure <15      Osmolality, Serum [[id number]]  (Abnormal) Collected: 05/18/23 2037    Specimen: Blood Updated:  "05/18/23 2139     Osmolality 268 mOsm/kg     TSH Rfx On Abnormal To Free T4 [[id number]]  (Normal) Collected: 05/18/23 2000    Specimen: Blood Updated: 05/18/23 2139     TSH 1.210 uIU/mL     Procalcitonin [[id number]]  (Normal) Collected: 05/18/23 2000    Specimen: Blood Updated: 05/18/23 2139     Procalcitonin 0.20 ng/mL     Narrative:      As a Marker for Sepsis (Non-Neonates):    1. <0.5 ng/mL represents a low risk of severe sepsis and/or septic shock.  2. >2 ng/mL represents a high risk of severe sepsis and/or septic shock.    As a Marker for Lower Respiratory Tract Infections that require antibiotic therapy:    PCT on Admission    Antibiotic Therapy       6-12 Hrs later    >0.5                Strongly Recommended  >0.25 - <0.5        Recommended   0.1 - 0.25          Discouraged              Remeasure/reassess PCT  <0.1                Strongly Discouraged     Remeasure/reassess PCT    As 28 day mortality risk marker: \"Change in Procalcitonin Result\" (>80% or <=80%) if Day 0 (or Day 1) and Day 4 values are available. Refer to [email or web address]    Change in PCT <=80%  A decrease of PCT levels below or equal to 80% defines a positive change in PCT test result representing a higher risk for 28-day all-cause mortality of patients diagnosed with severe sepsis for septic shock.    Change in PCT >80%  A decrease of PCT levels of more than 80% defines a negative change in PCT result representing a lower risk for 28-day all-cause mortality of patients diagnosed with severe sepsis or septic shock.       Lactic Acid, Plasma [[id number]]  (Normal) Collected: 05/18/23 2037    Specimen: Blood Updated: 05/18/23 2129     Lactate 0.7 mmol/L      Comment: Falsely depressed results may occur on samples drawn from patients receiving N-Acetylcysteine (NAC) or Metamizole.       CBC & Differential [[id number]]  (Abnormal) Collected: 05/18/23 2000    Specimen: Blood Updated: 05/18/23 2103    Narrative:      The following " orders were created for panel order CBC & Differential.  Procedure                               Abnormality         Status                     ---------                               -----------         ------                     CBC Auto Differential[[id number]]        Abnormal            Final result                 Please view results for these tests on the individual orders.    CBC Auto Differential [[id number]]  (Abnormal) Collected: 05/18/23 2000    Specimen: Blood Updated: 05/18/23 2103     WBC 13.93 10*3/mm3      RBC 3.99 10*6/mm3      Hemoglobin 13.2 g/dL      Hematocrit 37.6 %      MCV 94.2 fL      MCH 33.1 pg      MCHC 35.1 g/dL      RDW 12.4 %      RDW-SD 43.2 fl      MPV 8.0 fL      Platelets 432 10*3/mm3      Neutrophil % 74.5 %      Lymphocyte % 14.6 %      Monocyte % 9.4 %      Eosinophil % 0.4 %      Basophil % 0.4 %      Immature Grans % 0.7 %      Neutrophils, Absolute 10.37 10*3/mm3      Lymphocytes, Absolute 2.04 10*3/mm3      Monocytes, Absolute 1.31 10*3/mm3      Eosinophils, Absolute 0.06 10*3/mm3      Basophils, Absolute 0.05 10*3/mm3      Immature Grans, Absolute 0.10 10*3/mm3      nRBC 0.0 /100 WBC         Imaging Results (Last 24 Hours)     Procedure Component Value Units Date/Time    CT Angiogram Chest [[id number]] Collected: 05/18/23 2308     Updated: 05/18/23 2329    Narrative:      CT ANGIOGRAM CHEST    Date of Exam: 5/18/2023 10:50 PM EDT    Indication: rule out PE.    Comparison: CT chest 3/16/2023    Technique: CTA of the chest was performed before and after the uneventful intravenous administration of 82 mL Isovue-370. Reconstructed coronal and sagittal images were also obtained. In addition, a 3-D volume rendered image was created for   interpretation. Automated exposure control and iterative reconstruction methods were used.      Findings:  No evidence of pulmonary embolism. Unremarkable appearance of the cardiac chambers. There is mild atherosclerosis of the aortic arch. There  are mild scattered coronary artery calcifications. No pericardial effusion. No bulky or suspicious thoracic lymph   nodes. Chest wall soft tissues are normal. The trachea and mainstem bronchi are grossly patent with mild scattered secretions. There is mild perihilar bronchiectatic change. There is mild perihilar and lower lobe bronchial wall thickening and scattered   mucous plugging. There is mild centrilobular emphysema. No focal consolidation or evidence of active infectious or inflammatory process. There is some streaky bibasilar atelectasis. No lung mass. There is some linear scarring in the left upper lobe.   Previously described 5 mm nodule in the superior left lower lobe is no longer visualized, presumably resolution of small infectious or inflammatory focus. Stable appearance of a pleural-based 5 mm nodule in the right middle lobe (series 5 image 74). No   lung mass. No pleural effusion or pneumothorax. No acute or suspicious bony findings. Unremarkable appearance of the partially imaged upper abdomen.        Impression:      Impression:  No evidence of pulmonary embolism.    There is evidence of emphysema. Correlate with patient history and risk factors and please assess if the patient meets criteria for routine low dose CT lung cancer screening.    Mild bronchial wall thickening and scattered mucous plugging which may reflect smoking-associated bronchitis.        Electronically Signed: John Hicks    5/18/2023 11:26 PM EDT    Workstation ID: [id number]        ECG/EMG Results (last 24 hours)     Procedure Component Value Units Date/Time    SCANNED - TELEMETRY   [[id number]] Resulted: 05/18/23     Updated: 05/18/23 2151    SCANNED - TELEMETRY   [[id number]] Resulted: 05/18/23     Updated: 05/18/23 2151    ECG 12 Lead Dyspnea [[id number]] Collected: 05/18/23 2230     Updated: 05/19/23 0805     QT Interval 364 ms      QTC Interval 433 ms     Narrative:      Test Reason : Dyspnea  Blood Pressure :   */*    mmHG  Vent. Rate :  85 BPM     Atrial Rate :  85 BPM     P-R Int : 142 ms          QRS Dur :  98 ms      QT Int : 364 ms       P-R-T Axes :  81 -36  57 degrees     QTc Int : 433 ms    ** Poor data quality, interpretation may be adversely affected  Sinus rhythm with occasional premature ventricular complexes  Possible Left atrial enlargement  Left axis deviation  Incomplete right bundle branch block  Septal infarct , age undetermined  Abnormal ECG  When compared with ECG of 02-AUG-2014 05:51,  premature ventricular complexes are now present  Incomplete right bundle branch block is now present  Septal infarct is now present    Referred By: BILL           Confirmed By:

## 2023-05-19 NOTE — THERAPY EVALUATION
Patient Name: Luis Antonio Fairchild  : 1960    MRN: [id number]                              Today's Date: 2023       Admit Date: 2023    Visit Dx: No diagnosis found.  Patient Active Problem List   Diagnosis   • Anxiety   • Anderson's esophagus   • Carpal tunnel syndrome   • Chronic obstructive pulmonary disease with acute exacerbation   • Dysfunction of eustachian tube   • Gastroesophageal reflux disease without esophagitis   • Hyperlipidemia   • Hypertension   • Neck pain   • Osteoarthritis   • Fluid level behind tympanic membrane of right ear   • Tobacco use disorder   • Right shoulder pain   • Encounter for tobacco use cessation counseling   • Rotator cuff arthropathy of right shoulder   • Benign prostatic hyperplasia with urinary frequency   • Hyponatremia   • Pneumonia   • Right hip pain   • Alcohol abuse     Past Medical History:   Diagnosis Date   • Anxiety    • Arthritis    • Cancer 2018    Squamous carcinoma oropharynx   • COPD (chronic obstructive pulmonary disease)    • GERD (gastroesophageal reflux disease)    • History of IBS    • Hyperlipidemia    • Hypertension    • Irritable bowel syndrome    • Pneumonia      Past Surgical History:   Procedure Laterality Date   • COLONOSCOPY     • SHOULDER SURGERY      Onset Date    • THROAT SURGERY     • WRIST SURGERY      Onset Date:       General Information     Row Name 23 1358          Physical Therapy Time and Intention    Document Type evaluation  -     Mode of Treatment physical therapy  -     Row Name 23 1358          General Information    Patient Profile Reviewed yes  -HM     Prior Level of Function independent:;all household mobility;community mobility;gait;transfer;bed mobility  has been using a standard walker for past few days d/t R hip pain, no AD prior to recent decline  -     Existing Precautions/Restrictions fall;oxygen therapy device and L/min;other (see comments)  R hip pain  -HM     Barriers  to Rehab medically complex  -     Row Name 05/19/23 1358          Living Environment    People in Home spouse  -     Row Name 05/19/23 1358          Home Main Entrance    Number of Stairs, Main Entrance none  -     Row Name 05/19/23 1358          Stairs Within Home, Primary    Number of Stairs, Within Home, Primary other (see comments)  14  -     Stair Railings, Within Home, Primary railings on both sides of stairs  -     Row Name 05/19/23 1358          Cognition    Orientation Status (Cognition) oriented x 4  -     Row Name 05/19/23 1358          Safety Issues, Functional Mobility    Safety Issues Affecting Function (Mobility) safety precaution awareness  -     Impairments Affecting Function (Mobility) balance;pain;strength;endurance/activity tolerance  -           User Key  (r) = Recorded By, (t) = Taken By, (c) = Cosigned By    Initials Name Provider Type     Tori Lane, PT Physical Therapist               Mobility     Row Name 05/19/23 1401          Sit-Stand Transfer    Sit-Stand [place] (Transfers) contact guard  -     Assistive Device (Sit-Stand Transfers) walker, front-wheeled  -     Comment, (Sit-Stand Transfer) cues for hand placement  -     Row Name 05/19/23 1401          Gait/Stairs (Locomotion)    [place] Level (Gait) contact guard  -     Distance in Feet (Gait) 125+125  -HM     Deviations/Abnormal Patterns (Gait) bilateral deviations  -     Right Sided Gait Deviations weight shift ability decreased  -     Comment, (Gait/Stairs) Pt ambulated with a step to pattern leading with RLE with increased utilization of BUE on walker d/t pain in RLE with ambulation. Mobility limited d/t pain. Mild SOA at end of ambulation with SpO2 reading 77% with poor waveform and when good waveform noted SpO2 above 89%. Rec Monitor O2 for accuracy of SpO2 with exertion.  -           User Key  (r) = Recorded By, (t) = Taken By, (c) = Cosigned By    Initials Name Provider Type      Tori Lane, OMAR Physical Therapist               Obj/Interventions     Row Name 05/19/23 1409          Range of Motion Comprehensive    General Range of Motion bilateral lower extremity ROM WFL  -     Row Name 05/19/23 1409          Strength Comprehensive (MMT)    General Manual Muscle Testing (MMT) Assessment lower extremity strength deficits identified  -     Comment, General Manual Muscle Testing (MMT) Assessment RLE 4/5 d/t pain, LLE 4+/5  -     Row Name 05/19/23 1409          Motor Skills    Motor Skills functional endurance  -     Functional Endurance Pt requiring increased O2 at this time compared to baseline demonstrating decreased activity tolerance  -     Row Name 05/19/23 1409          Balance    Balance Assessment sitting static balance;sitting dynamic balance;sit to stand dynamic balance;standing static balance;standing dynamic balance  -     Static Sitting Balance independent  -     Dynamic Sitting Balance independent  -     Position, Sitting Balance unsupported;sitting in chair  -     Sit to Stand Dynamic Balance contact guard  -     Static Standing Balance contact guard  -     Dynamic Standing Balance contact guard  -     Position/Device Used, Standing Balance supported;walker, front-wheeled  -     Balance Interventions standing;dynamic;occupation based/functional task  -     Comment, Balance no LOB  -     Row Name 05/19/23 1409          Sensory Assessment (Somatosensory)    Sensory Assessment (Somatosensory) LE sensation intact  -           User Key  (r) = Recorded By, (t) = Taken By, (c) = Cosigned By    Initials Name Provider Type     Tori Lane, OMAR Physical Therapist               Goals/Plan    No documentation.                Clinical Impression     Row Name 05/19/23 1413          Pain    Pretreatment Pain Rating 0/10 - no pain  -     Posttreatment Pain Rating 7/10  -     Pain Location - Side/Orientation Right  -     Pain Location generalized   -     Pain Location - hip  -     Pre/Posttreatment Pain Comment tolerated  -     Pain Intervention(s) Repositioned;Rest  -     Row Name 05/19/23 1413          Plan of Care Review    Plan of Care Reviewed With patient  -     Progress no change  -     Outcome Evaluation PT eval completed. Pt ambulated 250 feet total CGA with FWx with pain limiting function with pt utilizing BUE support on walker to assist with pain on RLE. Pt demonstrated pain limiting function, RLE weakness d/t pain, mild balance deficits, and decreased functional endurance warranting IPPT POC. d/c rec home with assist and OPPT.  -     Row Name 05/19/23 1413          Therapy Assessment/Plan (PT)    Rehab Potential (PT) good, to achieve stated therapy goals  -     Criteria for Skilled Interventions Met (PT) yes;meets criteria;skilled treatment is necessary  -     Therapy Frequency (PT) daily  -     Row Name 05/19/23 1413          Vital Signs    Pre Systolic BP Rehab 168  -HM     Pre Treatment Diastolic BP 91  -     Pretreatment Heart Rate (beats/min) 88  -     Pre SpO2 (%) 97  -     O2 Delivery Pre Treatment nasal cannula  -     O2 Delivery Intra Treatment nasal cannula  -     O2 Delivery Post Treatment nasal cannula  -HM     Pre Patient Position Sitting  -     Intra Patient Position Standing  -     Post Patient Position Sitting  -     Row Name 05/19/23 1413          Positioning and Restraints    Pre-Treatment Position sitting in chair/recliner  -     Post Treatment Position chair  -HM     In Chair notified nsg;reclined;sitting;call light within reach;encouraged to call for assist;exit alarm on;waffle cushion  -           User Key  (r) = Recorded By, (t) = Taken By, (c) = Cosigned By    Initials Name Provider Type     Tori Lane, PT Physical Therapist               Outcome Measures     Row Name 05/19/23 1606 05/19/23 0800       How much help from another person do you currently need...    Turning from  your back to your side while in flat bed without using bedrails? 4  - 4  -AL (r) NT (t) AL (c)    Moving from lying on back to sitting on the side of a flat bed without bedrails? 4  - 4  -AL (r) NT (t) AL (c)    Moving to and from a bed to a chair (including a wheelchair)? 3  - 3  -AL (r) NT (t) AL (c)    Standing up from a chair using your arms (e.g., wheelchair, bedside chair)? 3  - 4  -AL (r) NT (t) AL (c)    Climbing 3-5 steps with a railing? 3  - 3  -AL (r) NT (t) AL (c)    To walk in hospital room? 3  - 3  -AL (r) NT (t) AL (c)    AM-PAC 6 Clicks Score (PT) 20  - 21  -AL (r) NT (t)    Highest level of mobility 6 --> Walked 10 steps or more  - 6 --> Walked 10 steps or more  -AL (r) NT (t)    Row Name 05/19/23 1606          Functional Assessment    Outcome Measure Options AM-PAC 6 Clicks Basic Mobility (PT)  -           User Key  (r) = Recorded By, (t) = Taken By, (c) = Cosigned By    Initials Name Provider Type    Rayna Holden, RN Registered Nurse     Tori Lane, OMAR Physical Therapist    Margarito Myles RN Extern Registered Nurse                             Physical Therapy Education     Title: PT OT SLP Therapies (In Progress)     Topic: Physical Therapy (In Progress)     Point: Mobility training (Done)     Learning Progress Summary           Patient Acceptance, E,TB, VU,NR by  at 5/19/2023 1606                   Point: Home exercise program (Not Started)     Learner Progress:  Not documented in this visit.          Point: Body mechanics (Done)     Learning Progress Summary           Patient Acceptance, E,TB, VU,NR by  at 5/19/2023 1606                   Point: Precautions (Done)     Learning Progress Summary           Patient Acceptance, E,TB, VU,NR by  at 5/19/2023 1606                               User Key     Initials Effective Dates Name Provider Type Discipline     09/22/22 -  Tori Lane, PT Physical Therapist PT              PT Recommendation and Plan      Plan of Care Reviewed With: patient  Progress: no change  Outcome Evaluation: PT eval completed. Pt ambulated 250 feet total CGA with FWx with pain limiting function with pt utilizing BUE support on walker to assist with pain on RLE. Pt demonstrated pain limiting function, RLE weakness d/t pain, mild balance deficits, and decreased functional endurance warranting IPPT POC. d/c rec home with assist and OPPT.     Time Calculation:    PT Charges     Row Name 05/19/23 1554             Time Calculation    Start Time 1330  -HM      PT Received On 05/19/23  -HM      PT Goal Re-Cert Due Date 05/29/23  -         Untimed Charges    PT Eval/Re-eval Minutes 46  -HM         Total Minutes    Untimed Charges Total Minutes 46  -HM       Total Minutes 46  -HM            User Key  (r) = Recorded By, (t) = Taken By, (c) = Cosigned By    Initials Name Provider Type     Tori Lane, OMAR Physical Therapist              Therapy Charges for Today     Code Description Service Date Service Provider Modifiers Qty    83848993236 HC PT EVAL LOW COMPLEXITY 4 5/19/2023 Tori Lane, PT GP 1          PT G-Codes  Outcome Measure Options: AM-PAC 6 Clicks Basic Mobility (PT)  AM-PAC 6 Clicks Score (PT): 20  PT Discharge Summary  Anticipated Discharge Disposition (PT): home with outpatient therapy services, home with assist    Tori Lane, OMAR  5/19/2023

## 2023-05-19 NOTE — PROGRESS NOTES
[facility] Medicine Services  PROGRESS NOTE    Patient Name: Luis Antonio Fairchild  : 1960  MRN: [id number]    Date of Admission: 2023  Primary Care Physician: Sulma Woodward APRN    Subjective   Subjective     CC:  Hyponatremia    HPI:  Resting in bed in no acute distress and his only complaint is right hip pain.  This is chronic and patient is following with his primary care physician and he is in the process of being referred to orthopedic surgery.  No fever or chills.  No chest pain or palpitation or shortness of breath at rest.  No nausea vomiting or diarrhea or abdominal pain.    ROS:  As mentioned above    Objective   Objective     Vital Signs:   Temp:  [97.6 °F (36.4 °C)-98.3 °F (36.8 °C)] 98.2 °F (36.8 °C)  Heart Rate:  [71-99] 90  Resp:  [16-18] 18  BP: (137-168)/(76-97) 137/83  Flow (L/min):  [2] 2     Physical Exam:  Constitutional: No acute distress  HENT: NCAT, mucous membranes moist  Respiratory: Clear to auscultation bilaterally, respiratory effort normal   Cardiovascular: RRR, no murmurs, rubs, or gallops  Gastrointestinal: Positive bowel sounds, soft, nontender, nondistended  Musculoskeletal: No bilateral ankle edema  Psychiatric: Appropriate affect, cooperative  Neurologic: Oriented x 3, strength symmetric in all extremities, Cranial Nerves grossly intact to confrontation, speech clear  Skin: No rashes    Results Reviewed:  LAB RESULTS:      Lab 23  03423   WBC 10.62  --  13.93*   HEMOGLOBIN 13.6  --  13.2   HEMATOCRIT 39.6  --  37.6   PLATELETS 458*  --  432   NEUTROS ABS 9.81*  --  10.37*   IMMATURE GRANS (ABS) 0.09*  --  0.10*   LYMPHS ABS 0.52*  --  2.04   MONOS ABS 0.16  --  1.31*   EOS ABS 0.00  --  0.06   MCV 93.4  --  94.2   PROCALCITONIN  --   --  0.20   LACTATE  --  0.7  --          Lab 23  1442 23  0800 23  03423  0005 23   SODIUM 125* 128* 125* 126* 125*   POTASSIUM 4.2 3.9 3.8  4.0 3.7   CHLORIDE 89* 90* 89* 89* 88*   CO2 25.0 27.0 27.0 27.0 25.0   ANION GAP 11.0 11.0 9.0 10.0 12.0   BUN 11 7* 7* 9 11   CREATININE 0.64* 0.66* 0.65* 0.76 0.85   EGFR 106.4 105.4 105.9 101.0 97.6   GLUCOSE 273* 168* 163* 186* 113*   CALCIUM 9.0 9.0 9.0 9.0 9.2   MAGNESIUM  --   --  1.9  --   --    HEMOGLOBIN A1C  --   --  5.90*  --   --    TSH  --   --   --   --  1.210         Lab 05/18/23 2000 05/17/23  1145   TOTAL PROTEIN 6.5  --    ALBUMIN 3.5  --    GLOBULIN 3.0  --    ALT (SGPT) 18  --    AST (SGOT) 29  --    BILIRUBIN 0.2  --    ALK PHOS 107  --    LIPASE  --  30*                     Brief Urine Lab Results  (Last result in the past 365 days)      Color   Clarity   Blood   Leuk Est   Nitrite   Protein   CREAT   Urine HCG        05/18/23 2217 Yellow   Clear   Negative   Negative   Negative   Negative                 Microbiology Results Abnormal     Procedure Component Value - Date/Time    Respiratory Culture - Sputum, Cough [[id number]] Collected: 05/18/23 2239    Lab Status: Preliminary result Specimen: Sputum from Cough Updated: 05/19/23 1057     Respiratory Culture Growth present, too young to evaluate     Gram Stain Many (4+) WBCs per low power field      Few (2+) Mixed bacterial morphotypes seen on Gram Stain      Rare (1+) Epithelial cells per low power field      Rare (1+) Budding yeast with pseudohyphae    S. Pneumo Ag Urine or CSF - Urine, Urine, Clean Catch [[id number]]  (Normal) Collected: 05/18/23 2202    Lab Status: Final result Specimen: Urine, Clean Catch Updated: 05/19/23 0951     Strep Pneumo Ag Negative    Legionella Antigen, Urine - Urine, Urine, Clean Catch [[id number]]  (Normal) Collected: 05/18/23 2202    Lab Status: Final result Specimen: Urine, Clean Catch Updated: 05/19/23 0950     LEGIONELLA ANTIGEN, URINE Negative          CT Angiogram Chest    Result Date: 5/18/2023  CT ANGIOGRAM CHEST Date of Exam: 5/18/2023 10:50 PM EDT Indication: rule out PE. Comparison: CT chest 3/16/2023  Technique: CTA of the chest was performed before and after the uneventful intravenous administration of 82 mL Isovue-370. Reconstructed coronal and sagittal images were also obtained. In addition, a 3-D volume rendered image was created for interpretation. Automated exposure control and iterative reconstruction methods were used. Findings: No evidence of pulmonary embolism. Unremarkable appearance of the cardiac chambers. There is mild atherosclerosis of the aortic arch. There are mild scattered coronary artery calcifications. No pericardial effusion. No bulky or suspicious thoracic lymph nodes. Chest wall soft tissues are normal. The trachea and mainstem bronchi are grossly patent with mild scattered secretions. There is mild perihilar bronchiectatic change. There is mild perihilar and lower lobe bronchial wall thickening and scattered mucous plugging. There is mild centrilobular emphysema. No focal consolidation or evidence of active infectious or inflammatory process. There is some streaky bibasilar atelectasis. No lung mass. There is some linear scarring in the left upper lobe. Previously described 5 mm nodule in the superior left lower lobe is no longer visualized, presumably resolution of small infectious or inflammatory focus. Stable appearance of a pleural-based 5 mm nodule in the right middle lobe (series 5 image 74). No lung mass. No pleural effusion or pneumothorax. No acute or suspicious bony findings. Unremarkable appearance of the partially imaged upper abdomen.     Impression: Impression: No evidence of pulmonary embolism. There is evidence of emphysema. Correlate with patient history and risk factors and please assess if the patient meets criteria for routine low dose CT lung cancer screening. Mild bronchial wall thickening and scattered mucous plugging which may reflect smoking-associated bronchitis. Electronically Signed: John Hicks  5/18/2023 11:26 PM EDT  Workstation ID: [id number]      Results  for orders placed during the hospital encounter of 03/19/23    Adult Transthoracic Echo Complete W/ Cont if Necessary Per Protocol    Interpretation Summary  •  Left ventricular systolic function is normal. Calculated left ventricular EF = 56.2% Left ventricular ejection fraction appears to be 56 - 60%.  •  Left ventricular diastolic function was normal.  •  Estimated right ventricular systolic pressure from tricuspid regurgitation is normal (<35 mmHg). Calculated right ventricular systolic pressure from tricuspid regurgitation is 23 mmHg.      Current medications:  Scheduled Meds:amLODIPine, 5 mg, Oral, Daily  budesonide-formoterol, 1 puff, Inhalation, BID - RT  citalopram, 20 mg, Oral, Daily  doxycycline, 100 mg, Oral, Q12H  enoxaparin, 40 mg, Subcutaneous, Nightly  folic acid, 1 mg, Oral, Daily  ipratropium-albuterol, 3 mL, Nebulization, 4x Daily - RT  methylPREDNISolone sodium succinate, 40 mg, Intravenous, Q12H  multivitamin with minerals, 1 tablet, Oral, Daily  nicotine, 1 patch, Transdermal, Q24H  pantoprazole, 40 mg, Oral, BID  pravastatin, 40 mg, Oral, Daily  senna-docusate sodium, 2 tablet, Oral, BID  sodium chloride, 10 mL, Intravenous, Q12H  thiamine (B-1) IV, 200 mg, Intravenous, Q8H   Followed by  [START ON 5/24/2023] thiamine, 100 mg, Oral, Daily      Continuous Infusions:sodium chloride, 75 mL/hr, Last Rate: 75 mL/hr (05/19/23 1240)      PRN Meds:.•  acetaminophen **OR** acetaminophen **OR** acetaminophen  •  Albuterol Sulfate NEB Orderable  •  senna-docusate sodium **AND** polyethylene glycol **AND** bisacodyl **AND** bisacodyl  •  Calcium Replacement - Follow Nurse / BPA Driven Protocol  •  cyclobenzaprine  •  ipratropium-albuterol  •  LORazepam **OR** LORazepam **OR** LORazepam **OR** LORazepam **OR** LORazepam **OR** LORazepam  •  Magnesium Standard Dose Replacement - Follow Nurse / BPA Driven Protocol  •  melatonin  •  Morphine **AND** naloxone  •  Phosphorus Replacement - Follow Nurse / BPA  Driven Protocol  •  Potassium Replacement - Follow Nurse / BPA Driven Protocol  •  sodium chloride  •  sodium chloride    Assessment & Plan   Assessment & Plan     Active Hospital Problems    Diagnosis  POA   • **Hyponatremia [E87.1]  Unknown   • Pneumonia [J18.9]  Unknown   • Right hip pain [M25.551]  Unknown   • Alcohol abuse [F10.10]  Unknown   • Benign prostatic hyperplasia with urinary frequency [N40.1, R35.0]  Yes   • Tobacco use disorder [F17.200]  Yes   • Anxiety [F41.9]  Yes   • Anderson's esophagus [K22.70]  Yes   • Hyperlipidemia [E78.5]  Yes   • Hypertension [I10]  Yes   • Osteoarthritis [M19.90]  Yes   • Gastroesophageal reflux disease without esophagitis [K21.9]  Yes      Resolved Hospital Problems   No resolved problems to display.        Brief Hospital Course to date:  Luis Antonio Fairchild is a 62 y.o. male with past medical history significant for hypertension, hyperlipidemia, osteoarthritis, Anderson's in first esophagus, anxiety disorder, tobacco abuse, BPH, alcohol abuse, chronic right hip pain.  Patient was admitted for hyponatremia.    *Hyponatremia, seems to be secondary to polydipsia.  Patient has history of throat cancer s/p surgery about 5 years ago.  Patient tells me that after the surgery his saliva is markedly reduced and if he does not drink water his throat gets dry.  Sodium content concentration in the urine is low and also osmolality is low.  Currently patient is on IV fluid and restricted p.o. fluid intake to 2000 cc per 24 hours.  Sodium is gradually coming up.  Will order serum sodium every 4 hours and monitor.    *Pneumonia and some degree of COPD exacerbation.  Patient was given doxycycline as outpatient.  Currently doxycycline is continued.  We will change steroid to oral.    *Osteoarthritis and right hip pain.  Evidently since about a few months back the right hip pain has become worse.  Patient's PCP evidently is in the process of referring the patient to orthopedic  surgery.    *Hypertension and hyperlipidemia, patient is on home medication.        Expected Discharge Location and Transportation: Home  Expected Discharge in a day or 2, when sodium is acceptable.  Expected Discharge Date: 5/20/2023; Expected Discharge Time:      DVT prophylaxis:  Medical and mechanical DVT prophylaxis orders are present.     AM-PAC 6 Clicks Score (PT): 20 (05/19/23 8976)    CODE STATUS:   Code Status and Medical Interventions:   Ordered at: 05/18/23 2012     Level Of Support Discussed With:    Patient     Code Status (Patient has no pulse and is not breathing):    CPR (Attempt to Resuscitate)     Medical Interventions (Patient has pulse or is breathing):    Full Support       Ashish Garcia MD  05/19/23

## 2023-05-19 NOTE — PLAN OF CARE
Goal Outcome Evaluation:      Patient resting in bed at this time, multiple complaints of pain in the leg overnight   Morphine given 2x with minimal relief, flexeril given X1 and tylenol X1   NS at 75ml/hr   UOP 1625ml   CWIA scores 0     VSS  Na still low at 126  Seizure precautions maintained

## 2023-05-19 NOTE — H&P
[facility] Medicine Services  HISTORY AND PHYSICAL    Patient Name: Luis Antonio Fairchild  : 1960  MRN: [id number]  Primary Care Physician: Sulma Woodward APRN  Date of admission: 2023      Subjective   Subjective     Chief Complaint:  Hyponatremia    HPI:  Luis Antonio Fairchild is a 62 y.o. 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  Patient reports that 1 week ago he began to develop a cough worse than baseline, productive of brown sputum.  He reports associated malaise, nausea.  He denies fever, vomiting, diarrhea.  He was seen at [facility] ED on 5/15 where he was diagnosed with pneumonia, per pt he was prescribed Levaquin and a steroid (per EMR it appears he was prescribed Doxycycline and prednisone).  He began taking Levaquin last night.  On Tuesday he began to develop severe right hip pain, increased from baseline.  He reports severe pain that radiates to his right lateral leg.  He is able to bear weight on the leg.  He was seen once again in [facility] ED on 2023 where he was found to have hyponatremia with a sodium level 121.  Admission was recommended, patient refused.  He was seen by his PCP today with repeat labs and found to have persistent low sodium at 122.  He denies lower extremity or saddle paresthesias, bowel or bladder incontinence or leg weakness.      Review of Systems   Constitutional: Positive for activity change and appetite change. Negative for fever.   HENT: Negative.    Eyes: Negative.    Respiratory: Positive for cough, chest tightness and shortness of breath.    Cardiovascular: Negative.  Negative for chest pain.   Gastrointestinal: Positive for nausea. Negative for diarrhea.   Endocrine: Negative.    Genitourinary: Negative.     Musculoskeletal: Positive for myalgias. Negative for back pain.   Skin: Negative.    Allergic/Immunologic: Negative.    Neurological: Negative.  Negative for weakness and numbness.   Hematological: Negative.    Psychiatric/Behavioral: Negative.         Personal History     Past Medical History:   Diagnosis Date   • Anxiety 2016   • Arthritis    • Cancer 2018    Squamous carcinoma oropharynx   • COPD (chronic obstructive pulmonary disease) 2021   • GERD (gastroesophageal reflux disease) 2014   • History of IBS    • Hyperlipidemia    • Hypertension    • Irritable bowel syndrome    • Pneumonia 2021             Past Surgical History:   Procedure Laterality Date   • COLONOSCOPY     • SHOULDER SURGERY      Onset Date 2008   • THROAT SURGERY     • WRIST SURGERY      Onset Date: 2012       Family History: family history includes Anxiety disorder in his brother and mother; Arthritis in his mother; Cancer in his brother and brother; Hypertension in his father; Stroke in his father.     Social History:  reports that he has been smoking cigarettes. He started smoking about 49 years ago. He has a 11.50 pack-year smoking history. He has never used smokeless tobacco. He reports current alcohol use of about 35.0 standard drinks per week. He reports that he does not use drugs.  Social History     Social History Narrative   • Not on file       Medications:  Available home medication information reviewed.  Facility-Administered Medications Prior to Admission   Medication Dose Route Frequency Provider Last Rate Last Admin   • [COMPLETED] ketorolac (TORADOL) injection 30 mg  30 mg Intramuscular Once Breeding, Sulma, APRN   30 mg at 05/18/23 1202     Medications Prior to Admission   Medication Sig Dispense Refill Last Dose   • albuterol sulfate HFA (ProAir HFA) 108 (90 Base) MCG/ACT inhaler Inhale 1-2 puffs every 4-6 hours PRN 1 g 5 5/18/2023   • amLODIPine (NORVASC) 5 MG tablet Take 1 tablet by mouth Daily. 30 tablet 5 5/18/2023   •  Budeson-Glycopyrrol-Formoterol (BREZTRI) 160-9-4.8 MCG/ACT aerosol inhaler Inhale 2 puffs 2 (Two) Times a Day. 10.7 g 11 5/18/2023   • Calcium Carbonate-Vit D-Min (CALCIUM 1200 PO) Take  by mouth.   5/18/2023   • celecoxib (CeleBREX) 200 MG capsule Take 1 capsule by mouth 2 (Two) Times a Day. 180 capsule 0 5/18/2023   • citalopram (CeleXA) 40 MG tablet Take 1 tablet by mouth Daily. 90 tablet 1 5/18/2023   • ezetimibe (ZETIA) 10 MG tablet Take 1 tablet by mouth Daily.   5/18/2023   • levoFLOXacin (Levaquin) 750 MG tablet Take 1 tablet by mouth Daily. 5 tablet 0 5/17/2023   • multivitamin with minerals tablet tablet Take 1 tablet by mouth Daily.   5/18/2023   • ondansetron (Zofran) 4 MG tablet Take 1 tablet by mouth Every 8 (Eight) Hours As Needed for Nausea or Vomiting. 20 tablet 0 5/17/2023   • pantoprazole (PROTONIX) 40 MG EC tablet Take 1 tablet by mouth 2 (Two) Times a Day. 180 tablet 0 5/18/2023   • pravastatin (PRAVACHOL) 40 MG tablet Take 1 tablet by mouth Daily. 90 tablet 1 5/17/2023   • predniSONE (DELTASONE) 10 MG tablet Take 1 tablet by mouth Daily.   Patient Taking Differently   • VITAMIN D, CHOLECALCIFEROL, PO Take  by mouth.   5/18/2023   • cyclobenzaprine (FLEXERIL) 10 MG tablet  (Patient not taking: Reported on 5/18/2023)   Not Taking   • Fluticasone-Salmeterol (ADVAIR/WIXELA) 100-50 MCG/ACT DISKUS Inhale 1 each 2 (Two) Times a Day. (Patient not taking: Reported on 5/18/2023)   Not Taking   • ketorolac (TORADOL) 10 MG tablet Take 1 tablet by mouth Every 6 (Six) Hours As Needed for Moderate Pain. 20 tablet 0 Unknown   • varenicline (CHANTIX) 1 MG tablet Take 1 tablet by mouth. (Patient not taking: Reported on 5/18/2023)   Not Taking       No Known Allergies    Objective   Objective     Vital Signs:   Temp:  [97.7 °F (36.5 °C)] 97.7 °F (36.5 °C)  Heart Rate:  [64-87] 87  Resp:  [16-18] 18  BP: (134-150)/(70-79) 150/79       Physical Exam   Constitutional: Awake, alert  Eyes: PERRLA, sclerae anicteric,  no conjunctival injection  HENT: NCAT, mucous membranes moist  Neck: Supple, no thyromegaly, no lymphadenopathy, trachea midline  Respiratory: Moderate air movement, bilateral wheezes, nonlabored respirations   Cardiovascular: RRR, no murmurs, rubs, or gallops, palpable pedal pulses bilaterally  Gastrointestinal: Positive bowel sounds, soft, nontender, nondistended  Musculoskeletal: No bilateral ankle edema, no clubbing or cyanosis to extremities  Psychiatric: Appropriate affect, cooperative  Neurologic: Oriented x 3, strength symmetric in all extremities, Cranial Nerves grossly intact to confrontation, speech clear  Skin: No rashes      Result Review:  I have personally reviewed the results from the time of this admission to 5/18/2023 20:25 EDT and agree with these findings:  [x]  Laboratory list / accordion  [x]  Microbiology  []  Radiology  [x]  EKG/Telemetry   []  Cardiology/Vascular   []  Pathology  [x]  Old records  []  Other:      LAB RESULTS:          Lab 05/18/23  1141   SODIUM 122*   POTASSIUM 4.2   CHLORIDE 86*   CO2 25.0   ANION GAP 11.0   BUN 7*   CREATININE 0.69*   EGFR 104.0   GLUCOSE 120*   CALCIUM 9.5         Lab 05/17/23  1145   LIPASE 30*                     UA        1/30/2023    09:52   Urinalysis   Specific Gravity, UA 1.008     Ketones, UA Negative     Blood, UA Negative     Leukocytes, UA Negative     Nitrite, UA Negative         Microbiology Results (last 10 days)     ** No results found for the last 240 hours. **          CT Abdomen Pelvis Without Contrast    Result Date: 5/17/2023  CT SCAN OF THE ABDOMEN AND PELVIS WITHOUT CONTRAST     5/17/2023 12:12 PM HISTORY: Right posterior hip/flank pain, nausea. COMPARISON: None. PROCEDURE: Axial images were obtained from the lung bases to the pubic symphysis by computed tomography. This study was performed with techniques to keep radiation doses as low as reasonably achievable, (ALARA). Individualized dose reduction techniques using automated  exposure control or adjustment of mA and/or kV according to the patient size were employed. FINDINGS: ABDOMEN: There is atelectasis and nodularity at the left lung base.  The heart size is normal. A small hiatal hernia is noted. The stomach is distended. The limited non-contrast images of the liver are unremarkable. The gallbladder is distended. The spleen is unremarkable. There is thickening of the left adrenal gland. The aorta is normal in caliber. There is no significant free fluid or adenopathy.  There is no nephrolithiasis. There is no hydronephrosis. PELVIS:  The GI tract demonstrates no obstruction. The appendix is normal. The urinary bladder is distended. There is no fluid or adenopathy.     Impression: Distended urinary bladder. Distended gallbladder. Atelectasis and nodularity along the left hemidiaphragm. Follow-up to resolution recommended. CT SCAN RIGHT  HIP .     5/17/2023 12:12 PM HISTORY: Severe right hip pain. COMPARISON: None. PROCEDURE: Axial images were obtained through the hip by computed tomography.  Sagittal and coronal reconstruction images were performed . This study was performed with techniques to keep radiation doses as low as reasonably achievable, (ALARA). Individualized dose reduction techniques using automated exposure control or adjustment of mA and/or kV according to the patient size were employed. FINDINGS:  There is no fracture or joint effusion .  No loose bodies are identified .  The bones are normally mineralized .  There is no significant degenerative change . IMPRESSION:  No acute process . Images reviewed, interpreted, and dictated by Dr. FLORENCE Salazar. Transcribed by KEVIN Spangler(R).    CT Lumbar Spine Without Contrast    Result Date: 5/17/2023  CT LUMBAR SPINE  5/17/2023 10:39 AM HISTORY: Acute Lumbar back pain. PROCEDURE: Axial images were obtained through the lumbar spine by computed tomography. Reconstruction images were also performed. This study was  performed with techniques to keep radiation doses as low as reasonably achievable, (ALARA). Individualized dose reduction techniques using automated exposure control or adjustment of mA and/or kV according to the patient size were employed. FINDINGS: The disc spaces are mildly diffusely degenerated. Disc bulges are present at L3-4, L4-5, L5-S1. Urinary bladder is markedly distended.  There is no subluxation. No acute fracture.    Impression: No acute fracture. Degenerative disc disease. Images reviewed, interpreted, and dictated by FLORENCE Salazar MD    CT hip right without contrast    Result Date: 5/17/2023  CT SCAN OF THE ABDOMEN AND PELVIS WITHOUT CONTRAST     5/17/2023 12:12 PM HISTORY: Right posterior hip/flank pain, nausea. COMPARISON: None. PROCEDURE: Axial images were obtained from the lung bases to the pubic symphysis by computed tomography. This study was performed with techniques to keep radiation doses as low as reasonably achievable, (ALARA). Individualized dose reduction techniques using automated exposure control or adjustment of mA and/or kV according to the patient size were employed. FINDINGS: ABDOMEN: There is atelectasis and nodularity at the left lung base.  The heart size is normal. A small hiatal hernia is noted. The stomach is distended. The limited non-contrast images of the liver are unremarkable. The gallbladder is distended. The spleen is unremarkable. There is thickening of the left adrenal gland. The aorta is normal in caliber. There is no significant free fluid or adenopathy.  There is no nephrolithiasis. There is no hydronephrosis. PELVIS:  The GI tract demonstrates no obstruction. The appendix is normal. The urinary bladder is distended. There is no fluid or adenopathy.     Impression: Distended urinary bladder. Distended gallbladder. Atelectasis and nodularity along the left hemidiaphragm. Follow-up to resolution recommended. CT SCAN RIGHT  HIP .     5/17/2023 12:12 PM  HISTORY: Severe right hip pain. COMPARISON: None. PROCEDURE: Axial images were obtained through the hip by computed tomography.  Sagittal and coronal reconstruction images were performed . This study was performed with techniques to keep radiation doses as low as reasonably achievable, (ALARA). Individualized dose reduction techniques using automated exposure control or adjustment of mA and/or kV according to the patient size were employed. FINDINGS:  There is no fracture or joint effusion .  No loose bodies are identified .  The bones are normally mineralized .  There is no significant degenerative change . IMPRESSION:  No acute process . Images reviewed, interpreted, and dictated by Dr. FLORENCE Salazar. Transcribed by KEVIN Spangler(R).    XR hip 2 views right    Result Date: 5/17/2023  RIGHT HIP HISTORY: Right hip pain. FINDINGS: There are several small avulsions from the superior lateral right acetabulum. The hip joint appears well-maintained. The femoral head has a normal rounded contour. The left hip is included on the AP field-of-view and appears normal.    Impression: Small old avulsions from the right acetabulum.      Results for orders placed during the hospital encounter of 03/19/23    Adult Transthoracic Echo Complete W/ Cont if Necessary Per Protocol    Interpretation Summary  •  Left ventricular systolic function is normal. Calculated left ventricular EF = 56.2% Left ventricular ejection fraction appears to be 56 - 60%.  •  Left ventricular diastolic function was normal.  •  Estimated right ventricular systolic pressure from tricuspid regurgitation is normal (<35 mmHg). Calculated right ventricular systolic pressure from tricuspid regurgitation is 23 mmHg.      Assessment & Plan   Assessment & Plan     Active Hospital Problems    Diagnosis  POA   • **Hyponatremia [E87.1]  Unknown   • Pneumonia [J18.9]  Unknown   • Right hip pain [M25.551]  Unknown   • Alcohol abuse [F10.10]  Unknown   •  Benign prostatic hyperplasia with urinary frequency [N40.1, R35.0]  Yes     Diagnosed 11/28/2022.  PSA within normal limits January 2022     • Tobacco use disorder [F17.200]  Yes   • Anxiety [F41.9]  Yes   • Anderson's esophagus [K22.70]  Yes   • Hyperlipidemia [E78.5]  Yes   • Hypertension [I10]  Yes   • Osteoarthritis [M19.90]  Yes   • Gastroesophageal reflux disease without esophagitis [K21.9]  Yes   Luis Antonio Fairchild is a 62 y.o. 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      Hyponatremia  --Check urine osmolality, serum osmolality, urine sodium  --IV fluids depending   --Every 4 hours BMP   --Not to correct more than 8 mmol/L/day    Pneumonia  COPD exacerbation  - Prescribed doxycycline, prednisone from outside ED  - Continue doxycycline, start Solu-Medrol  - DuoNebs scheduled and as needed  - Respiratory PCR  - Sputum culture  - I-S  - Urine antigens  - CTA chest pending    BPH  - Markedly distended urinary bladder seen on outside imaging  - Follow acute urinary retention protocol  - Benefit from initiating flomax    Chronic tobacco use  --counseled on cessation  --nicotine patch    Alcohol dependency/withdrawal  --CIWA protocol  -- As needed Ativan  --thiamine, folic acid  --fall precautions  --counseled on cessation  --seizure precautions    Osteoarthritis  Right hip pain  - Likely secondary to sciatica  - Imaging obtained at outside ED shows bulging disc in the lumbar spine, denies back pain  - CT right hip at outside ED with no acute process  - May benefit from orthopedic surgery consult in a.m.  - PT/OT  - Pain control    Chronic tobacco use  --counseled on cessation  --nicotine patch    Hypertension  Hyperlipidemia  - Continue home meds    GERD  - History of Anderson's esophagus  - Continue PPI    DVT  prophylaxis:  Lovenox      CODE STATUS:  Full code  Code Status and Medical Interventions:   Ordered at: 05/18/23 2012     Level Of Support Discussed With:    Patient     Code Status (Patient has no pulse and is not breathing):    CPR (Attempt to Resuscitate)     Medical Interventions (Patient has pulse or is breathing):    Full Support       Expected Discharge   Expected discharge date/ time has not been documented.     Meghann Ly,   05/18/23

## 2023-05-19 NOTE — PROGRESS NOTES
"                    Clinical Nutrition       Patient Name: Luis Antonio Fairchild  YOB: 1960  MRN: [id number]  Date of Encounter: 05/19/23 13:56 EDT  Admission date: 5/18/2023    Pt with excessive fluid intake including 5-6 beers/day. Education provided for hyponatremia and decreasing overall fluid intake, alcohol moderation/cessation encouraged.     Diet liberalized to encourage sodium intake.     Reason for Visit   MST score 2+, Unintended wt loss, Reduced oral intake    EMR Reviewed     EMR Reviewed: yes     Admission Diagnosis:  Hyponatremia [E87.1]  Back pain [M54.9]    Problem List:    Hyponatremia    Anxiety    Anderson's esophagus    Gastroesophageal reflux disease without esophagitis    Hyperlipidemia    Hypertension    Osteoarthritis    Tobacco use disorder    Benign prostatic hyperplasia with urinary frequency    Pneumonia    Right hip pain    Alcohol abuse      Anthropometric      Height: 175.3 cm (69\")  Last Filed Weight: Weight: 66.6 kg (146 lb 13.2 oz) (05/19/23 0600)     BMI: BMI (Calculated): 21.7  BMI classification: Normal: 18.5-24.9kg/m2   IBW:  155 lb    UBW: per pt report he has lost from 175 lb => 140 lb over the past 8 months. Per several EMR documented weights, this loss occurred some time ago and weight has been stable ~145-150 lb for >1 year:   Weight      Weight (kg) Weight (lbs) Visit Report   9/21/2016 80.74 kg  178 lb  --    11/23/2016 83.462 kg  184 lb  --    1/26/2022 67.495 kg  148 lb 12.8 oz  --    2/23/2022 69.037 kg  152 lb 3.2 oz  --    3/9/2022 67.495 kg  148 lb 12.8 oz  --    4/12/2022 67.132 kg  148 lb  --    5/11/2022 66.951 kg  147 lb 9.6 oz  --    8/12/2022 65.772 kg  145 lb  --    10/18/2022 68.493 kg  151 lb  --    11/28/2022 65.318 kg  144 lb  --    1/30/2023 64.864 kg  143 lb  --    2/2/2023 64.9 kg  143 lb 1.3 oz  --    2/9/2023 66.395 kg  146 lb 6 oz  --    2/28/2023 67.223 kg  148 lb 3.2 oz  --    3/19/2023 67.2 kg  148 lb 2.4 oz      67.132 kg  148 lb   " "  3/29/2023 67.495 kg  148 lb 12.8 oz  --    3/31/2023 66.395 kg  146 lb 6 oz  --    4/26/2023 67.586 kg  149 lb  --    5/18/2023 67.586 kg  149 lb  --     70.761 kg  156 lb  --     66.679 kg  147 lb  --    5/19/2023 66.6 kg  146 lb 13.2 oz         Weight change: no verifiable significant changes, previous loss ~30 lb several years ago     Reported/Observed/Food/Nutrition Related - Comments     Visited with pt at bedside, very pleasant and open to assessment, seemed alert but did show some possible confusion regarding weight hx during assessment. Tells me he has an excellent appetite and describes an overall balanced healthy diet. He does note excessive fluid intake \"especially when it gets hotter in the summer.\" He describes fluid intake of ~64 oz water, 16-24 oz tea, \"a pot\" ?24 oz coffee, and 5-6 beers/day. Discussed with pt that this is overall excessive and likely impacting hyponatremia, especially the beer intake. Discussed with him recommendations for cutting down overall fluid intake and of course alcohol moderation/cessation. He did voice understanding and motivation for diet/lifestyle change. Pt also noted weight loss over the past 8 months and reiterates no change in PO intakes. He states he has discussed this with his doctor and is still unsure etiology. This weight loss is noted in EMR however unable to verify reported timeline, per EMR documented weights weight stable >1year, he did have loss from higher weights ~180 lb in 2016. While searching for weight hx in EMR RD did note a note from physician during an office visit that stated \"Patient continues to be concerned about his 30 pound weight loss since his throat cancer in 2018.\" Pt appears well nourished, no significant findings in NFPE.  He is certainly at nutritional risk r/t EtOH intake, again this was discussed with good reception. He denied further needs while here in the hospital.    Nutrition Focused Physical Exam     Date: 5/19    NFPE " completed, patient does not meet criteria for malnutrition diagnosis at this time.      Current Nutrition Prescription     Diet: Cardiac Diets, Fluid Restriction (240 mL/tray) Diets; Healthy Heart (2-3 Na+); 2000 mL/day; Texture: Regular Texture (IDDSI 7); Fluid Consistency: Thin (IDDSI 0)  No active supplement orders      Average Intake from Charting: Insufficient data     Nutrition Diagnosis     Problem Excessive fluid intake    Etiology Est. Fluid needs   Signs/Symptoms Pt report/fluid intake hx   Status:    Actions     Follow treatment progress, Care plan reviewed, Advise alternate selection, Advised available snacks, Interview for preferences, Menu provided, Encourage intake, Supplement offered/refused, Education Provided for hyponatremia, fluid needs, EtOH    Diet liberalized to encourage Na intake    Monitor Per Protocol      Emma Olson RD  Time Spent: 30m

## 2023-05-19 NOTE — CASE MANAGEMENT/SOCIAL WORK
Discharge Planning Assessment  [facility]     Patient Name: Luis Antonio Fairchild  MRN: [id number]  Today's Date: 5/19/2023    Admit Date: 5/18/2023    Plan: Home with spouse   Discharge Needs Assessment     Row Name 05/19/23 0834       Living Environment    People in Home spouse    Name(s) of People in Home Jada Fairchild (spouse) [phone]    Current Living Arrangements home    Potentially Unsafe Housing Conditions none    Primary Care Provided by self    Provides Primary Care For no one    Family Caregiver if Needed spouse    Able to Return to Prior Arrangements yes       Resource/Environmental Concerns    Resource/Environmental Concerns none    Transportation Concerns none       Transition Planning    Patient/Family Anticipates Transition to home with family    Patient/Family Anticipated Services at Transition none    Transportation Anticipated family or friend will provide       Discharge Needs Assessment    Readmission Within the Last 30 Days no previous admission in last 30 days    Equipment Currently Used at Home walker, rolling    Concerns to be Addressed denies needs/concerns at this time    Anticipated Changes Related to Illness none    Equipment Needed After Discharge none               Discharge Plan     Row Name 05/19/23 0805       Plan    Plan Home with spouse    Patient/Family in Agreement with Plan yes    Plan Comments Spoke with patient at bedside. Lives with Jada Fairchild (spouse) [phone] in [place]. Is independent with ADL's. No problems with [employer] or medications. Uses a rolling walker at home. Has advanced directives. PCP is KALEB Barrios. Plan is home with spouse. Spouse will transport. CM will continue to follow.    Final Discharge Disposition Code 01 - home or self-care              Continued Care and Services - Admitted Since 5/18/2023    Coordination has not been started for this encounter.       Expected Discharge Date and Time     Expected Discharge Date  Expected Discharge Time    May 20, 2023          Demographic Summary     Row Name 05/19/23 0834       General Information    Admission Type inpatient    Arrived From emergency department    Referral Source admission list    Reason for Consult discharge planning    Preferred Language English       Contact Information    Permission Granted to Share Info With     Contact Information Obtained for                Functional Status     Row Name 05/19/23 0834       Functional Status    Usual Activity Tolerance moderate    Current Activity Tolerance moderate       Functional Status, IADL    Medications independent    Meal Preparation independent    Housekeeping independent    Laundry independent    Shopping independent       Mental Status    General Appearance WDL WDL       Mental Status Summary    Recent Changes in Mental Status/Cognitive Functioning no changes       Employment/    Employment Status retired               Psychosocial    No documentation.                Abuse/Neglect    No documentation.                Legal    No documentation.                Substance Abuse    No documentation.                Patient Forms    No documentation.                   Kapil Rushing RN

## 2023-05-20 ENCOUNTER — READMISSION MANAGEMENT (OUTPATIENT)
Dept: CALL CENTER | Facility: HOSPITAL | Age: 63
End: 2023-05-20
Payer: COMMERCIAL

## 2023-05-20 VITALS
OXYGEN SATURATION: 96 % | TEMPERATURE: 97.4 F | DIASTOLIC BLOOD PRESSURE: 84 MMHG | SYSTOLIC BLOOD PRESSURE: 152 MMHG | HEIGHT: 69 IN | BODY MASS INDEX: 21.75 KG/M2 | HEART RATE: 80 BPM | RESPIRATION RATE: 18 BRPM | WEIGHT: 146.83 LBS

## 2023-05-20 LAB
ANION GAP SERPL CALCULATED.3IONS-SCNC: 12 MMOL/L (ref 5–15)
BACTERIA SPEC RESP CULT: NORMAL
BUN SERPL-MCNC: 9 MG/DL (ref 8–23)
BUN/CREAT SERPL: 13.6 (ref 7–25)
CALCIUM SPEC-SCNC: 9.2 MG/DL (ref 8.6–10.5)
CHLORIDE SERPL-SCNC: 94 MMOL/L (ref 98–107)
CO2 SERPL-SCNC: 25 MMOL/L (ref 22–29)
CREAT SERPL-MCNC: 0.66 MG/DL (ref 0.76–1.27)
EGFRCR SERPLBLD CKD-EPI 2021: 105.4 ML/MIN/1.73
GLUCOSE SERPL-MCNC: 173 MG/DL (ref 65–99)
GRAM STN SPEC: NORMAL
MAGNESIUM SERPL-MCNC: 1.9 MG/DL (ref 1.6–2.4)
PHOSPHATE SERPL-MCNC: 2.8 MG/DL (ref 2.5–4.5)
POTASSIUM SERPL-SCNC: 3.9 MMOL/L (ref 3.5–5.2)
QT INTERVAL: 364 MS
QTC INTERVAL: 433 MS
SODIUM SERPL-SCNC: 130 MMOL/L (ref 136–145)
SODIUM SERPL-SCNC: 131 MMOL/L (ref 136–145)
SODIUM SERPL-SCNC: 131 MMOL/L (ref 136–145)

## 2023-05-20 PROCEDURE — 25010000002 THIAMINE PER 100 MG: Performed by: INTERNAL MEDICINE

## 2023-05-20 PROCEDURE — 25010000002 METHYLPREDNISOLONE PER 40 MG: Performed by: INTERNAL MEDICINE

## 2023-05-20 PROCEDURE — 80048 BASIC METABOLIC PNL TOTAL CA: CPT | Performed by: INTERNAL MEDICINE

## 2023-05-20 PROCEDURE — 84295 ASSAY OF SERUM SODIUM: CPT | Performed by: INTERNAL MEDICINE

## 2023-05-20 PROCEDURE — 83735 ASSAY OF MAGNESIUM: CPT | Performed by: INTERNAL MEDICINE

## 2023-05-20 PROCEDURE — 84100 ASSAY OF PHOSPHORUS: CPT | Performed by: INTERNAL MEDICINE

## 2023-05-20 PROCEDURE — 94799 UNLISTED PULMONARY SVC/PX: CPT

## 2023-05-20 PROCEDURE — 25010000002 MORPHINE PER 10 MG: Performed by: INTERNAL MEDICINE

## 2023-05-20 RX ADMIN — MORPHINE SULFATE 2 MG: 2 INJECTION, SOLUTION INTRAMUSCULAR; INTRAVENOUS at 05:20

## 2023-05-20 RX ADMIN — ALBUTEROL SULFATE 2.5 MG: 2.5 SOLUTION RESPIRATORY (INHALATION) at 05:28

## 2023-05-20 RX ADMIN — SODIUM CHLORIDE 75 ML/HR: 9 INJECTION, SOLUTION INTRAVENOUS at 01:55

## 2023-05-20 RX ADMIN — PRAVASTATIN SODIUM 40 MG: 40 TABLET ORAL at 09:31

## 2023-05-20 RX ADMIN — METHYLPREDNISOLONE SODIUM SUCCINATE 40 MG: 40 INJECTION INTRAMUSCULAR; INTRAVENOUS at 09:31

## 2023-05-20 RX ADMIN — ACETAMINOPHEN 650 MG: 325 TABLET ORAL at 01:54

## 2023-05-20 RX ADMIN — SENNOSIDES AND DOCUSATE SODIUM 2 TABLET: 50; 8.6 TABLET ORAL at 09:31

## 2023-05-20 RX ADMIN — DOXYCYCLINE 100 MG: 100 CAPSULE ORAL at 09:31

## 2023-05-20 RX ADMIN — THIAMINE HYDROCHLORIDE 200 MG: 100 INJECTION, SOLUTION INTRAMUSCULAR; INTRAVENOUS at 04:47

## 2023-05-20 RX ADMIN — FOLIC ACID 1 MG: 1 TABLET ORAL at 09:31

## 2023-05-20 RX ADMIN — AMLODIPINE BESYLATE 5 MG: 5 TABLET ORAL at 09:31

## 2023-05-20 RX ADMIN — CYCLOBENZAPRINE 10 MG: 10 TABLET, FILM COATED ORAL at 04:47

## 2023-05-20 RX ADMIN — PANTOPRAZOLE SODIUM 40 MG: 40 TABLET, DELAYED RELEASE ORAL at 09:31

## 2023-05-20 RX ADMIN — BUDESONIDE AND FORMOTEROL FUMARATE DIHYDRATE 1 PUFF: 160; 4.5 AEROSOL RESPIRATORY (INHALATION) at 09:27

## 2023-05-20 RX ADMIN — CITALOPRAM HYDROBROMIDE 20 MG: 20 TABLET ORAL at 09:31

## 2023-05-20 RX ADMIN — IPRATROPIUM BROMIDE AND ALBUTEROL SULFATE 3 ML: 2.5; .5 SOLUTION RESPIRATORY (INHALATION) at 09:27

## 2023-05-20 NOTE — CASE MANAGEMENT/SOCIAL WORK
Case Management Discharge Note      Final Note: Mr Fairchild has discharge order in place.  PT has recommended OPPT after discharge.  Patient has appointment in [facility] already placed for OPPT.  No other needs identified.  Spouse will transport home,                   Final Discharge Disposition Code: 01 - home or self-care

## 2023-05-22 ENCOUNTER — TRANSITIONAL CARE MANAGEMENT TELEPHONE ENCOUNTER (OUTPATIENT)
Dept: CALL CENTER | Facility: HOSPITAL | Age: 63
End: 2023-05-22
Payer: COMMERCIAL

## 2023-05-22 NOTE — OUTREACH NOTE
Call Center TCM Note    Flowsheet Row Responses   [facility] patient discharged from? [place]   Does the patient have one of the following disease processes/diagnoses(primary or secondary)? Other   TCM attempt successful? No   Unsuccessful attempts Attempt 1  [Outdated PCP verbal release. ]          Benita Hernández RN    5/22/2023, 11:49 EDT

## 2023-05-22 NOTE — OUTREACH NOTE
Call Center TCM Note    Flowsheet Row Responses   [facility] patient discharged from? [place]   Does the patient have one of the following disease processes/diagnoses(primary or secondary)? Other   TCM attempt successful? No   Unsuccessful attempts Attempt 2          Benita Hernández RN    5/22/2023, 12:55 EDT

## 2023-05-23 ENCOUNTER — TRANSITIONAL CARE MANAGEMENT TELEPHONE ENCOUNTER (OUTPATIENT)
Dept: CALL CENTER | Facility: HOSPITAL | Age: 63
End: 2023-05-23
Payer: COMMERCIAL

## 2023-05-23 NOTE — OUTREACH NOTE
Call Center TCM Note    Flowsheet Row Responses   [facility] patient discharged from? [place]   Does the patient have one of the following disease processes/diagnoses(primary or secondary)? Other   TCM attempt successful? No   Unsuccessful attempts Attempt 3          Rosalio Busby RN    5/23/2023, 10:53 EDT

## 2023-05-24 ENCOUNTER — TELEPHONE (OUTPATIENT)
Dept: INTERNAL MEDICINE | Facility: CLINIC | Age: 63
End: 2023-05-24
Payer: COMMERCIAL

## 2023-05-24 NOTE — TELEPHONE ENCOUNTER
Caller: Luis Antonio Fairchild    Relationship: Self    Best call back number: [phone]    What is the best time to reach you: ANYTIME    Who are you requesting to speak with (clinical staff, provider,  specific staff member): KEELY MAURICIO    Do you know the name of the person who called: LUIS ANTONIO    What was the call regarding: WOULD LIKE TO TALK ABOUT PHYSICAL THERAPY.    Do you require a callback: YES

## 2023-05-24 NOTE — TELEPHONE ENCOUNTER
I spoke with patient. He was wanting to know what the PT was for. I advised him it was for his back pain. He verbalized understanding.

## 2023-05-28 NOTE — DISCHARGE SUMMARY
[facility] Medicine Services  DISCHARGE SUMMARY    Patient Name: Luis Antonio Fairchild  : 1960  MRN: [id number]    Date of Admission: 2023  5:12 PM  Date of Discharge:  23  Primary Care Physician: Sulma Woodward APRN    Consults     No orders found from 2023 to 2023.          Hospital Course     Presenting Problem:     Active Hospital Problems    Diagnosis  POA   • **Hyponatremia [E87.1]  Unknown   • Pneumonia [J18.9]  Unknown   • Right hip pain [M25.551]  Unknown   • Alcohol abuse [F10.10]  Unknown   • Benign prostatic hyperplasia with urinary frequency [N40.1, R35.0]  Yes   • Tobacco use disorder [F17.200]  Yes   • Anxiety [F41.9]  Yes   • Anderson's esophagus [K22.70]  Yes   • Hyperlipidemia [E78.5]  Yes   • Hypertension [I10]  Yes   • Osteoarthritis [M19.90]  Yes   • Gastroesophageal reflux disease without esophagitis [K21.9]  Yes      Resolved Hospital Problems   No resolved problems to display.          Hospital Course:  Luis Antonio Fairchild is a 62 y.o. male  with past medical history significant for hypertension, hyperlipidemia, osteoarthritis, Anderson's in first esophagus, anxiety disorder, tobacco abuse, BPH, alcohol abuse, chronic right hip pain.  Patient was admitted for hyponatremia.     *Hyponatremia, seems to be secondary to polydipsia.  Patient has history of throat cancer s/p surgery about 5 years ago.  Patient tells me that after the surgery his saliva is markedly reduced and if he does not drink water his throat gets dry.  Sodium content concentration in the urine is low and also osmolality is low.  -Patient was put on p.o. fluid restriction and sodium gradually improved and now it is very close to normal     *Pneumonia and some degree of COPD exacerbation.  Patient was given doxycycline as outpatient.  Currently doxycycline is continued.   -Patient also was continued on steroids oral  -Symptoms improved and patient remained afebrile    *Osteoarthritis  and right hip pain.  Evidently since about a few months back the right hip pain has become worse.  Patient's PCP evidently is in the process of referring the patient to orthopedic surgery.     *Hypertension and hyperlipidemia, patient is on home medication      Discharge Follow Up Recommendations for outpatient labs/diagnostics:      Day of Discharge     HPI:   Resting in bed in no acute distress and has no specific complaint.  No fever or chills.  No chest pain or palpitation or shortness of breath.  No nausea vomiting or diarrhea or abdominal pain.    Review of Systems  As above    Vital Signs:          Physical Exam:  Constitutional: No acute distress  HENT: NCAT, mucous membranes moist  Respiratory: Clear to auscultation bilaterally, respiratory effort normal   Cardiovascular: RRR, no murmurs, rubs, or gallops  Gastrointestinal: Positive bowel sounds, soft, nontender, nondistended  Musculoskeletal: No bilateral ankle edema  Psychiatric: Appropriate affect, cooperative  Neurologic: Oriented x 3, strength symmetric in all extremities, Cranial Nerves grossly intact to confrontation, speech clear  Skin: No rashes    Pertinent  and/or Most Recent Results     LAB RESULTS:                              Brief Urine Lab Results  (Last result in the past 365 days)      Color   Clarity   Blood   Leuk Est   Nitrite   Protein   CREAT   Urine HCG        05/18/23 2217 Yellow   Clear   Negative   Negative   Negative   Negative               Microbiology Results (last 10 days)     Procedure Component Value - Date/Time    Respiratory Culture - Sputum, Cough [[id number]] Collected: 05/18/23 2239    Lab Status: Final result Specimen: Sputum from Cough Updated: 05/20/23 0932     Respiratory Culture Light growth (2+) Normal respiratory angelica. No S. aureus or Pseudomonas aeruginosa detected. Final report.     Gram Stain Many (4+) WBCs per low power field      Few (2+) Mixed bacterial morphotypes seen on Gram Stain      Rare (1+)  Epithelial cells per low power field      Rare (1+) Budding yeast with pseudohyphae    COVID PRE-OP / PRE-PROCEDURE SCREENING ORDER (NO ISOLATION) - Swab, Nasopharynx [[id number]]  (Abnormal) Collected: 05/18/23 2212    Lab Status: Final result Specimen: Swab from Nasopharynx Updated: 05/18/23 2312    Narrative:      The following orders were created for panel order COVID PRE-OP / PRE-PROCEDURE SCREENING ORDER (NO ISOLATION) - Swab, Nasopharynx.  Procedure                               Abnormality         Status                     ---------                               -----------         ------                     Respiratory Panel PCR w/...[[id number]]  Abnormal            Final result                 Please view results for these tests on the individual orders.    Respiratory Panel PCR w/COVID-19(SARS-CoV-2) JODI/MOE/JAYLIN/PAD/COR/MAD/XIANG In-House, NP Swab in UTM/VTM, 3-4 HR TAT - Swab, Nasopharynx [[id number]]  (Abnormal) Collected: 05/18/23 2212    Lab Status: Final result Specimen: Swab from Nasopharynx Updated: 05/18/23 2312     ADENOVIRUS, PCR Not Detected     Coronavirus 229E Not Detected     Coronavirus HKU1 Not Detected     Coronavirus NL63 Not Detected     Coronavirus OC43 Not Detected     COVID19 Not Detected     Human Metapneumovirus Not Detected     Human Rhinovirus/Enterovirus Detected     Influenza A PCR Not Detected     Influenza B PCR Not Detected     Parainfluenza Virus 1 Not Detected     Parainfluenza Virus 2 Not Detected     Parainfluenza Virus 3 Not Detected     Parainfluenza Virus 4 Not Detected     RSV, PCR Not Detected     Bordetella pertussis pcr Not Detected     Bordetella parapertussis PCR Not Detected     Chlamydophila pneumoniae PCR Not Detected     Mycoplasma pneumo by PCR Not Detected    Narrative:      In the setting of a positive respiratory panel with a viral infection PLUS a negative procalcitonin without other underlying concern for bacterial infection, consider observing off  antibiotics or discontinuation of antibiotics and continue supportive care. If the respiratory panel is positive for atypical bacterial infection (Bordetella pertussis, Chlamydophila pneumoniae, or Mycoplasma pneumoniae), consider antibiotic de-escalation to target atypical bacterial infection.    Legionella Antigen, Urine - Urine, Urine, Clean Catch [[id number]]  (Normal) Collected: 05/18/23 2202    Lab Status: Final result Specimen: Urine, Clean Catch Updated: 05/19/23 0950     LEGIONELLA ANTIGEN, URINE Negative    S. Pneumo Ag Urine or CSF - Urine, Urine, Clean Catch [[id number]]  (Normal) Collected: 05/18/23 2202    Lab Status: Final result Specimen: Urine, Clean Catch Updated: 05/19/23 0951     Strep Pneumo Ag Negative          CT Angiogram Chest    Result Date: 5/18/2023  CT ANGIOGRAM CHEST Date of Exam: 5/18/2023 10:50 PM EDT Indication: rule out PE. Comparison: CT chest 3/16/2023 Technique: CTA of the chest was performed before and after the uneventful intravenous administration of 82 mL Isovue-370. Reconstructed coronal and sagittal images were also obtained. In addition, a 3-D volume rendered image was created for interpretation. Automated exposure control and iterative reconstruction methods were used. Findings: No evidence of pulmonary embolism. Unremarkable appearance of the cardiac chambers. There is mild atherosclerosis of the aortic arch. There are mild scattered coronary artery calcifications. No pericardial effusion. No bulky or suspicious thoracic lymph nodes. Chest wall soft tissues are normal. The trachea and mainstem bronchi are grossly patent with mild scattered secretions. There is mild perihilar bronchiectatic change. There is mild perihilar and lower lobe bronchial wall thickening and scattered mucous plugging. There is mild centrilobular emphysema. No focal consolidation or evidence of active infectious or inflammatory process. There is some streaky bibasilar atelectasis. No lung mass. There  is some linear scarring in the left upper lobe. Previously described 5 mm nodule in the superior left lower lobe is no longer visualized, presumably resolution of small infectious or inflammatory focus. Stable appearance of a pleural-based 5 mm nodule in the right middle lobe (series 5 image 74). No lung mass. No pleural effusion or pneumothorax. No acute or suspicious bony findings. Unremarkable appearance of the partially imaged upper abdomen.     Impression: No evidence of pulmonary embolism. There is evidence of emphysema. Correlate with patient history and risk factors and please assess if the patient meets criteria for routine low dose CT lung cancer screening. Mild bronchial wall thickening and scattered mucous plugging which may reflect smoking-associated bronchitis. Electronically Signed: John Hicks  5/18/2023 11:26 PM EDT  Workstation ID: [id number]              Results for orders placed during the hospital encounter of 03/19/23    Adult Transthoracic Echo Complete W/ Cont if Necessary Per Protocol    Interpretation Summary  •  Left ventricular systolic function is normal. Calculated left ventricular EF = 56.2% Left ventricular ejection fraction appears to be 56 - 60%.  •  Left ventricular diastolic function was normal.  •  Estimated right ventricular systolic pressure from tricuspid regurgitation is normal (<35 mmHg). Calculated right ventricular systolic pressure from tricuspid regurgitation is 23 mmHg.          Discharge Details        Discharge Medications      New Medications      Instructions Start Date   Fluticasone-Salmeterol 100-50 MCG/ACT DISKUS  Commonly known as: ADVAIR/WIXELA   1 each, Inhalation, 2 Times Daily         Continue These Medications      Instructions Start Date   albuterol sulfate  (90 Base) MCG/ACT inhaler  Commonly known as: ProAir HFA   Inhale 1-2 puffs every 4-6 hours PRN      amLODIPine 5 MG tablet  Commonly known as: NORVASC   5 mg, Oral, Daily       Budeson-Glycopyrrol-Formoterol 160-9-4.8 MCG/ACT aerosol inhaler  Commonly known as: BREZTRI   2 puffs, Inhalation, 2 Times Daily      CALCIUM 1200 PO   Oral      celecoxib 200 MG capsule  Commonly known as: CeleBREX   200 mg, Oral, 2 Times Daily      citalopram 40 MG tablet  Commonly known as: CeleXA   40 mg, Oral, Daily      ezetimibe 10 MG tablet  Commonly known as: ZETIA   1 tablet, Oral, Daily      ketorolac 10 MG tablet  Commonly known as: TORADOL   10 mg, Oral, Every 6 Hours PRN      multivitamin with minerals tablet tablet   1 tablet, Oral, Daily      ondansetron 4 MG tablet  Commonly known as: Zofran   4 mg, Oral, Every 8 Hours PRN      pantoprazole 40 MG EC tablet  Commonly known as: PROTONIX   40 mg, Oral, 2 Times Daily      pravastatin 40 MG tablet  Commonly known as: PRAVACHOL   40 mg, Oral, Daily      predniSONE 10 MG tablet  Commonly known as: DELTASONE   10 mg, Oral, Daily      VITAMIN D (CHOLECALCIFEROL) PO   Oral         Stop These Medications    cyclobenzaprine 10 MG tablet  Commonly known as: FLEXERIL     levoFLOXacin 750 MG tablet  Commonly known as: Levaquin     varenicline 1 MG tablet  Commonly known as: CHANTIX            No Known Allergies      Discharge Disposition:  Home or Self Care    Diet:  Hospital: Patient was instructed to cut back or his overall fluid.  Recommended using Gatorade instead of other drinks.      Activity:  Activity Instructions     Activity as Tolerated                   CODE STATUS:    Code Status and Medical Interventions:   Ordered at: 05/18/23 2012     Level Of Support Discussed With:    Patient     Code Status (Patient has no pulse and is not breathing):    CPR (Attempt to Resuscitate)     Medical Interventions (Patient has pulse or is breathing):    Full Support       Future Appointments   Date Time Provider Department Center   5/31/2023  8:30 AM Diego Boudreaux PT [facility]   6/1/2023 10:30 AM Annie Torres PA-C [facility]       Additional  Instructions for the Follow-ups that You Need to Schedule     Discharge Follow-up with PCP   As directed       Currently Documented PCP:    Sulma Woodward APRN    PCP Phone Number:    [phone]     Follow Up Details: within one week.                     Ashish Garcia MD  05/28/23      Time Spent on Discharge:  I spent  38  minutes on this discharge activity which included: face-to-face encounter with the patient, reviewing the data in the system, coordination of the care with the nursing staff as well as consultants, documentation, and entering orders.

## 2023-05-30 ENCOUNTER — TELEPHONE (OUTPATIENT)
Dept: INTERNAL MEDICINE | Facility: CLINIC | Age: 63
End: 2023-05-30

## 2023-05-30 NOTE — TELEPHONE ENCOUNTER
Caller: Luis Antonio Fairchild    Relationship: Self    Best call back number: [phone]    What medication are you requesting: PAIN MEDICATION    What are your current symptoms: HIP PAIN    How long have you been experiencing symptoms: 2 WEEKS    Have you had these symptoms before:    [x] Yes  [] No    Have you been treated for these symptoms before:   [x] Yes  [] No      If a prescription is needed, what is your preferred pharmacy and phone number:      [facility] [address] [place] [address] [phone] [place] [phone]       Additional notes:

## 2023-05-30 NOTE — TELEPHONE ENCOUNTER
Caller: Luis Antonio Fairchild    Relationship: Self    Best call back number:[phone]    What is the best time to reach you: ANYTIME    Who are you requesting to speak with (clinical staff, provider,  specific staff member): KEELY MAURICIO    Do you know the name of the person who called: PATIENT/ LUIS ANTONIO FAIRCHILD    What was the call regarding: PAIN IN HIP/ CANNOT SLEEP    Is it okay if the provider responds through MyChart: NA

## 2023-05-30 NOTE — TELEPHONE ENCOUNTER
Caller: Luis Antonio Fairchild    Relationship to patient: Self    Best call back number: [phone]    THE HUB READ THE FOLLOWING MESSAGE TO PATIENT          Hub please schedule Pt for office visit. Thanks.         SCHEDULED FOR THIS AFTERNOON  PM

## 2023-05-30 NOTE — TELEPHONE ENCOUNTER
Tried reaching Pt. Left detailed voice mail message advising I called to triage hip pain. Advised Pt to schedule office visit for evaluation. Office number given.    Hub please schedule Pt for office visit. Thanks.

## 2023-05-31 ENCOUNTER — TREATMENT (OUTPATIENT)
Dept: PHYSICAL THERAPY | Facility: CLINIC | Age: 63
End: 2023-05-31

## 2023-05-31 DIAGNOSIS — G89.29 CHRONIC RIGHT-SIDED LOW BACK PAIN WITH RIGHT-SIDED SCIATICA: Primary | ICD-10-CM

## 2023-05-31 DIAGNOSIS — M54.41 CHRONIC RIGHT-SIDED LOW BACK PAIN WITH RIGHT-SIDED SCIATICA: Primary | ICD-10-CM

## 2023-05-31 NOTE — PROGRESS NOTES
Physical Therapy Initial Evaluation and Plan of Care  [facility]ERAP[facility] [place]  [address]  [facility] [address]        Patient: Luis Antonio Fairchild   : 1960  Diagnosis/ICD-10 Code:  Chronic right-sided low back pain with right-sided sciatica [M54.41, G89.29]  Referring practitioner: KALEB Barrios  Date of Initial Visit: 2023  Today's Date: 2023  Patient seen for 1 sessions         Visit Diagnoses:    ICD-10-CM ICD-9-CM   1. Chronic right-sided low back pain with right-sided sciatica  M54.41 724.2    G89.29 724.3     338.29         Subjective Questionnaire: Oswestry: 94%    Subjective Evaluation    History of Present Illness  Mechanism of injury: 2-3 weeks ago, developed pneumonia, coughing. About 2 weeks ago started experiencing severe right posterior hip pain, within a couple days, pain down right LE. No previous history of lumbar issues.    CURRENT COMPLAINTS  Constant right lumbar pain with varying intensity, right posterior hip pain constant (most severe area of pain), intermittent right lateral LE pain to ankle (present most of the time).  Disrupting sleep. No bowel/bladder dysfunction.    WORSE:  Walking/standing is the worst position, but any one position too long, coughing/sneezing  BETTER: Sitting to left, laying with legs flexed up.      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  [employer]    Precautions and Work Restrictions: Off work due to severity of painQuality of life: good    Pain  Current pain rating: 10  At best pain ratin  At worst pain rating: 10  Progression: no change    Hand dominance: right    Diagnostic Tests  X-ray: abnormal    Treatments  Previous treatment: medication  Patient Goals  Patient goals for therapy: decreased pain and return to work             Objective          Static Posture   General Observations  Asymmetrical weight bearing.     Lumbar Spine   Flattened.     Postural Observations  Seated posture:  poor    Additional Postural Observation Details  All weight to left LE.  Decreased Lumbar lordosis.  No Lateral Shift.    Tenderness     Additional Tenderness Details  Right L5/S1 tender with radiating symptoms.    Neurological Testing     Reflexes   Left   Patellar (L4): normal (2+)  Achilles (S1): normal (2+)    Right   Patellar (L4): normal (2+)  Achilles (S1): normal (2+)    Active Range of Motion     Lumbar   Flexion: 85 (Increased lumbar and LE symptoms) degrees   Extension: 28 (More painful than flexion, and more leg pain than flexion) degrees with pain  Left lateral flexion: 35 degrees   Right lateral flexion: 20 (Radiating pain) degrees with pain    Strength/Myotome Testing     Left Hip   Planes of Motion   Flexion: 5    Right Hip   Planes of Motion   Flexion: 5    Left Knee   Extension: 5    Right Knee   Extension: 5    Left Ankle/Foot   Dorsiflexion: 5  Eversion: 5  Great toe extension: 5    Right Ankle/Foot   Dorsiflexion: 4+  Eversion: 5  Great toe extension: 4+    Tests     Lumbar     Left   Negative crossed SLR, passive SLR and quadrant.     Right   Positive passive SLR (60 degrees sitting and supine ) and quadrant.     Right Hip   Negative FELICITY, FADIR and scour.     Additional Tests Details  Repeated standing flexion: Peripheralization of symptoms.  Repeated standing flexion: Peripheralization of symptoms.  Laying repeated flexion: No effect  Laying repeated extension: Peripheralization of symptoms.  Prone Lumbar distraction centralizes for a few minutes, then symptoms returned to right LE.  Supine 90/90 position symptoms moved out of calf to upper thigh and posterior hip, pain level decreased to 7/10.    Ambulation   Weight-Bearing Status   Weight-Bearing Status (Left): full weight bearing   Weight-Bearing Status (Right): full weight-bearing      Observational Gait   Gait: antalgic   Decreased right stance time.     Quality of Movement During Gait   Trunk  Forward lean.           Assessment & Plan      Assessment  Impairments: abnormal or restricted ROM, activity intolerance and weight-bearing intolerance  Functional Limitations: carrying objects, lifting, walking, pulling, sitting, standing and stooping  Assessment details: 62 year old male with a non-work related lumbar pathology with severe back and right LE pain. He has signs and symptoms of lower right lumbar derangement with right lateral LE pain to ankle. Suspect a disc or some kind of nerve root irritation, most like L5 nerve root.  Pt did not have direction of preference, both extension and flexion worsened symptoms.  Lumbar neural, unload did provide some temporary decrease and centralization of symptoms.  Neurological exam is normal but neural tension present in right sciatic nerve.  Prognosis: fair    Goals  Plan Goals: STG to be met in 4 weeks  1.  Pt has improved lumbar function so that her Oswestry score improves to 50%.  2.  Pt can stand up straight and stay on his feet on up to 20 minutes at a time  3.  Pt is able to ambulate up right 50% of the time.  4.  Worst lumbar pain decreases to 5/10.  5.  Pt has centralization of symptoms to the thigh area.  LTG to be met in 8 weeks  1.  Pt is independent with HEP.  2.  Pt has improved lumbar function so that Her Oswestry score improves to 25%  3.  Pt is able to gainful employment.  4.  Pt has centralization of symptoms to lumbar area.  5.  Pt worst lumbar pain is 3/10    Plan  Therapy options: will be seen for skilled therapy services  Planned modality interventions: high voltage pulsed current (spasm management), high voltage pulsed current (pain management), dry needling, traction and ultrasound  Planned therapy interventions: abdominal trunk stabilization, balance/weight-bearing training, body mechanics training, flexibility, functional ROM exercises, joint mobilization, home exercise program, therapeutic activities, stretching, strengthening, spinal/joint mobilization, soft tissue mobilization,  postural training, neuromuscular re-education and manual therapy  Frequency: 2x week  Duration in weeks: 8        Timed:         Manual Therapy:    10     mins  38435;     Therapeutic Exercise:         mins  38304;     Neuromuscular Seymour:        mins  18207;    Therapeutic Activity:     15     mins  14376;     Gait Training:           mins  54005;     Ultrasound:          mins  89765;    Ionto                                   mins   61492  Self Care                            mins   50602  Canalith Repos         mins 79711      Un-Timed:  Electrical Stimulation:    20     mins  41254 ( );  Dry Needling          mins self-pay  Traction          mins 11201  Low Eval          Mins  62661  Mod Eval     25     Mins  69080  High Eval                            Mins  12373        Timed Treatment:   25   mins   Total Treatment:     65   mins          PT: Diego Boudreaux PT     License Number: [id number]  Electronically signed by Diego Boudreaux PT, 05/31/23, 8:46 AM EDT    Initial Certification  Certification Period: 8/29/2023  I certify that the therapy services are furnished while this patient is under my care.  The services outlined above are required by this patient, and will be reviewed every 90 days.     PHYSICIAN: ________________________________________________________  Sulma Woodward APRN        DATE: ____________________________________________________________    Please sign and return via fax to [phone].. Thank you, [facility].

## 2023-05-31 NOTE — LETTER
Progress Note  5/31/2023  Annie Torres PA-c    Re: Luis Antonio Fairchild  ________________________________________________________________  EVALUATION WITH NEUROSURGERY ON Thursday June 1    Yasmeen,  I evaluated Mr. Fairchild today, for lumbar derangement with severe lumbar & right posterior hip pain and right lateral LE pain to foot.      Hip joint exam appeared to be normal.  Lumbar repeated flexion and extension motions produced peripheralization of symptoms.  Only centralization and relief we could get today was to lay him in supine position with legs up in a 90/90 position and distract the spine.  Reflexes and strength is good, SLR is positive. We are having to lay on ice in this position 30 minutes every 2-3 hours until he gets to appointment with you Thursday.    I hope this information is helpful.  Please advise after your exam.    Thank you,    Diego Boudreaux, PT

## 2023-06-01 NOTE — TELEPHONE ENCOUNTER
Called pt to get a hospital follow up scheduled. Patient was transferred to the front for scheduling.

## 2023-06-02 ENCOUNTER — TELEPHONE (OUTPATIENT)
Dept: NEUROSURGERY | Facility: CLINIC | Age: 63
End: 2023-06-02

## 2023-06-02 ENCOUNTER — OFFICE VISIT (OUTPATIENT)
Dept: NEUROSURGERY | Facility: CLINIC | Age: 63
End: 2023-06-02

## 2023-06-02 VITALS
WEIGHT: 146.2 LBS | DIASTOLIC BLOOD PRESSURE: 74 MMHG | BODY MASS INDEX: 21.66 KG/M2 | HEIGHT: 69 IN | SYSTOLIC BLOOD PRESSURE: 138 MMHG

## 2023-06-02 DIAGNOSIS — F17.200 TOBACCO USE DISORDER: ICD-10-CM

## 2023-06-02 DIAGNOSIS — M54.41 CHRONIC RIGHT-SIDED LOW BACK PAIN WITH RIGHT-SIDED SCIATICA: ICD-10-CM

## 2023-06-02 DIAGNOSIS — M51.36 DDD (DEGENERATIVE DISC DISEASE), LUMBAR: Primary | ICD-10-CM

## 2023-06-02 DIAGNOSIS — M25.551 RIGHT HIP PAIN: ICD-10-CM

## 2023-06-02 DIAGNOSIS — M51.9 LUMBOSACRAL DISC DISEASE: ICD-10-CM

## 2023-06-02 DIAGNOSIS — G89.29 CHRONIC RIGHT-SIDED LOW BACK PAIN WITH RIGHT-SIDED SCIATICA: ICD-10-CM

## 2023-06-02 PROBLEM — M51.369 DDD (DEGENERATIVE DISC DISEASE), LUMBAR: Status: ACTIVE | Noted: 2023-06-02

## 2023-06-02 PROCEDURE — 99204 OFFICE O/P NEW MOD 45 MIN: CPT | Performed by: PHYSICIAN ASSISTANT

## 2023-06-02 RX ORDER — TIZANIDINE HYDROCHLORIDE 2 MG/1
2 CAPSULE, GELATIN COATED ORAL
Qty: 30 CAPSULE | Refills: 1 | Status: SHIPPED | OUTPATIENT
Start: 2023-06-02 | End: 2023-06-02

## 2023-06-02 RX ORDER — TIZANIDINE 2 MG/1
2 TABLET ORAL NIGHTLY PRN
Qty: 30 TABLET | Refills: 1 | Status: SHIPPED | OUTPATIENT
Start: 2023-06-02

## 2023-06-02 NOTE — TELEPHONE ENCOUNTER
[facility] CALLED [facility]  TiZANidine CAPSULES NOT COVERED BUT TABLETS ARE. CAN YOU PLEASE RESEND MED REQUEST AS TABLET FORM    THANK YOU,     done

## 2023-06-02 NOTE — PROGRESS NOTES
Patient: Luis Antonio Fairchild  : 1960  Chart #: [id number]    Date of Service: 2023    Chief Complaint   Patient presents with   • Back Pain     ED FU       HPI   61 yo with comorbidities of COPD with recent double pneumonia, ongoing smoking, history of throat cancer, alcohol abuse, osteoarthritis, hypertension, hyperlipidemia, chronic prednisone use, chronic hyponatremia, hiatal hernia who had acute onset of right posterior hip right buttocks pain radiating down the leg to the calf and ankle.  He and his wife are on vacation 2 weeks ago in a cabin in [place] and with the weather he developed a severe cough and when he came back to [place] he was diagnosed with double pneumonia.  In the emergency room he was found to be significantly hyponatremic and was admitted to the hospital for treatment.  During this time he continued to have hip and right leg pain but no work-up was ensued.  He has been seen by physical therapy and told that his right hip function was good, he is sent for neurosurgical evaluation.  He gets increased pain with weightbearing on the right leg.  He is walking with a cane.  He denies bladder problems, he denies numbness tingling in the leg or foot.    Chronic Illnesses:  Past Medical History:   Diagnosis Date   • Anxiety    • Arthritis    • Cancer 2018    Squamous carcinoma oropharynx   • COPD (chronic obstructive pulmonary disease)    • GERD (gastroesophageal reflux disease)    • History of IBS    • Hyperlipidemia    • Hypertension    • Irritable bowel syndrome    • Low back pain    • Lumbosacral disc disease    • Pneumonia          Past Surgical History:   Procedure Laterality Date   • COLONOSCOPY     • SHOULDER SURGERY      Onset Date    • THROAT SURGERY     • WRIST SURGERY      Onset Date:        No Known Allergies      Current Outpatient Medications:   •  albuterol sulfate HFA (ProAir HFA) 108 (90 Base) MCG/ACT inhaler, Inhale 1-2 puffs every 4-6 hours  PRN, Disp: 1 g, Rfl: 5  •  amLODIPine (NORVASC) 5 MG tablet, Take 1 tablet by mouth Daily., Disp: 30 tablet, Rfl: 5  •  Budeson-Glycopyrrol-Formoterol (BREZTRI) 160-9-4.8 MCG/ACT aerosol inhaler, Inhale 2 puffs 2 (Two) Times a Day., Disp: 10.7 g, Rfl: 11  •  Calcium Carbonate-Vit D-Min (CALCIUM 1200 PO), Take  by mouth., Disp: , Rfl:   •  citalopram (CeleXA) 40 MG tablet, Take 1 tablet by mouth Daily., Disp: 90 tablet, Rfl: 1  •  ezetimibe (ZETIA) 10 MG tablet, Take 1 tablet by mouth Daily., Disp: , Rfl:   •  Fluticasone-Salmeterol (ADVAIR/WIXELA) 100-50 MCG/ACT DISKUS, Inhale 1 each 2 (Two) Times a Day., Disp: , Rfl:   •  multivitamin with minerals tablet tablet, Take 1 tablet by mouth Daily., Disp: , Rfl:   •  ondansetron (Zofran) 4 MG tablet, Take 1 tablet by mouth Every 8 (Eight) Hours As Needed for Nausea or Vomiting., Disp: 20 tablet, Rfl: 0  •  pantoprazole (PROTONIX) 40 MG EC tablet, Take 1 tablet by mouth 2 (Two) Times a Day., Disp: 180 tablet, Rfl: 0  •  pravastatin (PRAVACHOL) 40 MG tablet, Take 1 tablet by mouth Daily., Disp: 90 tablet, Rfl: 1  •  predniSONE (DELTASONE) 10 MG tablet, Take 1 tablet by mouth Daily., Disp: , Rfl:   •  VITAMIN D, CHOLECALCIFEROL, PO, Take  by mouth., Disp: , Rfl:   •  tiZANidine (ZANAFLEX) 2 MG tablet, Take 1 tablet by mouth At Night As Needed for Muscle Spasms., Disp: 30 tablet, Rfl: 1    Social History     Socioeconomic History   • Marital status:    Tobacco Use   • Smoking status: Every Day     Packs/day: 0.50     Years: 46.00     Pack years: 23.00     Types: Cigarettes     Start date: 1974   • Smokeless tobacco: Never   Vaping Use   • Vaping Use: Never used   Substance and Sexual Activity   • Alcohol use: Yes     Alcohol/week: 35.0 standard drinks     Types: 35 Cans of beer per week     Comment: nightly   • Drug use: Never   • Sexual activity: Yes     Partners: Female     Birth control/protection: None     Comment:         Family History   Problem Relation  "Age of Onset   • Anxiety disorder Mother    • Arthritis Mother    • Stroke Father            • Hypertension Father    • Cancer Brother    • Anxiety disorder Brother    • Cancer Brother                BMI is within normal parameters. No other follow-up for BMI required.       Social History    Tobacco Use      Smoking status: Every Day        Packs/day: 0.50        Years: 46.00        Pack years: 23        Types: Cigarettes        Start date:       Smokeless tobacco: Never       Review of Systems   Respiratory: Positive for shortness of breath.    Musculoskeletal: Positive for arthralgias and joint swelling.        Gait & Balance Assessment:  Risk assessment for falls. Fall precautions:  such as;   • Using gait aids a cane, walker at the appropriate height at all times for ambulation or if necessary a wheelchair  • Removing all area rugs and coffee tables to create a safe environment at home  • Ensure clean, dry floors  • Wearing supportive footwear and properly fitting clothing  • Ensure bed/chair is appropriate height and patient's feet can touch the floor  • Using a shower transfer bench  • Using walk-in shower and having shower safety bars installed  • Ensure proper lighting, minimize glare  • Have nightlights operational and in use  • Participation in an exercise program for gait training, balance training and strength  • Avoid carrying laundry up and down steps  • Ensure proper compliance and organization of medications to avoid errors   • Avoid use of over the counter sedatives and alcohol consumption  • Ensure easy access to call bell, glasses, TV control, telephone  • Ensure glasses/hearing aids are in use or close by (on top of night table)     Physical examination:  Blood pressure 138/74, height 175.3 cm (69\"), weight 66.3 kg (146 lb 3.2 oz).  HEENT- normocephalic, atraumatic, sclera clear  Lungs-normal expansion, no wheezing  Heart-regular rate and rhythm  Extremities-positive pulses, " no edema    Neurologic Exam  WDWN WM  A/A/C, speech clear, attention normal, conversant, answers questions appropriately, good historian.  Cranial nerves II through XII are intact.  Motor examination does not reveal weakness in the , upper or lower extremities.   Sensation is intact.  Gait is normal, balance is normal.   No tremors are noted.  Reflexes are intact in the upper extremities and patella's, absent in the Achilles bilaterally..   Askew is negative. Clonus is negative.   Palpation of the right sciatic notch is very tender, he has tenderness of the right trochanter, straight leg raising is positive.    Radiographic Imaging:  I have personally reviewed a CT of the lumbar spine ordered by Dr. Lambert.  There is noted multilevel degenerative disc disease with disc bulging at L3-4 L4-5 and L5-S1.    Medical Decision Making  Diagnoses and all orders for this visit:    1. DDD (degenerative disc disease), lumbar (Primary)  -     MRI Lumbar Spine Without Contrast; Future  -     XR Spine Lumbar AP & Lateral With Flex & Ext; Future  -     Cancel: XR hip w or wo pelvis 4 view right; Future    2. Right hip pain  -     MRI Lumbar Spine Without Contrast; Future  -     XR Spine Lumbar AP & Lateral With Flex & Ext; Future  -     Cancel: XR hip w or wo pelvis 4 view right; Future    3. Chronic right-sided low back pain with right-sided sciatica    4. Lumbosacral disc disease    5. Tobacco use disorder    Other orders  -     Discontinue: TiZANidine (Zanaflex) 2 MG capsule; Take 1 capsule by mouth 3 (Three) to 4 (Four) times daily.  Dispense: 30 capsule; Refill: 1    This is a 62-year-old gentleman with a 2-week onset of right lower extremity pain, worse with walking.  He has had a CT scan of the lumbar spine which shows multilevel degenerative disc disease with disc bulging.  I am proceeding with an MRI of the lumbar spine, x-rays of the lumbar spine for surgical planning.  The patient is in agreement with this  plan.    Any copied data from previous notes included in the (1) HPI, (2) PE, (3) MDM and/or assessment and plan has been reviewed and is accurate as of this day.     Yasmeen Torres, PAC    Patient Care Team:  Sulma Woodward APRN as PCP - General (Nurse Practitioner)  Aleksandar Maldonado MD as Consulting Physician (Pulmonary Disease)

## 2023-06-05 ENCOUNTER — TELEPHONE (OUTPATIENT)
Dept: NEUROSURGERY | Facility: CLINIC | Age: 63
End: 2023-06-05
Payer: COMMERCIAL

## 2023-06-05 DIAGNOSIS — G89.29 CHRONIC RIGHT-SIDED LOW BACK PAIN WITH RIGHT-SIDED SCIATICA: ICD-10-CM

## 2023-06-05 DIAGNOSIS — M54.41 CHRONIC RIGHT-SIDED LOW BACK PAIN WITH RIGHT-SIDED SCIATICA: ICD-10-CM

## 2023-06-05 DIAGNOSIS — K59.03 DRUG-INDUCED CONSTIPATION: Primary | ICD-10-CM

## 2023-06-05 DIAGNOSIS — M51.9 LUMBOSACRAL DISC DISEASE: Primary | ICD-10-CM

## 2023-06-05 DIAGNOSIS — M51.36 DDD (DEGENERATIVE DISC DISEASE), LUMBAR: ICD-10-CM

## 2023-06-05 DIAGNOSIS — M25.551 RIGHT HIP PAIN: ICD-10-CM

## 2023-06-05 RX ORDER — TRAMADOL HYDROCHLORIDE 50 MG/1
50 TABLET ORAL EVERY 6 HOURS PRN
Qty: 40 TABLET | Refills: 0 | Status: SHIPPED | OUTPATIENT
Start: 2023-06-05

## 2023-06-05 RX ORDER — SENNA PLUS 8.6 MG/1
1 TABLET ORAL 2 TIMES DAILY
Qty: 60 TABLET | Refills: 0 | Status: SHIPPED | OUTPATIENT
Start: 2023-06-05

## 2023-06-05 NOTE — TELEPHONE ENCOUNTER
Documentation Only: I have sent in a PA for Tramadol.  I have spoke to the patient and told him I would let him know when I got a response.    I have received an approval for Tramadol and called and let the patient know. He was thankful for the call back.

## 2023-06-06 ENCOUNTER — TELEPHONE (OUTPATIENT)
Dept: PHYSICAL THERAPY | Facility: CLINIC | Age: 63
End: 2023-06-06

## 2023-06-07 DIAGNOSIS — B37.0 CANDIDIASIS, MOUTH: Primary | ICD-10-CM

## 2023-06-08 ENCOUNTER — TELEPHONE (OUTPATIENT)
Dept: ORTHOPEDICS | Facility: OTHER | Age: 63
End: 2023-06-08
Payer: COMMERCIAL

## 2023-06-15 ENCOUNTER — HOSPITAL ENCOUNTER (OUTPATIENT)
Dept: MRI IMAGING | Facility: HOSPITAL | Age: 63
Discharge: HOME | End: 2023-06-15
Payer: COMMERCIAL

## 2023-06-15 DIAGNOSIS — M51.9 LUMBOSACRAL DISC DISEASE: ICD-10-CM

## 2023-06-15 DIAGNOSIS — M25.551 RIGHT HIP PAIN: ICD-10-CM

## 2023-06-15 DIAGNOSIS — M51.36 DDD (DEGENERATIVE DISC DISEASE), LUMBAR: ICD-10-CM

## 2023-06-15 DIAGNOSIS — M51.26 HNP (HERNIATED NUCLEUS PULPOSUS), LUMBAR: Primary | ICD-10-CM

## 2023-06-15 DIAGNOSIS — G89.29 CHRONIC RIGHT-SIDED LOW BACK PAIN WITH RIGHT-SIDED SCIATICA: ICD-10-CM

## 2023-06-15 DIAGNOSIS — M54.41 CHRONIC RIGHT-SIDED LOW BACK PAIN WITH RIGHT-SIDED SCIATICA: ICD-10-CM

## 2023-06-15 PROCEDURE — 72148 MRI LUMBAR SPINE W/O DYE: CPT

## 2023-06-15 RX ORDER — TRAMADOL HYDROCHLORIDE 50 MG/1
50 TABLET ORAL EVERY 6 HOURS PRN
Qty: 40 TABLET | Refills: 0 | Status: SHIPPED | OUTPATIENT
Start: 2023-06-15

## 2023-06-15 RX ORDER — KETOROLAC TROMETHAMINE 10 MG/1
10 TABLET, FILM COATED ORAL EVERY 6 HOURS PRN
Qty: 20 TABLET | Refills: 0 | Status: SHIPPED | OUTPATIENT
Start: 2023-06-15

## 2023-06-16 ENCOUNTER — PATIENT MESSAGE (OUTPATIENT)
Dept: NEUROSURGERY | Facility: CLINIC | Age: 63
End: 2023-06-16
Payer: COMMERCIAL

## 2023-06-16 NOTE — TELEPHONE ENCOUNTER
From: Luis Antonio Fairchild  To: Annie Torres  Sent: 6/16/2023 8:31 AM EDT  Subject: XRays    Am I supposed to get x-rays too? Can I go to [facility]?

## 2023-06-19 ENCOUNTER — TELEPHONE (OUTPATIENT)
Dept: INTERNAL MEDICINE | Facility: CLINIC | Age: 63
End: 2023-06-19

## 2023-06-19 ENCOUNTER — OFFICE VISIT (OUTPATIENT)
Dept: INTERNAL MEDICINE | Facility: CLINIC | Age: 63
End: 2023-06-19
Payer: COMMERCIAL

## 2023-06-19 VITALS
HEIGHT: 69 IN | BODY MASS INDEX: 20.76 KG/M2 | TEMPERATURE: 97.7 F | WEIGHT: 140.2 LBS | RESPIRATION RATE: 18 BRPM | SYSTOLIC BLOOD PRESSURE: 148 MMHG | HEART RATE: 88 BPM | DIASTOLIC BLOOD PRESSURE: 80 MMHG

## 2023-06-19 DIAGNOSIS — E87.1 LOW SODIUM LEVELS: Primary | ICD-10-CM

## 2023-06-19 DIAGNOSIS — B37.0 ORAL CANDIDIASIS: ICD-10-CM
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
## 2023-06-19 DIAGNOSIS — M25.511 ACUTE PAIN OF RIGHT SHOULDER: ICD-10-CM

## 2023-06-19 DIAGNOSIS — J44.9 COPD, SEVERE: ICD-10-CM

## 2023-06-19 DIAGNOSIS — R55 SYNCOPE, UNSPECIFIED SYNCOPE TYPE: ICD-10-CM

## 2023-06-19 LAB
ANION GAP SERPL CALCULATED.3IONS-SCNC: 13 MMOL/L (ref 5–15)
BUN SERPL-MCNC: 6 MG/DL (ref 8–23)
BUN/CREAT SERPL: 10.7 (ref 7–25)
CALCIUM SPEC-SCNC: 9.4 MG/DL (ref 8.6–10.5)
CHLORIDE SERPL-SCNC: 87 MMOL/L (ref 98–107)
CO2 SERPL-SCNC: 26 MMOL/L (ref 22–29)
CREAT SERPL-MCNC: 0.56 MG/DL (ref 0.76–1.27)
EGFRCR SERPLBLD CKD-EPI 2021: 110.8 ML/MIN/1.73
GLUCOSE SERPL-MCNC: 120 MG/DL (ref 65–99)
POTASSIUM SERPL-SCNC: 3.9 MMOL/L (ref 3.5–5.2)
SODIUM SERPL-SCNC: 126 MMOL/L (ref 136–145)

## 2023-06-19 RX ORDER — CLOTRIMAZOLE 10 MG/1
10 LOZENGE ORAL; TOPICAL 4 TIMES DAILY
Qty: 28 TABLET | Refills: 0 | Status: SHIPPED | OUTPATIENT
Start: 2023-06-19

## 2023-06-19 RX ORDER — ALBUTEROL SULFATE 90 UG/1
AEROSOL, METERED RESPIRATORY (INHALATION)
Qty: 1 G | Refills: 5 | Status: SHIPPED | OUTPATIENT
Start: 2023-06-19

## 2023-06-19 NOTE — PROGRESS NOTES
"Patient Name: Luis Antonio Fairchild  : 1960   MRN: [id number]     Chief Complaint:    Chief Complaint   Patient presents with    low sodium     Follow up       History of Present Illness: Luis Antonio Fairchild is a 62 y.o. male presents to clinic today for a follow-up from a recent hospital visit and low sodium levels. His wife accompanies him today.     His wife reports that the patient is following ANGELY Kinney, at neurosurgery for his right leg and hip pain. He underwent an MRI of his lumbar spine on 06/15/2023 but have not gotten the results. He has not completed the x-rays of his lumbar spine.    Syncope with collapse  The patient reports that he was sitting in the kitchen on Saturday morning, 2023, cutting up a melon and when he stood up and reached for a bowl he suddenly blacked out and fell backwards. He notes having no warning. Later that evening he went to the [facility] because his right hand started swelling. While at the hospital his lab work showed his sodium levels were down to 118 mEq/L, so they kept him overnight and gave him 2 intravenous boluses of normal saline. He checked himself out because he wanted to go to [facility] and knew he already had an appointment scheduled for today. Upon discharge from the hospital yesterday morning, 2023, his sodium was 121 mEq/L and states   \" Increasing\".     He reports he has cut back to 3 alcoholic drinks a day. He reports being sleepy. The adult female reports that he is falling asleep \"like nothing,\" however he is taking pain medication for his hip. He has been consuming a lot of sodium in the last couple of days by drinking Gatorade and taking sodium tablets. He has not experienced any dizziness or headache since being home. He denies any nausea or vomiting. He has not had problems with balance or confusion. He admits to not eating due to the sores in his mouth. He was not drinking alcohol the day of his episode, " but he did have 3 cups of coffee. His wife admits that due to his increased pain, he has consumed more alcohol than usual (over the last month).    He has undergone imaging of his chest and neck, and recent CTs of his abdomen and pelvis. Earlier concern was that his throat cancer had metastasized. He had a head CT several years ago, but nothing recent.      Right shoulder pain  The patient has a history of right shoulder pain, but when he fell the other day, it flared his right shoulder pain. He is currently experiencing pain in the center of his shoulder with movement. He denies pain on palpation. He did undergo x-rays of his right wrist, forearm, and elbow at the hospital. He is currently wearing a wrist brace. He was not referred to an orthopedist upon discharge. He has seen an orthopedist in the past.     Oral candidiasis  The patient is utilizing nystatin suspension, which does not seem to be working. He has already used half the bottle.     COPD  The patient reports that he is nervous about utilizing his inhalers due to the oral thrush he is fighting. He is not experiencing any shortness of breath. He denies a productive cough or fever.         Past Medical History:   Diagnosis Date    Anxiety 2016    Arthritis     Cancer 2018    Squamous carcinoma oropharynx    COPD (chronic obstructive pulmonary disease) 2021    GERD (gastroesophageal reflux disease) 2014    History of IBS     Hyperlipidemia     Hypertension     Irritable bowel syndrome     Low back pain     Lumbosacral disc disease     Pneumonia 2021       Subjective     Review of System: Review of Systems   A review of systems was performed, and the pertinent positives are noted in the HPI.    Medications:     Current Outpatient Medications:     albuterol sulfate HFA (ProAir HFA) 108 (90 Base) MCG/ACT inhaler, Inhale 1-2 puffs every 4-6 hours PRN, Disp: 1 g, Rfl: 5    amLODIPine (NORVASC) 5 MG tablet, Take 1 tablet by mouth Daily., Disp: 30 tablet, Rfl:  "5    Budeson-Glycopyrrol-Formoterol (BREZTRI) 160-9-4.8 MCG/ACT aerosol inhaler, Inhale 2 puffs 2 (Two) Times a Day., Disp: 10.7 g, Rfl: 11    Calcium Carbonate-Vit D-Min (CALCIUM 1200 PO), Take  by mouth., Disp: , Rfl:     citalopram (CeleXA) 40 MG tablet, Take 1 tablet by mouth Daily., Disp: 90 tablet, Rfl: 1    ezetimibe (ZETIA) 10 MG tablet, Take 1 tablet by mouth Daily., Disp: , Rfl:     Fluticasone-Salmeterol (ADVAIR/WIXELA) 100-50 MCG/ACT DISKUS, Inhale 1 each 2 (Two) Times a Day., Disp: , Rfl:     ketorolac (TORADOL) 10 MG tablet, Take 1 tablet by mouth Every 6 (Six) Hours As Needed for Severe Pain., Disp: 20 tablet, Rfl: 0    multivitamin with minerals tablet tablet, Take 1 tablet by mouth Daily., Disp: , Rfl:     ondansetron (Zofran) 4 MG tablet, Take 1 tablet by mouth Every 8 (Eight) Hours As Needed for Nausea or Vomiting., Disp: 20 tablet, Rfl: 0    pantoprazole (PROTONIX) 40 MG EC tablet, Take 1 tablet by mouth 2 (Two) Times a Day., Disp: 180 tablet, Rfl: 0    pravastatin (PRAVACHOL) 40 MG tablet, Take 1 tablet by mouth Daily., Disp: 90 tablet, Rfl: 1    predniSONE (DELTASONE) 10 MG tablet, Take 1 tablet by mouth Daily., Disp: , Rfl:     tiZANidine (ZANAFLEX) 2 MG tablet, Take 1 tablet by mouth At Night As Needed for Muscle Spasms., Disp: 30 tablet, Rfl: 1    traMADol (ULTRAM) 50 MG tablet, Take 1 tablet by mouth Every 6 (Six) Hours As Needed for Moderate Pain or Severe Pain., Disp: 40 tablet, Rfl: 0    VITAMIN D, CHOLECALCIFEROL, PO, Take  by mouth., Disp: , Rfl:     clotrimazole (MYCELEX) 10 MG humza, Take 1 tablet by mouth 4 (Four) Times a Day., Disp: 28 tablet, Rfl: 0    Allergies:   No Known Allergies    Objective     Physical Exam:   Vital Signs:   Vitals:    06/19/23 1504   BP: 148/80   BP Location: Right arm   Patient Position: Sitting   Cuff Size: Adult   Pulse: 88   Resp: 18   Temp: 97.7 °F (36.5 °C)   TempSrc: Infrared   Weight: 63.6 kg (140 lb 3.2 oz)   Height: 175.3 cm (69\")   PainSc:   6 "     Body mass index is 20.7 kg/m². BMI is within normal parameters. No other follow-up for BMI required.      Physical Exam  Constitutional:       General: He is not in acute distress.     Appearance: He is not ill-appearing.   HENT:      Head: Normocephalic.   Cardiovascular:      Rate and Rhythm: Normal rate and regular rhythm.      Heart sounds: Normal heart sounds. No murmur heard.  Pulmonary:      Breath sounds: Wheezing present.   Abdominal:      General: Bowel sounds are normal.      Tenderness: There is no abdominal tenderness.   Musculoskeletal:      Comments: Right shoulder in sling. Lateral tenderness to palpation of right shoulder. Neurovascular intact; brisk cap refill.    Lymphadenopathy:      Cervical: No cervical adenopathy.   Neurological:      General: No focal deficit present.      Mental Status: He is oriented to person, place, and time.   Psychiatric:         Mood and Affect: Mood normal.       Assessment / Plan      Assessment/Plan:   Diagnoses and all orders for this visit:    1. Low sodium levels (Primary)  -     Cancel: Basic metabolic panel; Future  -     Cancel: Basic metabolic panel; Future  -     MRI Brain With & Without Contrast; Future  -     Basic metabolic panel; Future    2. Fall, initial encounter  -     XR Shoulder 2+ View Right; Future    3. Syncope, unspecified syncope type  -     XR Shoulder 2+ View Right; Future    4. Acute pain of right shoulder  -     XR Shoulder 2+ View Right; Future    5. Oral candidiasis  -     clotrimazole (MYCELEX) 10 MG humza; Take 1 tablet by mouth 4 (Four) Times a Day.  Dispense: 28 tablet; Refill: 0    6. COPD, severe  -     albuterol sulfate HFA (ProAir HFA) 108 (90 Base) MCG/ACT inhaler; Inhale 1-2 puffs every 4-6 hours PRN  Dispense: 1 g; Refill: 5       1. Hyponatremia  The patient had a recent hospital stay due to syncope and fall. Lab work at the hospital showed low sodium levels of 121 mEq/L.. Discussed risks of low sodium, including  seizure, and recommended going to the emergency department but the patient declined. He feels he has improved and wants to wait until his BMP results are back. A stat order has been placed for BMP to recheck his sodium levels and will discuss results.  Any severe symptoms he is to call 911 and go to the emergency department.     2. Fall, initial encounter  An order has been placed for MRI brain. An order has been placed for an x-ray of right shoulder.     3. Syncope, unspecified syncope type  An order has been placed for MRI brain.     4. Acute pain of right shoulder  An order has been placed for an x-ray of right shoulder. He will call his orthopedic surgeon for follow-up.     5. Oral candidiasis  A prescription for clotrimazole (Mycelex) 10 MG humza has been sent to his preferred pharmacy.     6. Chronic obstructive pulmonary disease (COPD), severe  The patient was advised to continue using his inhalers. A prescription refill for albuterol sulfate HFA (ProAir HFA) 108 (90 Base) mcg/ACT inhaler has been sent. He was given samples of BREZTRI 160-9-4.8 mcg/ACT aerosol inhaler in the office today.   Lot #[id number]    Follow Up:   Discuss follow-up pending results.     KALEB Barrios  [facility]    Transcribed from ambient dictation for KALEB Barrios by Amanda Owens.  06/19/23   18:08 EDT    Patient or patient representative verbalized consent to the visit recording.  I have personally performed the services described in this document as transcribed by the above individual, and it is both accurate and complete.

## 2023-06-19 NOTE — TELEPHONE ENCOUNTER
I left a message on the patients voicemail to call our office back, office number provided.     HUB read:  You had a shoulder xray done in January 2023. It looks like they ordered a xray of lumbar spine. If Neurosurgery needs an xray, they should order it. Is he also seeing orthopedics for shoulder?

## 2023-06-19 NOTE — TELEPHONE ENCOUNTER
You had a shoulder xray done in January 2023. It looks like they ordered a xray of lumbar spine. If Neurosurgery needs an xray, they should order it. Is he also seeing orthopedics for shoulder?

## 2023-06-19 NOTE — TELEPHONE ENCOUNTER
Caller: Luis Antonio Fairchild    Relationship: Self    Best call back number: [phone]     What orders are you requesting (i.e. lab or imaging): X-RAY ON RIGHT SHOULDER    In what timeframe would the patient need to come in: ASAP    Where will you receive your lab/imaging services:     Additional notes: THE PATIENT WENT TO GET HIS X-RAYS DONE. THEY DID NOT DO HIS SHOULDER, ONLY HIS LOWER ARM. HIS RIGHT SHOULD IS HURTING AND HE WAS TOLD TO GET THESE X-RAYS DONE TODAY 06.19.23, FROM HIS NEUROSURGEON. IF THESE CAN BE PUT IN FOR HIM TO GET AFTER HIS APPOINTMENT TODAY HE WOULD LIKE TO DO THIS THEN.

## 2023-07-24 ENCOUNTER — TELEPHONE (OUTPATIENT)
Dept: PAIN MEDICINE | Facility: CLINIC | Age: 63
End: 2023-07-24
Payer: COMMERCIAL

## 2023-07-24 NOTE — TELEPHONE ENCOUNTER
Provider: DR WENDI ALICEA  Caller: ANGELIA NOBLE  Relationship to Patient: SELF    Phone Number: [phone]  Reason for Call:  PATIENT HAD EPIDURAL ON 7/18/23 AND DID NOT HELP. PATIENT ASKING WHAT NEXT?

## 2023-07-25 DIAGNOSIS — M54.16 LUMBAR RADICULOPATHY: Primary | ICD-10-CM

## 2023-07-26 ENCOUNTER — HOSPITAL ENCOUNTER (EMERGENCY)
Facility: HOSPITAL | Age: 63
Discharge: HOME | End: 2023-07-26
Attending: EMERGENCY MEDICINE | Admitting: EMERGENCY MEDICINE
Payer: COMMERCIAL

## 2023-07-26 VITALS
DIASTOLIC BLOOD PRESSURE: 77 MMHG | WEIGHT: 140 LBS | OXYGEN SATURATION: 94 % | HEART RATE: 88 BPM | SYSTOLIC BLOOD PRESSURE: 130 MMHG | TEMPERATURE: 98.5 F | BODY MASS INDEX: 20.73 KG/M2 | RESPIRATION RATE: 20 BRPM | HEIGHT: 69 IN

## 2023-07-26 DIAGNOSIS — M54.50 ACUTE EXACERBATION OF CHRONIC LOW BACK PAIN: ICD-10-CM

## 2023-07-26 DIAGNOSIS — G89.29 ACUTE EXACERBATION OF CHRONIC LOW BACK PAIN: ICD-10-CM

## 2023-07-26 DIAGNOSIS — S46.219A BICEPS TENDON TEAR: Primary | ICD-10-CM

## 2023-07-26 PROCEDURE — 99283 EMERGENCY DEPT VISIT LOW MDM: CPT

## 2023-07-26 RX ORDER — OXYCODONE HYDROCHLORIDE AND ACETAMINOPHEN 5; 325 MG/1; MG/1
1 TABLET ORAL ONCE
Status: COMPLETED | OUTPATIENT
Start: 2023-07-26 | End: 2023-07-26

## 2023-07-26 RX ORDER — CYCLOBENZAPRINE HCL 10 MG
10 TABLET ORAL 3 TIMES DAILY PRN
Qty: 12 TABLET | Refills: 0 | Status: SHIPPED | OUTPATIENT
Start: 2023-07-26

## 2023-07-26 RX ADMIN — OXYCODONE HYDROCHLORIDE AND ACETAMINOPHEN 1 TABLET: 5; 325 TABLET ORAL at 11:30

## 2023-07-26 NOTE — ED PROVIDER NOTES
Subjective   History of Present Illness  62-year-old male presents emergency department today with lumbar pain.  He has been seeing a Dr. Jackson for his lumbar pain has had multiple MRIs x-rays and CT scans.  He had no new fall no new trauma.  Has not had any loss of bowel or bladder control.  Just have an exacerbation of pain.  He did have a needed epidural injection about 2 to 3 weeks ago got pain relief for several days but states that the pain is back.  He does not have any medication he takes at home.  He also has a new injury to his left biceps area he noticed there is quite a bit of ecchymosis and a little tenderness.  He states he has not picked up anything heavy and is currently in a cast for his right hand so has been using the left hand for almost everything else.  She is seeing an orthopedist tomorrow to have this cast removal of his right wrist.  A fall and fracture.    History provided by:  Patient   used: No    Back Pain  Location:  Lumbar spine  Quality:  Burning and shooting  Radiates to:  Does not radiate  Pain severity:  Moderate  Onset quality:  Gradual  Duration:  12 months  Timing:  Intermittent  Progression:  Waxing and waning  Chronicity:  Chronic  Context: not emotional stress, not falling, not jumping from heights, not lifting heavy objects, not MCA, not MVA, not occupational injury, not pedestrian accident, not recent illness, not recent injury and not twisting    Relieved by: Epidural injection.  Worsened by:  Movement and twisting  Ineffective treatments:  None tried  Associated symptoms: no bladder incontinence, no bowel incontinence, no chest pain, no headaches, no leg pain, no numbness, no paresthesias, no tingling, no weakness and no weight loss    Risk factors: no hx of cancer, no hx of osteoporosis, no menopause, not obese, no recent surgery and no vascular disease    Arm Pain  Location:  Left biceps area  Quality:  Burning  Severity:  Moderate  Onset quality:   Sudden  Duration:  4 days  Timing:  Constant  Progression:  Unchanged  Chronicity:  New  Context:  Does not recall a specific injury.  Associated symptoms: no chest pain, no congestion, no headaches and no shortness of breath      Review of Systems   Constitutional:  Negative for weight loss.   HENT:  Negative for congestion.    Respiratory:  Negative for chest tightness and shortness of breath.    Cardiovascular:  Negative for chest pain.   Gastrointestinal:  Negative for bowel incontinence.   Genitourinary:  Negative for bladder incontinence.   Musculoskeletal:  Positive for back pain.   Neurological:  Negative for tingling, weakness, numbness, headaches and paresthesias.   All other systems reviewed and are negative.    Past Medical History:   Diagnosis Date    Anxiety 2016    Arthritis     Asthma     Cancer 2018    Squamous carcinoma oropharynx    Cervical disc disorder     Chronic pain disorder     COPD (chronic obstructive pulmonary disease)     Extremity pain     Fractures 23    Rt wrist    GERD (gastroesophageal reflux disease)     History of IBS     Hyperlipidemia     Hypertension     Irritable bowel syndrome     Joint pain     Low back pain     Lumbosacral disc disease     Neck pain     Osteoarthritis     Pneumonia     Shingles     Spinal stenosis        No Known Allergies    Past Surgical History:   Procedure Laterality Date    COLONOSCOPY      FRACTURE SURGERY      Lt wrist    ORTHOPEDIC SURGERY      Lft shoulder    SHOULDER SURGERY      Onset Date     THROAT SURGERY      WRIST SURGERY      Onset Date:        Family History   Problem Relation Age of Onset    Anxiety disorder Mother     Arthritis Mother     Stroke Father             Hypertension Father     Cancer Brother     Anxiety disorder Brother     Cancer Brother                Social History     Socioeconomic History    Marital status:    Tobacco Use    Smoking status: Every Day     Packs/day: 0.50      Years: 46.00     Pack years: 23.00     Types: Cigarettes     Start date: 1/1/1974    Smokeless tobacco: Never   Vaping Use    Vaping Use: Never used   Substance and Sexual Activity    Alcohol use: Yes     Alcohol/week: 35.0 standard drinks     Types: 35 Cans of beer per week     Comment: nightly    Drug use: Never    Sexual activity: Yes     Partners: Female     Birth control/protection: None     Comment:             Objective   Physical Exam  Vitals and nursing note reviewed.   Constitutional:       Appearance: He is well-developed.   HENT:      Head: Normocephalic and atraumatic.      Right Ear: External ear normal.      Left Ear: External ear normal.      Nose: Nose normal.   Eyes:      General: No scleral icterus.     Conjunctiva/sclera: Conjunctivae normal.   Neck:      Thyroid: No thyromegaly.   Cardiovascular:      Rate and Rhythm: Normal rate and regular rhythm.      Heart sounds: Normal heart sounds.   Pulmonary:      Effort: Pulmonary effort is normal. No respiratory distress.      Breath sounds: Normal breath sounds. No wheezing or rales.   Chest:      Chest wall: No tenderness.   Abdominal:      General: Bowel sounds are normal. There is no distension.      Palpations: Abdomen is soft.      Tenderness: There is no abdominal tenderness.   Musculoskeletal:         General: Normal range of motion.      Cervical back: Normal range of motion.      Comments: Diffuse tenderness in the lumbar spine no point tenderness no rash no lesions.  Strength 5/5 bilateral lower extremities.  Due to reflexes plus one of the knees and ankles.  Good straight leg raise bilaterally.    Left biceps area is tender over the medial biceps attachment.  There is a significant ecchymosis.  There is little bit of a defect felt in the biceps.    Right wrist is in a splint.     Lymphadenopathy:      Cervical: No cervical adenopathy.   Skin:     General: Skin is warm and dry.   Neurological:      Mental Status: He is alert and  oriented to person, place, and time.      Cranial Nerves: No cranial nerve deficit.      Coordination: Coordination normal.      Deep Tendon Reflexes: Reflexes are normal and symmetric. Reflexes normal.   Psychiatric:         Behavior: Behavior normal.         Thought Content: Thought content normal.         Judgment: Judgment normal.       Procedures           ED Course                No results found for this or any previous visit (from the past 24 hour(s)).  Note: In addition to lab results from this visit, the labs listed above may include labs taken at another facility or during a different encounter within the last 24 hours. Please correlate lab times with ED admission and discharge times for further clarification of the services performed during this visit.    No orders to display     Vitals:    07/26/23 1030 07/26/23 1032 07/26/23 1101 07/26/23 1132   BP:  115/82 131/86 130/77   BP Location:       Patient Position:       Pulse:  88 89 88   Resp:       Temp: 98.5 °F (36.9 °C)      TempSrc: Oral      SpO2:  95% 97% 94%   Weight:       Height:         Medications   oxyCODONE-acetaminophen (PERCOCET) 5-325 MG per tablet 1 tablet (1 tablet Oral Given 7/26/23 1130)     ECG/EMG Results (last 24 hours)       ** No results found for the last 24 hours. **          No orders to display                                  Medical Decision Making  Pain in his back is chronic and ongoing he is just requesting some for pain here.  Told we would write a different muscle relaxer he could try.  He looks appears to be has a partial biceps tendon tear on the left he is in a cast on the right and supposed to see orthopedics tomorrow there is removed he is getting discussed that with them tomorrow.  He states he had recent MRIs CTs and x-rays no new trauma no loss of bowel or bladder control no significant emergent findings that would require an MRI.    Problems Addressed:  Acute exacerbation of chronic low back pain: complicated  acute illness or injury  Biceps tendon tear: complicated acute illness or injury    Risk  Prescription drug management.        Final diagnoses:   Biceps tendon tear   Acute exacerbation of chronic low back pain       ED Disposition  ED Disposition       ED Disposition   Discharge    Condition   Stable    Comment   --               Narciso Atkins MD  [address] [place] [address]  [address] [place], [place] [id number] [phone]      tomorrow as scheduled.         Medication List        New Prescriptions      cyclobenzaprine 10 MG tablet  Commonly known as: FLEXERIL  Take 1 tablet by mouth 3 (Three) Times a Day As Needed for Muscle Spasms.            Stop      tiZANidine 2 MG tablet  Commonly known as: ZANAFLEX               Where to Get Your Medications        These medications were sent to [facility] [address] [place] [address] - [address] [place] [address] [phone]  - [phone] [place] [id number]      Phone: [phone]   cyclobenzaprine 10 MG tablet            Roberth Haas PA  07/27/23 1931

## 2023-07-27 ENCOUNTER — OFFICE VISIT (OUTPATIENT)
Dept: ORTHOPEDIC SURGERY | Facility: CLINIC | Age: 63
End: 2023-07-27
Payer: COMMERCIAL

## 2023-07-27 VITALS
SYSTOLIC BLOOD PRESSURE: 140 MMHG | WEIGHT: 140 LBS | DIASTOLIC BLOOD PRESSURE: 70 MMHG | BODY MASS INDEX: 20.73 KG/M2 | HEIGHT: 69 IN

## 2023-07-27 DIAGNOSIS — S46.212A TRAUMATIC PARTIAL TEAR OF LEFT BICEPS TENDON, INITIAL ENCOUNTER: ICD-10-CM

## 2023-07-27 DIAGNOSIS — S52.551D OTHER CLOSED EXTRA-ARTICULAR FRACTURE OF DISTAL END OF RIGHT RADIUS WITH ROUTINE HEALING, SUBSEQUENT ENCOUNTER: ICD-10-CM

## 2023-07-27 DIAGNOSIS — M25.522 LEFT ELBOW PAIN: ICD-10-CM

## 2023-07-27 DIAGNOSIS — M25.531 RIGHT WRIST PAIN: Primary | ICD-10-CM

## 2023-07-27 NOTE — PROGRESS NOTES
[facility] Office Follow Up Visit     Office Follow Up      Date: 07/27/2023   Patient Name: Luis Antonio Fairchild  MRN: [id number]  YOB: 1960    Referring Physician: No ref. provider found     Chief Complaint:   Chief Complaint   Patient presents with    Follow-up     4 week f/u; Other closed extra-articular fracture of distal end of right radius       History of Present Illness: Luis Antonio Fairchild is a 62 y.o. male who is here today for follow up on right distal radius fracture.  He is 6 weeks from time of injury.  He has been in a short arm thumb spica cast for the last 4 weeks.  Symptoms of greatly improved.  He is nontender on exam now.  His pain is down to a 0/10.  He does feel stiffness.    Unrelated, he has diffuse left upper extremity bruising.  This is primarily focused around the elbow but certainly moves both proximally and distally from there.  He denies any known mechanism of injury.    Subjective   Review of Systems: Review of Systems   Constitutional:  Negative for chills, fever, unexpected weight gain and unexpected weight loss.   HENT:  Negative for congestion, postnasal drip and rhinorrhea.    Eyes:  Negative for blurred vision.   Respiratory:  Negative for shortness of breath.    Cardiovascular:  Negative for leg swelling.   Gastrointestinal:  Negative for abdominal pain, nausea and vomiting.   Genitourinary:  Negative for difficulty urinating.   Musculoskeletal:  Positive for arthralgias. Negative for gait problem, joint swelling and myalgias.   Skin:  Negative for skin lesions and wound.   Neurological:  Negative for dizziness, weakness, light-headedness and numbness.   Hematological:  Does not bruise/bleed easily.   Psychiatric/Behavioral:  Negative for depressed mood.       Medications:   Current Outpatient Medications:     albuterol sulfate HFA (ProAir HFA) 108 (90 Base) MCG/ACT inhaler, Inhale 1-2 puffs every 4-6 hours PRN, Disp: 1 g,  Rfl: 5    amLODIPine (NORVASC) 5 MG tablet, Take 1 tablet by mouth Daily., Disp: 30 tablet, Rfl: 5    Budeson-Glycopyrrol-Formoterol (BREZTRI) 160-9-4.8 MCG/ACT aerosol inhaler, Inhale 2 puffs 2 (Two) Times a Day., Disp: 10.7 g, Rfl: 11    Calcium Carbonate-Vit D-Min (CALCIUM 1200 PO), Take  by mouth., Disp: , Rfl:     celecoxib (CeleBREX) 200 MG capsule, Take 1 capsule by mouth twice daily, Disp: 180 capsule, Rfl: 0    citalopram (CeleXA) 40 MG tablet, Take 1 tablet by mouth Daily., Disp: 90 tablet, Rfl: 1    clotrimazole (MYCELEX) 10 MG humza, Take 1 tablet by mouth 4 (Four) Times a Day., Disp: 28 tablet, Rfl: 0    cyclobenzaprine (FLEXERIL) 10 MG tablet, Take 1 tablet by mouth 3 (Three) Times a Day As Needed for Muscle Spasms., Disp: 12 tablet, Rfl: 0    ezetimibe (ZETIA) 10 MG tablet, Take 1 tablet by mouth Daily., Disp: , Rfl:     Fluticasone-Salmeterol (ADVAIR/WIXELA) 100-50 MCG/ACT DISKUS, Inhale 1 each 2 (Two) Times a Day., Disp: , Rfl:     furosemide (Lasix) 20 MG tablet, Take 1 tablet by mouth Daily., Disp: 30 tablet, Rfl: 0    ketorolac (TORADOL) 10 MG tablet, Take 1 tablet by mouth Every 6 (Six) Hours As Needed for Severe Pain., Disp: 20 tablet, Rfl: 0    methylPREDNISolone (MEDROL) 4 MG dose pack, Take as directed on package instructions., Disp: 21 tablet, Rfl: 0    multivitamin with minerals tablet tablet, Take 1 tablet by mouth Daily., Disp: , Rfl:     ondansetron (Zofran) 4 MG tablet, Take 1 tablet by mouth Every 8 (Eight) Hours As Needed for Nausea or Vomiting., Disp: 20 tablet, Rfl: 0    pantoprazole (PROTONIX) 40 MG EC tablet, Take 1 tablet by mouth 2 (Two) Times a Day., Disp: 180 tablet, Rfl: 0    pravastatin (PRAVACHOL) 40 MG tablet, Take 1 tablet by mouth Daily., Disp: 90 tablet, Rfl: 1    sodium chloride 1 g tablet, Take 1 tablet by mouth 2 (Two) Times a Day., Disp: 60 tablet, Rfl: 0    traMADol (ULTRAM) 50 MG tablet, Take 1 tablet by mouth Every 6 (Six) Hours As Needed for Moderate Pain or  "Severe Pain., Disp: 40 tablet, Rfl: 0    VITAMIN D, CHOLECALCIFEROL, PO, Take  by mouth., Disp: , Rfl:     Allergies: No Known Allergies    I have reviewed and updated the patient's chief complaint, history of present illness, review of systems, past medical history, surgical history, family history, social history, medications and allergy list as appropriate.     Objective    Vital Signs:   Vitals:    07/27/23 1311   BP: 140/70   Weight: 63.5 kg (140 lb)   Height: 175.3 cm (69.02\")     Body mass index is 20.66 kg/m².  BMI is within normal parameters. No other follow-up for BMI required.    In this visit the patient was advised to stop smoking and was offered tobacco cessation measures and resources, including NRT and/or medication intervention. At least 5 minutes was spent on face-to-face counseling regarding smoking cessation.     Ortho Exam:  Right wrist: General Appearance: no swelling and only minimal tenderness on palpation and skin in-tact, no gross deformity, neurovascular intact, capillary refill normal, pulses normal, normal sensation.    Left elbow: diffuse bruising. Negative hook test. Full ROM. 4/5 strength.     Results Review:   Imaging Results (Last 24 Hours)       Procedure Component Value Units Date/Time    XR Elbow 2 View Left [[id number]] Resulted: 07/27/23 1356     Updated: 07/27/23 1358    Narrative:      Indication: Left elbow bruising    Views:  AP and lateral views of the elbow are submitted.    Impression: Alignment is normal. There are no acute findings. There is no   fracture subluxation or dislocation.  There is swelling in the joint as   evidenced by the displacement anterior fat pad.  No other significant   abnormalities.    Comparison: No additional images available for comparison review.    XR Wrist 3+ View Right [[id number]] Resulted: 07/27/23 1334     Updated: 07/27/23 1335    Narrative:      Indication: Right wrist pain, fall    Views: AP oblique and lateral views of the wrist are " submitted.    Impression: Impacted fracture of the right distal radius with   displacement.  There is evidence of bony remodeling compared to similar   images from last visit.    Comparison: Interval evidence of bony remodeling of the distal radius   fracture site.              Procedures    Assessment / Plan    Assessment/Plan:   Diagnoses and all orders for this visit:    1. Right wrist pain (Primary)  -     XR Wrist 3+ View Right    2. Left elbow pain  -     Cancel: XR Elbow 3+ View Left  -     XR Elbow 2 View Left    3. Other closed extra-articular fracture of distal end of right radius with routine healing, subsequent encounter    4. Traumatic partial tear of left biceps tendon, initial encounter      Follow-up on right distal radius fracture.  Radiographs today show good evidence of bony healing.  He does still have some impaction and displacement but this is improving.  His pain is greatly improved.  He is stiff and I encouraged him to work on his range of motion.  I will transition him into a thumb spica brace in order to still provide him some protection.  He should come out when at home and work on his range of motion is much as possible.    He also has suffered an unknown injury to the left elbow.  He is diffusely bruised and his radiographs show swelling.  He does have a negative hook test but I believe that he has at least partial tearing of biceps tendon.  I would evaluate further with MRI of his left elbow.  I will see him back after that to discuss results and follow-up on his wrist.    Follow Up:   Return in about 4 weeks (around 8/24/2023) for Recheck, MRI Left elbow.      Wei Atkins MD  [facility]

## 2023-07-28 DIAGNOSIS — K21.9 GASTROESOPHAGEAL REFLUX DISEASE, UNSPECIFIED WHETHER ESOPHAGITIS PRESENT: ICD-10-CM

## 2023-07-28 DIAGNOSIS — I10 PRIMARY HYPERTENSION: ICD-10-CM

## 2023-07-28 RX ORDER — PANTOPRAZOLE SODIUM 40 MG/1
TABLET, DELAYED RELEASE ORAL
Qty: 180 TABLET | Refills: 0 | Status: SHIPPED | OUTPATIENT
Start: 2023-07-28

## 2023-07-28 RX ORDER — AMLODIPINE BESYLATE 5 MG/1
TABLET ORAL
Qty: 90 TABLET | Refills: 0 | Status: SHIPPED | OUTPATIENT
Start: 2023-07-28

## 2023-08-03 ENCOUNTER — TELEPHONE (OUTPATIENT)
Dept: ORTHOPEDIC SURGERY | Facility: CLINIC | Age: 63
End: 2023-08-03
Payer: COMMERCIAL

## 2023-08-03 DIAGNOSIS — S52.551D OTHER CLOSED EXTRA-ARTICULAR FRACTURE OF DISTAL END OF RIGHT RADIUS WITH ROUTINE HEALING, SUBSEQUENT ENCOUNTER: Primary | ICD-10-CM

## 2023-08-03 NOTE — TELEPHONE ENCOUNTER
Provider: DR. WM RAGSDALE  Caller: ANGELIA NOBLE  Relationship to Patient: SELF  Pharmacy: [facility] [phone]  Phone Number: [phone]  Reason for Call: PATIENT SAYS HE WOKE THIS MORNING WITH COMPLETE LOSS OF THE USE OF HIS RIGHT THUMB.  When was the patient last seen:  7/27/23   H&P

## 2023-08-03 NOTE — TELEPHONE ENCOUNTER
Dr. Atkins,  I called patient, he woke up this morning and is having trouble using the thumb. He cannot straighten the thumb at all. He is not complaining of any pain. He has been doing the ROM exercises but it isn't helping. Please advise thank you!  Halie Acosta RT (R), ROT

## 2023-08-04 NOTE — TELEPHONE ENCOUNTER
He would like to go to  in [facility]. Can you place the order please? Thanks!  Halie HONEYCUTT (R), ROT

## 2023-08-08 ENCOUNTER — OUTSIDE FACILITY SERVICE (OUTPATIENT)
Dept: PAIN MEDICINE | Facility: CLINIC | Age: 63
End: 2023-08-08
Payer: COMMERCIAL

## 2023-08-08 ENCOUNTER — DOCUMENTATION (OUTPATIENT)
Dept: PAIN MEDICINE | Facility: CLINIC | Age: 63
End: 2023-08-08

## 2023-08-08 NOTE — PROGRESS NOTES
[facility]  [address] [place] [address]        PROCEDURE: Fluoroscopically-guided Right paramedian L4/5 Lumbar Interlaminar Epidural Steroid Injection     PRE-OP DIAGNOSIS: Lumbar radiculopathy   POST-OP DIAGNOSIS: Same    BLOOD THINNERS (ANTIPLATELETS/ANTICOAGULANTS): Were discussed with the patient and ELSA Guidelines were followed.     CONSENT: Risks, benefits and options were explained to the patient, all questions were answered and written informed consent was obtained.     ANESTHESIA: Moderate sedation was required to maintain comfort, safety, and cooperation during the procedure.  The duration of sedation service was over 10 minutes.  Patient received 2mg IV Versed and 0mcg IV fentanyl.  Independent observation and monitoring was performed by Elen Venegas RN.  The patient's level of consciousness and physiologic status was continually monitored with pulse oximetry, EKG from 07:45 to 7:58.  There were no complications or adverse events during sedation.  After the sedation patient was taken to the recovery area.   Local Only    PROCEDURE NOTE: A pre-procedural time out was performed to confirm the correct patient, procedure, side, and site. Standard ASA monitors were applied and oxygen via nasal cannula was provided. All proceduralists donned sterile gloves with masks and surgical hats. The patient was placed prone with pillow under the abdomen and all pressure points padded. The patient's lumbar spine was prepped in standard fashion using [Chlorhexidine] and draped with sterile towels. The target lumbar interspace was identified using fluoroscopy. The overlying skin and subcutaneous tissue was anesthetized with 1% lidocaine. A 20 gauge 10 cm Tuohy needle was advanced using intermittent AP and lateral fluoroscopy. The ligamentum flavum was engaged. Access to the epidural space was gained using a loss of resistance technique to air. Needle placement was confirmed with  1 ml of Omnipaque 180 mgI/ml contrast using biplanar live fluoroscopic imaging. An epidurogram was noted without evidence of intravascular or intrathecal spread. After negative aspiration, a mixture containing 3 ml of preservative-free normal saline, dexamethasone 10mg steroid, lidocaine 1% - 2 ml local anesthetic for a total volume of 6 ml was injected under direct visualization with fluoroscopy. The Tuohy needle was flushed, removed and a bandage applied.     EBL: None     COMPLICATIONS: None     The patient was monitored for at least 30 minutes prior to discharge. Vital signs remained stable throughout the procedure and in the recovery area. There were no immediate complications and the patient tolerated the procedure well. Sensory and motor exam was unchanged from baseline. The patient received written discharge instructions prior to discharge.     FOLLOW UP: As scheduled     ADDITIONAL NOTES:     [facility]   Azael Jackson MD

## 2023-08-09 ENCOUNTER — LAB (OUTPATIENT)
Dept: LAB | Facility: HOSPITAL | Age: 63
End: 2023-08-09
Payer: COMMERCIAL

## 2023-08-09 DIAGNOSIS — E87.1 HYPONATREMIA: ICD-10-CM

## 2023-08-09 LAB
ANION GAP SERPL CALCULATED.3IONS-SCNC: 15 MMOL/L (ref 5–15)
BUN SERPL-MCNC: 9 MG/DL (ref 8–23)
BUN/CREAT SERPL: 8.4 (ref 7–25)
CALCIUM SPEC-SCNC: 9.7 MG/DL (ref 8.6–10.5)
CHLORIDE SERPL-SCNC: 92 MMOL/L (ref 98–107)
CO2 SERPL-SCNC: 23 MMOL/L (ref 22–29)
CREAT SERPL-MCNC: 1.07 MG/DL (ref 0.76–1.27)
EGFRCR SERPLBLD CKD-EPI 2021: 78 ML/MIN/1.73
GLUCOSE SERPL-MCNC: 149 MG/DL (ref 65–99)
POTASSIUM SERPL-SCNC: 3.7 MMOL/L (ref 3.5–5.2)
SODIUM SERPL-SCNC: 130 MMOL/L (ref 136–145)

## 2023-08-09 PROCEDURE — 80048 BASIC METABOLIC PNL TOTAL CA: CPT

## 2023-08-10 ENCOUNTER — TELEPHONE (OUTPATIENT)
Dept: INTERNAL MEDICINE | Facility: CLINIC | Age: 63
End: 2023-08-10
Payer: COMMERCIAL

## 2023-08-10 DIAGNOSIS — B37.0 ORAL CANDIDIASIS: ICD-10-CM

## 2023-08-10 DIAGNOSIS — E78.5 HYPERLIPIDEMIA, UNSPECIFIED HYPERLIPIDEMIA TYPE: ICD-10-CM

## 2023-08-10 DIAGNOSIS — G89.29 CHRONIC RIGHT-SIDED LOW BACK PAIN WITH RIGHT-SIDED SCIATICA: ICD-10-CM

## 2023-08-10 DIAGNOSIS — R73.9 HYPERGLYCEMIA: ICD-10-CM

## 2023-08-10 DIAGNOSIS — M51.9 LUMBOSACRAL DISC DISEASE: ICD-10-CM

## 2023-08-10 DIAGNOSIS — R11.0 NAUSEA: ICD-10-CM

## 2023-08-10 DIAGNOSIS — I10 PRIMARY HYPERTENSION: ICD-10-CM

## 2023-08-10 DIAGNOSIS — J44.9 COPD, SEVERE: ICD-10-CM

## 2023-08-10 DIAGNOSIS — M54.41 ACUTE RIGHT-SIDED LOW BACK PAIN WITH RIGHT-SIDED SCIATICA: ICD-10-CM

## 2023-08-10 DIAGNOSIS — E87.1 HYPONATREMIA: Primary | ICD-10-CM

## 2023-08-10 DIAGNOSIS — F41.9 ANXIETY: ICD-10-CM

## 2023-08-10 DIAGNOSIS — M54.41 CHRONIC RIGHT-SIDED LOW BACK PAIN WITH RIGHT-SIDED SCIATICA: ICD-10-CM

## 2023-08-10 DIAGNOSIS — M51.36 DDD (DEGENERATIVE DISC DISEASE), LUMBAR: ICD-10-CM

## 2023-08-10 DIAGNOSIS — M25.551 RIGHT HIP PAIN: ICD-10-CM

## 2023-08-10 DIAGNOSIS — E87.1 HYPONATREMIA: ICD-10-CM

## 2023-08-10 DIAGNOSIS — M19.90 OSTEOARTHRITIS, UNSPECIFIED OSTEOARTHRITIS TYPE, UNSPECIFIED SITE: Chronic | ICD-10-CM

## 2023-08-10 RX ORDER — KETOROLAC TROMETHAMINE 10 MG/1
10 TABLET, FILM COATED ORAL EVERY 6 HOURS PRN
Qty: 20 TABLET | Refills: 0 | Status: SHIPPED | OUTPATIENT
Start: 2023-08-10

## 2023-08-10 RX ORDER — CITALOPRAM 40 MG/1
40 TABLET ORAL DAILY
Qty: 90 TABLET | Refills: 1 | Status: SHIPPED | OUTPATIENT
Start: 2023-08-10

## 2023-08-10 RX ORDER — ONDANSETRON 4 MG/1
4 TABLET, FILM COATED ORAL EVERY 8 HOURS PRN
Qty: 20 TABLET | Refills: 0 | Status: SHIPPED | OUTPATIENT
Start: 2023-08-10

## 2023-08-10 RX ORDER — FLUTICASONE PROPIONATE AND SALMETEROL 100; 50 UG/1; UG/1
1 POWDER RESPIRATORY (INHALATION) 2 TIMES DAILY
Qty: 14 EACH | Refills: 5 | Status: SHIPPED | OUTPATIENT
Start: 2023-08-10

## 2023-08-10 RX ORDER — METHYLPREDNISOLONE 4 MG/1
TABLET ORAL
Qty: 21 TABLET | Refills: 0 | OUTPATIENT
Start: 2023-08-10

## 2023-08-10 RX ORDER — CLOTRIMAZOLE 10 MG/1
10 LOZENGE ORAL; TOPICAL 4 TIMES DAILY
Qty: 28 TABLET | Refills: 0 | Status: SHIPPED | OUTPATIENT
Start: 2023-08-10

## 2023-08-10 RX ORDER — SODIUM CHLORIDE 1 G/1
1 TABLET ORAL 2 TIMES DAILY
Qty: 60 TABLET | Refills: 0 | Status: SHIPPED | OUTPATIENT
Start: 2023-08-10

## 2023-08-10 RX ORDER — PRAVASTATIN SODIUM 40 MG
40 TABLET ORAL DAILY
Qty: 90 TABLET | Refills: 1 | Status: SHIPPED | OUTPATIENT
Start: 2023-08-10

## 2023-08-10 RX ORDER — EZETIMIBE 10 MG/1
10 TABLET ORAL DAILY
Qty: 30 TABLET | Refills: 5 | Status: SHIPPED | OUTPATIENT
Start: 2023-08-10

## 2023-08-10 RX ORDER — FUROSEMIDE 20 MG/1
20 TABLET ORAL DAILY
Qty: 30 TABLET | Refills: 0 | Status: SHIPPED | OUTPATIENT
Start: 2023-08-10

## 2023-08-10 RX ORDER — ALBUTEROL SULFATE 90 UG/1
AEROSOL, METERED RESPIRATORY (INHALATION)
Qty: 1 G | Refills: 5 | Status: SHIPPED | OUTPATIENT
Start: 2023-08-10

## 2023-08-10 RX ORDER — AMLODIPINE BESYLATE 5 MG/1
5 TABLET ORAL DAILY
Qty: 90 TABLET | Refills: 0 | OUTPATIENT
Start: 2023-08-10

## 2023-08-10 RX ORDER — MULTIPLE VITAMINS W/ MINERALS TAB 9MG-400MCG
1 TAB ORAL DAILY
Qty: 30 TABLET | Refills: 0 | Status: SHIPPED | OUTPATIENT
Start: 2023-08-10 | End: 2023-09-09

## 2023-08-10 RX ORDER — CYCLOBENZAPRINE HCL 10 MG
10 TABLET ORAL 3 TIMES DAILY PRN
Qty: 12 TABLET | Refills: 0 | Status: SHIPPED | OUTPATIENT
Start: 2023-08-10

## 2023-08-10 NOTE — TELEPHONE ENCOUNTER
Called patient and confirmed that he is no longer taking Advair and it only taking Breztri.  Called pharmacy and canceled prescription request for Advair. Good verb given.

## 2023-08-10 NOTE — TELEPHONE ENCOUNTER
Pharmacy Name: [facility] [address] [place] [address] - [address] [facility] [phone] [place] [phone]      Pharmacy representative name: JOSE    Pharmacy representative phone number: [phone]    What medication are you calling in regards to: Fluticasone-Salmeterol (ADVAIR/WIXELA) 100-50 MCG/ACT DISKUS, ketorolac (TORADOL) 10 MG tablet    What question does the pharmacy have: ADVAIR WAS WRITTEN FOR A QTY 14  DO NOT COME IN A 14 COUNT IT COMES IN A 60 COUNT.  NEED CLARIFICATION ON   ketorolac (TORADOL) 10 MG tabletTHAT ASWELL AS TO SEE IF THE PATIENT HAS HAD ANOTHER INJECTION  FOR THAT WHY IS HE USING SO OFTEN  THEY NEED DOCUMENTATION     Who is the provider that prescribed the medication: BREEDING    Additional notes: VICKIE SALAMANCA CALLING ABOUT THE ADVAIR WAS WRITTEN FOR A QTY 14 AND THEY NEED CLARIFICATION ON THAT ASWELL AS TABLETS NEED TO SEE IF THE PATIENT HAS HAD ANOTHER INJECTION  FOR THAT WHY IS HE USING SO OFTEN

## 2023-08-10 NOTE — TELEPHONE ENCOUNTER
Caller: Luis Antonio Fairchild    Relationship: Self    Best call back number: [phone]    Requested Prescriptions:   Requested Prescriptions     Pending Prescriptions Disp Refills    sodium chloride 1 g tablet 60 tablet 0     Sig: Take 1 tablet by mouth 2 (Two) Times a Day.    albuterol sulfate HFA (ProAir HFA) 108 (90 Base) MCG/ACT inhaler 1 g 5     Sig: Inhale 1-2 puffs every 4-6 hours PRN    amLODIPine (NORVASC) 5 MG tablet 90 tablet 0     Sig: Take 1 tablet by mouth Daily.    Budeson-Glycopyrrol-Formoterol (BREZTRI) 160-9-4.8 MCG/ACT aerosol inhaler 10.7 g 11     Sig: Inhale 2 puffs 2 (Two) Times a Day.    clotrimazole (MYCELEX) 10 MG humza 28 tablet 0     Sig: Take 1 tablet by mouth 4 (Four) Times a Day.    cyclobenzaprine (FLEXERIL) 10 MG tablet 12 tablet 0     Sig: Take 1 tablet by mouth 3 (Three) Times a Day As Needed for Muscle Spasms.    citalopram (CeleXA) 40 MG tablet 90 tablet 1     Sig: Take 1 tablet by mouth Daily.    ezetimibe (ZETIA) 10 MG tablet       Sig: Take 1 tablet by mouth Daily.    Fluticasone-Salmeterol (ADVAIR/WIXELA) 100-50 MCG/ACT DISKUS       Sig: Inhale 2 (Two) Times a Day.    furosemide (Lasix) 20 MG tablet 30 tablet 0     Sig: Take 1 tablet by mouth Daily.    ketorolac (TORADOL) 10 MG tablet 20 tablet 0     Sig: Take 1 tablet by mouth Every 6 (Six) Hours As Needed for Severe Pain.    methylPREDNISolone (MEDROL) 4 MG dose pack 21 tablet 0     Sig: Take as directed on package instructions.    multivitamin with minerals tablet tablet       Sig: Take 1 tablet by mouth Daily.    ondansetron (Zofran) 4 MG tablet 20 tablet 0     Sig: Take 1 tablet by mouth Every 8 (Eight) Hours As Needed for Nausea or Vomiting.    pravastatin (PRAVACHOL) 40 MG tablet 90 tablet 1     Sig: Take 1 tablet by mouth Daily.    traMADol (ULTRAM) 50 MG tablet 40 tablet 0     Sig: Take 1 tablet by mouth Every 6 (Six) Hours As Needed for Moderate Pain or Severe Pain.        Pharmacy where request should be sent:  [facility] [address] [place] [address] [address] [facility] [phone] [place] [phone] FX     Last office visit with prescribing clinician: 6/19/2023   Last telemedicine visit with prescribing clinician: Visit date not found   Next office visit with prescribing clinician: Visit date not found     Additional details provided by patient: THE PATIENT STATES THAT HE IS OUT OF HIS MEDICATION     Does the patient have less than a 3 day supply:  [x] Yes  [] No    Would you like a call back once the refill request has been completed: [] Yes [x] No    If the office needs to give you a call back, can they leave a voicemail: [] Yes [x] No    Sandra Rodney Rep   08/10/23 11:04 EDT

## 2023-08-10 NOTE — TELEPHONE ENCOUNTER
Confirm patient is taking advair; patient should only be taking breztri or advair. It looks like advair was discontinued  3/23.

## 2023-08-10 NOTE — TELEPHONE ENCOUNTER
Caller: Luis Antonio Fairchild    Relationship: Self    Best call back number: [phone]      What test was performed: SODIUM LEVEL TEST     When was the test performed: 08/09/2023        Additional notes: THE PATIENT SAW HIS RESULTS AND HE STATES THAT IT  THE PATIENT WOULD LIKE A NURSE TO CALL HIM TO DISCUSS

## 2023-08-11 RX ORDER — TRAMADOL HYDROCHLORIDE 50 MG/1
50 TABLET ORAL EVERY 6 HOURS PRN
Qty: 40 TABLET | Refills: 0 | Status: SHIPPED | OUTPATIENT
Start: 2023-08-11

## 2023-08-14 ENCOUNTER — TELEPHONE (OUTPATIENT)
Dept: PAIN MEDICINE | Facility: CLINIC | Age: 63
End: 2023-08-14
Payer: COMMERCIAL

## 2023-08-14 NOTE — TELEPHONE ENCOUNTER
Caller: Luis Antonio Fairchild    Relationship: Self    Best call back number:     Who are you requesting to speak with (clinical staff, provider,  specific staff member): DR ALICEA/CLINICAL     What was the call regarding: THE PATIENT STATED HIS RIGHT HIP PAIN HAS COME BACK. HE WOULD LIKE TO KNOW WHAT DR ALICEA SUGGESTS THEY DO NEXT TO HELP HIS PAIN    Is it okay if the provider responds through MyChart: NO, CALL PREFERRED

## 2023-08-21 ENCOUNTER — TELEPHONE (OUTPATIENT)
Dept: PAIN MEDICINE | Facility: CLINIC | Age: 63
End: 2023-08-21

## 2023-08-21 NOTE — TELEPHONE ENCOUNTER
Hub staff attempted to follow warm transfer process and was unsuccessful     Caller: Luis Antonio Fairchild    Relationship to patient: Self    Best call back number: [phone]    Patient is needing: PATIENT STATES HE IS STILL WAITING ON SOMEONE TO CALL HIM TO SCHEDULE AN INJECTION FOR HIS RIGHT HIP PAIN.

## 2023-08-23 DIAGNOSIS — M54.16 LUMBAR RADICULOPATHY: Primary | ICD-10-CM

## 2023-08-28 ENCOUNTER — TELEPHONE (OUTPATIENT)
Dept: PAIN MEDICINE | Facility: CLINIC | Age: 63
End: 2023-08-28
Payer: COMMERCIAL

## 2023-08-28 NOTE — TELEPHONE ENCOUNTER
Caller: Gurinder Luis Antonio Lee    Relationship: Self    Best call back number: [phone] (WIFE: AGUSTIN) ; PATIENT GAVE WIFE'S NUMBER    Requested Prescriptions:   Requested Prescriptions      No prescriptions requested or ordered in this encounter      GABAPENTIN 600 MG    Pharmacy where request should be sent: [facility] [address] [place] [address] [phone] [place] [phone]      Last office visit with prescribing clinician: 7/5/2023   Last telemedicine visit with prescribing clinician: Visit date not found   Next office visit with prescribing clinician: 9/22/2023     Additional details provided by patient: PATIENT STATES THE TRAMADOL HAS NOT HELPED HIS PAIN & JUST PUTS HIM TO SLEEP BUT HE DID TAKE A GABAPENTIN 600 MG FROM HIS COUSIN ONE TIME AND STATED IT REALLY HELPED. PATIENT & HIS WIFE STATED A RESPONSE VIA MY CHART WOULD BE FINE.    Does the patient have less than a 3 day supply:  [x] Yes  [] No    Would you like a call back once the refill request has been completed: [x] Yes [] No    If the office needs to give you a call back, can they leave a voicemail: [x] Yes [] No    Sandra Serrato Rep   08/28/23 14:05 EDT          Follow up right away with facial droop, change in vision, pain in eyes        Patient Education     Shingles  Shingles is a viral infection caused by the same virus that causes chicken pox. Anyone who has had chicken pox may get shingles later in life. The virus stays in the body, but remains asleep (dormant). Shingles often occurs in older persons or persons with lowered immunity. But it can affect anyone at any age.  Shingles starts as a tingling patch of skin on one side of the body. Small, painful blisters may then appear. The rash rarely spreads to other parts of the body.  Exposure to shingles can't cause shingles. However, it can cause chicken pox in anyone who has not had chicken pox or has not been vaccinated. The contagious period ends when all blisters have crusted over, generally 1 to 2 weeks after the illness starts.  After the blisters heal, the affected skin may be sensitive or painful for weeks or months, gradually resolving over time. But, sometimes this can last longer and be permanent (called postherpetic neuralgia.)  Shingles vaccines are available. Vaccination can help prevent shingles or make it less painful. It is generally recommended for adults older than 50, even if you've had singles in the past. Talk with your healthcare provider about when to get vaccinated and which vaccine is best for you.  Home care    Medicines may be prescribed to help relieve pain. Take these medicines as directed. Ask your healthcare provider or pharmacist before using over-the-counter medicines for helping treat pain and itching.    In certain cases, antiviral medicines may be prescribed to reduce pain, shorten the illness, and prevent neuralgia. Take these medicines as directed.    Compresses made from a solution of cool water mixed with cornstarch or baking soda may help relieve pain and itching.     Gently wash skin daily with soap and water to help prevent infection. Be certain to rinse off all of the soap,  which can be irritating.    Trim fingernails and try not to scratch. Scratching the sores may leave scars.    Stay home from work or school until all blisters have formed a crust and you are no longer contagious.  Follow-up care  Follow up with your healthcare provider, or as directed.  When to seek medical advice    Fever of 100.4 F (38 C) or higher, or as directed by your healthcare provider    Affected skin is on the face or neck and any of the following occur:  ? Headache  ? Eye pain  ? Changes in vision  ? Sores near the eye  ? Weakness of facial muscles    Blisters occurring on new areas of the body    Pain, redness, or swelling of a joint    Signs of skin infection: colored drainage from the sores, warmth, increasing redness, fever, or increasing pain  Arianne last reviewed this educational content on 4/1/2018 2000-2020 [employer]. All rights reserved. This information is not intended as a substitute for professional medical care. Always follow your healthcare professional's instructions.

## 2023-08-29 ENCOUNTER — LAB (OUTPATIENT)
Dept: LAB | Facility: HOSPITAL | Age: 63
End: 2023-08-29
Payer: COMMERCIAL

## 2023-08-29 DIAGNOSIS — E87.1 HYPONATREMIA: ICD-10-CM

## 2023-08-29 PROCEDURE — 80048 BASIC METABOLIC PNL TOTAL CA: CPT

## 2023-08-29 PROCEDURE — 36415 COLL VENOUS BLD VENIPUNCTURE: CPT

## 2023-08-30 LAB
ANION GAP SERPL CALCULATED.3IONS-SCNC: 13.6 MMOL/L (ref 5–15)
BUN SERPL-MCNC: 30 MG/DL (ref 8–23)
BUN/CREAT SERPL: 31.6 (ref 7–25)
CALCIUM SPEC-SCNC: 9.1 MG/DL (ref 8.6–10.5)
CHLORIDE SERPL-SCNC: 96 MMOL/L (ref 98–107)
CO2 SERPL-SCNC: 21.4 MMOL/L (ref 22–29)
CREAT SERPL-MCNC: 0.95 MG/DL (ref 0.76–1.27)
EGFRCR SERPLBLD CKD-EPI 2021: 89.4 ML/MIN/1.73
GLUCOSE SERPL-MCNC: 203 MG/DL (ref 65–99)
POTASSIUM SERPL-SCNC: 4.2 MMOL/L (ref 3.5–5.2)
SODIUM SERPL-SCNC: 131 MMOL/L (ref 136–145)

## 2023-08-31 ENCOUNTER — HOSPITAL ENCOUNTER (OUTPATIENT)
Dept: GENERAL RADIOLOGY | Facility: HOSPITAL | Age: 63
Discharge: HOME | End: 2023-08-31
Payer: COMMERCIAL

## 2023-08-31 ENCOUNTER — LAB (OUTPATIENT)
Dept: LAB | Facility: HOSPITAL | Age: 63
End: 2023-08-31
Payer: COMMERCIAL

## 2023-08-31 ENCOUNTER — OFFICE VISIT (OUTPATIENT)
Dept: INTERNAL MEDICINE | Facility: CLINIC | Age: 63
End: 2023-08-31
Payer: COMMERCIAL

## 2023-08-31 VITALS
DIASTOLIC BLOOD PRESSURE: 70 MMHG | SYSTOLIC BLOOD PRESSURE: 154 MMHG | TEMPERATURE: 97.8 F | BODY MASS INDEX: 20.65 KG/M2 | HEIGHT: 69 IN | WEIGHT: 139.4 LBS | HEART RATE: 88 BPM | RESPIRATION RATE: 18 BRPM

## 2023-08-31 DIAGNOSIS — K62.5 BRBPR (BRIGHT RED BLOOD PER RECTUM): ICD-10-CM

## 2023-08-31 DIAGNOSIS — M54.41 ACUTE RIGHT-SIDED LOW BACK PAIN WITH RIGHT-SIDED SCIATICA: ICD-10-CM

## 2023-08-31 DIAGNOSIS — M25.431 WRIST SWELLING, RIGHT: Primary | ICD-10-CM

## 2023-08-31 DIAGNOSIS — Z79.899 HIGH RISK MEDICATION USE: ICD-10-CM

## 2023-08-31 DIAGNOSIS — M25.431 WRIST SWELLING, RIGHT: ICD-10-CM

## 2023-08-31 DIAGNOSIS — R31.9 HEMATURIA, UNSPECIFIED TYPE: ICD-10-CM

## 2023-08-31 LAB
AMPHET+METHAMPHET UR QL: NEGATIVE
AMPHETAMINES UR QL: NEGATIVE
BARBITURATES UR QL SCN: NEGATIVE
BENZODIAZ UR QL SCN: NEGATIVE
BUPRENORPHINE SERPL-MCNC: NEGATIVE NG/ML
CANNABINOIDS SERPL QL: POSITIVE
COCAINE UR QL: NEGATIVE
FENTANYL UR-MCNC: NEGATIVE NG/ML
METHADONE UR QL SCN: NEGATIVE
OPIATES UR QL: NEGATIVE
OXYCODONE UR QL SCN: NEGATIVE
PCP UR QL SCN: NEGATIVE
PROPOXYPH UR QL: NEGATIVE
TRICYCLICS UR QL SCN: NEGATIVE

## 2023-08-31 PROCEDURE — 73110 X-RAY EXAM OF WRIST: CPT

## 2023-08-31 PROCEDURE — 85025 COMPLETE CBC W/AUTO DIFF WBC: CPT

## 2023-08-31 PROCEDURE — 99214 OFFICE O/P EST MOD 30 MIN: CPT | Performed by: NURSE PRACTITIONER

## 2023-08-31 PROCEDURE — 80307 DRUG TEST PRSMV CHEM ANLYZR: CPT

## 2023-08-31 RX ORDER — WHEAT DEXTRIN/ASPARTAME 3 G/6 G
1 POWDER IN PACKET (EA) ORAL DAILY
Qty: 28 EACH | Refills: 0 | Status: SHIPPED | OUTPATIENT
Start: 2023-08-31

## 2023-08-31 RX ORDER — GABAPENTIN 100 MG/1
100 CAPSULE ORAL 3 TIMES DAILY
Qty: 90 CAPSULE | Refills: 0 | Status: SHIPPED | OUTPATIENT
Start: 2023-08-31

## 2023-08-31 NOTE — PROGRESS NOTES
"Patient Name: Luis Antonio Fairchild  : 1960   MRN: [id number]     Chief Complaint:    Chief Complaint   Patient presents with    Joint Pain    Blood in Urine    Black or Bloody Stool       History of Present Illness: Luis Antonio Fairchild is a 63 y.o. male presents to clinic today for evaluation of right wrist swelling.     Mr. Fairchild reports his right hip is still painful. He is scheduled to receive another epidural steroid injection on 2023 with Dr. Jackson at pain medicine. The first injection provided relief for 1 day, but the second injection did not touch the pain.     Right wrist swelling  The patient sustained a right distal radius fracture and had his cast removed approximately 3 weeks ago. The orthopedic surgeon, Dr. Atkins told him his right arm would continue to improve after the swelling subsided. He also completed x-rays after the cast was removed which showed no acute findings.     He states he has lost use of his right thumb, and he has been \"working it\" and stretching it since his cast was taken off, but there is no improvement. He notes there is still some swelling in a few places. His right thumb is not tender, but his wrist constantly hurts. He notified Dr. Atkins a couple of weeks after his visit to discuss the problems with his thumb and was told to do some physical therapy.  He has been performing physical therapy at home by stretching and flexing his right thumb and wrist, but nothing is helping. The range of motion in his right thumb is limited and he cannot straighten it.   He did experience a little bit of tingling a few days ago, but it went away and has not occurred since.     Syncopal episode  An MRI of his brain was ordered at his last office visit. His insurance denied the order. He denies any headache, numbness, or tingling. He reports that he has been \"severely weak\" in his bilateral hands and can barely  a 12-pack of Pepsi. His sodium levels have been stable. He is having " difficulty ambulating especially because of his hip and worsening arthritis in his knee. He denies any dizziness, fatigue, vertigo, nausea, emesis, change in speech, balance issues, blurred vision, seizures, or daytime sleepiness.     Elevated glucose   He questions his glucose levels. His glucose has been elevated according to his last few laboratory studies. The only steroids he has taken was in 07/2023. His hemoglobin A1c in 05/2023 was slightly elevated to 5.9 percent.     He mentions that he is tired all the time because he does not sleep well.     Patient had tried gabapentin from one of his friends and had relief.     The patient has a history of throat cancer.     He has used marijuana in the past. He is going to quit.    He has been constipated for a few weeks; he had blood in stool and when he wiped for a few days. Also had hematuria. The hematuria has improved.       Subjective     Review of System: Review of Systems   A review of systems was performed, and the pertinent positives are noted in the HPI.    Medications:     Current Outpatient Medications:     albuterol sulfate HFA (ProAir HFA) 108 (90 Base) MCG/ACT inhaler, Inhale 1-2 puffs every 4-6 hours PRN, Disp: 1 g, Rfl: 5    amLODIPine (NORVASC) 5 MG tablet, Take 1 tablet by mouth once daily, Disp: 90 tablet, Rfl: 0    Budeson-Glycopyrrol-Formoterol (BREZTRI) 160-9-4.8 MCG/ACT aerosol inhaler, Inhale 2 puffs 2 (Two) Times a Day., Disp: 10.7 g, Rfl: 11    Calcium Carbonate-Vit D-Min (CALCIUM 1200 PO), Take  by mouth., Disp: , Rfl:     celecoxib (CeleBREX) 200 MG capsule, Take 1 capsule by mouth twice daily, Disp: 180 capsule, Rfl: 0    citalopram (CeleXA) 40 MG tablet, Take 1 tablet by mouth Daily., Disp: 90 tablet, Rfl: 1    clotrimazole (MYCELEX) 10 MG humza, Take 1 tablet by mouth 4 (Four) Times a Day., Disp: 28 tablet, Rfl: 0    cyclobenzaprine (FLEXERIL) 10 MG tablet, Take 1 tablet by mouth 3 (Three) Times a Day As Needed for Muscle Spasms.,  "Disp: 12 tablet, Rfl: 0    ezetimibe (ZETIA) 10 MG tablet, Take 1 tablet by mouth Daily., Disp: 30 tablet, Rfl: 5    furosemide (Lasix) 20 MG tablet, Take 1 tablet by mouth Daily., Disp: 30 tablet, Rfl: 0    ketorolac (TORADOL) 10 MG tablet, Take 1 tablet by mouth Every 6 (Six) Hours As Needed for Severe Pain., Disp: 20 tablet, Rfl: 0    multivitamin with minerals tablet tablet, Take 1 tablet by mouth Daily for 30 days., Disp: 30 tablet, Rfl: 0    ondansetron (Zofran) 4 MG tablet, Take 1 tablet by mouth Every 8 (Eight) Hours As Needed for Nausea or Vomiting., Disp: 20 tablet, Rfl: 0    pantoprazole (PROTONIX) 40 MG EC tablet, Take 1 tablet by mouth twice daily, Disp: 180 tablet, Rfl: 0    pravastatin (PRAVACHOL) 40 MG tablet, Take 1 tablet by mouth Daily., Disp: 90 tablet, Rfl: 1    sodium chloride 1 g tablet, Take 1 tablet by mouth 2 (Two) Times a Day., Disp: 60 tablet, Rfl: 0    VITAMIN D, CHOLECALCIFEROL, PO, Take  by mouth., Disp: , Rfl:     traMADol (ULTRAM) 50 MG tablet, Take 1 tablet by mouth Every 6 (Six) Hours As Needed for Moderate Pain or Severe Pain., Disp: 40 tablet, Rfl: 0    Wheat Dextrin (Benefiber Drink Mix) pack, Take 1 dose by mouth Daily., Disp: 28 each, Rfl: 0    Allergies:   No Known Allergies    Objective     Physical Exam:   Vital Signs:   Vitals:    08/31/23 1103   BP: 154/70   BP Location: Right arm   Patient Position: Sitting   Cuff Size: Adult   Pulse: 88   Resp: 18   Temp: 97.8 øF (36.6 øC)   TempSrc: Infrared   Weight: 63.2 kg (139 lb 6.4 oz)   Height: 175.3 cm (69\")   PainSc:   9           Physical Exam  Constitutional:       General: He is not in acute distress.     Appearance: He is not ill-appearing.   HENT:      Head: Normocephalic.   Cardiovascular:      Rate and Rhythm: Normal rate and regular rhythm.      Heart sounds: Normal heart sounds. No murmur heard.  Pulmonary:      Breath sounds: Normal breath sounds.   Abdominal:      General: Bowel sounds are normal.      Tenderness: " There is no abdominal tenderness.   Neurological:      General: No focal deficit present.      Mental Status: He is oriented to person, place, and time.   Psychiatric:         Mood and Affect: Mood normal.       Assessment / Plan      Assessment/Plan:   Diagnoses and all orders for this visit:    1. Wrist swelling, right (Primary)  -     XR Wrist 3+ View Right; Future    2. BRBPR (bright red blood per rectum)  -     POC Occult Blood X 3, Stool  -     CBC & Differential; Future  -     Wheat Dextrin (Benefiber Drink Mix) pack; Take 1 dose by mouth Daily.  Dispense: 28 each; Refill: 0    3. High risk medication use  -     Urine Drug Screen - Urine, Clean Catch; Future    4. Hematuria, unspecified type  -     POC Urinalysis Dipstick, Automated         1. Wrist swelling, right  Patient had their cast removed approximately 3 weeks ago after sustaining a right distal radius fracture. He complains today of swelling and inability to move his right thumb. An order has been placed for 3-view x-ray of right wrist. I recommend he complete PT if xray is normal.     2. BRBPR (bright red blood per rectum)  He will start Benefiber. A prescription for wheat dextrin (Benefiber Drink Mix) pack has been sent. Discussed his rectal bleeding could be from a tear or internal hemorrhoids second to constipation. Recent colonoscopy showed internal hemorrhoids. Recommended butt balm and fiber and monitor symptoms.  He will bring back a stool sample. He will complete lab work today to include POC urinalysis dipstick; POC occult blood, stool; CBC and differential, and CBC auto differential.     3. High risk medication use  Patient had tried gabapentin of his friends and had relief. He is requesting a trial of gabapentin. I discussed that he cannot drink alcohol while taking gabapentin and that he should not take medication that is not prescribed. Advised  to avoid marijuana. He will complete lab work today to include urine drug screen. He will  complete CSA. He will start a trial of gabapentin.    Follow Up:   1 month    KALEB Barrios  [facility]    Transcribed from ambient dictation for KALEB Barrios by Amanda Owens.  08/31/23   14:27 EDT    Patient or patient representative verbalized consent to the visit recording.  I have personally performed the services described in this document as transcribed by the above individual, and it is both accurate and complete.

## 2023-09-01 LAB
BASOPHILS # BLD AUTO: 0.08 10*3/MM3 (ref 0–0.2)
BASOPHILS NFR BLD AUTO: 0.9 % (ref 0–1.5)
DEPRECATED RDW RBC AUTO: 44.9 FL (ref 37–54)
EOSINOPHIL # BLD AUTO: 0.28 10*3/MM3 (ref 0–0.4)
EOSINOPHIL NFR BLD AUTO: 3 % (ref 0.3–6.2)
ERYTHROCYTE [DISTWIDTH] IN BLOOD BY AUTOMATED COUNT: 12.5 % (ref 12.3–15.4)
HCT VFR BLD AUTO: 41.8 % (ref 37.5–51)
HGB BLD-MCNC: 14.8 G/DL (ref 13–17.7)
IMM GRANULOCYTES # BLD AUTO: 0.03 10*3/MM3 (ref 0–0.05)
IMM GRANULOCYTES NFR BLD AUTO: 0.3 % (ref 0–0.5)
LYMPHOCYTES # BLD AUTO: 1.74 10*3/MM3 (ref 0.7–3.1)
LYMPHOCYTES NFR BLD AUTO: 18.7 % (ref 19.6–45.3)
MCH RBC QN AUTO: 35.2 PG (ref 26.6–33)
MCHC RBC AUTO-ENTMCNC: 35.4 G/DL (ref 31.5–35.7)
MCV RBC AUTO: 99.5 FL (ref 79–97)
MONOCYTES # BLD AUTO: 0.89 10*3/MM3 (ref 0.1–0.9)
MONOCYTES NFR BLD AUTO: 9.6 % (ref 5–12)
NEUTROPHILS NFR BLD AUTO: 6.29 10*3/MM3 (ref 1.7–7)
NEUTROPHILS NFR BLD AUTO: 67.5 % (ref 42.7–76)
NRBC BLD AUTO-RTO: 0.1 /100 WBC (ref 0–0.2)
PLATELET # BLD AUTO: 489 10*3/MM3 (ref 140–450)
PMV BLD AUTO: 8.5 FL (ref 6–12)
RBC # BLD AUTO: 4.2 10*6/MM3 (ref 4.14–5.8)
WBC NRBC COR # BLD: 9.31 10*3/MM3 (ref 3.4–10.8)

## 2023-09-08 DIAGNOSIS — E87.1 HYPONATREMIA: ICD-10-CM

## 2023-09-08 RX ORDER — SODIUM CHLORIDE 1 G/1
TABLET ORAL
Qty: 60 TABLET | Refills: 0 | Status: SHIPPED | OUTPATIENT
Start: 2023-09-08

## 2023-09-11 DIAGNOSIS — M19.90 OSTEOARTHRITIS, UNSPECIFIED OSTEOARTHRITIS TYPE, UNSPECIFIED SITE: Chronic | ICD-10-CM

## 2023-09-11 RX ORDER — CELECOXIB 200 MG/1
200 CAPSULE ORAL 2 TIMES DAILY
Qty: 180 CAPSULE | Refills: 0 | Status: SHIPPED | OUTPATIENT
Start: 2023-09-11

## 2023-09-14 ENCOUNTER — OUTSIDE FACILITY SERVICE (OUTPATIENT)
Dept: PAIN MEDICINE | Facility: CLINIC | Age: 63
End: 2023-09-14
Payer: COMMERCIAL

## 2023-09-14 ENCOUNTER — DOCUMENTATION (OUTPATIENT)
Dept: PAIN MEDICINE | Facility: CLINIC | Age: 63
End: 2023-09-14

## 2023-09-14 NOTE — PROGRESS NOTES
[facility]  [address] [place] [id number]              PROCEDURE: Fluoroscopically-guided right L4/5 lumbar Transforaminal Epidural Steroid Injection     PRE-OP DIAGNOSIS: Lumbar radiculopathy  POST-OP DIAGNOSIS: Lumbar radiculopathy    BLOOD THINNERS (ANTIPLATELETS/ANTICOAGULANTS): Were discussed with the patient and ELSA Guidelines were followed.  Last dose of Plavix over 7 days ago    CONSENT: Risks, benefits and options were explained to the patient, all questions were answered and written informed consent was obtained.   ANESTHESIA: Local only    PROCEDURE NOTE: A pre-procedural time out was performed to confirm the correct patient, procedure, side, and site. Standard ASA monitors were applied and oxygen via nasal cannula was provided. All proceduralists donned sterile gloves with masks and surgical hats. The patient was placed prone with pillow under the abdomen and all pressure points padded. The patient's lumbar spine was prepped in standard fashion using Chlorhexidine and draped with sterile towels. The target neuroforamen was identified using oblique fluoroscopy and the superior vertebral body endplate squared. The overlying skin and subcutaneous tissue was anesthetized with 1% lidocaine. A 22 gauge 3.5 inch spinal needle with bent tip was incrementally advanced using intermittent fluoroscopy to the 6 o'clock position of the target pedicle in the mid-neuroforamen using oblique, AP and lateral intermittent fluoroscopy. After negative aspiration of blood and cerebrospinal fluid, needle placement was confirmed with 1 ml of omnipaque 180 mgI/ml contrast using AP fluoroscopic imaging. Imaging revealed a clear outline of the target spinal nerve with proximal spread of agent through the neuroforamen medially to the epidural space, without evidence of intravascular or intrathecal spread. After negative aspiration, a mixture containing dexamethasone 5 mg steroid and lidocaine  1% - 1 ml local anesthetic for a total volume of 1.5 ml was injected under direct visualization with fluoroscopy. The needle was flushed, removed and a bandage applied.     EBL: None     COMPLICATIONS: None     The patient was monitored for at least 30 minutes prior to discharge. Vital signs remained stable throughout the procedure and in the recovery area. There were no immediate complications and the patient tolerated the procedure well. Sensory and motor exam was unchanged from baseline. The patient received written discharge instructions prior to discharge.     FOLLOW UP: As scheduled     ADDITIONAL NOTES: Resume Plavix 24 hours        [facility]       Azael Jackson MD     CODES:  [id number]

## 2023-09-18 ENCOUNTER — TELEPHONE (OUTPATIENT)
Dept: PAIN MEDICINE | Facility: CLINIC | Age: 63
End: 2023-09-18
Payer: COMMERCIAL

## 2023-09-18 NOTE — TELEPHONE ENCOUNTER
Provider: DR ALICEA     Caller: ANGELIA NOBLE     Relationship to Patient: SELF    Phone Number: [phone] (home)       Reason for Call: PATIENT IS WONDERING IF DR ALCIEA CAN PRESCRIBE HIM GABAPENTIN 600MG. HIS PCP PRESCRIBED IT ONE TIME FOR HIM IN AUGUST 100MG.     PATIENT REQUESTING A CALL BACK  PLEASE ADVISE

## 2023-09-19 NOTE — TELEPHONE ENCOUNTER
Spoke to patient and let him know Dr. Jackson will need to talk with him about gabapentin at follow up. Patient voiced understanding. I advised him to speak with his PCP or go to Urgent Care if he needs relief right away. Patient voiced understanding.

## 2023-09-21 RX ORDER — FUROSEMIDE 20 MG/1
TABLET ORAL
Qty: 30 TABLET | Refills: 0 | Status: SHIPPED | OUTPATIENT
Start: 2023-09-21

## 2023-09-22 ENCOUNTER — OFFICE VISIT (OUTPATIENT)
Dept: PAIN MEDICINE | Facility: CLINIC | Age: 63
End: 2023-09-22
Payer: COMMERCIAL

## 2023-09-22 VITALS
DIASTOLIC BLOOD PRESSURE: 82 MMHG | OXYGEN SATURATION: 95 % | TEMPERATURE: 96.7 F | HEIGHT: 69 IN | BODY MASS INDEX: 20.88 KG/M2 | SYSTOLIC BLOOD PRESSURE: 142 MMHG | HEART RATE: 86 BPM | WEIGHT: 141 LBS | RESPIRATION RATE: 14 BRPM

## 2023-09-22 DIAGNOSIS — M54.16 LUMBAR RADICULOPATHY: Primary | ICD-10-CM

## 2023-09-22 PROCEDURE — 99214 OFFICE O/P EST MOD 30 MIN: CPT | Performed by: STUDENT IN AN ORGANIZED HEALTH CARE EDUCATION/TRAINING PROGRAM

## 2023-09-22 RX ORDER — GABAPENTIN 600 MG/1
600 TABLET ORAL 3 TIMES DAILY
Qty: 90 TABLET | Refills: 0 | Status: SHIPPED | OUTPATIENT
Start: 2023-09-22

## 2023-09-22 NOTE — PROGRESS NOTES
Primary Physician: Sulma Woodward APRN    CHIEF COMPLAINT or REASON FOR VISIT: Back Pain and Follow-up    Initial HPI 7/5/2023:  Mr. Luis Antonio Fairchild is 63 y.o. male who presents as a new patient referral for evaluation and treatment of 2-month history of right-sided low back pain with radiation into the right lower extremity.  Patient had a recent trip to [place] where he had a bout of pneumonia, subsequently developed hyponatremia for which she was admitted to the hospital.  Approximately 2 months ago he developed new onset right-sided low back pain with radiation of the right lower extremity and lateral ankle.  He reports that this is a shooting electrical type sensation.  He denies any initial inciting event or trauma.  He has tried physical therapy without benefit.  He additionally has recent history of a right thumb fracture currently in thumb spica cast.  He was evaluated by neurosurgery, Annie Torres PA-C, who obtained a lumbar MRI.  Patient does have ongoing treatment for hyponatremia which will need to be stabilized before consideration of any operative intervention.  He does have excessive alcohol consumption.    Interval history: Patient returns to clinic after undergoing a transforaminal epidural followed by interlaminar followed by transforaminal.  Patient did undergo 3 successive interventions in relatively close proximity due to the severity of his issue combined with inability to proceed with surgery while he stabilizes his hyponatremia.  Unfortunately he essentially had no benefit from these injections.  He did however take some gabapentin which significantly helped his pain.  He had used some old gabapentin and has been taking it 600 mg 3 times daily.  He has been using CBD with good effect.    Interventions:  7/18/2023: right L4/5 TFESI with 75% relief 2 to 3 days  8/8/2023: Right paramedian L4/5 LESI with 0% relief  9/14/2023: right L4/5 TFESI with 0% relief    Objective Pain Scoring:    BRIEF PAIN INVENTORY:  Total score:   Pain Score    23 0930   PainSc:   4   PainLoc: Back      PHQ-2: PHQ-2 Total Score: 6  PHQ-9: PHQ-9: Brief Depression Severity Measure Score: 17  Opioid Risk Tool:         Review of Systems:   ROS negative except as otherwise noted     Past Medical History:   Past Medical History:   Diagnosis Date    Anxiety 2016    Arthritis     Asthma     Cancer 2018    Squamous carcinoma oropharynx    Cervical disc disorder     Chronic pain disorder     COPD (chronic obstructive pulmonary disease)     Extremity pain     Fractures 23    Rt wrist    GERD (gastroesophageal reflux disease)     History of IBS     Hyperlipidemia     Hypertension     Irritable bowel syndrome     Joint pain     Low back pain     Lumbosacral disc disease     Neck pain     Osteoarthritis     Pneumonia     Shingles     Spinal stenosis          Past Surgical History:   Past Surgical History:   Procedure Laterality Date    COLONOSCOPY      FRACTURE SURGERY      Lt wrist    ORTHOPEDIC SURGERY      Lft shoulder    SHOULDER SURGERY      Onset Date     THROAT SURGERY      WRIST SURGERY      Onset Date:          Family History   Family History   Problem Relation Age of Onset    Anxiety disorder Mother     Arthritis Mother     Stroke Father             Hypertension Father     Cancer Brother     Anxiety disorder Brother     Cancer Brother                  Social History   Social History     Socioeconomic History    Marital status:    Tobacco Use    Smoking status: Every Day     Packs/day: 0.50     Years: 46.00     Pack years: 23.00     Types: Cigarettes     Start date: 1974    Smokeless tobacco: Never   Vaping Use    Vaping Use: Never used   Substance and Sexual Activity    Alcohol use: Yes     Alcohol/week: 35.0 standard drinks     Types: 35 Cans of beer per week     Comment: nightly    Drug use: Never    Sexual activity: Yes     Partners: Female     Birth  control/protection: None     Comment:          Medications:     Current Outpatient Medications:     albuterol sulfate HFA (ProAir HFA) 108 (90 Base) MCG/ACT inhaler, Inhale 1-2 puffs every 4-6 hours PRN, Disp: 1 g, Rfl: 5    amLODIPine (NORVASC) 5 MG tablet, Take 1 tablet by mouth once daily, Disp: 90 tablet, Rfl: 0    Budeson-Glycopyrrol-Formoterol (BREZTRI) 160-9-4.8 MCG/ACT aerosol inhaler, Inhale 2 puffs 2 (Two) Times a Day., Disp: 10.7 g, Rfl: 11    Calcium Carbonate-Vit D-Min (CALCIUM 1200 PO), Take  by mouth., Disp: , Rfl:     celecoxib (CeleBREX) 200 MG capsule, Take 1 capsule by mouth twice daily, Disp: 180 capsule, Rfl: 0    citalopram (CeleXA) 40 MG tablet, Take 1 tablet by mouth Daily., Disp: 90 tablet, Rfl: 1    ezetimibe (ZETIA) 10 MG tablet, Take 1 tablet by mouth Daily., Disp: 30 tablet, Rfl: 5    furosemide (LASIX) 20 MG tablet, Take 1 tablet by mouth once daily, Disp: 30 tablet, Rfl: 0    gabapentin (NEURONTIN) 100 MG capsule, Take 1 capsule by mouth 3 (Three) Times a Day., Disp: 90 capsule, Rfl: 0    pantoprazole (PROTONIX) 40 MG EC tablet, Take 1 tablet by mouth twice daily, Disp: 180 tablet, Rfl: 0    pravastatin (PRAVACHOL) 40 MG tablet, Take 1 tablet by mouth Daily., Disp: 90 tablet, Rfl: 1    sodium chloride 1 g tablet, Take 1 tablet by mouth twice daily, Disp: 60 tablet, Rfl: 0    VITAMIN D, CHOLECALCIFEROL, PO, Take  by mouth., Disp: , Rfl:     clotrimazole (MYCELEX) 10 MG humza, Take 1 tablet by mouth 4 (Four) Times a Day. (Patient not taking: Reported on 9/22/2023), Disp: 28 tablet, Rfl: 0    cyclobenzaprine (FLEXERIL) 10 MG tablet, Take 1 tablet by mouth 3 (Three) Times a Day As Needed for Muscle Spasms. (Patient not taking: Reported on 9/22/2023), Disp: 12 tablet, Rfl: 0    ketorolac (TORADOL) 10 MG tablet, Take 1 tablet by mouth Every 6 (Six) Hours As Needed for Severe Pain. (Patient not taking: Reported on 9/22/2023), Disp: 20 tablet, Rfl: 0    ondansetron (Zofran) 4 MG  "tablet, Take 1 tablet by mouth Every 8 (Eight) Hours As Needed for Nausea or Vomiting. (Patient not taking: Reported on 9/22/2023), Disp: 20 tablet, Rfl: 0    traMADol (ULTRAM) 50 MG tablet, Take 1 tablet by mouth Every 6 (Six) Hours As Needed for Moderate Pain or Severe Pain. (Patient not taking: Reported on 9/22/2023), Disp: 40 tablet, Rfl: 0    Wheat Dextrin (Benefiber Drink Mix) pack, Take 1 dose by mouth Daily. (Patient not taking: Reported on 9/22/2023), Disp: 28 each, Rfl: 0        Physical Exam:     Vitals:    09/22/23 0930   BP: 142/82   Pulse: 86   Resp: 14   Temp: 96.7 °F (35.9 °C)   SpO2: 95%   Weight: 64 kg (141 lb)   Height: 175.3 cm (69\")   PainSc:   4   PainLoc: Back        General: Alert and oriented, No acute distress.   HEENT: Normocephalic, atraumatic.   Cardiovascular: No gross edema  Respiratory: Respirations are non-labored    Lumbar Spine:   No masses or atrophy  Range of motion - Flexion normal. Extension normal. Right Lat Bending normal. Left Lat Bending normal  Facet Loading: Negative bilaterally  Facet Palpation - Nontender   PSIS tenderness - Negative bilaterally  Choco's/FELICITY/Thigh thrust - Negative bilaterally  Straight leg raise: Positive right  Slump test: Positive right    Motor Exam:    Strength: Rate on 1-5 scale Right Left    C5-Elbow flexion, Deltoid 5 5    C6-Wrist extension 5 5    C7- Elbow / finger extension 5 5    C8- Finger flexion 5 5    T1- Intrinsics hand 5 5    Strength: Rate on 1-5 scale Right Left    L1/2- hip flexion 5 5    L3- knee extension 5 5    L4- ankle dorsiflexion 5 5    L5- great toe extension 5 5    S1- ankle plantarflexion 5 5    Sensory Exam: Paresthesia decree sensation light touch in right leg    Neurologic: Cranial Nerves II-XII are grossly intact.     Clonus -negative bilaterally    Psychiatric: Cooperative.   Gait: Antalgic flexed forward  Assistive Devices: None    Imaging Studies:   Results for orders placed during the hospital encounter of " 06/15/23    MRI Lumbar Spine Without Contrast    Narrative  MRI LUMBAR SPINE WO CONTRAST    Date of Exam: 6/15/2023 6:58 PM EDT    Indication: severe right leg pain..    Comparison: None available.    Technique:  Routine multiplanar/multisequence sequence images of the lumbar spine were obtained without contrast administration.      Findings:  Evaluation is significantly degraded by patient motion. Marrow signal appears maintained without evidence of fracture. Alignment is anatomic without listhesis or subluxation. The conus medullaris and cauda equina nerve roots appear satisfactory.  Multilevel spondylosis is apparent, with areas of involvement noted including    L1-2, no significant spinal canal or neuroforaminal impingement.    L2-3, small disc bulge and bilateral facet arthropathy. There is mild spinal canal narrowing and bilateral neuroforaminal stenosis.    L3-4, small disc bulge, epidural lipomatosis and bilateral facet arthropathy. There is mild to moderate spinal canal narrowing in addition to mild left and moderate right neuroforaminal narrowing.    L4-5, circumferential disc bulge, with protrusion eccentric to the right and bilateral facet arthropathy. There is also a component of epidural lipomatosis. There is moderate spinal canal narrowing in addition to apparent severe right and moderate left  neuroforaminal stenosis.    L5-S1, small disc bulge and bilateral facet arthropathy. The spinal canal is patent. There is mild bilateral neuroforaminal narrowing    Impression  Impression:  Severely motion degraded exam demonstrating multilevel lumbar spondylosis as above. Areas of moderate spinal canal narrowing are present and there is apparent severe narrowing of the right neural foramen at L4-5.    Electronically Signed: Davide Alvarez  6/16/2023 12:06 AM EDT  Workstation ID: [id number]      Impression & Plan:   7/5/2023: Mr. Luis Antonio Fairchild is a 63 y.o. male with past medical history significant for anxiety,  Anderson's esophagus, HLD, HTN, COPD, GERD, tobacco use, hyponatremia, alcohol abuse who presents to the pain clinic for evaluation and treatment of right-sided low back pain with radiation to the right lower extremity.  I personally reviewed and interpreted his lumbar MRI dated Soledad 15, 2023: Limited study secondary to motion artifact; minimal DDD; mild L3/4 canal stenosis; probable neuroforaminal stenosis at right L4/5 and L5/S1.  Clinical examination consistent with lumbar radiculopathy.  We discussed epidural steroid injection to improve pain.  If greater than 50% relief for at least 2-3 months can consider repeat as needed every 3 to 4 months.  I had an in-depth discussion with the patient regarding the risks of the procedure including bleeding, infection, damage to surrounding structures, paralysis.  We discussed the potential adverse effects of corticosteroid injection including flushing of the face, lipodystrophy, skin discoloration, elevated blood glucose, increased blood pressure.  Risks of frequent steroid administration include weight gain, hormonal changes, mood changes, osteoporosis.  9/22/2023: No benefit from epidural steroid.  Will not repeat.  We will start gabapentin; patient has tolerated 600 mg 3 times daily without side effect.      1. Lumbar radiculopathy          PLAN:  1. Medication Recommendations: Recommend Voltaren topical, NSAIDs, Tylenol.  Can trial turmeric 500 mg twice daily if NSAID contraindicated.  Start gabapentin 600 mg 3 times daily no refills.  As part of this patient's treatment plan, patient will be prescribed controlled substances. The patient has been made aware of appropriate use of such medications, including potential risk of somnolence, limited ability to drive and /or work safely, and potential for dependence or overdose. It has also been made clear that these medications are for use by this patient only, without concomitant use of alcohol or other substances unless  prescribed.Controlled substance status of medication discussed with patient, discussed risks of medication including abuse potential and diversion potential and need to follow up for reevaluation appointment in order to receive further refills.  Myron was reviewed and compliant.    2. Physical Therapy: Continue HEP    3. Psychological: defer    4. Complementary and alternative (CAM) Therapies:     5. Labs: None indicated at this time.  Will obtain compliance UDS at next office visit.  If THC positive will  on using different CBD    6. Imaging: None indicated    7. Interventions: None indicated    8. Referrals: Referral back to Annie Torres    9. Records requested: n/a    10. Lifestyle goals: Reduce alcohol consumption, smoking    Follow-up 1 month for medication management      [facility]  Azael Jackson MD

## 2023-09-28 ENCOUNTER — TELEPHONE (OUTPATIENT)
Dept: INTERNAL MEDICINE | Facility: CLINIC | Age: 63
End: 2023-09-28
Payer: COMMERCIAL

## 2023-09-28 DIAGNOSIS — E87.1 HYPONATREMIA: Primary | ICD-10-CM

## 2023-09-28 NOTE — TELEPHONE ENCOUNTER
Patient demonstrates understanding and agreement of message. He states that he no longer needs Sulma to prescribe gabapentin as his pain management provider will prescribe it for him now.

## 2023-09-28 NOTE — TELEPHONE ENCOUNTER
I would still like to check stool; I put in a reoccurring order for sodium every two weeks as needed.

## 2023-09-28 NOTE — TELEPHONE ENCOUNTER
Caller: Luis Antonio Fairchild    Relationship: Self    Best call back number: [phone]     What is the best time to reach you: ANYTIME    Who are you requesting to speak with (clinical staff, provider,  specific staff member): PCP/MA        What was the call regarding: PATIENT STATED HE CAME TO DO BLOOD WORK YESTERDAY AND WAS TOLD THE ORDERS HAD BEEN CANCELLED. PATIENT WOULD LIKE A CALLBACK TODAY PLEASE    Is it okay if the provider responds through MyChart: CALLBACK

## 2023-09-28 NOTE — TELEPHONE ENCOUNTER
I have called and left a message with return phone number     HUB OK TO READ:  There are orders for a A1c, urinalysis and blood in stool. Does he want to recheck sodium?

## 2023-09-28 NOTE — TELEPHONE ENCOUNTER
Name: Luis Antonio Fairchild  Relationship: Self  Best Callback Number: [phone]  HUB PROVIDED THE RELAY MESSAGE FROM THE OFFICE  PATIENT EXPRESSED UNDERSTANDING. YES, PATIENT DOES WANT HIS SODIUM RE-CHECKED IF YOU WILL PLEASE ADD THAT TO THE LAB ORDERS. WANTED TO LET YOU KNOW THERE IS NO MORE BLOOD VISIBLE IN HIS STOOL.

## 2023-10-03 ENCOUNTER — TELEPHONE (OUTPATIENT)
Dept: PAIN MEDICINE | Facility: CLINIC | Age: 63
End: 2023-10-03
Payer: COMMERCIAL

## 2023-10-03 NOTE — TELEPHONE ENCOUNTER
Patient called wanting to know if he could be referred to someone in Neurosurgery who could see him sooner. I explained that he would have to call [facility] in order to attempt to get an earlier appointment.

## 2023-10-08 DIAGNOSIS — E87.1 HYPONATREMIA: ICD-10-CM

## 2023-10-09 ENCOUNTER — LAB (OUTPATIENT)
Dept: LAB | Facility: HOSPITAL | Age: 63
End: 2023-10-09
Payer: COMMERCIAL

## 2023-10-09 DIAGNOSIS — E87.1 HYPONATREMIA: ICD-10-CM

## 2023-10-09 PROCEDURE — 80048 BASIC METABOLIC PNL TOTAL CA: CPT

## 2023-10-09 RX ORDER — SODIUM CHLORIDE 1 G/1
TABLET ORAL
Qty: 60 TABLET | Refills: 0 | Status: SHIPPED | OUTPATIENT
Start: 2023-10-09

## 2023-10-10 LAB
ANION GAP SERPL CALCULATED.3IONS-SCNC: 11 MMOL/L (ref 5–15)
BUN SERPL-MCNC: 20 MG/DL (ref 8–23)
BUN/CREAT SERPL: 23.5 (ref 7–25)
CALCIUM SPEC-SCNC: 9.6 MG/DL (ref 8.6–10.5)
CHLORIDE SERPL-SCNC: 93 MMOL/L (ref 98–107)
CO2 SERPL-SCNC: 26 MMOL/L (ref 22–29)
CREAT SERPL-MCNC: 0.85 MG/DL (ref 0.76–1.27)
EGFRCR SERPLBLD CKD-EPI 2021: 97 ML/MIN/1.73
GLUCOSE SERPL-MCNC: 88 MG/DL (ref 65–99)
POTASSIUM SERPL-SCNC: 5 MMOL/L (ref 3.5–5.2)
SODIUM SERPL-SCNC: 130 MMOL/L (ref 136–145)

## 2023-10-11 ENCOUNTER — TELEPHONE (OUTPATIENT)
Dept: INTERNAL MEDICINE | Facility: CLINIC | Age: 63
End: 2023-10-11
Payer: COMMERCIAL

## 2023-10-11 NOTE — TELEPHONE ENCOUNTER
----- Message from KALEB Barrios sent at 10/10/2023  9:32 PM EDT -----  The test results show that your labs are stable; recheck in two weeks or sooner if worsening symptoms.

## 2023-10-11 NOTE — TELEPHONE ENCOUNTER
Tried to reach patient no answer left voicemail to return call    RELAY: The test results show that your labs are stable; recheck in two weeks or sooner if worsening symptoms.

## 2023-10-12 ENCOUNTER — LAB (OUTPATIENT)
Dept: INTERNAL MEDICINE | Facility: CLINIC | Age: 63
End: 2023-10-12
Payer: COMMERCIAL

## 2023-10-12 DIAGNOSIS — K62.5 BRBPR (BRIGHT RED BLOOD PER RECTUM): ICD-10-CM

## 2023-10-12 LAB
EXPIRATION DATE 2: ABNORMAL
EXPIRATION DATE 3: ABNORMAL
EXPIRATION DATE: ABNORMAL
GASTROCULT GAST QL: POSITIVE
HEMOCCULT SP2 STL QL: POSITIVE
HEMOCCULT SP3 STL QL: POSITIVE
Lab: ABNORMAL

## 2023-10-12 PROCEDURE — 82274 ASSAY TEST FOR BLOOD FECAL: CPT | Performed by: NURSE PRACTITIONER

## 2023-10-12 NOTE — TELEPHONE ENCOUNTER
I have called and notified Luis Antonio of his results and he demonstrates appreciation and understanding.

## 2023-10-13 ENCOUNTER — LAB (OUTPATIENT)
Dept: LAB | Facility: HOSPITAL | Age: 63
End: 2023-10-13
Payer: COMMERCIAL

## 2023-10-13 ENCOUNTER — OFFICE VISIT (OUTPATIENT)
Dept: NEUROSURGERY | Facility: CLINIC | Age: 63
End: 2023-10-13
Payer: COMMERCIAL

## 2023-10-13 ENCOUNTER — OFFICE VISIT (OUTPATIENT)
Dept: PAIN MEDICINE | Facility: CLINIC | Age: 63
End: 2023-10-13
Payer: COMMERCIAL

## 2023-10-13 VITALS
SYSTOLIC BLOOD PRESSURE: 146 MMHG | WEIGHT: 141.6 LBS | RESPIRATION RATE: 18 BRPM | DIASTOLIC BLOOD PRESSURE: 82 MMHG | OXYGEN SATURATION: 96 % | TEMPERATURE: 96 F | HEIGHT: 69 IN | HEART RATE: 79 BPM | BODY MASS INDEX: 20.97 KG/M2

## 2023-10-13 VITALS
SYSTOLIC BLOOD PRESSURE: 120 MMHG | WEIGHT: 142 LBS | RESPIRATION RATE: 14 BRPM | BODY MASS INDEX: 21.03 KG/M2 | OXYGEN SATURATION: 98 % | TEMPERATURE: 97.3 F | HEIGHT: 69 IN | DIASTOLIC BLOOD PRESSURE: 62 MMHG | HEART RATE: 85 BPM

## 2023-10-13 DIAGNOSIS — G89.29 CHRONIC RIGHT-SIDED LOW BACK PAIN WITH RIGHT-SIDED SCIATICA: ICD-10-CM

## 2023-10-13 DIAGNOSIS — M51.36 DDD (DEGENERATIVE DISC DISEASE), LUMBAR: Primary | ICD-10-CM

## 2023-10-13 DIAGNOSIS — Z00.8 ENCOUNTER FOR PRE-SURGICAL PSYCHOLOGICAL ASSESSMENT: Primary | ICD-10-CM

## 2023-10-13 DIAGNOSIS — Z51.81 ENCOUNTER FOR THERAPEUTIC DRUG LEVEL MONITORING: ICD-10-CM

## 2023-10-13 DIAGNOSIS — M54.41 CHRONIC RIGHT-SIDED LOW BACK PAIN WITH RIGHT-SIDED SCIATICA: ICD-10-CM

## 2023-10-13 DIAGNOSIS — M25.551 RIGHT HIP PAIN: ICD-10-CM

## 2023-10-13 DIAGNOSIS — M54.16 LUMBAR RADICULOPATHY: Primary | ICD-10-CM

## 2023-10-13 DIAGNOSIS — Z51.81 ENCOUNTER FOR THERAPEUTIC DRUG LEVEL MONITORING: Primary | ICD-10-CM

## 2023-10-13 PROCEDURE — 80307 DRUG TEST PRSMV CHEM ANLYZR: CPT

## 2023-10-13 PROCEDURE — 99214 OFFICE O/P EST MOD 30 MIN: CPT | Performed by: STUDENT IN AN ORGANIZED HEALTH CARE EDUCATION/TRAINING PROGRAM

## 2023-10-13 RX ORDER — GABAPENTIN 800 MG/1
800 TABLET ORAL 3 TIMES DAILY
Qty: 90 TABLET | Refills: 2 | Status: SHIPPED | OUTPATIENT
Start: 2023-10-13 | End: 2023-10-16

## 2023-10-13 NOTE — PROGRESS NOTES
Primary Physician: Sulma Woodward APRN    CHIEF COMPLAINT or REASON FOR VISIT: Back Pain (Med refill) and Follow-up    Initial HPI 7/5/2023:  Mr. Luis Antonio Fairchild is 63 y.o. male who presents as a new patient referral for evaluation and treatment of 2-month history of right-sided low back pain with radiation into the right lower extremity.  Patient had a recent trip to [place] where he had a bout of pneumonia, subsequently developed hyponatremia for which she was admitted to the hospital.  Approximately 2 months ago he developed new onset right-sided low back pain with radiation of the right lower extremity and lateral ankle.  He reports that this is a shooting electrical type sensation.  He denies any initial inciting event or trauma.  He has tried physical therapy without benefit.  He additionally has recent history of a right thumb fracture currently in thumb spica cast.  He was evaluated by neurosurgery, Annie Torres PA-C, who obtained a lumbar MRI.  Patient does have ongoing treatment for hyponatremia which will need to be stabilized before consideration of any operative intervention.  He does have excessive alcohol consumption.    Interval history: Patient returns to clinic.  He has had neurosurgical evaluation with Raghu Lynn PA-C, no surgery recommended at this time.  Continues to have significant radicular symptoms refractory to epidural steroid injection.  He is interested in spinal cord stimulation after discussing with neurosurgery.    Interventions:  7/18/2023: right L4/5 TFESI with 75% relief 2 to 3 days  8/8/2023: Right paramedian L4/5 LESI with 0% relief  9/14/2023: right L4/5 TFESI with 0% relief    Objective Pain Scoring:   BRIEF PAIN INVENTORY:  Total score:   Pain Score    10/13/23 1045   PainSc:   8   PainLoc: Back      PHQ-2: PHQ-2 Total Score: 5  PHQ-9: PHQ-9: Brief Depression Severity Measure Score: 19  Opioid Risk Tool:         Review of Systems:   ROS negative except as otherwise  noted     Past Medical History:   Past Medical History:   Diagnosis Date    Anxiety 2016    Arthritis     Asthma     Cancer 2018    Squamous carcinoma oropharynx    Cervical disc disorder     Chronic pain disorder     COPD (chronic obstructive pulmonary disease)     Extremity pain     Fractures 23    Rt wrist    GERD (gastroesophageal reflux disease) 2014    History of IBS     Hyperlipidemia     Hypertension     Irritable bowel syndrome     Joint pain     Low back pain     Lumbosacral disc disease     Neck pain     Osteoarthritis     Pneumonia     Shingles     Spinal stenosis          Past Surgical History:   Past Surgical History:   Procedure Laterality Date    COLONOSCOPY      FRACTURE SURGERY      Lt wrist    ORTHOPEDIC SURGERY      Lft shoulder    SHOULDER SURGERY      Onset Date     THROAT SURGERY      WRIST SURGERY      Onset Date:          Family History   Family History   Problem Relation Age of Onset    Anxiety disorder Mother     Arthritis Mother     Stroke Father             Hypertension Father     Cancer Brother     Anxiety disorder Brother     Cancer Brother                  Social History   Social History     Socioeconomic History    Marital status:    Tobacco Use    Smoking status: Every Day     Packs/day: 0.50     Years: 46.00     Additional pack years: 0.00     Total pack years: 23.00     Types: Cigarettes     Start date: 1974    Smokeless tobacco: Never   Vaping Use    Vaping Use: Never used   Substance and Sexual Activity    Alcohol use: Yes     Alcohol/week: 35.0 standard drinks of alcohol     Types: 35 Cans of beer per week     Comment: nightly    Drug use: Never    Sexual activity: Yes     Partners: Female     Birth control/protection: None     Comment:          Medications:     Current Outpatient Medications:     albuterol sulfate HFA (ProAir HFA) 108 (90 Base) MCG/ACT inhaler, Inhale 1-2 puffs every 4-6 hours PRN, Disp: 1 g, Rfl: 5     "amLODIPine (NORVASC) 5 MG tablet, Take 1 tablet by mouth once daily, Disp: 90 tablet, Rfl: 0    Budeson-Glycopyrrol-Formoterol (BREZTRI) 160-9-4.8 MCG/ACT aerosol inhaler, Inhale 2 puffs 2 (Two) Times a Day., Disp: 10.7 g, Rfl: 11    Calcium Carbonate-Vit D-Min (CALCIUM 1200 PO), Take  by mouth., Disp: , Rfl:     celecoxib (CeleBREX) 200 MG capsule, Take 1 capsule by mouth twice daily, Disp: 180 capsule, Rfl: 0    citalopram (CeleXA) 40 MG tablet, Take 1 tablet by mouth Daily., Disp: 90 tablet, Rfl: 1    ezetimibe (ZETIA) 10 MG tablet, Take 1 tablet by mouth Daily., Disp: 30 tablet, Rfl: 5    furosemide (LASIX) 20 MG tablet, Take 1 tablet by mouth once daily, Disp: 30 tablet, Rfl: 0    gabapentin (NEURONTIN) 600 MG tablet, Take 1 tablet by mouth 3 (Three) Times a Day., Disp: 90 tablet, Rfl: 0    pantoprazole (PROTONIX) 40 MG EC tablet, Take 1 tablet by mouth twice daily, Disp: 180 tablet, Rfl: 0    pravastatin (PRAVACHOL) 40 MG tablet, Take 1 tablet by mouth Daily., Disp: 90 tablet, Rfl: 1    sodium chloride 1 g tablet, Take 1 tablet by mouth twice daily, Disp: 60 tablet, Rfl: 0    traMADol (ULTRAM) 50 MG tablet, Take 1 tablet by mouth Every 6 (Six) Hours As Needed for Moderate Pain or Severe Pain., Disp: 40 tablet, Rfl: 0    VITAMIN D, CHOLECALCIFEROL, PO, Take  by mouth., Disp: , Rfl:         Physical Exam:     Vitals:    10/13/23 1045   BP: 146/82   Pulse: 79   Resp: 18   Temp: 96 øF (35.6 øC)   SpO2: 96%   Weight: 64.2 kg (141 lb 9.6 oz)   Height: 175.3 cm (69\")   PainSc:   8   PainLoc: Back        General: Alert and oriented, No acute distress.   HEENT: Normocephalic, atraumatic.   Cardiovascular: No gross edema  Respiratory: Respirations are non-labored    Lumbar Spine:   No masses or atrophy  Range of motion - Flexion normal. Extension normal. Right Lat Bending normal. Left Lat Bending normal  Facet Loading: Negative bilaterally  Facet Palpation - Nontender   PSIS tenderness - Negative " bilaterally  Choco's/FELICITY/Thigh thrust - Negative bilaterally  Straight leg raise: Positive right  Slump test: Positive right    Motor Exam:    Strength: Rate on 1-5 scale Right Left    C5-Elbow flexion, Deltoid 5 5    C6-Wrist extension 5 5    C7- Elbow / finger extension 5 5    C8- Finger flexion 5 5    T1- Intrinsics hand 5 5    Strength: Rate on 1-5 scale Right Left    L1/2- hip flexion 5 5    L3- knee extension 5 5    L4- ankle dorsiflexion 5 5    L5- great toe extension 5 5    S1- ankle plantarflexion 5 5    Sensory Exam: Paresthesia decree sensation light touch in right leg    Neurologic: Cranial Nerves II-XII are grossly intact.     Clonus -negative bilaterally    Psychiatric: Cooperative.   Gait: Antalgic flexed forward  Assistive Devices: None    Imaging Studies:   Results for orders placed during the hospital encounter of 06/15/23    MRI Lumbar Spine Without Contrast    Narrative  MRI LUMBAR SPINE WO CONTRAST    Date of Exam: 6/15/2023 6:58 PM EDT    Indication: severe right leg pain..    Comparison: None available.    Technique:  Routine multiplanar/multisequence sequence images of the lumbar spine were obtained without contrast administration.      Findings:  Evaluation is significantly degraded by patient motion. Marrow signal appears maintained without evidence of fracture. Alignment is anatomic without listhesis or subluxation. The conus medullaris and cauda equina nerve roots appear satisfactory.  Multilevel spondylosis is apparent, with areas of involvement noted including    L1-2, no significant spinal canal or neuroforaminal impingement.    L2-3, small disc bulge and bilateral facet arthropathy. There is mild spinal canal narrowing and bilateral neuroforaminal stenosis.    L3-4, small disc bulge, epidural lipomatosis and bilateral facet arthropathy. There is mild to moderate spinal canal narrowing in addition to mild left and moderate right neuroforaminal narrowing.    L4-5, circumferential  disc bulge, with protrusion eccentric to the right and bilateral facet arthropathy. There is also a component of epidural lipomatosis. There is moderate spinal canal narrowing in addition to apparent severe right and moderate left  neuroforaminal stenosis.    L5-S1, small disc bulge and bilateral facet arthropathy. The spinal canal is patent. There is mild bilateral neuroforaminal narrowing    Impression  Impression:  Severely motion degraded exam demonstrating multilevel lumbar spondylosis as above. Areas of moderate spinal canal narrowing are present and there is apparent severe narrowing of the right neural foramen at L4-5.    Electronically Signed: Davide Alvarez  6/16/2023 12:06 AM EDT  Workstation ID: [id number]      Impression & Plan:   7/5/2023: Mr. Luis Antonio Fairchild is a 63 y.o. male with past medical history significant for anxiety, Anderson's esophagus, HLD, HTN, COPD, GERD, tobacco use, hyponatremia, alcohol abuse who presents to the pain clinic for evaluation and treatment of right-sided low back pain with radiation to the right lower extremity.  I personally reviewed and interpreted his lumbar MRI dated Soledad 15, 2023: Limited study secondary to motion artifact; minimal DDD; mild L3/4 canal stenosis; probable neuroforaminal stenosis at right L4/5 and L5/S1.  Clinical examination consistent with lumbar radiculopathy.  We discussed epidural steroid injection to improve pain.  If greater than 50% relief for at least 2-3 months can consider repeat as needed every 3 to 4 months.  I had an in-depth discussion with the patient regarding the risks of the procedure including bleeding, infection, damage to surrounding structures, paralysis.  We discussed the potential adverse effects of corticosteroid injection including flushing of the face, lipodystrophy, skin discoloration, elevated blood glucose, increased blood pressure.  Risks of frequent steroid administration include weight gain, hormonal changes, mood changes,  osteoporosis.  9/22/2023: No benefit from epidural steroid.  Will not repeat.  We will start gabapentin; patient has tolerated 600 mg 3 times daily without side effect.  10/13/2023: I discussed the patient's treatment plan with Raghu Lynn PA-C, neurosurgery, who recommends spinal cord stimulation trial at this time.  I had an in-depth discussion with the patient regarding the risks of the procedure including bleeding, infection, damage to surrounding structures, paralysis.  Will refer for psychiatric clearance.  Additionally will obtain new UDS now that patient has stopped his previous CBD which apparently contained THC.  We will plan to increase gabapentin to 800 mg      1. Lumbar radiculopathy            PLAN:  1. Medication Recommendations: Recommend Voltaren topical, NSAIDs, Tylenol.  Can trial turmeric 500 mg twice daily if NSAID contraindicated.  Increase to gabapentin 800 mg 3 times daily no refills.  As part of this patient's treatment plan, patient will be prescribed controlled substances. The patient has been made aware of appropriate use of such medications, including potential risk of somnolence, limited ability to drive and /or work safely, and potential for dependence or overdose. It has also been made clear that these medications are for use by this patient only, without concomitant use of alcohol or other substances unless prescribed.Controlled substance status of medication discussed with patient, discussed risks of medication including abuse potential and diversion potential and need to follow up for reevaluation appointment in order to receive further refills.  Myron was reviewed and compliant.  Addendum 10/16/2023: Gabapentin discontinued due to to continued presence of THC.      2. Physical Therapy: Continue HEP    3. Psychological: Referral for SCS clearance    4. Complementary and alternative (CAM) Therapies:     5. Labs: UDS showed THC secondary to CBD.  Patient has since stopped that  CBD, will obtain new UDS.  10/16/2023: UDS with THC.    6. Imaging: None indicated    7. Interventions: We will plan for SCS trial with [facility] pending psychiatric clearance    8. Referrals: I discussed management with neurosurgery    9. Records requested: n/a    10. Lifestyle goals: Reduce alcohol consumption, smoking    Follow-up 3 months for medication management      [facility]  Azael Jackson MD

## 2023-10-13 NOTE — PROGRESS NOTES
Office Visit      Date: 10/13/2023  Patient Name: Luis Antonio Fairchild  : 1960   MRN: [id number]     Chief Complaint:    Chief Complaint   Patient presents with    Back Pain    Leg Pain     RLE       History of Present Illness: Luis Antonio Fairchild is a 63 y.o. male who is well-known patient to neurosurgical practice being last seen by my colleague Ms. Torres in the office in 2023.  This is a 63-year-old gentleman with some persistent low back pain, right hip pain and right leg pain that reflects underlying lumbar radiculopathy.  He endorses weight with weightbearing on the right leg, he does not walk with a cane today but is used in the past.  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  He has received 3 CARLI's by Dr. Jackson of pain management, he states these provided little to no relief.  He still endorses quite severe right hip and right leg pain that extends anterior laterally down the right leg, denies any numbness or tingling, admits to subjective lower extremity weakness, denies any upper extremity weakness, denies any numbness or tingling currently, denies urinary bladder or bowel dysfunction.  Patient states that he has used gabapentin for his neuropathic pain in the past which he did experience some relief from initially, he states that the gabapentin dose he is currently on of 600 mg 3 times daily provides no additional relief.    Subjective   Review of Systems:  Review of Systems   Constitutional:  Positive for activity change and appetite change. Negative for chills, diaphoresis, fatigue, fever and unexpected weight change.   HENT:  Negative for congestion, dental problem, drooling, ear discharge, ear pain, facial swelling, hearing loss, mouth sores, nosebleeds, postnasal drip, rhinorrhea, sinus pressure,  sinus pain, sneezing, sore throat, tinnitus, trouble swallowing and voice change.    Eyes:  Negative for photophobia, pain, discharge, redness, itching and visual disturbance.   Respiratory:  Positive for cough and shortness of breath. Negative for apnea, choking, chest tightness, wheezing and stridor.    Cardiovascular:  Negative for chest pain, palpitations and leg swelling.   Gastrointestinal:  Positive for blood in stool and diarrhea. Negative for abdominal distention, abdominal pain, anal bleeding, constipation, nausea, rectal pain and vomiting.   Endocrine: Negative for cold intolerance, heat intolerance, polydipsia, polyphagia and polyuria.   Genitourinary:  Negative for decreased urine volume, difficulty urinating, dysuria, enuresis, flank pain, frequency, genital sores, hematuria, penile discharge, penile pain, penile swelling, scrotal swelling, testicular pain and urgency.   Musculoskeletal:  Positive for arthralgias. Negative for back pain, gait problem, joint swelling, myalgias, neck pain and neck stiffness.   Skin:  Negative for color change, pallor, rash and wound.   Allergic/Immunologic: Negative for environmental allergies, food allergies and immunocompromised state.   Neurological:  Negative for dizziness, tremors, seizures, syncope, facial asymmetry, speech difficulty, weakness, light-headedness, numbness and headaches.   Hematological:  Negative for adenopathy. Does not bruise/bleed easily.   Psychiatric/Behavioral:  Negative for agitation, behavioral problems, confusion, decreased concentration, dysphoric mood, hallucinations, self-injury, sleep disturbance and suicidal ideas. The patient is not nervous/anxious and is not hyperactive.         Past Medical History:  Past Medical History:   Diagnosis Date    Anxiety 2016    Arthritis     Asthma     Cancer 2018    Squamous carcinoma oropharynx    Cervical disc disorder     Chronic pain disorder     COPD (chronic obstructive pulmonary disease) 2021     Extremity pain     Fractures 06/14/23    Rt wrist    GERD (gastroesophageal reflux disease) 2014    History of IBS     Hyperlipidemia     Hypertension     Irritable bowel syndrome     Joint pain     Low back pain     Lumbosacral disc disease     Neck pain     Osteoarthritis     Pneumonia 2021    Shingles     Spinal stenosis        Past Surgical History:  Past Surgical History:   Procedure Laterality Date    COLONOSCOPY      FRACTURE SURGERY      Lt wrist    ORTHOPEDIC SURGERY      Lft shoulder    SHOULDER SURGERY      Onset Date 2008    THROAT SURGERY      WRIST SURGERY      Onset Date: 2012       Medications    Current Outpatient Medications:     albuterol sulfate HFA (ProAir HFA) 108 (90 Base) MCG/ACT inhaler, Inhale 1-2 puffs every 4-6 hours PRN, Disp: 1 g, Rfl: 5    amLODIPine (NORVASC) 5 MG tablet, Take 1 tablet by mouth once daily, Disp: 90 tablet, Rfl: 0    Budeson-Glycopyrrol-Formoterol (BREZTRI) 160-9-4.8 MCG/ACT aerosol inhaler, Inhale 2 puffs 2 (Two) Times a Day., Disp: 10.7 g, Rfl: 11    Calcium Carbonate-Vit D-Min (CALCIUM 1200 PO), Take  by mouth., Disp: , Rfl:     celecoxib (CeleBREX) 200 MG capsule, Take 1 capsule by mouth twice daily, Disp: 180 capsule, Rfl: 0    citalopram (CeleXA) 40 MG tablet, Take 1 tablet by mouth Daily., Disp: 90 tablet, Rfl: 1    ezetimibe (ZETIA) 10 MG tablet, Take 1 tablet by mouth Daily., Disp: 30 tablet, Rfl: 5    furosemide (LASIX) 20 MG tablet, Take 1 tablet by mouth once daily, Disp: 30 tablet, Rfl: 0    gabapentin (NEURONTIN) 600 MG tablet, Take 1 tablet by mouth 3 (Three) Times a Day., Disp: 90 tablet, Rfl: 0    pantoprazole (PROTONIX) 40 MG EC tablet, Take 1 tablet by mouth twice daily, Disp: 180 tablet, Rfl: 0    pravastatin (PRAVACHOL) 40 MG tablet, Take 1 tablet by mouth Daily., Disp: 90 tablet, Rfl: 1    sodium chloride 1 g tablet, Take 1 tablet by mouth twice daily, Disp: 60 tablet, Rfl: 0    traMADol (ULTRAM) 50 MG tablet, Take 1 tablet by mouth Every 6  (Six) Hours As Needed for Moderate Pain or Severe Pain., Disp: 40 tablet, Rfl: 0    VITAMIN D, CHOLECALCIFEROL, PO, Take  by mouth., Disp: , Rfl:     Allergies:  No Known Allergies    Social Hx:  Social History     Tobacco Use    Smoking status: Every Day     Packs/day: 0.50     Years: 46.00     Additional pack years: 0.00     Total pack years: 23.00     Types: Cigarettes     Start date: 1974    Smokeless tobacco: Never   Vaping Use    Vaping Use: Never used   Substance Use Topics    Alcohol use: Yes     Alcohol/week: 35.0 standard drinks of alcohol     Types: 35 Cans of beer per week     Comment: nightly    Drug use: Never       Family Hx:  Family History   Problem Relation Age of Onset    Anxiety disorder Mother     Arthritis Mother     Stroke Father             Hypertension Father     Cancer Brother     Anxiety disorder Brother     Cancer Brother                Objective     Vitals:    10/13/23 0949   BP: 120/62   Pulse: 85   Resp: 14   Temp: 97.3 øF (36.3 øC)   SpO2: 98%     Body mass index is 20.97 kg/mý.    Physical examination:  General Appearance:  Well developed, well nourished, well groomed, alert, and cooperative.  HEENT- normocephalic, atraumatic, sclera clear  Lungs-normal expansion, not in any acute respiratory distress, productive cough noted today, conversational dyspnea noted today  Heart-regular rate and rhythm  Extremities-positive pulses, no edema    Neurologic Exam    WDWN  A/A/C, speech clear, attention normal, conversant, answers questions appropriately, good historian.  Cranial nerves II through XII are intact.  Motor examination does not reveal weakness in the , upper or lower extremities.   Sensation is intact.  Gait is normal, balance is normal.   No tremors are noted.  Reflexes are intact.   Askew is negative. Clonus is negative.   Minimal palpation to the left paraspinal musculature of the lower lumbar spine  Positive SLR on the right    Review of imaging: I  reviewed the patient's MRI of the lumbar spine that was performed on 6/15/2023 along with his corresponding radiological report.  This scan is severely limited by motion artifact, he has multilevel degenerative disc disease from L2-L3 through L5-S1, L2-L3 there is a mild broad-based disc bulge, mild bilateral facet arthropathy no nerve root compression, L3-4 broad-based disc bulge facet arthropathy that is mild bilateral foraminal narrowing, L4-L5 which I think is the patient's major problem is a broad-based disc bulge with associated rightward foraminal narrowing creating nerve root compression, mild bilateral facet arthropathy L5-S1, flexion-extension x-rays lumbar spine there performed on 6/19/2023 along with his corresponding radiological review there is no evidence of any instability, pathologic motion or spondylolisthesis      Assessment & Plan     1. DDD (degenerative disc disease), lumbar    2. Right hip pain    3. Chronic right-sided low back pain with right-sided sciatica      Plan: 63-year-old male with significant degenerative disc disease facet arthropathy throughout the lumbar spine, he has had an exacerbation of his back pain, right hip pain with additional lumbar radiculopathy at the L4-L5 distribution since May 2023, he has no trauma or inciting event except for his hospitalization in May 2023 with pneumonia due to his severe COPD.  He has a productive cough that is noted today in the office with some conversational dyspnea, he harbors quite significant pain and admits to lack of sleep, he has no focal neurological deficit today, he has a positive SLR, I do think it is L4-L5 neuroforaminal stenosis and facet arthropathy is his major issue, but due to his numerous medical comorbidities and ongoing chronic smoking as well as alcoholism I do not think the patient is a good surgical candidate currently.  I recommend further discussion with pain management to see what other options the patient has.  I  did discuss this patient with Dr. Azael Jackson, of pain management, he does have an appointment scheduled with pain management today which he will follow-up with.  We will see the patient in the neurosurgical clinic on an as-needed basis currently.  Patient could be a good candidate for spinal cord stimulator trial, focal nerve root ablations, continued CARLI's transforaminal injections etc.  The patient has no Askew's clonus or long track signs.  Patient tells me he will continue to work on his medical comorbidities.  If the patient experiences any signs symptoms of cauda equina syndrome which were discussed with him today at the visit he will call our office and/or 911 or report to the nearest emergency room.    Raghu Lynn PA-C  [facility]  [address] [id number]  Fax [phone]  Phone [phone]

## 2023-10-16 RX ORDER — FUROSEMIDE 20 MG/1
TABLET ORAL
Qty: 30 TABLET | Refills: 0 | Status: SHIPPED | OUTPATIENT
Start: 2023-10-16

## 2023-10-18 DIAGNOSIS — K62.5 BRBPR (BRIGHT RED BLOOD PER RECTUM): Primary | ICD-10-CM

## 2023-10-18 DIAGNOSIS — Z86.010 HISTORY OF COLON POLYPS: ICD-10-CM

## 2023-10-18 DIAGNOSIS — Z98.890 HISTORY OF COLONOSCOPY: ICD-10-CM

## 2023-10-18 RX ORDER — POLYETHYLENE GLYCOL 3350 17 G/17G
17 POWDER, FOR SOLUTION ORAL DAILY
Qty: 850 G | Refills: 2 | Status: SHIPPED | OUTPATIENT
Start: 2023-10-18

## 2023-10-24 ENCOUNTER — OFFICE VISIT (OUTPATIENT)
Dept: INTERNAL MEDICINE | Facility: CLINIC | Age: 63
End: 2023-10-24
Payer: COMMERCIAL

## 2023-10-24 ENCOUNTER — HOSPITAL ENCOUNTER (OUTPATIENT)
Dept: GENERAL RADIOLOGY | Facility: HOSPITAL | Age: 63
Discharge: HOME | End: 2023-10-24
Admitting: NURSE PRACTITIONER
Payer: COMMERCIAL

## 2023-10-24 VITALS
WEIGHT: 142.8 LBS | TEMPERATURE: 97.5 F | RESPIRATION RATE: 18 BRPM | SYSTOLIC BLOOD PRESSURE: 128 MMHG | DIASTOLIC BLOOD PRESSURE: 76 MMHG | HEART RATE: 96 BPM | HEIGHT: 69 IN | BODY MASS INDEX: 21.15 KG/M2

## 2023-10-24 DIAGNOSIS — M79.662 PAIN IN SHIN, LEFT: Primary | ICD-10-CM

## 2023-10-24 DIAGNOSIS — J44.1 CHRONIC OBSTRUCTIVE PULMONARY DISEASE WITH ACUTE EXACERBATION: ICD-10-CM

## 2023-10-24 DIAGNOSIS — M51.9 LUMBOSACRAL DISC DISEASE: ICD-10-CM

## 2023-10-24 DIAGNOSIS — M79.662 PAIN IN SHIN, LEFT: ICD-10-CM

## 2023-10-24 DIAGNOSIS — S46.212S TEAR OF LEFT BICEPS MUSCLE, SEQUELA: ICD-10-CM

## 2023-10-24 DIAGNOSIS — E87.1 HYPONATREMIA: ICD-10-CM

## 2023-10-24 DIAGNOSIS — F10.10 ALCOHOL ABUSE: ICD-10-CM

## 2023-10-24 LAB
ANION GAP SERPL CALCULATED.3IONS-SCNC: 14.6 MMOL/L (ref 5–15)
BUN SERPL-MCNC: 21 MG/DL (ref 8–23)
BUN/CREAT SERPL: 18.6 (ref 7–25)
CALCIUM SPEC-SCNC: 9.4 MG/DL (ref 8.6–10.5)
CHLORIDE SERPL-SCNC: 94 MMOL/L (ref 98–107)
CO2 SERPL-SCNC: 24.4 MMOL/L (ref 22–29)
CREAT SERPL-MCNC: 1.13 MG/DL (ref 0.76–1.27)
EGFRCR SERPLBLD CKD-EPI 2021: 72.6 ML/MIN/1.73
GLUCOSE SERPL-MCNC: 105 MG/DL (ref 65–99)
POTASSIUM SERPL-SCNC: 4.6 MMOL/L (ref 3.5–5.2)
SODIUM SERPL-SCNC: 133 MMOL/L (ref 136–145)

## 2023-10-24 PROCEDURE — 80048 BASIC METABOLIC PNL TOTAL CA: CPT | Performed by: NURSE PRACTITIONER

## 2023-10-24 PROCEDURE — 73590 X-RAY EXAM OF LOWER LEG: CPT

## 2023-10-24 NOTE — PROGRESS NOTES
Patient Name: Luis Antonio Fairchild  : 1960   MRN: [id number]     Chief Complaint:    Chief Complaint   Patient presents with    Mass     Mass on upper left arm    Lower Extremity Issue     Pain in lower right leg    Wrist Injury     Fractured right wrist 2 months ago. Wrist still swollen and still unable to use his thumb       History of Present Illness: Luis Antonio Fairchild is a 63 y.o. male presents to clinic for an acute visit.    The patient reports he will have a psychological evaluation for pain management and possible spinal cord stimulator. He adds he will be evaluated for addiction to narcotics and alcohol. He notes he has reduced his alcohol intake to 2 drinks a day and has expressed he would try to cease drinking. He notes he has not tried any counseling for his addiction.    He confirms he is scheduled for a spinal cord stimulation with his pain management provider.     He adds he was seen by a specialist on one occasion for a right wrist fracture and notes he does ice it regularly for swelling and has not had the use of his thumb return to date. He states he is concerned about the loss of use of his thumb and has not followed up with his orthopedic surgeon Dr. Narciso Atkins. The patient reports he has been completing the exercises he was instructed on for his wrist and thumb daily.    The patient states he has a tear on his left bicep that has continued to be sore. He is unsure if Dr. Atkins advised him on the tear and notes he noticed it when he fractured his wrist. He confirms he is experiencing pain and has difficulty lifting anything more than 15 pounds.    He denies any dyspnea or wheezing symptoms and notes he uses his rescue inhaler at 3 times a day. He is using Breztri twice daily.     He confirms he is having his blood drawn for laboratory studies on a bi-weekly basis and will complete one this week to monitor his hyponatremia.    He reports pain in his right lower leg and associated with his  hip pain. He adds when his hip pain subsides the pain in his leg continues. He notes the pain began 2 weeks. The patient confirms pain when palpated and add the pain is located in the shin area. He denies any issues with blood clotting in his legs previously. He denies applying heat, massaging, or stretching his lower extremity.    The patient reports he has reduced his alcohol intake to 2 drinks a day and a history of alcohol abuse. He confirms he is a smoker.     The patient reports a family history of blood clotting issues with his grandson.    The patient confirms he is tolerating his prescription for Breztri.    Subjective     Review of System: Review of Systems   A review of systems was performed, and the pertinent positives are noted in the HPI.    Medications:     Current Outpatient Medications:     albuterol sulfate HFA (ProAir HFA) 108 (90 Base) MCG/ACT inhaler, Inhale 1-2 puffs every 4-6 hours PRN, Disp: 1 g, Rfl: 5    amLODIPine (NORVASC) 5 MG tablet, Take 1 tablet by mouth once daily, Disp: 90 tablet, Rfl: 0    Budeson-Glycopyrrol-Formoterol (BREZTRI) 160-9-4.8 MCG/ACT aerosol inhaler, Inhale 2 puffs 2 (Two) Times a Day., Disp: 10.7 g, Rfl: 11    Calcium Carbonate-Vit D-Min (CALCIUM 1200 PO), Take  by mouth., Disp: , Rfl:     celecoxib (CeleBREX) 200 MG capsule, Take 1 capsule by mouth twice daily, Disp: 180 capsule, Rfl: 0    citalopram (CeleXA) 40 MG tablet, Take 1 tablet by mouth Daily., Disp: 90 tablet, Rfl: 1    ezetimibe (ZETIA) 10 MG tablet, Take 1 tablet by mouth Daily., Disp: 30 tablet, Rfl: 5    furosemide (LASIX) 20 MG tablet, Take 1 tablet by mouth once daily, Disp: 30 tablet, Rfl: 0    pantoprazole (PROTONIX) 40 MG EC tablet, Take 1 tablet by mouth twice daily, Disp: 180 tablet, Rfl: 0    polyethylene glycol (MiraLax) 17 GM/SCOOP powder, Take 17 g by mouth Daily., Disp: 850 g, Rfl: 2    pravastatin (PRAVACHOL) 40 MG tablet, Take 1 tablet by mouth Daily., Disp: 90 tablet, Rfl: 1    sodium  "chloride 1 g tablet, Take 1 tablet by mouth twice daily, Disp: 60 tablet, Rfl: 0    traMADol (ULTRAM) 50 MG tablet, Take 1 tablet by mouth Every 6 (Six) Hours As Needed for Moderate Pain or Severe Pain., Disp: 40 tablet, Rfl: 0    VITAMIN D, CHOLECALCIFEROL, PO, Take  by mouth., Disp: , Rfl:     Allergies:   No Known Allergies    Objective     Physical Exam:   Vital Signs:   Vitals:    10/24/23 0940   BP: 128/76   BP Location: Right arm   Patient Position: Sitting   Cuff Size: Adult   Pulse: 96   Resp: 18   Temp: 97.5 °F (36.4 °C)   TempSrc: Infrared   Weight: 64.8 kg (142 lb 12.8 oz)   Height: 175.3 cm (69\")   PainSc:   9     Body mass index is 21.09 kg/m².   BMI is within normal parameters. No other follow-up for BMI required.       Information has been added to the AVS.      Physical Exam  Constitutional:       General: He is not in acute distress.     Appearance: He is not ill-appearing.   HENT:      Head: Normocephalic.   Cardiovascular:      Rate and Rhythm: Normal rate and regular rhythm.      Heart sounds: Normal heart sounds. No murmur heard.  Pulmonary:      Breath sounds: Normal breath sounds.   Abdominal:      General: Bowel sounds are normal.      Tenderness: There is no abdominal tenderness.   Musculoskeletal:      Comments: Right tenderness along the shin; no redness, swelling. Calves are equal in diameter.  Negative Homans' sign   Neurological:      General: No focal deficit present.      Mental Status: He is oriented to person, place, and time.   Psychiatric:         Mood and Affect: Mood normal.         Assessment / Plan      Assessment/Plan:   Diagnoses and all orders for this visit:    1. Pain in shin, left (Primary)  -     XR Tibia Fibula 2 View Right; Future    2. Lumbosacral disc disease    3. Chronic obstructive pulmonary disease with acute exacerbation    4. Alcohol abuse    5. Hyponatremia  -     Basic metabolic panel    6. Tear of left biceps muscle, sequela       General health " maintenance  - The patient presents today for an acute visit for a mass on upper left arm, Lower right extremity pain, and a previously fractured wrist. The patient has a history of alcohol abuse and will be evaluated for addiction by psychiatry in the future for pain management.  He was advised to cease drinking alcohol as soon as possible.    Degenerative disc  He has been diagnosed previously with a degenerative disc in his lumbar spine. The patient's surgeon has determined he is not a surgical candidate due to comorbidities, smoking and alcohol abuse. The patient's orthopedic notes were reviewed and discussed with him in full detail.     Wrist pain  The patient's orthopedic notes were reviewed and discussed with him in full detail. The notes indicate the patient was referred to physical therapy and he was not available on 3 different occasions to schedule PT sessions. The patient was advised to contact scheduling for a PT appointment and to follow up with Dr. Atkins. He will consult Dr. Atkins for biceps tear.      Pain in lower right extremity  - An order was placed for an x-ray of the right tibia and fibula. Wells criteria was discussed in full detail; criteria reviewed with patient; low risk for DVT; patient does not want to proceed with ultrasound at this time. He was advised to contact the office if his lower extremity becomes swollen with redness and an ultrasound could be ordered at that time. The patient was provided information on proper stretching and exercises for his legs.     Hyponatremia  - The patient will complete laboratory studies today as indicated in my standing order for bi-weekly blood draws. He will continue sodium chloride 1 gm BID and lasix 20 mg daily.     COPD  Continue Breztri  160-9-4.8 mcg/act 2 puffs BID; albuterol as needed      35 min visit      I spent 35 minutes caring for Luis Antonio on this date of service. This time includes time spent by me in the following  activities:preparing for the visit, reviewing tests, obtaining and/or reviewing a separately obtained history, performing a medically appropriate examination and/or evaluation , counseling and educating the patient/family/caregiver, documenting information in the medical record, and independently interpreting results and communicating that information with the patient/family/caregiver    Follow Up: 1 month       KALEB Barrios  [facility] [place] [place] [facility]    Transcribed from ambient dictation for KALEB Barrios by Kobe Matt.  10/24/23   11:42 EDT    Patient or patient representative verbalized consent to the visit recording.  I have personally performed the services described in this document as transcribed by the above individual, and it is both accurate and complete.

## 2023-10-30 ENCOUNTER — TELEPHONE (OUTPATIENT)
Dept: PAIN MEDICINE | Facility: CLINIC | Age: 63
End: 2023-10-30
Payer: COMMERCIAL

## 2023-10-30 NOTE — TELEPHONE ENCOUNTER
Caller: Luis Antonio Fairchild    Relationship: Self    Best call back number:     Who are you requesting to speak with (clinical staff, provider,  specific staff member): DR ALICEA/CLINICAL    What was the call regarding: THE PATIENT WOULD LIKE A CALL BACK ABOUT HIS TREATMENT PLAN GOING FORWARD. HE WOULD LIKE TO KNOW WHEN HE WILL GET HIS SPINAL CORD STIMULATOR IMSERTED.    HE WOULD LIKE A CALL FROM DR ALICEA TO DISCUSS THIS.    Is it okay if the provider responds through MyChart: CALL

## 2023-10-31 ENCOUNTER — TELEPHONE (OUTPATIENT)
Dept: INTERNAL MEDICINE | Facility: CLINIC | Age: 63
End: 2023-10-31
Payer: COMMERCIAL

## 2023-10-31 DIAGNOSIS — M51.9 LUMBOSACRAL DISC DISEASE: Primary | ICD-10-CM

## 2023-10-31 NOTE — TELEPHONE ENCOUNTER
Patient states he has a pinched nerve he has been dealing with. He went and saw the pain doctor and they talked about placing a stimulator in his spine for the pain. He states he feels like the process is being drug along and not going anywhere. He has another follow up with pain management but not until Jan 12th. Patient wanting to know if we can refer him somewhere else or do something else for his pain.

## 2023-10-31 NOTE — TELEPHONE ENCOUNTER
Caller: Lui sAntonio Fairchild    Relationship: Self    Best call back number: [phone]    What is the best time to reach you: ANYTIME     Who are you requesting to speak with (clinical staff, provider,  specific staff member): CLINICAL STAFF    What was the call regarding: PATIENT IS WANTING TO SPEAK WITH HIS PCP. HE SAYS THAT IT IS IN REGARDS TO HIS PINCHED NERVE     Is it okay if the provider responds through MyChart: YES

## 2023-11-01 NOTE — TELEPHONE ENCOUNTER
Called the patient after calling [email or web address] to check on the status of his psych clearance, and advised him they told me that one he pays his $100 balance his report will be sent. Patient voiced understanding.

## 2023-11-01 NOTE — TELEPHONE ENCOUNTER
Spoke with the patient and explained that we need to receive a copy of his psychiatric clearance from [employer] in order to place him on the schedule for a spinal cord stimulator trial.  Patient voiced understanding.

## 2023-11-02 ENCOUNTER — TELEPHONE (OUTPATIENT)
Dept: PAIN MEDICINE | Facility: CLINIC | Age: 63
End: 2023-11-02
Payer: COMMERCIAL

## 2023-11-02 DIAGNOSIS — M54.16 LUMBAR RADICULOPATHY: Primary | ICD-10-CM

## 2023-11-02 NOTE — TELEPHONE ENCOUNTER
Called the patient and scheduled him for his SCS trial with [employer]. I also mailed him a packet explaining his appointment times and locations. Reminded of NPO status and need for a . Notifed rep.

## 2023-11-02 NOTE — TELEPHONE ENCOUNTER
Called the patient and let him know I have received his clearance from [employer], and that I am now just waiting for Dr. Jackson to instruct me to put him on the schedule. Patient voiced understanding.

## 2023-11-07 ENCOUNTER — TELEPHONE (OUTPATIENT)
Dept: PAIN MEDICINE | Facility: CLINIC | Age: 63
End: 2023-11-07
Payer: COMMERCIAL

## 2023-11-07 NOTE — TELEPHONE ENCOUNTER
Patient called because he said the gabapentin was no longer controlling his pain, and he wanted to know if he could be prescribed percoset instead.

## 2023-11-08 DIAGNOSIS — E87.1 HYPONATREMIA: ICD-10-CM

## 2023-11-08 RX ORDER — SODIUM CHLORIDE 1 G/1
TABLET ORAL
Qty: 60 TABLET | Refills: 0 | Status: SHIPPED | OUTPATIENT
Start: 2023-11-08

## 2023-11-13 ENCOUNTER — TELEPHONE (OUTPATIENT)
Dept: INTERNAL MEDICINE | Facility: CLINIC | Age: 63
End: 2023-11-13

## 2023-11-13 NOTE — TELEPHONE ENCOUNTER
If we have samples, please have him come by and give refills. If we do not have samples let me know and I will send in an alternative.

## 2023-11-13 NOTE — TELEPHONE ENCOUNTER
Caller: Luis Antonio Farichild    Relationship: Self    Best call back number:     What is the best time to reach you: ANYTIME    Who are you requesting to speak with (clinical staff, provider,  specific staff member): CLINICAL STAFF    Do you know the name of the person who called: LUIS ANTONIO    What was the call regarding: PATIENT IS REQUESTING SAMPLES OF   Budeson-Glycopyrrol-Formoterol (BREZTRI) 160-9-4.8 MCG/ACT aerosol inhaler      HIS INSURANCE WILL NOT PAY FOR THIS MEDICATION AND HE HAS MONTH LEFT OF THIS MEDICATION      Is it okay if the provider responds through MyChart:YES WHEN THIS IS READY FOR

## 2023-11-13 NOTE — TELEPHONE ENCOUNTER
Caller: Gurinder Luis Antonio Lee    Relationship: Self    Best call back number:      Requested Prescriptions:   Requested Prescriptions     Pending Prescriptions Disp Refills    furosemide (LASIX) 20 MG tablet 30 tablet 0     Sig: Take 1 tablet by mouth Daily.        Pharmacy where request should be sent: [facility] [address] [place] [address] [phone] [place] [phone]      Last office visit with prescribing clinician: 10/24/2023   Last telemedicine visit with prescribing clinician: Visit date not found   Next office visit with prescribing clinician: Visit date not found     Additional details provided by patient: PATIENT HAS FOUR DAYS LEFT    Does the patient have less than a 3 day supply:  [] Yes  [x] No      Sandra Mead Rep   11/13/23 12:56 EST

## 2023-11-14 NOTE — TELEPHONE ENCOUNTER
Tried to reach patient no answer left voicemail to return call    RELAY:    If we have samples, please have him come by and give refills. If we do not have samples let me know and I will send in an alternative.        We do have samples and he can come by anytime to pick them up.

## 2023-11-14 NOTE — TELEPHONE ENCOUNTER
Name: GurinderLuis Antonio Valentin    Relationship: Self    Best Callback Number: [phone]    HUB PROVIDED THE RELAY MESSAGE FROM THE OFFICE   PATIENT VOICED UNDERSTANDING AND HAS NO FURTHER QUESTIONS AT THIS TIME    ADDITIONAL INFORMATION:

## 2023-11-15 RX ORDER — FUROSEMIDE 20 MG/1
20 TABLET ORAL DAILY
Qty: 30 TABLET | Refills: 0 | Status: SHIPPED | OUTPATIENT
Start: 2023-11-15

## 2023-11-16 ENCOUNTER — TELEPHONE (OUTPATIENT)
Dept: GASTROENTEROLOGY | Facility: CLINIC | Age: 63
End: 2023-11-16
Payer: COMMERCIAL

## 2023-11-16 DIAGNOSIS — F41.9 ANXIETY: ICD-10-CM

## 2023-11-16 RX ORDER — CITALOPRAM 40 MG/1
40 TABLET ORAL DAILY
Qty: 90 TABLET | Refills: 0 | Status: SHIPPED | OUTPATIENT
Start: 2023-11-16

## 2023-11-16 NOTE — TELEPHONE ENCOUNTER
Caller: Luis Antonio Fairchild    Relationship to patient: Self    Best call back number:  [phone]    Type of visit: COLONOSCOPY    If rescheduling, when is the original appointment: 11/27/2023     Additional notes:WOULD LIKE A CALL BACK TO RESCHEDULE COLONOSCOPY

## 2023-11-27 ENCOUNTER — TELEPHONE (OUTPATIENT)
Dept: INTERNAL MEDICINE | Facility: CLINIC | Age: 63
End: 2023-11-27
Payer: COMMERCIAL

## 2023-11-27 NOTE — TELEPHONE ENCOUNTER
Patient states that he has been having symptoms for the past week. He is feeling hot and has not checked to see if he has a fever. His symptoms have not changed in the past week. He is experiencing chest tightness and when he takes a very deep breath, it causes him pain. He has been scheduled for an appointment tomorrow and does not wish to go to the emergency department.

## 2023-11-27 NOTE — TELEPHONE ENCOUNTER
Caller: Gurinder Luis Antonio Lee    Relationship: Self    Best call back number: [phone]     What medication are you requesting: Z-PAC    What are your current symptoms: FLU LIKE SYMPTOMS    COUGH, NOSE CONSTANTLY STOPPED UP AND RUNNING    Have you had these symptoms before:    [x] Yes  [] No    Have you been treated for these symptoms before:   [x] Yes  [] No    If a prescription is needed, what is your preferred pharmacy and phone number: [address] [phone] [place] [phone]      Additional notes: THE PATIENT SAID THAT HE CAN TELL HE IS GETTING THIS AND IS CONCERNED ABOUT HIS COPD.    THE PATIENT WAS WARM TRANSFERRED TO THE OFFICE DUE TO RED FLAG SYMPTOMS OF FULLNESS (TIGHTNESS) IN CHEST AND HARD TIME TAKING A DEEP BREATHE ALL THE TIME. (INCLUDING REST) HE DID NOT WANT TO GO TO THE ER.

## 2023-11-28 ENCOUNTER — HOSPITAL ENCOUNTER (EMERGENCY)
Facility: HOSPITAL | Age: 63
Discharge: HOME | End: 2023-11-28
Attending: EMERGENCY MEDICINE | Admitting: EMERGENCY MEDICINE
Payer: COMMERCIAL

## 2023-11-28 ENCOUNTER — APPOINTMENT (OUTPATIENT)
Dept: GENERAL RADIOLOGY | Facility: HOSPITAL | Age: 63
End: 2023-11-28
Payer: COMMERCIAL

## 2023-11-28 ENCOUNTER — OFFICE VISIT (OUTPATIENT)
Dept: INTERNAL MEDICINE | Facility: CLINIC | Age: 63
End: 2023-11-28
Payer: COMMERCIAL

## 2023-11-28 VITALS
HEIGHT: 69 IN | DIASTOLIC BLOOD PRESSURE: 70 MMHG | TEMPERATURE: 97.5 F | HEART RATE: 76 BPM | SYSTOLIC BLOOD PRESSURE: 150 MMHG | RESPIRATION RATE: 28 BRPM | WEIGHT: 146.6 LBS | BODY MASS INDEX: 21.71 KG/M2 | OXYGEN SATURATION: 88 %

## 2023-11-28 VITALS
HEIGHT: 69 IN | DIASTOLIC BLOOD PRESSURE: 86 MMHG | OXYGEN SATURATION: 95 % | HEART RATE: 84 BPM | BODY MASS INDEX: 21.48 KG/M2 | SYSTOLIC BLOOD PRESSURE: 162 MMHG | TEMPERATURE: 97.9 F | RESPIRATION RATE: 16 BRPM | WEIGHT: 145 LBS

## 2023-11-28 DIAGNOSIS — J98.4 PNEUMONITIS: ICD-10-CM

## 2023-11-28 DIAGNOSIS — E87.1 HYPONATREMIA: ICD-10-CM

## 2023-11-28 DIAGNOSIS — J44.1 CHRONIC OBSTRUCTIVE PULMONARY DISEASE WITH ACUTE EXACERBATION: Primary | ICD-10-CM

## 2023-11-28 DIAGNOSIS — J44.1 COPD EXACERBATION: Primary | ICD-10-CM

## 2023-11-28 DIAGNOSIS — R09.02 HYPOXIA: ICD-10-CM

## 2023-11-28 DIAGNOSIS — R50.9 FEVER, UNSPECIFIED FEVER CAUSE: ICD-10-CM

## 2023-11-28 LAB
ALBUMIN SERPL-MCNC: 3.6 G/DL (ref 3.5–5.2)
ALBUMIN/GLOB SERPL: 1.2 G/DL
ALP SERPL-CCNC: 95 U/L (ref 39–117)
ALT SERPL W P-5'-P-CCNC: 14 U/L (ref 1–41)
ANION GAP SERPL CALCULATED.3IONS-SCNC: 11 MMOL/L (ref 5–15)
AST SERPL-CCNC: 17 U/L (ref 1–40)
BASOPHILS # BLD AUTO: 0.04 10*3/MM3 (ref 0–0.2)
BASOPHILS NFR BLD AUTO: 0.4 % (ref 0–1.5)
BILIRUB SERPL-MCNC: 0.2 MG/DL (ref 0–1.2)
BUN SERPL-MCNC: 16 MG/DL (ref 8–23)
BUN/CREAT SERPL: 14.3 (ref 7–25)
CALCIUM SPEC-SCNC: 8.9 MG/DL (ref 8.6–10.5)
CHLORIDE SERPL-SCNC: 97 MMOL/L (ref 98–107)
CO2 SERPL-SCNC: 26 MMOL/L (ref 22–29)
CREAT SERPL-MCNC: 1.12 MG/DL (ref 0.76–1.27)
DEPRECATED RDW RBC AUTO: 47.6 FL (ref 37–54)
EGFRCR SERPLBLD CKD-EPI 2021: 73.4 ML/MIN/1.73
EOSINOPHIL # BLD AUTO: 0.11 10*3/MM3 (ref 0–0.4)
EOSINOPHIL NFR BLD AUTO: 1 % (ref 0.3–6.2)
ERYTHROCYTE [DISTWIDTH] IN BLOOD BY AUTOMATED COUNT: 12.8 % (ref 12.3–15.4)
EXPIRATION DATE: NORMAL
FLUAV AG UPPER RESP QL IA.RAPID: NOT DETECTED
FLUAV RNA RESP QL NAA+PROBE: NOT DETECTED
FLUBV AG UPPER RESP QL IA.RAPID: NOT DETECTED
FLUBV RNA RESP QL NAA+PROBE: NOT DETECTED
GLOBULIN UR ELPH-MCNC: 3 GM/DL
GLUCOSE SERPL-MCNC: 138 MG/DL (ref 65–99)
HCT VFR BLD AUTO: 42.3 % (ref 37.5–51)
HGB BLD-MCNC: 14.5 G/DL (ref 13–17.7)
HOLD SPECIMEN: NORMAL
IMM GRANULOCYTES # BLD AUTO: 0.05 10*3/MM3 (ref 0–0.05)
IMM GRANULOCYTES NFR BLD AUTO: 0.5 % (ref 0–0.5)
INTERNAL CONTROL: NORMAL
LYMPHOCYTES # BLD AUTO: 1.52 10*3/MM3 (ref 0.7–3.1)
LYMPHOCYTES NFR BLD AUTO: 14.4 % (ref 19.6–45.3)
Lab: NORMAL
MCH RBC QN AUTO: 34.4 PG (ref 26.6–33)
MCHC RBC AUTO-ENTMCNC: 34.3 G/DL (ref 31.5–35.7)
MCV RBC AUTO: 100.2 FL (ref 79–97)
MONOCYTES # BLD AUTO: 1.13 10*3/MM3 (ref 0.1–0.9)
MONOCYTES NFR BLD AUTO: 10.7 % (ref 5–12)
NEUTROPHILS NFR BLD AUTO: 7.74 10*3/MM3 (ref 1.7–7)
NEUTROPHILS NFR BLD AUTO: 73 % (ref 42.7–76)
NRBC BLD AUTO-RTO: 0 /100 WBC (ref 0–0.2)
NT-PROBNP SERPL-MCNC: 57.9 PG/ML (ref 0–900)
PLATELET # BLD AUTO: 344 10*3/MM3 (ref 140–450)
PMV BLD AUTO: 8.3 FL (ref 6–12)
POTASSIUM SERPL-SCNC: 4.3 MMOL/L (ref 3.5–5.2)
PROT SERPL-MCNC: 6.6 G/DL (ref 6–8.5)
QT INTERVAL: 380 MS
QTC INTERVAL: 427 MS
RBC # BLD AUTO: 4.22 10*6/MM3 (ref 4.14–5.8)
RSV RNA NPH QL NAA+NON-PROBE: NOT DETECTED
SARS-COV-2 AG UPPER RESP QL IA.RAPID: NOT DETECTED
SARS-COV-2 RNA RESP QL NAA+PROBE: NOT DETECTED
SODIUM SERPL-SCNC: 134 MMOL/L (ref 136–145)
TROPONIN T SERPL HS-MCNC: 19 NG/L
WBC NRBC COR # BLD AUTO: 10.59 10*3/MM3 (ref 3.4–10.8)
WHOLE BLOOD HOLD COAG: NORMAL
WHOLE BLOOD HOLD SPECIMEN: NORMAL

## 2023-11-28 PROCEDURE — 96374 THER/PROPH/DIAG INJ IV PUSH: CPT

## 2023-11-28 PROCEDURE — 25010000002 METHYLPREDNISOLONE PER 125 MG: Performed by: EMERGENCY MEDICINE

## 2023-11-28 PROCEDURE — 83880 ASSAY OF NATRIURETIC PEPTIDE: CPT | Performed by: EMERGENCY MEDICINE

## 2023-11-28 PROCEDURE — 99284 EMERGENCY DEPT VISIT MOD MDM: CPT

## 2023-11-28 PROCEDURE — 93005 ELECTROCARDIOGRAM TRACING: CPT | Performed by: EMERGENCY MEDICINE

## 2023-11-28 PROCEDURE — 94640 AIRWAY INHALATION TREATMENT: CPT

## 2023-11-28 PROCEDURE — 85025 COMPLETE CBC W/AUTO DIFF WBC: CPT | Performed by: EMERGENCY MEDICINE

## 2023-11-28 PROCEDURE — 87637 SARSCOV2&INF A&B&RSV AMP PRB: CPT | Performed by: EMERGENCY MEDICINE

## 2023-11-28 PROCEDURE — 80053 COMPREHEN METABOLIC PANEL: CPT | Performed by: EMERGENCY MEDICINE

## 2023-11-28 PROCEDURE — 36415 COLL VENOUS BLD VENIPUNCTURE: CPT

## 2023-11-28 PROCEDURE — 87428 SARSCOV & INF VIR A&B AG IA: CPT | Performed by: NURSE PRACTITIONER

## 2023-11-28 PROCEDURE — 71045 X-RAY EXAM CHEST 1 VIEW: CPT

## 2023-11-28 PROCEDURE — 84484 ASSAY OF TROPONIN QUANT: CPT | Performed by: EMERGENCY MEDICINE

## 2023-11-28 RX ORDER — DOXYCYCLINE 100 MG/1
100 CAPSULE ORAL 2 TIMES DAILY
Qty: 14 CAPSULE | Refills: 0 | Status: SHIPPED | OUTPATIENT
Start: 2023-11-28

## 2023-11-28 RX ORDER — IPRATROPIUM BROMIDE AND ALBUTEROL SULFATE 2.5; .5 MG/3ML; MG/3ML
3 SOLUTION RESPIRATORY (INHALATION) ONCE
Status: COMPLETED | OUTPATIENT
Start: 2023-11-28 | End: 2023-11-28

## 2023-11-28 RX ORDER — METHYLPREDNISOLONE 4 MG/1
TABLET ORAL
Qty: 21 TABLET | Refills: 0 | Status: SHIPPED | OUTPATIENT
Start: 2023-11-28

## 2023-11-28 RX ORDER — SODIUM CHLORIDE 0.9 % (FLUSH) 0.9 %
10 SYRINGE (ML) INJECTION AS NEEDED
Status: DISCONTINUED | OUTPATIENT
Start: 2023-11-28 | End: 2023-11-28 | Stop reason: HOSPADM

## 2023-11-28 RX ORDER — METHYLPREDNISOLONE SODIUM SUCCINATE 125 MG/2ML
125 INJECTION, POWDER, LYOPHILIZED, FOR SOLUTION INTRAMUSCULAR; INTRAVENOUS ONCE
Status: COMPLETED | OUTPATIENT
Start: 2023-11-28 | End: 2023-11-28

## 2023-11-28 RX ORDER — DOXYCYCLINE 100 MG/1
100 CAPSULE ORAL ONCE
Status: COMPLETED | OUTPATIENT
Start: 2023-11-28 | End: 2023-11-28

## 2023-11-28 RX ADMIN — IPRATROPIUM BROMIDE AND ALBUTEROL SULFATE 3 ML: 2.5; .5 SOLUTION RESPIRATORY (INHALATION) at 14:08

## 2023-11-28 RX ADMIN — METHYLPREDNISOLONE SODIUM SUCCINATE 125 MG: 125 INJECTION INTRAMUSCULAR; INTRAVENOUS at 14:03

## 2023-11-28 RX ADMIN — DOXYCYCLINE 100 MG: 100 CAPSULE ORAL at 16:02

## 2023-11-28 NOTE — PROGRESS NOTES
Patient Name: Luis Antonio Fairchild  : 1960   MRN: [id number]     Chief Complaint:    Chief Complaint   Patient presents with    Cough     Cough x 1 week coughing up green mucus now.     Shortness of Breath       History of Present Illness: Luis Antonio Fairchild is a 63 y.o. male presents to clinic today for an acute visit for evaluation of cough, shortness of breath, and chest congestion.    The patient reports his symptoms started about 5 days ago. He confirms he has a cough, shortness of breath, chest congestion, chest tightness, and wheezing. He notes he has not checked his oxygen at home and adds he had a low-grade fever around 100 degrees about 3 or 4 days ago. He states he is coughing up thick green sputum and denies any sick contacts. He reports he has a history of COPD. His oxygen is usually normal.    The patient confirms he has been using Breztri 2 puffs twice a day and albuterol. He last used his rescue inhaler about an hour ago.    Subjective     Review of System: Review of Systems   A review of systems was performed, and the pertinent positives are noted in the HPI.    Medications:     Current Outpatient Medications:     albuterol sulfate HFA (ProAir HFA) 108 (90 Base) MCG/ACT inhaler, Inhale 1-2 puffs every 4-6 hours PRN, Disp: 1 g, Rfl: 5    amLODIPine (NORVASC) 5 MG tablet, Take 1 tablet by mouth once daily, Disp: 90 tablet, Rfl: 0    Budeson-Glycopyrrol-Formoterol (BREZTRI) 160-9-4.8 MCG/ACT aerosol inhaler, Inhale 2 puffs 2 (Two) Times a Day., Disp: 10.7 g, Rfl: 11    Calcium Carbonate-Vit D-Min (CALCIUM 1200 PO), Take  by mouth., Disp: , Rfl:     celecoxib (CeleBREX) 200 MG capsule, Take 1 capsule by mouth twice daily, Disp: 180 capsule, Rfl: 0    citalopram (CeleXA) 40 MG tablet, Take 1 tablet by mouth once daily, Disp: 90 tablet, Rfl: 0    ezetimibe (ZETIA) 10 MG tablet, Take 1 tablet by mouth Daily., Disp: 30 tablet, Rfl: 5    furosemide (LASIX) 20 MG tablet, Take 1 tablet by mouth Daily., Disp: 30  "tablet, Rfl: 0    pantoprazole (PROTONIX) 40 MG EC tablet, Take 1 tablet by mouth twice daily, Disp: 180 tablet, Rfl: 0    pravastatin (PRAVACHOL) 40 MG tablet, Take 1 tablet by mouth Daily., Disp: 90 tablet, Rfl: 1    sodium chloride 1 g tablet, Take 1 tablet by mouth twice daily, Disp: 60 tablet, Rfl: 0    traMADol (ULTRAM) 50 MG tablet, Take 1 tablet by mouth Every 6 (Six) Hours As Needed for Moderate Pain or Severe Pain., Disp: 40 tablet, Rfl: 0    VITAMIN D, CHOLECALCIFEROL, PO, Take  by mouth., Disp: , Rfl:     Allergies:   No Known Allergies    Objective     Physical Exam:   Vital Signs:   Vitals:    11/28/23 1050   BP: 150/70   BP Location: Right arm   Patient Position: Sitting   Cuff Size: Adult   Pulse: 76   Resp: 28   Temp: 97.5 °F (36.4 °C)   TempSrc: Infrared   SpO2: (!) 88%   Weight: 66.5 kg (146 lb 9.6 oz)   Height: 175.3 cm (69\")   PainSc: 10-Worst pain ever     Body mass index is 21.65 kg/m².         Physical Exam  Constitutional:       Appearance: He is ill-appearing.   HENT:      Right Ear: Tympanic membrane normal.      Left Ear: Tympanic membrane normal.      Nose: Congestion and rhinorrhea present.   Cardiovascular:      Rate and Rhythm: Normal rate and regular rhythm.      Heart sounds: Normal heart sounds. No murmur heard.  Pulmonary:      Effort: No respiratory distress.      Breath sounds: No stridor. Examination of the right-lower field reveals decreased breath sounds. Wheezing present. No rhonchi or rales.   Abdominal:      General: Bowel sounds are normal.      Tenderness: There is no abdominal tenderness.   Lymphadenopathy:      Cervical: No cervical adenopathy.   Skin:     General: Skin is warm and dry.       ECG 12 Lead    Date/Time: 12/4/2023 4:05 PM  Performed by: Sulma Woodward APRN    Authorized by: Sulma Woodward APRN  Comparison: compared with previous ECG   Similar to previous ECG  Rhythm: sinus rhythm  Rate: normal  Conduction: conduction normal  QRS axis: " normal    Clinical impression: abnormal EKG            Assessment / Plan      Assessment/Plan:   Diagnoses and all orders for this visit:    1. Chronic obstructive pulmonary disease with acute exacerbation (Primary)    2. Hyponatremia    3. Hypoxia    4. Fever, unspecified fever cause     Hypoxic on arrival to clinic.C/o shortness of breath, wheezing and chest tightness.  Placed on 2 L NC with improvement 98 %. Albuterol neb with some improvement of shortness of breath/ breath sounds improved on right lower lobe; continues with wheezing.     History of hyponatremia complicates care. Will need labs.   An order was placed for an EKG and the patient will complete it today. EKG without ischemia.     EMS arrived and patient taken in stable condition.     Report called to Carlos GOZNALEZ.   Follow Up:   Per ED instructions.     KALEB Barrios  [facility]    Transcribed from ambient dictation for KALEB Barrios by Kobe Matt.  11/28/23   12:11 EST    Patient or patient representative verbalized consent to the visit recording.  I have personally performed the services described in this document as transcribed by the above individual, and it is both accurate and complete.

## 2023-11-28 NOTE — DISCHARGE INSTRUCTIONS
We have given you steroids through IV.  Your next dose of steroid will be tomorrow.    We have given you oral antibiotics.  Your next dose will be tomorrow morning.    Please review the medications you are supposed to be taking according to prior physician recommendations. I have not changed your home medications during this visit. If your discharge instructions indicate that I have changed your home medications, this is not the case, and you should disregard. If you have any questions about the medication you should be taking at home, please call your physician.

## 2023-11-28 NOTE — ED PROVIDER NOTES
EMERGENCY DEPARTMENT ENCOUNTER    Pt Name: Luis Antonio Fairchild  MRN: [id number]  Pt :   1960  Room Number:  1515  Date of encounter:  2023  PCP: Sulma Woodward APRN  ED Provider: Franklyn Yu MD    Historian: Patient and PCP      HPI:  Chief Complaint: Cough and shortness of breath        Context: Luis Antonio Fairchild is a 63 y.o. male who presents to the ED c/o symptoms which he feels are related to pneumonia.  The patient is having similar symptoms to when he was diagnosed with pneumonia many years ago.  Over the last 5 days he has been coughing up green sputum and having increasing shortness of breath.  He has been using his rescue inhaler without significant improvement in symptoms.  He measured a temperature of 100 degrees earlier today.  He has been exposed to his father-in-law who is currently hospitalized here at [facility] with a respiratory illness.  He is uncertain what the diagnosis was or if it was COVID.  Today the patient presented to his PCP Dr. Sulma woodward  And was found to have an oxygen saturation of 88%.  He was referred to our emergency department for further evaluation and care.  The patient continues to smoke at roughly 1 pack/day.  He has decreased over the last few days because of his shortness of breath.    PAST MEDICAL HISTORY  Past Medical History:   Diagnosis Date    Anxiety     Arthritis     Asthma     Cancer 2018    Squamous carcinoma oropharynx    Carpal tunnel syndrome 2016    Cervical disc disorder     Chronic pain disorder     COPD (chronic obstructive pulmonary disease)     Extremity pain     Fractures 23    Rt wrist    GERD (gastroesophageal reflux disease)     History of IBS     Hyperlipidemia     Hypertension     Irritable bowel syndrome     Joint pain     Low back pain     Lumbosacral disc disease     Lumbosacral disc disease     Neck pain     Osteoarthritis     Pneumonia     Shingles     Spinal stenosis          PAST  SURGICAL HISTORY  Past Surgical History:   Procedure Laterality Date    COLONOSCOPY      FRACTURE SURGERY      Lt wrist    ORTHOPEDIC SURGERY      Lft shoulder    SHOULDER SURGERY      Onset Date     THROAT SURGERY      WRIST SURGERY      Onset Date:          FAMILY HISTORY  Family History   Problem Relation Age of Onset    Anxiety disorder Mother     Arthritis Mother     Stroke Father             Hypertension Father     Cancer Brother     Anxiety disorder Brother     Cancer Brother                  SOCIAL HISTORY  Social History     Socioeconomic History    Marital status:    Tobacco Use    Smoking status: Every Day     Packs/day: 0.50     Years: 46.00     Additional pack years: 0.00     Total pack years: 23.00     Types: Cigarettes     Start date: 1974    Smokeless tobacco: Never   Vaping Use    Vaping Use: Never used   Substance and Sexual Activity    Alcohol use: Yes     Alcohol/week: 35.0 standard drinks of alcohol     Types: 35 Cans of beer per week     Comment: nightly    Drug use: Never    Sexual activity: Yes     Partners: Female     Birth control/protection: None     Comment:           ALLERGIES  Patient has no known allergies.        REVIEW OF SYSTEMS  Review of Systems       All systems reviewed and negative except for those discussed in HPI.       PHYSICAL EXAM    I have reviewed the triage vital signs and nursing notes.    ED Triage Vitals   Temp Heart Rate Resp BP SpO2   23 1244 23 1243 23 1243 23 1243 23 1243   97.9 °F (36.6 °C) 75 16 147/74 92 %      Temp src Heart Rate Source Patient Position BP Location FiO2 (%)   23 1244 23 1243 23 1243 23 1243 --   Oral Monitor Lying Left arm        Physical Exam  GENERAL:   Appears in mild respiratory distress.   HENT: Nares patent.  EYES: No scleral icterus.  CV: Regular rhythm, regular rate.  No murmurs gallops rubs  RESPIRATORY: Diminished in lower half of lung  fields with mild expiratory wheeze in upper lung fields.  Speaking in full sentences.  Oxygen saturation 87-89 percent on room air.  ABDOMEN: Soft, nontender  MUSCULOSKELETAL: No deformities.   NEURO: Alert, moves all extremities, follows commands.  SKIN: Warm, dry, no rash visualized.      LAB RESULTS  Recent Results (from the past 24 hour(s))   ECG 12 Lead ED Triage Standing Order; SOA    Collection Time: 11/28/23 12:46 PM   Result Value Ref Range    QT Interval 380 ms    QTC Interval 427 ms   Comprehensive Metabolic Panel    Collection Time: 11/28/23 12:54 PM    Specimen: Blood   Result Value Ref Range    Glucose 138 (H) 65 - 99 mg/dL    BUN 16 8 - 23 mg/dL    Creatinine 1.12 0.76 - 1.27 mg/dL    Sodium 134 (L) 136 - 145 mmol/L    Potassium 4.3 3.5 - 5.2 mmol/L    Chloride 97 (L) 98 - 107 mmol/L    CO2 26.0 22.0 - 29.0 mmol/L    Calcium 8.9 8.6 - 10.5 mg/dL    Total Protein 6.6 6.0 - 8.5 g/dL    Albumin 3.6 3.5 - 5.2 g/dL    ALT (SGPT) 14 1 - 41 U/L    AST (SGOT) 17 1 - 40 U/L    Alkaline Phosphatase 95 39 - 117 U/L    Total Bilirubin 0.2 0.0 - 1.2 mg/dL    Globulin 3.0 gm/dL    A/G Ratio 1.2 g/dL    BUN/Creatinine Ratio 14.3 7.0 - 25.0    Anion Gap 11.0 5.0 - 15.0 mmol/L    eGFR 73.4 >60.0 mL/min/1.73   BNP    Collection Time: 11/28/23 12:54 PM    Specimen: Blood   Result Value Ref Range    proBNP 57.9 0.0 - 900.0 pg/mL   Single High Sensitivity Troponin T    Collection Time: 11/28/23 12:54 PM    Specimen: Blood   Result Value Ref Range    HS Troponin T 19 <22 ng/L   Green Top (Gel)    Collection Time: 11/28/23 12:54 PM   Result Value Ref Range    Extra Tube Hold for add-ons.    Lavender Top    Collection Time: 11/28/23 12:54 PM   Result Value Ref Range    Extra Tube hold for add-on    Gold Top - SST    Collection Time: 11/28/23 12:54 PM   Result Value Ref Range    Extra Tube Hold for add-ons.    Light Blue Top    Collection Time: 11/28/23 12:54 PM   Result Value Ref Range    Extra Tube Hold for add-ons.    CBC  Auto Differential    Collection Time: 11/28/23 12:54 PM    Specimen: Blood   Result Value Ref Range    WBC 10.59 3.40 - 10.80 10*3/mm3    RBC 4.22 4.14 - 5.80 10*6/mm3    Hemoglobin 14.5 13.0 - 17.7 g/dL    Hematocrit 42.3 37.5 - 51.0 %    .2 (H) 79.0 - 97.0 fL    MCH 34.4 (H) 26.6 - 33.0 pg    MCHC 34.3 31.5 - 35.7 g/dL    RDW 12.8 12.3 - 15.4 %    RDW-SD 47.6 37.0 - 54.0 fl    MPV 8.3 6.0 - 12.0 fL    Platelets 344 140 - 450 10*3/mm3    Neutrophil % 73.0 42.7 - 76.0 %    Lymphocyte % 14.4 (L) 19.6 - 45.3 %    Monocyte % 10.7 5.0 - 12.0 %    Eosinophil % 1.0 0.3 - 6.2 %    Basophil % 0.4 0.0 - 1.5 %    Immature Grans % 0.5 0.0 - 0.5 %    Neutrophils, Absolute 7.74 (H) 1.70 - 7.00 10*3/mm3    Lymphocytes, Absolute 1.52 0.70 - 3.10 10*3/mm3    Monocytes, Absolute 1.13 (H) 0.10 - 0.90 10*3/mm3    Eosinophils, Absolute 0.11 0.00 - 0.40 10*3/mm3    Basophils, Absolute 0.04 0.00 - 0.20 10*3/mm3    Immature Grans, Absolute 0.05 0.00 - 0.05 10*3/mm3    nRBC 0.0 0.0 - 0.2 /100 WBC   COVID-19, FLU A/B, RSV PCR 1 HR TAT - Swab, Nasopharynx    Collection Time: 11/28/23  1:01 PM    Specimen: Nasopharynx; Swab   Result Value Ref Range    COVID19 Not Detected Not Detected - Ref. Range    Influenza A PCR Not Detected Not Detected    Influenza B PCR Not Detected Not Detected    RSV, PCR Not Detected Not Detected   POCT SARS-CoV-2 + Flu Antigen SHAUN    Collection Time: 11/28/23  1:27 PM    Specimen: Swab   Result Value Ref Range    SARS Antigen Not Detected Not Detected, Presumptive Negative    Influenza A Antigen SHAUN Not Detected Not Detected    Influenza B Antigen SHAUN Not Detected Not Detected    Internal Control Passed Passed    Lot Number [id number]     Expiration Date 11/01/2024        If labs were ordered, I independently reviewed the results and considered them in treating the patient.        RADIOLOGY  XR Chest 1 View    Result Date: 11/28/2023  XR CHEST 1 VW Date of Exam: 11/28/2023 12:45 PM EST Indication: SOA triage  protocol Comparison: Chest radiograph dated 2/9/2023 Findings: The cardiomediastinal silhouette is within normal limits. There is aortic arch atherosclerotic calcification. The lungs are hyperinflated compatible with underlying COPD/emphysema. There is no acute consolidation or pleural effusion. There is no evidence  of pneumothorax. There are degenerative changes of the thoracic spine.     Impression: 1. Chronic findings of COPD/emphysema. No evidence of acute pneumonia. Electronically Signed: Krishnazane EmTyler  11/28/2023 1:20 PM EST  Workstation ID: [id number]     I ordered and independently reviewed the above noted radiographic studies.      I viewed images of chest x-ray which showed no consolidative infiltrates per my independent interpretation.    See radiologist's dictation for official interpretation.        PROCEDURES    Procedures    ECG 12 Lead ED Triage Standing Order; SOA   Final Result   Test Reason : SOB   Blood Pressure :   */*   mmHG   Vent. Rate :  76 BPM     Atrial Rate :  76 BPM      P-R Int : 144 ms          QRS Dur :  92 ms       QT Int : 380 ms       P-R-T Axes :  81 -70  52 degrees      QTc Int : 427 ms      Normal sinus rhythm   Possible Left atrial enlargement   Left axis deviation   Pulmonary disease pattern   Nonspecific T wave abnormality   Abnormal ECG   Confirmed by LYLE GRIDER MD ([id number]) on 11/28/2023 1:00:33 PM      Referred By: EDMD           Confirmed By: LYLE GRIDER MD          MEDICATIONS GIVEN IN ER    Medications   sodium chloride 0.9 % flush 10 mL (has no administration in time range)   doxycycline (MONODOX) capsule 100 mg (has no administration in time range)   ipratropium-albuterol (DUO-NEB) nebulizer solution 3 mL (3 mL Nebulization Given 11/28/23 1408)   methylPREDNISolone sodium succinate (SOLU-Medrol) injection 125 mg (125 mg Intravenous Given 11/28/23 1403)         MEDICAL DECISION MAKING, PROGRESS, and CONSULTS    All labs, if obtained, have been independently reviewed  by me.  All radiology studies, if obtained, have been reviewed by me and the radiologist dictating the report.  All EKG's, if obtained, have been independently viewed and interpreted by me/my attending physician.      Discussion below represents my analysis of pertinent findings related to patient's condition, differential diagnosis, treatment plan and final disposition.                         Differential diagnosis:    COPD exacerbation versus bronchitis versus pneumonia versus COVID versus flu, etc. CHF considered but less likely.      Additional sources:    - Discussed/ obtained information from independent historians: Patient's PCP called ahead to give me report of clinic course.  I spoke with the patient's wife at bedside.    - External (non-ED) record review: Reviewed multiple previous records to include CT angiogram of the chest on 5/18/2023 which was negative for pulmonary embolus.    I reviewed discharge summary dated 5/20/2023 by Dr. Garcia when the patient had been admitted for pneumonia, hyponatremia, etc.    - Chronic or social conditions impacting care: Ongoing tobacco abuse.    - Shared decision making: Patient in full agreement with current plans for evaluation and treatment.      Orders placed during this visit:  Orders Placed This Encounter   Procedures    COVID PRE-OP / PRE-PROCEDURE SCREENING ORDER (NO ISOLATION) - Swab, Nasopharynx    COVID-19, FLU A/B, RSV PCR 1 HR TAT - Swab, Nasopharynx    XR Chest 1 View    [facility] Draw    Comprehensive Metabolic Panel    BNP    Single High Sensitivity Troponin T    CBC Auto Differential    NPO Diet NPO Type: Strict NPO    Undress & Gown    Continuous Pulse Oximetry    Vital Signs    Please remove nasal cannula oxygen, check oxygen saturations and let me know  Misc Nursing Order (Specify)    Oxygen Therapy- Nasal Cannula; Titrate 1-6 LPM Per SpO2; 90 - 95%    ECG 12 Lead ED Triage Standing Order; SOA    Insert Peripheral IV    CBC & Differential    Green  Top (Gel)    Lavender Top    Gold Top - SST    Gray Top    Light Blue Top         Additional orders considered but not ordered:  CT angio of the chest.    ED Course:    Consultants:      ED Course as of 11/28/23 1537   Tue Nov 28, 2023   1533 After Solu-Medrol and DuoNeb the patient is feeling significantly improved.  He is still coughing.  I took him off of oxygen and had him get in and out of bed.  His oxygen saturations on room air are remaining 91 to 93%.  He wishes to be discharged home and his wife will keep a close eye on him returning him if he worsens. [MS]      ED Course User Index  [MS] Franklyn Yu MD              Shared Decision Making:  After my consideration of clinical presentation and any laboratory/radiology studies obtained, I discussed the findings with the patient/patient representative who is in agreement with the treatment plan and the final disposition.   Risks and benefits of discharge and/or observation/admission were discussed.       AS OF 15:37 EST VITALS:    BP - 132/75  HR - 71  TEMP - 97.9 °F (36.6 °C) (Oral)  O2 SATS - 93%                  DIAGNOSIS  Final diagnoses:   COPD exacerbation   Pneumonitis         DISPOSITION  DISCHARGE    Patient discharged in stable condition.    Reviewed implications of results, diagnosis, meds, responsibility to follow up, warning signs and symptoms of possible worsening, potential complications and reasons to return to ER.    Patient/Family voiced understanding of above instructions.    Discussed plan for discharge, as there is no emergent indication for admission.  Pt/family is agreeable and understands need for follow up and possible repeat testing.  Pt/family is aware that discharge does not mean that nothing is wrong but that it indicates no emergency is currently present that requires admission and they must continue care with follow-up as given below or with a physician of their choice.     FOLLOW-UP  Sulma Woodward, APRN  [address]  [place] [id number] [phone]      NEXT AVAILABLE APPOINTMENT - RECHECK OF CONDITION    [facility] EMERGENCY DEPARTMENT  [address] [facility] [address] [phone]    IF YOU HAVE ANY CONCERN OF WORSENING CONDITION         Medication List        New Prescriptions      doxycycline 100 MG capsule  Commonly known as: MONODOX  Take 1 capsule by mouth 2 (Two) Times a Day.     methylPREDNISolone 4 MG dose pack  Commonly known as: MEDROL  Take as directed on package instructions.               Where to Get Your Medications        These medications were sent to [id number] - [place], [place] - [address] [place] [address] - [phone]  - [phone] [address] [place], [place] [id number]      Phone: [phone]   doxycycline 100 MG capsule  methylPREDNISolone 4 MG dose pack             Please note that portions of this document were completed with voice recognition software.        Franklyn Yu MD  11/28/23 9006

## 2023-11-29 ENCOUNTER — TELEPHONE (OUTPATIENT)
Dept: INTERNAL MEDICINE | Facility: CLINIC | Age: 63
End: 2023-11-29
Payer: COMMERCIAL

## 2023-11-29 ENCOUNTER — TELEPHONE (OUTPATIENT)
Dept: OTHER | Facility: HOSPITAL | Age: 63
End: 2023-11-29
Payer: COMMERCIAL

## 2023-11-29 ENCOUNTER — TELEPHONE (OUTPATIENT)
Dept: PAIN MEDICINE | Facility: CLINIC | Age: 63
End: 2023-11-29

## 2023-11-29 NOTE — TELEPHONE ENCOUNTER
Caller: Luis Antonio Fairchild    Relationship: Self    Best call back number: [phone]     Equipment requested: NEBULIZER MACHINE, NEBULIZER MEDICATION.    Reason for the request: POST ER VISIT AND RECOMMENDATION FROM PROVIDER    Prescribing Provider: KEELY MANUEL    Additional information or concerns: PLEASE CALL PATIENT WHEN APPROVED AND TO LET PATIENT KNOW WHERE IT WAS SENT.

## 2023-11-29 NOTE — TELEPHONE ENCOUNTER
Caller: Luis Antonio Fairchild    Relationship to patient: Self    Best call back number: [phone]    Patient is needing: PATIENT HS BEEN DOING SOME RESEARCH AND WOULD LIKE TO DO THE ABLATION RATHER THAN HAVE THE STIMULATOR - PLEASE REACH OUT AND ADVISE

## 2023-11-29 NOTE — TELEPHONE ENCOUNTER
I spoke with patient and let him know he can come  a neb at our office. He will come in sometime tomorrow to get nebulizer machine.

## 2023-11-29 NOTE — TELEPHONE ENCOUNTER
COPD Nurse Navigator attempted to reach patient by telephone in regard to ED visit.  There was no answer at the time of the call.  No other questions or concerns at this time.  COPD Nurse Navigator continues to follow.COPD Nurse Navigator attempted to reach patient by telephone in regard to .  There was no answer at the time of the call.  No other questions or concerns at this time.  COPD Nurse Navigator continues to follow.

## 2023-11-30 DIAGNOSIS — J44.1 CHRONIC OBSTRUCTIVE PULMONARY DISEASE WITH ACUTE EXACERBATION: Primary | ICD-10-CM

## 2023-11-30 RX ORDER — IPRATROPIUM BROMIDE AND ALBUTEROL SULFATE 2.5; .5 MG/3ML; MG/3ML
3 SOLUTION RESPIRATORY (INHALATION) EVERY 4 HOURS PRN
Qty: 360 ML | Refills: 1 | Status: SHIPPED | OUTPATIENT
Start: 2023-11-30

## 2023-12-01 ENCOUNTER — TELEPHONE (OUTPATIENT)
Dept: INTERNAL MEDICINE | Facility: CLINIC | Age: 63
End: 2023-12-01
Payer: COMMERCIAL

## 2023-12-01 NOTE — TELEPHONE ENCOUNTER
Caller: Luis Antonio Fairchild    Relationship: Self    Best call back number: [phone]     What was the call regarding: PATIENT WOULD LIKE TO KNOW IF HE NEEDS A NEW REFERRAL TO GO BACK TO PHYSICAL THERAPY. LOPEZ FISHER PHONE: [phone]    HAS AN APPOINTMENT ON TUESDAY THE 5TH.     Is it okay if the provider responds through [facility]: YES

## 2023-12-05 DIAGNOSIS — M51.36 DDD (DEGENERATIVE DISC DISEASE), LUMBAR: Primary | ICD-10-CM

## 2023-12-11 DIAGNOSIS — M19.90 OSTEOARTHRITIS, UNSPECIFIED OSTEOARTHRITIS TYPE, UNSPECIFIED SITE: Chronic | ICD-10-CM

## 2023-12-11 RX ORDER — CELECOXIB 200 MG/1
200 CAPSULE ORAL 2 TIMES DAILY
Qty: 180 CAPSULE | Refills: 1 | Status: SHIPPED | OUTPATIENT
Start: 2023-12-11

## 2023-12-14 ENCOUNTER — TELEPHONE (OUTPATIENT)
Dept: PAIN MEDICINE | Facility: CLINIC | Age: 63
End: 2023-12-14
Payer: COMMERCIAL

## 2023-12-14 NOTE — TELEPHONE ENCOUNTER
Caller:  ANGELIA NOBLE    Relationship to Patient: [phone] (home)     Phone Number: the patient    Reason for Call: PATIENT CALLED IN STATES HIS INSURANCE TOLD HIM DR. ALICEA WOULD NEED TO APPEAL  THE DECISION WITH ONE OF THEIR [facility] [phone]

## 2023-12-14 NOTE — TELEPHONE ENCOUNTER
Patient called because he says his back pain has become nearly unbearable, and says he can hardly walk. He says the gabapentin is not helping. Nor has using ice and heat. He says he is scheduled for PT today but does not think he will be able to endure it today. Please advise.

## 2023-12-18 NOTE — TELEPHONE ENCOUNTER
Called the patient and relayed Dr. Jackson' message. He said he was waiting for a call from his physical therapist.

## 2023-12-21 NOTE — TELEPHONE ENCOUNTER
Caller: ANGELIA NOBLE     Relationship to Patient: the patient    Phone Number: [phone] (home)     Reason for Call:   PATIENT CALLING IN STATING DR. ALICEA WILL NEED TO INFORM HIS INSURANCE HE HAS DONE HIS PT AND ITS NOT HELPING AND CAUSING INCREASED PAIN. HAS NOT COMPELETED THE FULL 6 WEEKS. PATIENT STATES HE HAS REACHED OUT TO PT FOR THE NOTE STATING INCREASED PAIN WITH PT BUT PT REFERRED HIM BACK TO US.

## 2023-12-26 ENCOUNTER — OFFICE VISIT (OUTPATIENT)
Dept: PAIN MEDICINE | Facility: CLINIC | Age: 63
End: 2023-12-26
Payer: COMMERCIAL

## 2023-12-26 ENCOUNTER — TELEPHONE (OUTPATIENT)
Dept: PAIN MEDICINE | Facility: CLINIC | Age: 63
End: 2023-12-26

## 2023-12-26 VITALS — BODY MASS INDEX: 20.73 KG/M2 | HEIGHT: 69 IN | WEIGHT: 140 LBS

## 2023-12-26 DIAGNOSIS — M54.16 LUMBAR RADICULOPATHY: Primary | ICD-10-CM

## 2023-12-26 RX ORDER — GABAPENTIN 800 MG/1
800 TABLET ORAL 3 TIMES DAILY
COMMUNITY

## 2023-12-26 NOTE — PROGRESS NOTES
Primary Physician: Sulma Woodward APRN    CHIEF COMPLAINT or REASON FOR VISIT: Back Pain and Follow-up    Initial HPI 7/5/2023:  Mr. Luis Antonio Fairchild is 63 y.o. male who presents as a new patient referral for evaluation and treatment of 2-month history of right-sided low back pain with radiation into the right lower extremity.  Patient had a recent trip to [place] where he had a bout of pneumonia, subsequently developed hyponatremia for which she was admitted to the hospital.  Approximately 2 months ago he developed new onset right-sided low back pain with radiation of the right lower extremity and lateral ankle.  He reports that this is a shooting electrical type sensation.  He denies any initial inciting event or trauma.  He has tried physical therapy which exacerbated his pain.  He additionally has recent history of a right thumb fracture currently in thumb spica cast.  He was evaluated by neurosurgery, Annie Torres PA-C, who obtained a lumbar MRI.  Patient does have ongoing treatment for hyponatremia which will need to be stabilized before consideration of any operative intervention.  He does have excessive alcohol consumption.    Interval history: Patient returns to clinic.  Patient was originally scheduled for his SCS ([facility]) trial, however, it was canceled due to insurance issues.  Patient was granted psychiatric clearance for this procedure at that time.  Patient was referred to physical therapy where he reports he underwent 2 sessions of physical therapy, which significantly exacerbated his pain.  He states he could not undergo any more physical therapy sessions due to the pain that it caused him.  Today, patient complains of right-sided low back pain with radiation into his right lower extremity.  Patient denies any bowel or bladder dysfunction, lower extremity weakness, new onset saddle anesthesia or unexplained weight loss.     Interventions:  7/18/2023: right L4/5 TFESI with 75% relief 2 to 3  days  2023: Right paramedian L4/5 LESI with 0% relief  2023: right L4/5 TFESI with 0% relief    Objective Pain Scoring:   BRIEF PAIN INVENTORY:  Total score:   Pain Score    23 1043   PainSc: 10-Worst pain ever   PainLoc: Back        PHQ-2: PHQ-2 Total Score: 3  PHQ-9: PHQ-9: Brief Depression Severity Measure Score: 6  Opioid Risk Tool:         Review of Systems:   ROS negative except as otherwise noted     Past Medical History:   Past Medical History:   Diagnosis Date    Alcoholism 1998    Anxiety 2016    Arthritis     Asthma     Cancer 2018    Squamous carcinoma oropharynx    Carpal tunnel syndrome 2016    Cervical disc disorder     Chronic pain disorder     COPD (chronic obstructive pulmonary disease)     Extremity pain     Fractures 23    Rt wrist    GERD (gastroesophageal reflux disease)     History of IBS     Hyperlipidemia     Hypertension     Irritable bowel syndrome     Joint pain     Low back pain     Lumbosacral disc disease     Lumbosacral disc disease     Neck pain     Osteoarthritis     Peripheral neuropathy     Pneumonia     Shingles     Spinal stenosis          Past Surgical History:   Past Surgical History:   Procedure Laterality Date    COLONOSCOPY      FRACTURE SURGERY      Lt wrist    ORTHOPEDIC SURGERY      Lft shoulder    SHOULDER SURGERY      Onset Date     THROAT SURGERY      WRIST SURGERY      Onset Date:          Family History   Family History   Problem Relation Age of Onset    Anxiety disorder Mother     Arthritis Mother     Stroke Father             Hypertension Father     Cancer Brother     Anxiety disorder Brother     Cancer Brother                  Social History   Social History     Socioeconomic History    Marital status:    Tobacco Use    Smoking status: Every Day     Packs/day: 0.50     Years: 46.00     Additional pack years: 0.00     Total pack years: 23.00     Types: Cigarettes     Start date: 1974     Smokeless tobacco: Never   Vaping Use    Vaping Use: Never used   Substance and Sexual Activity    Alcohol use: Yes     Alcohol/week: 35.0 standard drinks of alcohol     Types: 35 Cans of beer per week     Comment: nightly    Drug use: Never    Sexual activity: Not Currently     Partners: Female     Birth control/protection: None     Comment:          Medications:     Current Outpatient Medications:     albuterol sulfate HFA (ProAir HFA) 108 (90 Base) MCG/ACT inhaler, Inhale 1-2 puffs every 4-6 hours PRN, Disp: 1 g, Rfl: 5    amLODIPine (NORVASC) 5 MG tablet, Take 1 tablet by mouth once daily, Disp: 90 tablet, Rfl: 0    Budeson-Glycopyrrol-Formoterol (BREZTRI) 160-9-4.8 MCG/ACT aerosol inhaler, Inhale 2 puffs 2 (Two) Times a Day., Disp: 10.7 g, Rfl: 11    Calcium Carbonate-Vit D-Min (CALCIUM 1200 PO), Take  by mouth., Disp: , Rfl:     celecoxib (CeleBREX) 200 MG capsule, Take 1 capsule by mouth twice daily, Disp: 180 capsule, Rfl: 1    citalopram (CeleXA) 40 MG tablet, Take 1 tablet by mouth once daily, Disp: 90 tablet, Rfl: 0    doxycycline (MONODOX) 100 MG capsule, Take 1 capsule by mouth 2 (Two) Times a Day., Disp: 14 capsule, Rfl: 0    ezetimibe (ZETIA) 10 MG tablet, Take 1 tablet by mouth Daily., Disp: 30 tablet, Rfl: 5    furosemide (LASIX) 20 MG tablet, Take 1 tablet by mouth Daily., Disp: 30 tablet, Rfl: 0    gabapentin (NEURONTIN) 800 MG tablet, Take 1 tablet by mouth 3 (Three) Times a Day., Disp: , Rfl:     ipratropium-albuterol (DUO-NEB) 0.5-2.5 mg/3 ml nebulizer, Take 3 mL by nebulization Every 4 (Four) Hours As Needed for Wheezing., Disp: 360 mL, Rfl: 1    pantoprazole (PROTONIX) 40 MG EC tablet, Take 1 tablet by mouth twice daily, Disp: 180 tablet, Rfl: 0    pravastatin (PRAVACHOL) 40 MG tablet, Take 1 tablet by mouth Daily., Disp: 90 tablet, Rfl: 1    sodium chloride 1 g tablet, Take 1 tablet by mouth twice daily, Disp: 60 tablet, Rfl: 0    traMADol (ULTRAM) 50 MG tablet, Take 1 tablet by mouth  "Every 6 (Six) Hours As Needed for Moderate Pain or Severe Pain., Disp: 40 tablet, Rfl: 0    VITAMIN D, CHOLECALCIFEROL, PO, Take  by mouth., Disp: , Rfl:     methylPREDNISolone (MEDROL) 4 MG dose pack, Take as directed on package instructions. (Patient not taking: Reported on 12/26/2023), Disp: 21 tablet, Rfl: 0        Physical Exam:     Vitals:    12/26/23 1043   Weight: 63.5 kg (140 lb)   Height: 175.3 cm (69.02\")   PainSc: 10-Worst pain ever   PainLoc: Back          General: Alert and oriented, No acute distress.   HEENT: Normocephalic, atraumatic.   Cardiovascular: No gross edema  Respiratory: Respirations are non-labored    Lumbar Spine:   No masses or atrophy  Range of motion - Flexion normal. Extension normal. Right Lat Bending normal. Left Lat Bending normal  Facet Loading: Negative bilaterally  Facet Palpation - Nontender   PSIS tenderness - Negative bilaterally  Choco's/FELICITY/Thigh thrust - Negative bilaterally  Straight leg raise: Positive right  Slump test: Positive right    Motor Exam:    Strength: Rate on 1-5 scale Right Left    C5-Elbow flexion, Deltoid 5 5    C6-Wrist extension 5 5    C7- Elbow / finger extension 5 5    C8- Finger flexion 5 5    T1- Intrinsics hand 5 5    Strength: Rate on 1-5 scale Right Left    L1/2- hip flexion 5 5    L3- knee extension 5 5    L4- ankle dorsiflexion 5 5    L5- great toe extension 5 5    S1- ankle plantarflexion 5 5    Sensory Exam: Paresthesia decree sensation light touch in right leg    Neurologic: Cranial Nerves II-XII are grossly intact.     Clonus -negative bilaterally    Psychiatric: Cooperative.   Gait: Antalgic flexed forward  Assistive Devices: None    Imaging Studies:   Results for orders placed during the hospital encounter of 06/15/23    MRI Lumbar Spine Without Contrast    Narrative  MRI LUMBAR SPINE WO CONTRAST    Date of Exam: 6/15/2023 6:58 PM EDT    Indication: severe right leg pain..    Comparison: None available.    Technique:  Routine " multiplanar/multisequence sequence images of the lumbar spine were obtained without contrast administration.      Findings:  Evaluation is significantly degraded by patient motion. Marrow signal appears maintained without evidence of fracture. Alignment is anatomic without listhesis or subluxation. The conus medullaris and cauda equina nerve roots appear satisfactory.  Multilevel spondylosis is apparent, with areas of involvement noted including    L1-2, no significant spinal canal or neuroforaminal impingement.    L2-3, small disc bulge and bilateral facet arthropathy. There is mild spinal canal narrowing and bilateral neuroforaminal stenosis.    L3-4, small disc bulge, epidural lipomatosis and bilateral facet arthropathy. There is mild to moderate spinal canal narrowing in addition to mild left and moderate right neuroforaminal narrowing.    L4-5, circumferential disc bulge, with protrusion eccentric to the right and bilateral facet arthropathy. There is also a component of epidural lipomatosis. There is moderate spinal canal narrowing in addition to apparent severe right and moderate left  neuroforaminal stenosis.    L5-S1, small disc bulge and bilateral facet arthropathy. The spinal canal is patent. There is mild bilateral neuroforaminal narrowing    Impression  Impression:  Severely motion degraded exam demonstrating multilevel lumbar spondylosis as above. Areas of moderate spinal canal narrowing are present and there is apparent severe narrowing of the right neural foramen at L4-5.    Electronically Signed: Davide Alvarez  6/16/2023 12:06 AM EDT  Workstation ID: [id number]      Impression & Plan:   7/5/2023: Mr. Luis Antonio Fairchild is a 63 y.o. male with past medical history significant for anxiety, Anderson's esophagus, HLD, HTN, COPD, GERD, tobacco use, hyponatremia, alcohol abuse who presents to the pain clinic for evaluation and treatment of right-sided low back pain with radiation to the right lower extremity.  I  personally reviewed and interpreted his lumbar MRI dated Soledad 15, 2023: Limited study secondary to motion artifact; minimal DDD; mild L3/4 canal stenosis; probable neuroforaminal stenosis at right L4/5 and L5/S1.  Clinical examination consistent with lumbar radiculopathy.  We discussed epidural steroid injection to improve pain.  If greater than 50% relief for at least 2-3 months can consider repeat as needed every 3 to 4 months.  I had an in-depth discussion with the patient regarding the risks of the procedure including bleeding, infection, damage to surrounding structures, paralysis.  We discussed the potential adverse effects of corticosteroid injection including flushing of the face, lipodystrophy, skin discoloration, elevated blood glucose, increased blood pressure.  Risks of frequent steroid administration include weight gain, hormonal changes, mood changes, osteoporosis.  9/22/2023: No benefit from epidural steroid.  Will not repeat.  We will start gabapentin; patient has tolerated 600 mg 3 times daily without side effect.  10/13/2023: I discussed the patient's treatment plan with Raghu Lynn PA-C, neurosurgery, who recommends spinal cord stimulation trial at this time.  I had an in-depth discussion with the patient regarding the risks of the procedure including bleeding, infection, damage to surrounding structures, paralysis.  Will refer for psychiatric clearance.  Additionally will obtain new UDS now that patient has stopped his previous CBD which apparently contained THC.  We will plan to increase gabapentin to 800 mg  12/26/23: SCS ([facility]) trial scheduled.  No gabapentin at this time due to presence of THC on UDS on 10/16/2023.  Was granted psychiatric clearance before last SCS trial, which was canceled.      1. Lumbar radiculopathy              PLAN:  1. Medication Recommendations: Recommend Voltaren topical, NSAIDs, Tylenol.  Can trial turmeric 500 mg twice daily if NSAID contraindicated.   Increase to gabapentin 800 mg 3 times daily no refills.  As part of this patient's treatment plan, patient will be prescribed controlled substances. The patient has been made aware of appropriate use of such medications, including potential risk of somnolence, limited ability to drive and /or work safely, and potential for dependence or overdose. It has also been made clear that these medications are for use by this patient only, without concomitant use of alcohol or other substances unless prescribed.Controlled substance status of medication discussed with patient, discussed risks of medication including abuse potential and diversion potential and need to follow up for reevaluation appointment in order to receive further refills.  Myron was reviewed and compliant.  Addendum 10/16/2023: Gabapentin discontinued due to to continued presence of THC.      2. Physical Therapy: Continue HEP.  Failed PT, as it exacerbated his symptoms.    3. Psychological: Previously cleared for SCS.    4. Complementary and alternative (CAM) Therapies:     5. Labs: UDS showed THC secondary to CBD.  Patient has since stopped that CBD, will obtain new UDS.  10/16/2023: UDS with THC.    6. Imaging: None indicated    7. Interventions: Schedule SCS trial with [employer] ([id number])    8. Referrals:     9. Records requested: n/a    10. Lifestyle goals: Reduce alcohol consumption, smoking    Follow-up for postop visit and lead pull.      [facility]  Pete Guardado PA-C

## 2023-12-27 ENCOUNTER — TELEPHONE (OUTPATIENT)
Dept: PAIN MEDICINE | Facility: CLINIC | Age: 63
End: 2023-12-27
Payer: COMMERCIAL

## 2023-12-27 DIAGNOSIS — M54.16 LUMBAR RADICULOPATHY: Primary | ICD-10-CM

## 2023-12-27 NOTE — TELEPHONE ENCOUNTER
Spoke to patient on 12/26/2023 to determine where he underwent physical therapy.  It was determined that he went to [facility] in [facility]

## 2023-12-27 NOTE — TELEPHONE ENCOUNTER
Patient called and stated he would do physical therapy. OK to place a new PT order?     He wants me to fax any order and appropriate documents to [facility].

## 2023-12-27 NOTE — TELEPHONE ENCOUNTER
Called the patient and relayed Kylie Gee's message regarding the fact that his insurance will not approve his procedure unless he does PT. I advised him that we will discuss the matter further with DR Jackson when he returns to the office after Jan. 1 2024.

## 2024-01-02 ENCOUNTER — TELEPHONE (OUTPATIENT)
Dept: PAIN MEDICINE | Facility: CLINIC | Age: 64
End: 2024-01-02
Payer: COMMERCIAL

## 2024-01-02 NOTE — TELEPHONE ENCOUNTER
Patient called because he said his pain is nearly unbearable, he wants to know if there is anything that can be done to relieve his pain in the meantime. He states he is scheduled for a PT visit today, but is concerned his pain will interfere with his ability to do physical therapy.

## 2024-01-06 DIAGNOSIS — E87.1 HYPONATREMIA: ICD-10-CM

## 2024-01-08 ENCOUNTER — TELEPHONE (OUTPATIENT)
Dept: PAIN MEDICINE | Facility: CLINIC | Age: 64
End: 2024-01-08
Payer: COMMERCIAL

## 2024-01-08 ENCOUNTER — TELEPHONE (OUTPATIENT)
Dept: INTERNAL MEDICINE | Facility: CLINIC | Age: 64
End: 2024-01-08
Payer: COMMERCIAL

## 2024-01-08 NOTE — TELEPHONE ENCOUNTER
Caller: Luis Antonio Fairchild    Relationship: Self    Best call back number: [phone]     What is the medical concern/diagnosis: PINCHED NERVE L4- L5     What specialty or service is being requested: SURGEON    What is the provider, practice or medical service name: ANY    What is the office location: [facility]     What is the office phone number:     Any additional details:  CALL WITH INFORMATION     Attempted calling pt again for the SECOND time and no answer received or VM option to leave a message. CC staff will try again once more before letter to be mailed to pt.

## 2024-01-08 NOTE — TELEPHONE ENCOUNTER
"Spoke with patient, informed that Sulma said \"I saw that patient has reached out to pain management; let me know if can get rescheduled there.\" Verbalized understanding and agreement.     "

## 2024-01-08 NOTE — TELEPHONE ENCOUNTER
Caller: Luis Antonio Fairchild    Relationship: Self    Best call back number:     What is the best time to reach you: ANY    Who are you requesting to speak with (clinical staff, provider,  specific staff member): CLINICAL    What was the call regarding: PATIENT WANTS TO TRY AND RESCHEDULE HIS PROCEDURES THAT WERE CANCELLED.  HE ALSO WANTS TO ASK SOME QUESTIONS ABOUT [facility] APPOINTMENT.  HE IS IN A GREAT DEAL OF PAIN     Is it okay if the provider responds through MyChart: PLEASE CALL

## 2024-01-09 ENCOUNTER — OFFICE VISIT (OUTPATIENT)
Dept: INTERNAL MEDICINE | Facility: CLINIC | Age: 64
End: 2024-01-09
Payer: COMMERCIAL

## 2024-01-09 VITALS
TEMPERATURE: 96.9 F | BODY MASS INDEX: 21.06 KG/M2 | RESPIRATION RATE: 20 BRPM | DIASTOLIC BLOOD PRESSURE: 74 MMHG | WEIGHT: 142.2 LBS | HEART RATE: 88 BPM | HEIGHT: 69 IN | SYSTOLIC BLOOD PRESSURE: 142 MMHG

## 2024-01-09 DIAGNOSIS — M51.36 DDD (DEGENERATIVE DISC DISEASE), LUMBAR: Primary | ICD-10-CM

## 2024-01-09 DIAGNOSIS — J44.1 CHRONIC OBSTRUCTIVE PULMONARY DISEASE WITH ACUTE EXACERBATION: ICD-10-CM

## 2024-01-09 DIAGNOSIS — F17.200 TOBACCO USE DISORDER: ICD-10-CM

## 2024-01-09 DIAGNOSIS — E87.1 HYPONATREMIA: ICD-10-CM

## 2024-01-09 DIAGNOSIS — M54.16 LUMBAR BACK PAIN WITH RADICULOPATHY AFFECTING RIGHT LOWER EXTREMITY: ICD-10-CM

## 2024-01-09 PROCEDURE — 80048 BASIC METABOLIC PNL TOTAL CA: CPT | Performed by: NURSE PRACTITIONER

## 2024-01-09 PROCEDURE — 99214 OFFICE O/P EST MOD 30 MIN: CPT | Performed by: NURSE PRACTITIONER

## 2024-01-09 RX ORDER — FUROSEMIDE 20 MG/1
20 TABLET ORAL DAILY
Qty: 30 TABLET | Refills: 0 | Status: SHIPPED | OUTPATIENT
Start: 2024-01-09

## 2024-01-09 RX ORDER — ACETAMINOPHEN 500 MG
500 TABLET ORAL EVERY 6 HOURS PRN
COMMUNITY

## 2024-01-09 RX ORDER — SODIUM CHLORIDE 1 G/1
TABLET ORAL
Qty: 60 TABLET | Refills: 0 | Status: SHIPPED | OUTPATIENT
Start: 2024-01-09

## 2024-01-09 NOTE — PROGRESS NOTES
Patient Name: Luis Antonio Fairchild  : 1960   MRN: [id number]     Chief Complaint:    Chief Complaint   Patient presents with    COPD     Follow up       History of Present Illness: Luis Antonio Fairchild is a 63 y.o. male presents to clinic for evaluation of right-sided low back pain that radiates down his right leg.    The patient has been evaluated by neurosurgeon, Dr. Raghu Lynn, on 10/13/2023 for surgery. He was not a surgical candidate due to multiple co morbidities and he referred him to pain management. He sees Dr. Jackson for pain management. He had previously tried 3 steroid epidurals, which were ineffective. They started him on gabapentin 800 mg 3 times a day, which has been ineffective. They were trying to get him approved for a nerve stimulator, but insurance has not approved it.  He has started another trial of physical therapy twice a week. He is taking Tylenol Arthritis Extra Strength for his pain. He is requesting a referral to a second opinion for neurosurgery. He does not have a preference. He has developed numbness and tingling in his foot over the last month.  He has weakness in his right leg.  He denies any loss of bowel or bladder or genital numbness.    He is using his Breztri for his COPD. He denies any wheezing or cough. He is using 2 nebulizers twice a day.    The patient continues to smoke 4 to 5 cigarettes a day and drinks 2 alcoholic drinks a day. He is using marijuana.    He was started on Lasix in 2023 for his hyponatremia, which he needs a refill on. He is taking Celebrex. He is not taking tramadol. He denies being on any antibiotics.    The patient declines the influenza vaccine today. He states that he has not had the flu virus this year.            MRI Lumbar Spine Without Contrast (06/15/2023 19:32)     Impression:   Severely motion degraded exam demonstrating multilevel lumbar spondylosis as above. Areas of moderate spinal canal narrowing are present and there is  apparent severe narrowing of the right neural foramen at L4-5.            Lab Results   Component Value Date    GLUCOSE 90 01/09/2024    BUN 14 01/09/2024    CREATININE 1.21 01/09/2024    EGFR 66.9 01/09/2024    BCR 11.6 01/09/2024    K 4.2 01/09/2024    CO2 26.0 01/09/2024    CALCIUM 9.7 01/09/2024    ALBUMIN 3.6 11/28/2023    BILITOT 0.2 11/28/2023    AST 17 11/28/2023    ALT 14 11/28/2023   Na 134      Subjective     Review of System: Review of Systems     Medications:     Current Outpatient Medications:     albuterol sulfate HFA (ProAir HFA) 108 (90 Base) MCG/ACT inhaler, Inhale 1-2 puffs every 4-6 hours PRN, Disp: 1 g, Rfl: 5    amLODIPine (NORVASC) 5 MG tablet, Take 1 tablet by mouth once daily, Disp: 90 tablet, Rfl: 0    Budeson-Glycopyrrol-Formoterol (BREZTRI) 160-9-4.8 MCG/ACT aerosol inhaler, Inhale 2 puffs 2 (Two) Times a Day., Disp: 10.7 g, Rfl: 11    Calcium Carbonate-Vit D-Min (CALCIUM 1200 PO), Take  by mouth., Disp: , Rfl:     celecoxib (CeleBREX) 200 MG capsule, Take 1 capsule by mouth twice daily, Disp: 180 capsule, Rfl: 1    citalopram (CeleXA) 40 MG tablet, Take 1 tablet by mouth once daily, Disp: 90 tablet, Rfl: 0    ezetimibe (ZETIA) 10 MG tablet, Take 1 tablet by mouth Daily., Disp: 30 tablet, Rfl: 5    ipratropium-albuterol (DUO-NEB) 0.5-2.5 mg/3 ml nebulizer, Take 3 mL by nebulization Every 4 (Four) Hours As Needed for Wheezing., Disp: 360 mL, Rfl: 1    pantoprazole (PROTONIX) 40 MG EC tablet, Take 1 tablet by mouth twice daily, Disp: 180 tablet, Rfl: 0    pravastatin (PRAVACHOL) 40 MG tablet, Take 1 tablet by mouth Daily., Disp: 90 tablet, Rfl: 1    VITAMIN D, CHOLECALCIFEROL, PO, Take  by mouth., Disp: , Rfl:     acetaminophen (TYLENOL) 500 MG tablet, Take 1 tablet by mouth Every 6 (Six) Hours As Needed for Mild Pain., Disp: , Rfl:     furosemide (LASIX) 20 MG tablet, Take 1 tablet by mouth once daily, Disp: 30 tablet, Rfl: 0    nicotine (Nicoderm CQ) 14 MG/24HR patch, Place 1 patch on the  "skin as directed by provider Daily., Disp: 28 each, Rfl: 1    Sod Picosulfate-Mag Ox-Cit Acd (Clenpiq) 10-3.5-12 MG-GM -GM/160ML solution, Take 350 mL by mouth Take As Directed., Disp: 350 mL, Rfl: 0    sodium chloride 1 g tablet, Take 1 tablet by mouth twice daily, Disp: 60 tablet, Rfl: 0    Allergies:   No Known Allergies    Objective     Physical Exam:   Vital Signs:   Vitals:    01/09/24 1106   BP: 142/74   BP Location: Right arm   Patient Position: Sitting   Cuff Size: Adult   Pulse: 88   Resp: 20   Temp: 96.9 °F (36.1 °C)   TempSrc: Infrared   Weight: 64.5 kg (142 lb 3.2 oz)   Height: 175.3 cm (69\")   PainSc: 10-Worst pain ever           Physical Exam  Constitutional:       General: He is not in acute distress.     Appearance: He is not ill-appearing.      Comments: Appears in pain   HENT:      Head: Normocephalic.   Cardiovascular:      Rate and Rhythm: Normal rate and regular rhythm.      Heart sounds: Normal heart sounds. No murmur heard.  Pulmonary:      Breath sounds: Normal breath sounds.   Abdominal:      General: Bowel sounds are normal.      Tenderness: There is no abdominal tenderness.   Musculoskeletal:      Comments: Right posterior hip low back pain to palpation.  Positive right leg raise.   Neurological:      General: No focal deficit present.      Mental Status: He is oriented to person, place, and time.      Deep Tendon Reflexes:      Reflex Scores:       Patellar reflexes are 2+ on the right side and 2+ on the left side.  Psychiatric:         Mood and Affect: Mood normal.       Assessment / Plan      Assessment/Plan:   Diagnoses and all orders for this visit:    1. DDD (degenerative disc disease), lumbar (Primary)  -     Ambulatory Referral to Neurosurgery  -     MRI Lumbar Spine Without Contrast; Future    2. Chronic obstructive pulmonary disease with acute exacerbation    3. Hyponatremia  -     Basic metabolic panel    4. Lumbar back pain with radiculopathy affecting right lower extremity  - "     MRI Lumbar Spine Without Contrast; Future    5. Tobacco use disorder  -     nicotine (Nicoderm CQ) 14 MG/24HR patch; Place 1 patch on the skin as directed by provider Daily.  Dispense: 28 each; Refill: 1         Right-sided low back pain.  The patient's MRI on 06/15/2023, showed moderate spinal canal narrowing and severe narrowing of the right neural foramen at L4-L5. I will refer him to neurosurgery for a second opinion. He will continue with his physical therapy.    Hyponatremia.  I refilled his Lasix. I will recheck his sodium today.  Right leg pain.  I will repeat MRI if the patient experiences worsening pain and neuropathy in his right leg.  4. COPD  Continue current regimen.  5.  Tobacco cessation  Luis Antonio Fairchild  reports that he has been smoking cigarettes. He started smoking about 50 years ago. He has a 23.00 pack-year smoking history. He has never used smokeless tobacco.. I have educated him on the risk of diseases from using tobacco products such as cancer, COPD, and heart disease.     I advised him to quit and he is willing to quit. We have discussed the following method/s for tobacco cessation:  Prescription Medicaiton.  Together we have set a quit date for  asap .  He will follow up with me in 1 month or sooner to check on his progress.    I spent 3  minutes counseling the patient.        Discussed smoking cessation, avoid alcohol, and avoid marijuana.    Follow Up:   Return in about 4 weeks (around 2/6/2024) for Annual.    KALEB Barrios  [facility]      Transcribed from ambient dictation for KALEB Barrios by Petty Mcfarland.  01/09/24   15:12 EST    Patient or patient representative verbalized consent to the visit recording.  I have personally performed the services described in this document as transcribed by the above individual, and it is both accurate and complete.

## 2024-01-10 LAB
ANION GAP SERPL CALCULATED.3IONS-SCNC: 13 MMOL/L (ref 5–15)
BUN SERPL-MCNC: 14 MG/DL (ref 8–23)
BUN/CREAT SERPL: 11.6 (ref 7–25)
CALCIUM SPEC-SCNC: 9.7 MG/DL (ref 8.6–10.5)
CHLORIDE SERPL-SCNC: 94 MMOL/L (ref 98–107)
CO2 SERPL-SCNC: 26 MMOL/L (ref 22–29)
CREAT SERPL-MCNC: 1.21 MG/DL (ref 0.76–1.27)
EGFRCR SERPLBLD CKD-EPI 2021: 66.9 ML/MIN/1.73
GLUCOSE SERPL-MCNC: 90 MG/DL (ref 65–99)
POTASSIUM SERPL-SCNC: 4.2 MMOL/L (ref 3.5–5.2)
SODIUM SERPL-SCNC: 133 MMOL/L (ref 136–145)

## 2024-01-10 RX ORDER — SODIUM PICOSULFATE, MAGNESIUM OXIDE, AND ANHYDROUS CITRIC ACID 10; 3.5; 12 MG/160ML; G/160ML; G/160ML
350 LIQUID ORAL TAKE AS DIRECTED
Qty: 350 ML | Refills: 0 | Status: SHIPPED | OUTPATIENT
Start: 2024-01-10

## 2024-01-11 ENCOUNTER — TELEPHONE (OUTPATIENT)
Dept: PAIN MEDICINE | Facility: CLINIC | Age: 64
End: 2024-01-11

## 2024-01-11 RX ORDER — NICOTINE 21 MG/24HR
1 PATCH, TRANSDERMAL 24 HOURS TRANSDERMAL EVERY 24 HOURS
Qty: 28 EACH | Refills: 1 | Status: SHIPPED | OUTPATIENT
Start: 2024-01-11

## 2024-01-11 NOTE — TELEPHONE ENCOUNTER
Caller: ANGELIA   Relationship to Patient: SELF  Phone Number: [phone] (home)     Reason for Call: PATIENT CALLING STATING THAT HE SPOKE WITH SOMEONE AND HE WAS INFORMED HE NEEDS TO DO MORE PT, HOWEVER HE STATES HE CALLED PT AND THEY DO NOT HAVE ANY UPDATED ORDERS FOR HIM

## 2024-01-11 NOTE — TELEPHONE ENCOUNTER
Mr. Salmon called in today and stated that he has completed physical therapy, and that he was told by one of the physical therapist feels that its not working for him.  Pt is in a lot of pain. I told him his doctor appt is canceled tomorrow with Pablo Crispy. I advised him to call back tomorrow morning to speak with Briseida his Medical Assistant that can better assist him with this situation.

## 2024-01-12 ENCOUNTER — TELEPHONE (OUTPATIENT)
Dept: PAIN MEDICINE | Facility: CLINIC | Age: 64
End: 2024-01-12
Payer: COMMERCIAL

## 2024-01-12 NOTE — TELEPHONE ENCOUNTER
Spoke with the patient and explained that he must have at least 6 PT sessions completed within the last 6 weeks in order for insurance to cover any procedure for him. Patient states he has completed 6 weeks of PT. He granted permission for me to request verification from his physical therapist.

## 2024-01-15 DIAGNOSIS — M54.16 LUMBAR RADICULOPATHY: Primary | ICD-10-CM

## 2024-01-16 ENCOUNTER — TELEPHONE (OUTPATIENT)
Dept: PAIN MEDICINE | Facility: CLINIC | Age: 64
End: 2024-01-16
Payer: COMMERCIAL

## 2024-01-16 NOTE — TELEPHONE ENCOUNTER
Caller: Luis Antonio Fairchild    Relationship: Self    Best call back number:     What is the best time to reach you: ANY     Who are you requesting to speak with (clinical staff, provider,  specific staff member): CLINICAL    What was the call regarding: PATIENT IS NEEDING US TO SUBMIT ANOTHER REQUEST FOR SPINAL CORD STIMULATOR     Is it okay if the provider responds through [facility]: PLEASE CALL WHEN SUBMITTED

## 2024-01-16 NOTE — TELEPHONE ENCOUNTER
Called the patient and advised him that he needs to do 6 PT visits, or he needs for his physical therapist to document that PT is not helpful to him. Patient voiced understanding.

## 2024-01-18 ENCOUNTER — TELEPHONE (OUTPATIENT)
Dept: PAIN MEDICINE | Facility: CLINIC | Age: 64
End: 2024-01-18

## 2024-01-18 DIAGNOSIS — J44.1 CHRONIC OBSTRUCTIVE PULMONARY DISEASE WITH ACUTE EXACERBATION: ICD-10-CM

## 2024-01-18 RX ORDER — IPRATROPIUM BROMIDE AND ALBUTEROL SULFATE 2.5; .5 MG/3ML; MG/3ML
SOLUTION RESPIRATORY (INHALATION)
Qty: 360 ML | Refills: 0 | Status: SHIPPED | OUTPATIENT
Start: 2024-01-18

## 2024-01-23 ENCOUNTER — TELEPHONE (OUTPATIENT)
Dept: PAIN MEDICINE | Facility: CLINIC | Age: 64
End: 2024-01-23
Payer: COMMERCIAL

## 2024-01-23 NOTE — TELEPHONE ENCOUNTER
Provider: DR ALICEA     Caller: PATIENT     Phone Number: [phone]    Reason for Call: PATIENT STATES HE RECEIVED SOME PAPER WORK IN THE MAIL REGARDING HIS BACK BUT HE HAS MISPLACED THAT. HE WOULD LIKE A CALL BACK ASA REGARDING THIS AS HIS PAIN IS STARTING TO SPREAD.  OKAY TO LEAVE A VOICEMAIL.

## 2024-01-23 NOTE — TELEPHONE ENCOUNTER
Called the patient and advised him that we have not mailed him any paperwork regarding his back. Patient wants to know what Dr Jackson has planned for treating his back pain.

## 2024-01-24 DIAGNOSIS — M54.16 LUMBAR RADICULOPATHY: Primary | ICD-10-CM

## 2024-01-24 NOTE — TELEPHONE ENCOUNTER
Called the patient and scheduled for SCS trial with [employer]. Reminded of NPO status and need for a , and that the surgery center will call the day before with arrival time. Explained process--trial first, then lead pull, then, if the trial is successful, permanent placement.

## 2024-02-02 DIAGNOSIS — J44.9 COPD, SEVERE: ICD-10-CM

## 2024-02-02 DIAGNOSIS — J44.1 CHRONIC OBSTRUCTIVE PULMONARY DISEASE WITH ACUTE EXACERBATION: ICD-10-CM

## 2024-02-02 RX ORDER — ALBUTEROL SULFATE 90 UG/1
AEROSOL, METERED RESPIRATORY (INHALATION)
Qty: 1 G | Refills: 5 | Status: SHIPPED | OUTPATIENT
Start: 2024-02-02

## 2024-02-02 RX ORDER — IPRATROPIUM BROMIDE AND ALBUTEROL SULFATE 2.5; .5 MG/3ML; MG/3ML
SOLUTION RESPIRATORY (INHALATION)
Qty: 360 ML | Refills: 0 | Status: SHIPPED | OUTPATIENT
Start: 2024-02-02

## 2024-02-05 ENCOUNTER — TELEPHONE (OUTPATIENT)
Dept: INTERNAL MEDICINE | Facility: CLINIC | Age: 64
End: 2024-02-05
Payer: COMMERCIAL

## 2024-02-05 ENCOUNTER — TELEPHONE (OUTPATIENT)
Dept: PAIN MEDICINE | Facility: CLINIC | Age: 64
End: 2024-02-05
Payer: COMMERCIAL

## 2024-02-05 NOTE — TELEPHONE ENCOUNTER
ATTEMPTED TO WARM TRANSFER    Caller: ANGELIA NOBLE    Relationship: PATIENT    Best call back number: [phone]    What is the best time to reach you: TODAY    Who are you requesting to speak with (clinical staff, provider,  specific staff member):  CLINICAL STAFF    Do you know the name of the person who called:  YESICA    What was the call regarding: PROCEDURE    Is it okay if the provider responds through MyChart: CALL

## 2024-02-05 NOTE — TELEPHONE ENCOUNTER
Hub staff attempted to follow warm transfer process and was unsuccessful     Caller: Luis Antonio Fairchild    Relationship to patient: Self    Best call back number: [phone] (home)       Patient is needing:  PATIENT PROCEDURE NEEDS TO KNOW WHERE HE IS GOING. PATIENT HAS DATE AND TIME BUT NO LOCATION.

## 2024-02-05 NOTE — TELEPHONE ENCOUNTER
Caller: Luis Antonio Fairchild    Relationship: Self    Best call back number: [phone]    What orders are you requesting (i.e. lab or imaging): SODIUM LEVEL   Additional notes: THE PATIENT WOULD LIKE TO HAVE HIS SODIUM LEVEL CHECKED HE STATES THAT HE FEELS LIKE ITS OFF

## 2024-02-05 NOTE — TELEPHONE ENCOUNTER
Called the patient and left VM informing him that the surgery center will not be able to schedule him until his insurance pays for his previous procedure.

## 2024-02-05 NOTE — TELEPHONE ENCOUNTER
I left a message on the patients voicemail to call our office back, office number provided.     RELAY:    He should have a standing order; severe symptoms I would go to ER; since he will take several hours to get back. Severe symptoms including confusion, headache, lethargy, fatigue and dizziness.  If it gets too low it can cause seizures.  This requires further evaluation.

## 2024-02-05 NOTE — TELEPHONE ENCOUNTER
He should have a standing order; severe symptoms I would go to ER; since he will take several hours to get back. Severe symptoms including confusion, headache, lethargy, fatigue and dizziness.  If it gets too low it can cause seizures.  This requires further evaluation.

## 2024-02-05 NOTE — TELEPHONE ENCOUNTER
Patient called because he stated that he had paid his debt to [facility], and that his insurance company would cover anything he did not pay. He requested that I leave him on the schedule for a spinal cord stimulator trial on 2/8/2024.     I called [facility] to confirm, however, the staff there could find no documentation in his chart to support his statements.     I called the patient and left a VM advising him that if he can get his insurance company to send us documentation showing that his debt to [facility] has been paid, we will be happy to move forward with his spinal cord stimulator trial.

## 2024-02-06 RX ORDER — EZETIMIBE 10 MG/1
10 TABLET ORAL DAILY
Qty: 30 TABLET | Refills: 0 | Status: SHIPPED | OUTPATIENT
Start: 2024-02-06

## 2024-02-06 NOTE — TELEPHONE ENCOUNTER
Attempt #2   I left a message on the patients voicemail to call our office back, office number provided.      RELAY:     He should have a standing order; severe symptoms I would go to ER; since he will take several hours to get back. Severe symptoms including confusion, headache, lethargy, fatigue and dizziness.  If it gets too low it can cause seizures.  This requires further evaluation.

## 2024-02-07 ENCOUNTER — TELEPHONE (OUTPATIENT)
Dept: PAIN MEDICINE | Facility: CLINIC | Age: 64
End: 2024-02-07
Payer: COMMERCIAL

## 2024-02-07 DIAGNOSIS — E78.5 HYPERLIPIDEMIA, UNSPECIFIED HYPERLIPIDEMIA TYPE: ICD-10-CM

## 2024-02-07 NOTE — TELEPHONE ENCOUNTER
HUB transferred call to the office from patient and his insurance company. Patient was placed on hold in order to determine who they would need to speak with.   Patient hung up before information could be relayed.  Patient will need to reach out to the billing dept as the office does not handle billing.  [facility] [phone]

## 2024-02-07 NOTE — TELEPHONE ENCOUNTER
Patient called and stated his insurance company has paid his claim with [facility]. I advised the patient that I spoke with their staff yesterday, 2/6/2024, and they had no documentation supporting this. I advised the patient he needs to contact his insurance company and have them provide documentation to the Surgery Center, otherwise we will not be able to put him on the schedule. Patient stated he would contact his insurance company.

## 2024-02-07 NOTE — TELEPHONE ENCOUNTER
Patient notified and verbalized understanding.  Patient states he does not have any severe symptoms now. He will come get lab drawn.

## 2024-02-07 NOTE — TELEPHONE ENCOUNTER
Caller: ANGELIA NOBLE    Relationship to Patient: the patient    Phone Number: [phone] (home)     Reason for Call:   PATIENT CALLED IN EDU THAT HE SPOKE WITH INSURANCE COMPANY WHO TOLD HIM WE WILL NEED TO SEND MEDICAL necessity TO INSURANCE SO THEY CAN APPROVE PROCEDURE.

## 2024-02-08 ENCOUNTER — TELEPHONE (OUTPATIENT)
Dept: PAIN MEDICINE | Facility: CLINIC | Age: 64
End: 2024-02-08

## 2024-02-08 ENCOUNTER — TELEPHONE (OUTPATIENT)
Dept: INTERNAL MEDICINE | Facility: CLINIC | Age: 64
End: 2024-02-08
Payer: COMMERCIAL

## 2024-02-08 ENCOUNTER — OUTSIDE FACILITY SERVICE (OUTPATIENT)
Dept: PAIN MEDICINE | Facility: CLINIC | Age: 64
End: 2024-02-08
Payer: COMMERCIAL

## 2024-02-08 RX ORDER — PRAVASTATIN SODIUM 40 MG
40 TABLET ORAL DAILY
Qty: 90 TABLET | Refills: 0 | Status: SHIPPED | OUTPATIENT
Start: 2024-02-08

## 2024-02-08 NOTE — TELEPHONE ENCOUNTER
Tried to reach patient no answer left voicemail to return call    RELAY:    Please ask patient if he was able to reschedule with [facility].

## 2024-02-08 NOTE — TELEPHONE ENCOUNTER
I called patient and discussed I cannot get his MRI approved without a peer to peer.  He is going to call [facility] and reschedule his appointment.  He missed it on 2/2/2024.  He will let me know the date.  He will have them address the MRI.

## 2024-02-12 ENCOUNTER — TELEPHONE (OUTPATIENT)
Dept: PAIN MEDICINE | Facility: CLINIC | Age: 64
End: 2024-02-12

## 2024-02-12 NOTE — TELEPHONE ENCOUNTER
Caller: Luis Antonio Fairchild    Relationship: Self    Best call back number: [phone]    What is the best time to reach you: ANY     Who are you requesting to speak with (clinical staff, provider,  specific staff member): CLINICAL     Do you know the name of the person who called: YES     What was the call regarding: PATIENT WOULD LIKE TO KNOW THE NEXT STEPS REGARDING THE NERVE PAIN

## 2024-02-13 NOTE — TELEPHONE ENCOUNTER
Patient called back because he states he received a letter in the mail from his insurance company granting approval for his SCS trial. I advised the patient we can proceed when we receive verification from his insurance company.

## 2024-02-14 ENCOUNTER — TELEPHONE (OUTPATIENT)
Dept: PAIN MEDICINE | Facility: CLINIC | Age: 64
End: 2024-02-14
Payer: COMMERCIAL

## 2024-02-15 ENCOUNTER — TELEPHONE (OUTPATIENT)
Dept: PAIN MEDICINE | Facility: CLINIC | Age: 64
End: 2024-02-15
Payer: COMMERCIAL

## 2024-02-20 ENCOUNTER — OFFICE VISIT (OUTPATIENT)
Dept: INTERNAL MEDICINE | Facility: CLINIC | Age: 64
End: 2024-02-20
Payer: COMMERCIAL

## 2024-02-20 VITALS
SYSTOLIC BLOOD PRESSURE: 142 MMHG | HEART RATE: 88 BPM | RESPIRATION RATE: 22 BRPM | HEIGHT: 69 IN | BODY MASS INDEX: 20.06 KG/M2 | WEIGHT: 135.4 LBS | DIASTOLIC BLOOD PRESSURE: 86 MMHG | TEMPERATURE: 97.3 F

## 2024-02-20 DIAGNOSIS — E87.1 HYPONATREMIA: ICD-10-CM

## 2024-02-20 DIAGNOSIS — R06.83 SNORES: ICD-10-CM

## 2024-02-20 DIAGNOSIS — J44.9 COPD, SEVERE: ICD-10-CM

## 2024-02-20 DIAGNOSIS — G89.11 ACUTE SHOULDER PAIN DUE TO TRAUMA, RIGHT: ICD-10-CM

## 2024-02-20 DIAGNOSIS — M51.36 DDD (DEGENERATIVE DISC DISEASE), LUMBAR: ICD-10-CM

## 2024-02-20 DIAGNOSIS — Z85.818 HISTORY OF CANCER TONSIL: ICD-10-CM

## 2024-02-20 DIAGNOSIS — R91.1 LEFT LOWER LOBE PULMONARY NODULE: ICD-10-CM

## 2024-02-20 DIAGNOSIS — E55.9 VITAMIN D DEFICIENCY: ICD-10-CM

## 2024-02-20 DIAGNOSIS — R53.83 OTHER FATIGUE: Primary | ICD-10-CM

## 2024-02-20 DIAGNOSIS — M25.511 ACUTE SHOULDER PAIN DUE TO TRAUMA, RIGHT: ICD-10-CM

## 2024-02-20 LAB
25(OH)D3 SERPL-MCNC: 87.8 NG/ML (ref 30–100)
ALBUMIN SERPL-MCNC: 4.3 G/DL (ref 3.5–5.2)
ALBUMIN/GLOB SERPL: 1.8 G/DL
ALP SERPL-CCNC: 77 U/L (ref 39–117)
ALT SERPL W P-5'-P-CCNC: 16 U/L (ref 1–41)
ANION GAP SERPL CALCULATED.3IONS-SCNC: 12 MMOL/L (ref 5–15)
AST SERPL-CCNC: 20 U/L (ref 1–40)
BASOPHILS # BLD AUTO: 0.05 10*3/MM3 (ref 0–0.2)
BASOPHILS NFR BLD AUTO: 0.7 % (ref 0–1.5)
BILIRUB SERPL-MCNC: 0.3 MG/DL (ref 0–1.2)
BUN SERPL-MCNC: 13 MG/DL (ref 8–23)
BUN/CREAT SERPL: 11.9 (ref 7–25)
CALCIUM SPEC-SCNC: 9.5 MG/DL (ref 8.6–10.5)
CHLORIDE SERPL-SCNC: 90 MMOL/L (ref 98–107)
CO2 SERPL-SCNC: 24 MMOL/L (ref 22–29)
CREAT SERPL-MCNC: 1.09 MG/DL (ref 0.76–1.27)
DEPRECATED RDW RBC AUTO: 43.7 FL (ref 37–54)
EGFRCR SERPLBLD CKD-EPI 2021: 75.8 ML/MIN/1.73
EOSINOPHIL # BLD AUTO: 0.08 10*3/MM3 (ref 0–0.4)
EOSINOPHIL NFR BLD AUTO: 1.1 % (ref 0.3–6.2)
ERYTHROCYTE [DISTWIDTH] IN BLOOD BY AUTOMATED COUNT: 12.5 % (ref 12.3–15.4)
FOLATE SERPL-MCNC: >20 NG/ML (ref 4.78–24.2)
GLOBULIN UR ELPH-MCNC: 2.4 GM/DL
GLUCOSE SERPL-MCNC: 107 MG/DL (ref 65–99)
HCT VFR BLD AUTO: 43.3 % (ref 37.5–51)
HGB BLD-MCNC: 15 G/DL (ref 13–17.7)
IMM GRANULOCYTES # BLD AUTO: 0.03 10*3/MM3 (ref 0–0.05)
IMM GRANULOCYTES NFR BLD AUTO: 0.4 % (ref 0–0.5)
LYMPHOCYTES # BLD AUTO: 1.62 10*3/MM3 (ref 0.7–3.1)
LYMPHOCYTES NFR BLD AUTO: 21.5 % (ref 19.6–45.3)
MCH RBC QN AUTO: 33.2 PG (ref 26.6–33)
MCHC RBC AUTO-ENTMCNC: 34.6 G/DL (ref 31.5–35.7)
MCV RBC AUTO: 95.8 FL (ref 79–97)
MONOCYTES # BLD AUTO: 1.02 10*3/MM3 (ref 0.1–0.9)
MONOCYTES NFR BLD AUTO: 13.6 % (ref 5–12)
NEUTROPHILS NFR BLD AUTO: 4.72 10*3/MM3 (ref 1.7–7)
NEUTROPHILS NFR BLD AUTO: 62.7 % (ref 42.7–76)
NRBC BLD AUTO-RTO: 0 /100 WBC (ref 0–0.2)
PLATELET # BLD AUTO: 461 10*3/MM3 (ref 140–450)
PMV BLD AUTO: 8.2 FL (ref 6–12)
POTASSIUM SERPL-SCNC: 5.1 MMOL/L (ref 3.5–5.2)
PROT SERPL-MCNC: 6.7 G/DL (ref 6–8.5)
RBC # BLD AUTO: 4.52 10*6/MM3 (ref 4.14–5.8)
SODIUM SERPL-SCNC: 126 MMOL/L (ref 136–145)
T4 FREE SERPL-MCNC: 1.66 NG/DL (ref 0.93–1.7)
TSH SERPL DL<=0.05 MIU/L-ACNC: 1.21 UIU/ML (ref 0.27–4.2)
VIT B12 BLD-MCNC: 870 PG/ML (ref 211–946)
WBC NRBC COR # BLD AUTO: 7.52 10*3/MM3 (ref 3.4–10.8)

## 2024-02-20 PROCEDURE — 85025 COMPLETE CBC W/AUTO DIFF WBC: CPT | Performed by: NURSE PRACTITIONER

## 2024-02-20 PROCEDURE — 82306 VITAMIN D 25 HYDROXY: CPT | Performed by: NURSE PRACTITIONER

## 2024-02-20 PROCEDURE — 86664 EPSTEIN-BARR NUCLEAR ANTIGEN: CPT | Performed by: NURSE PRACTITIONER

## 2024-02-20 PROCEDURE — 80053 COMPREHEN METABOLIC PANEL: CPT | Performed by: NURSE PRACTITIONER

## 2024-02-20 PROCEDURE — 82607 VITAMIN B-12: CPT | Performed by: NURSE PRACTITIONER

## 2024-02-20 PROCEDURE — 84443 ASSAY THYROID STIM HORMONE: CPT | Performed by: NURSE PRACTITIONER

## 2024-02-20 PROCEDURE — 86665 EPSTEIN-BARR CAPSID VCA: CPT | Performed by: NURSE PRACTITIONER

## 2024-02-20 PROCEDURE — 82746 ASSAY OF FOLIC ACID SERUM: CPT | Performed by: NURSE PRACTITIONER

## 2024-02-20 PROCEDURE — 84439 ASSAY OF FREE THYROXINE: CPT | Performed by: NURSE PRACTITIONER

## 2024-02-20 RX ORDER — SODIUM CHLORIDE 1 G/1
1 TABLET ORAL 2 TIMES DAILY
Qty: 180 TABLET | Refills: 1 | Status: SHIPPED | OUTPATIENT
Start: 2024-02-20

## 2024-02-20 RX ORDER — FUROSEMIDE 20 MG/1
20 TABLET ORAL DAILY
Qty: 90 TABLET | Refills: 1 | Status: SHIPPED | OUTPATIENT
Start: 2024-02-20

## 2024-02-20 NOTE — PROGRESS NOTES
Patient Name: Luis Antonio Fairchild  : 1960   MRN: [id number]     Chief Complaint:    Chief Complaint   Patient presents with    Fatigue       History of Present Illness: Luis Antonio Fairchild is a 63 y.o. male presents to clinic with complaints of fatigue. The fatigue has been worsening over the last month.    He has been falling asleep during the day if he sits still. He does snore. He does have pauses in his sleeping and gasping for air. He does not feel rested. He denies any headaches or falls other than when his sodium dropped.     He had an episode of syncope and fell a month ago. He has had shoulder pain since. He has decreased range of motion in his right shoulder and pain with lifting. He has been trying some exercises at home, but it is not improving a whole lot. He was seeing orthopedics for his elbow and wrist, but there was no follow-up after that. His shoulder pain improved at that time, but the shoulder pain has  returned after this most current fall.    He has been using his rescue inhaler about once or twice a day for his COPD. He also uses Breztri twice a day. He has not had any emergency visits since his last visit. He denies any wheezing or sputum. He is using oxygen at night.    He follows with Dr. Sellers at  for his neck and throat cancer and has an appointment with him in 2024. His routine soft tissue and chest CTs have not showed any recurrence.     He has an appointment with  tomorrow to further evaluate his back pain. He would like to consider surgery if possible. Dr. Jackson wanted to do a stimulator in his back, but it was canceled twice.    He has a long history of hyponatremia. An MRI of his head was ordered  to evaluate hyponatremia; but insurance denied.  He had a trial of sodium tablets as well as Lasix and his sodium has remained stable.    He also has a history of vitamin D deficiency.   He has decreased his alcohol to 2 cans of beer a day and he is going to try to quit  completely.    He is currently taking vitamin D and calcium. He has also been taking an extra vitamin D.      MRI Lumbar Spine Without Contrast (06/15/2023 19:32)         CT Chest Without Contrast Diagnostic (03/16/2023 10:55)        Subjective     Review of System: Review of Systems     Medications:     Current Outpatient Medications:     acetaminophen (TYLENOL) 500 MG tablet, Take 1 tablet by mouth Every 6 (Six) Hours As Needed for Mild Pain., Disp: , Rfl:     albuterol sulfate HFA (ProAir HFA) 108 (90 Base) MCG/ACT inhaler, Inhale 1-2 puffs every 4-6 hours PRN, Disp: 1 g, Rfl: 5    amLODIPine (NORVASC) 5 MG tablet, Take 1 tablet by mouth once daily, Disp: 90 tablet, Rfl: 0    Budeson-Glycopyrrol-Formoterol (BREZTRI) 160-9-4.8 MCG/ACT aerosol inhaler, Inhale 2 puffs 2 (Two) Times a Day., Disp: 10.7 g, Rfl: 11    Calcium Carbonate-Vit D-Min (CALCIUM 1200 PO), Take  by mouth., Disp: , Rfl:     celecoxib (CeleBREX) 200 MG capsule, Take 1 capsule by mouth twice daily, Disp: 180 capsule, Rfl: 1    citalopram (CeleXA) 40 MG tablet, Take 1 tablet by mouth once daily, Disp: 90 tablet, Rfl: 0    ezetimibe (ZETIA) 10 MG tablet, Take 1 tablet by mouth once daily, Disp: 30 tablet, Rfl: 0    furosemide (LASIX) 20 MG tablet, Take 1 tablet by mouth Daily., Disp: 90 tablet, Rfl: 1    ipratropium-albuterol (DUO-NEB) 0.5-2.5 mg/3 ml nebulizer, USE 1 AMPULE IN NEBULIZER EVERY 4 HOURS AS NEEDED FOR WHEEZING, Disp: 360 mL, Rfl: 0    pantoprazole (PROTONIX) 40 MG EC tablet, Take 1 tablet by mouth twice daily, Disp: 180 tablet, Rfl: 0    pravastatin (PRAVACHOL) 40 MG tablet, Take 1 tablet by mouth once daily, Disp: 90 tablet, Rfl: 0    sodium chloride 1 g tablet, Take 1 tablet by mouth 2 (Two) Times a Day., Disp: 180 tablet, Rfl: 1    Sod Picosulfate-Mag Ox-Cit Acd (Clenpiq) 10-3.5-12 MG-GM -GM/160ML solution, Take 350 mL by mouth Take As Directed., Disp: 350 mL, Rfl: 0    Allergies:   No Known Allergies    Objective     Physical Exam:  "  Vital Signs:   Vitals:    02/20/24 1056   BP: 142/86   BP Location: Right arm   Patient Position: Sitting   Cuff Size: Adult   Pulse: 88   Resp: 22   Temp: 97.3 °F (36.3 °C)   TempSrc: Infrared   Weight: 61.4 kg (135 lb 6.4 oz)   Height: 175.3 cm (69\")   PainSc: 10-Worst pain ever       Physical Exam  Constitutional:       General: He is not in acute distress.     Appearance: He is not ill-appearing.   HENT:      Head: Normocephalic.   Cardiovascular:      Rate and Rhythm: Normal rate and regular rhythm.      Heart sounds: Normal heart sounds. No murmur heard.  Pulmonary:      Breath sounds: Normal breath sounds.   Neurological:      General: No focal deficit present.      Mental Status: He is oriented to person, place, and time.   Psychiatric:         Mood and Affect: Mood normal.         Assessment / Plan      Assessment/Plan:   Diagnoses and all orders for this visit:    1. Other fatigue (Primary)  -     Comprehensive Metabolic Panel; Future  -     Vitamin B12; Future  -     CBC & Differential; Future  -     TSH; Future  -     T4, Free; Future  -     EBV Antibody Profile; Future  -     Folate; Future  -     Comprehensive Metabolic Panel  -     Vitamin B12  -     CBC & Differential  -     TSH  -     T4, Free  -     EBV Antibody Profile  -     Folate    2. Acute shoulder pain due to trauma, right  -     Ambulatory Referral to Orthopedic Surgery  -     XR Shoulder 2+ View Right; Future    3. Snores  -     Ambulatory Referral to Sleep Medicine    4. Vitamin D deficiency  -     Vitamin D,25-Hydroxy; Future  -     Vitamin D,25-Hydroxy    5. Hyponatremia  -     sodium chloride 1 g tablet; Take 1 tablet by mouth 2 (Two) Times a Day.  Dispense: 180 tablet; Refill: 1  -     furosemide (LASIX) 20 MG tablet; Take 1 tablet by mouth Daily.  Dispense: 90 tablet; Refill: 1  -     Cancel: Basic metabolic panel    6. COPD, severe    7. History of cancer tonsil    8. DDD (degenerative disc disease), lumbar    9. Left lower lobe " pulmonary nodule           1. Fatigue, snoring, and hyponatremia.  - It was thought that his alcohol use had contributed to the hyponatremia.  - Sodium has been stable since we have started the Lasix and the sodium tablets.   - An order for labs has been placed.  - He will continue Lasix and sodium pills.  - I attempted to do an MRI of the brain to evaluate his hyponatremia/syncope, but was denied; he does not have any headaches.   Adult Transthoracic Echo Complete W/ Cont if Necessary Per Protocol (03/19/2023 13:53)   Osmolality, Urine - Urine, Clean Catch (05/18/2023 22:02)   Osmolality, Serum (05/18/2023 20:37)  CT Abdomen Pelvis Without Contrast (05/17/2023 12:40)     2. Back pain.  - He will follow up with  tomorrow for a possible surgical consult.    3. Shoulder pain.  - He was previously seeing orthopedics for his elbow and wrist; however, he has not had a follow up appointment.  - A repeat x-ray on his right shoulder has been placed.  - A referral to orthopedics has been sent.    4. Vitamin D deficiency.  - An order for vitamin D labs has been placed.    5. COPD  -Continue current medications.    6. Pulmonary nodule  -He is due for his repeat CT of chest. This has been ordered by pulmonary. Dr. Maldonado.   -He will schedule a follow-up      CT Chest Without Contrast Diagnostic (03/16/2023 10:55)     7. Tonsil  cancer  He will follow up with  for his routine follow-up for his throat cancer.       Will follow-up as scheduled.    KALEB Barrios  [facility]    Transcribed from ambient dictation for KALEB Barrios by Margie Betancourt.  02/20/24   13:51 EST    Patient or patient representative verbalized consent to the visit recording.  I have personally performed the services described in this document as transcribed by the above individual, and it is both accurate and complete.

## 2024-02-21 ENCOUNTER — TELEPHONE (OUTPATIENT)
Dept: INTERNAL MEDICINE | Facility: CLINIC | Age: 64
End: 2024-02-21
Payer: COMMERCIAL

## 2024-02-21 LAB
EBV NA IGG SER IA-ACNC: 129 U/ML (ref 0–17.9)
EBV VCA IGG SER IA-ACNC: 559 U/ML (ref 0–17.9)
EBV VCA IGM SER IA-ACNC: <36 U/ML (ref 0–35.9)
SERVICE CMNT-IMP: ABNORMAL

## 2024-02-21 NOTE — TELEPHONE ENCOUNTER
I left a message on the patients voicemail to call our office back, office number provided.     RELAY:    Your sodium is low.  Sometimes this causes severe symptoms including confusion, headache, lethargy, fatigue and dizziness.  If it gets too low it can cause seizures.  This requires further evaluation.  I would recommend ER for evaluation especially if you have severe symptoms. Please let me know if he is going to the ER.

## 2024-02-21 NOTE — TELEPHONE ENCOUNTER
Name: Luis Antonio Fairchild    Relationship: Self    Best Callback Number: [phone]    HUB PROVIDED THE RELAY MESSAGE FROM THE OFFICE   PATIENT VOICED UNDERSTANDING AND HAS NO FURTHER QUESTIONS AT THIS TIME    ADDITIONAL INFORMATION: PATIENT STATING HE BELIEVE HIS SODIUM WAS LOW IS BECAUSE HE HAD RUN OUT OF HIS SODIUM TABLETS

## 2024-02-21 NOTE — TELEPHONE ENCOUNTER
----- Message from KALEB Barrios sent at 2/21/2024  8:16 AM EST -----  Your sodium is low.  Sometimes this causes severe symptoms including confusion, headache, lethargy, fatigue and dizziness.  If it gets too low it can cause seizures.  This requires further evaluation.  I would recommend ER for evaluation especially if you have severe symptoms. Please let me know if he is going to the ER.

## 2024-02-21 NOTE — TELEPHONE ENCOUNTER
Please have him restart medication and limit fluids (especially alcohol) and recheck in two days; there is a standing order. Severe symptoms he should go to ER.

## 2024-02-22 RX ORDER — ACETAMINOPHEN 500 MG
500 TABLET ORAL EVERY 6 HOURS PRN
Qty: 120 TABLET | Refills: 1 | Status: SHIPPED | OUTPATIENT
Start: 2024-02-22

## 2024-02-22 NOTE — TELEPHONE ENCOUNTER
Caller: Luis Antonio Fairchild Valentin    Relationship: Self    Best call back number: [phone]     Requested Prescriptions:   Requested Prescriptions     Pending Prescriptions Disp Refills    acetaminophen (TYLENOL) 500 MG tablet       Sig: Take 1 tablet by mouth Every 6 (Six) Hours As Needed for Mild Pain.        Pharmacy where request should be sent: [facility] [address] [place] [address] [phone] [place] [phone]      Last office visit with prescribing clinician: 2/20/2024   Last telemedicine visit with prescribing clinician: Visit date not found   Next office visit with prescribing clinician: 3/21/2024     Does the patient have less than a 3 day supply:  [x] Yes  [] No    Would you like a call back once the refill request has been completed: [x] Yes [] No    If the office needs to give you a call back, can they leave a voicemail: [x] Yes [] No    Sandra Bateman   02/22/24 14:51 EST

## 2024-02-24 DIAGNOSIS — F41.9 ANXIETY: ICD-10-CM

## 2024-02-27 ENCOUNTER — TRANSCRIBE ORDERS (OUTPATIENT)
Dept: ADMINISTRATIVE | Facility: HOSPITAL | Age: 64
End: 2024-02-27
Payer: COMMERCIAL

## 2024-02-27 ENCOUNTER — TELEPHONE (OUTPATIENT)
Dept: PAIN MEDICINE | Facility: CLINIC | Age: 64
End: 2024-02-27
Payer: COMMERCIAL

## 2024-02-27 DIAGNOSIS — M54.41 ACUTE MIDLINE LOW BACK PAIN WITH RIGHT-SIDED SCIATICA: ICD-10-CM

## 2024-02-27 DIAGNOSIS — M51.36 DDD (DEGENERATIVE DISC DISEASE), LUMBAR: Primary | ICD-10-CM

## 2024-02-27 RX ORDER — CITALOPRAM 40 MG/1
40 TABLET ORAL DAILY
Qty: 90 TABLET | Refills: 0 | Status: SHIPPED | OUTPATIENT
Start: 2024-02-27

## 2024-02-27 NOTE — TELEPHONE ENCOUNTER
Caller: Luis Antonio Fairchild    Relationship: Self    Best call back number: [phone]    What orders are you requesting (i.e. lab or imaging): MRI    In what timeframe would the patient need to come in: ASAP    Where will you receive your lab/imaging services: [facility]    Additional notes: PT THOUGHT THE MRI WAS TO BE ORDERED LAST THURSDAY. PT REQUESTING AN MRI TO BE ORDERED AND TO BE SENT TO [facility] TO HAVE DONE

## 2024-03-11 ENCOUNTER — TELEPHONE (OUTPATIENT)
Dept: INTERNAL MEDICINE | Facility: CLINIC | Age: 64
End: 2024-03-11
Payer: COMMERCIAL

## 2024-03-11 DIAGNOSIS — K21.9 GASTROESOPHAGEAL REFLUX DISEASE, UNSPECIFIED WHETHER ESOPHAGITIS PRESENT: ICD-10-CM

## 2024-03-11 DIAGNOSIS — B37.0 CANDIDA INFECTION OF MOUTH: Primary | ICD-10-CM

## 2024-03-11 DIAGNOSIS — I10 PRIMARY HYPERTENSION: ICD-10-CM

## 2024-03-11 RX ORDER — PANTOPRAZOLE SODIUM 40 MG/1
40 TABLET, DELAYED RELEASE ORAL 2 TIMES DAILY
Qty: 180 TABLET | Refills: 0 | Status: SHIPPED | OUTPATIENT
Start: 2024-03-11

## 2024-03-11 RX ORDER — AMLODIPINE BESYLATE 5 MG/1
5 TABLET ORAL DAILY
Qty: 90 TABLET | Refills: 0 | Status: SHIPPED | OUTPATIENT
Start: 2024-03-11

## 2024-03-11 RX ORDER — EZETIMIBE 10 MG/1
10 TABLET ORAL DAILY
Qty: 30 TABLET | Refills: 0 | Status: SHIPPED | OUTPATIENT
Start: 2024-03-11

## 2024-03-11 NOTE — TELEPHONE ENCOUNTER
Caller: Luis Antonio Fairchild    Relationship: Self    Best call back number: [phone]     Requested Prescriptions:   Requested Prescriptions     Pending Prescriptions Disp Refills    amLODIPine (NORVASC) 5 MG tablet 90 tablet 0     Sig: Take 1 tablet by mouth Daily.    pantoprazole (PROTONIX) 40 MG EC tablet 180 tablet 0     Sig: Take 1 tablet by mouth 2 (Two) Times a Day.    ezetimibe (ZETIA) 10 MG tablet 30 tablet 0     Sig: Take 1 tablet by mouth Daily.        Pharmacy where request should be sent: [address] [phone] [place] [phone] [facility]     Last office visit with prescribing clinician: 2/20/2024   Last telemedicine visit with prescribing clinician: Visit date not found   Next office visit with prescribing clinician: 3/21/2024     Additional details provided by patient: THE PATIENT REPORTS HE IS OUT OF THESE MEDICATION, ADVISED THAT HIS HOUSE KEEPER THROUGH OUT HIS EMPTY BOTTLES, AND TO PLEASE CALL IN ANY ADDITIONAL MEDICATIONS THE PROVIDER BELIEVES THE PATIENT IS DUE TO HAVE REFILLED FROM THE PRACTICE.     Does the patient have less than a 3 day supply:  [x] Yes  [] No    Would you like a call back once the refill request has been completed: [x] Yes [] No    If the office needs to give you a call back, can they leave a voicemail: [x] Yes [] No    Sandra Kothari Rep   03/11/24 10:43 EDT

## 2024-03-11 NOTE — TELEPHONE ENCOUNTER
Caller: Luis Antonio Fairchild Valentin    Relationship: Self    Best call back number: [phone]     What medication are you requesting: MOUTHWASH     What are your current symptoms: PAIN AND WHITE PATCHES IN MOUTH AND REPORTS HIS FOOD IS TASTING DIFFERENT AS WELL.     How long have you been experiencing symptoms: ONE WEEK     Have you had these symptoms before:    [x] Yes  [] No    Have you been treated for these symptoms before:   [x] Yes  [] No    If a prescription is needed, what is your preferred pharmacy and phone number: [address] [phone] [place] [phone]      Additional notes: THE PATIENT REPORTS THAT HE BELIEVES HE HAS THRUSH FROM USING HIS INHALERS AND IS REQUESTING THE MOUTHWASH TO BE CALLED IN. PLEASE CALL THE PATIENT IF ANY QUESTIONS OR CONCERNS.

## 2024-03-12 ENCOUNTER — HOSPITAL ENCOUNTER (OUTPATIENT)
Dept: MRI IMAGING | Facility: HOSPITAL | Age: 64
Discharge: HOME | End: 2024-03-12
Payer: COMMERCIAL

## 2024-03-12 DIAGNOSIS — M54.41 ACUTE MIDLINE LOW BACK PAIN WITH RIGHT-SIDED SCIATICA: ICD-10-CM

## 2024-03-12 DIAGNOSIS — M51.36 DDD (DEGENERATIVE DISC DISEASE), LUMBAR: ICD-10-CM

## 2024-03-12 PROCEDURE — 72148 MRI LUMBAR SPINE W/O DYE: CPT

## 2024-03-21 ENCOUNTER — OFFICE VISIT (OUTPATIENT)
Dept: INTERNAL MEDICINE | Facility: CLINIC | Age: 64
End: 2024-03-21
Payer: COMMERCIAL

## 2024-03-21 VITALS
SYSTOLIC BLOOD PRESSURE: 144 MMHG | WEIGHT: 135.6 LBS | RESPIRATION RATE: 18 BRPM | BODY MASS INDEX: 20.08 KG/M2 | TEMPERATURE: 97.3 F | HEIGHT: 69 IN | DIASTOLIC BLOOD PRESSURE: 78 MMHG | HEART RATE: 100 BPM

## 2024-03-21 DIAGNOSIS — Z23 ENCOUNTER FOR IMMUNIZATION: ICD-10-CM

## 2024-03-21 DIAGNOSIS — E78.5 HYPERLIPIDEMIA, UNSPECIFIED HYPERLIPIDEMIA TYPE: Chronic | ICD-10-CM

## 2024-03-21 DIAGNOSIS — M51.36 DDD (DEGENERATIVE DISC DISEASE), LUMBAR: ICD-10-CM

## 2024-03-21 DIAGNOSIS — J44.1 CHRONIC OBSTRUCTIVE PULMONARY DISEASE WITH ACUTE EXACERBATION: ICD-10-CM

## 2024-03-21 DIAGNOSIS — E55.9 VITAMIN D DEFICIENCY: ICD-10-CM

## 2024-03-21 DIAGNOSIS — I10 PRIMARY HYPERTENSION: ICD-10-CM

## 2024-03-21 DIAGNOSIS — Z00.00 WELLNESS EXAMINATION: Primary | ICD-10-CM

## 2024-03-21 LAB
25(OH)D3 SERPL-MCNC: 67.9 NG/ML (ref 30–100)
ALBUMIN SERPL-MCNC: 4.5 G/DL (ref 3.5–5.2)
ALBUMIN/GLOB SERPL: 1.6 G/DL
ALP SERPL-CCNC: 91 U/L (ref 39–117)
ALT SERPL W P-5'-P-CCNC: 15 U/L (ref 1–41)
ANION GAP SERPL CALCULATED.3IONS-SCNC: 14.8 MMOL/L (ref 5–15)
AST SERPL-CCNC: 19 U/L (ref 1–40)
BASOPHILS # BLD AUTO: 0.06 10*3/MM3 (ref 0–0.2)
BASOPHILS NFR BLD AUTO: 0.8 % (ref 0–1.5)
BILIRUB SERPL-MCNC: 0.3 MG/DL (ref 0–1.2)
BUN SERPL-MCNC: 9 MG/DL (ref 8–23)
BUN/CREAT SERPL: 12.3 (ref 7–25)
CALCIUM SPEC-SCNC: 9.7 MG/DL (ref 8.6–10.5)
CHLORIDE SERPL-SCNC: 90 MMOL/L (ref 98–107)
CHOLEST SERPL-MCNC: 174 MG/DL (ref 0–200)
CO2 SERPL-SCNC: 24.2 MMOL/L (ref 22–29)
CREAT SERPL-MCNC: 0.73 MG/DL (ref 0.76–1.27)
DEPRECATED RDW RBC AUTO: 44.9 FL (ref 37–54)
EGFRCR SERPLBLD CKD-EPI 2021: 101.6 ML/MIN/1.73
EOSINOPHIL # BLD AUTO: 0.16 10*3/MM3 (ref 0–0.4)
EOSINOPHIL NFR BLD AUTO: 2.2 % (ref 0.3–6.2)
ERYTHROCYTE [DISTWIDTH] IN BLOOD BY AUTOMATED COUNT: 12.7 % (ref 12.3–15.4)
GLOBULIN UR ELPH-MCNC: 2.8 GM/DL
GLUCOSE SERPL-MCNC: 84 MG/DL (ref 65–99)
HCT VFR BLD AUTO: 43.7 % (ref 37.5–51)
HDLC SERPL-MCNC: 87 MG/DL (ref 40–60)
HGB BLD-MCNC: 15 G/DL (ref 13–17.7)
IMM GRANULOCYTES # BLD AUTO: 0.02 10*3/MM3 (ref 0–0.05)
IMM GRANULOCYTES NFR BLD AUTO: 0.3 % (ref 0–0.5)
LDLC SERPL CALC-MCNC: 59 MG/DL (ref 0–100)
LDLC/HDLC SERPL: 0.61 {RATIO}
LYMPHOCYTES # BLD AUTO: 1.37 10*3/MM3 (ref 0.7–3.1)
LYMPHOCYTES NFR BLD AUTO: 19 % (ref 19.6–45.3)
MCH RBC QN AUTO: 33.2 PG (ref 26.6–33)
MCHC RBC AUTO-ENTMCNC: 34.3 G/DL (ref 31.5–35.7)
MCV RBC AUTO: 96.7 FL (ref 79–97)
MONOCYTES # BLD AUTO: 0.73 10*3/MM3 (ref 0.1–0.9)
MONOCYTES NFR BLD AUTO: 10.1 % (ref 5–12)
NEUTROPHILS NFR BLD AUTO: 4.86 10*3/MM3 (ref 1.7–7)
NEUTROPHILS NFR BLD AUTO: 67.6 % (ref 42.7–76)
NRBC BLD AUTO-RTO: 0 /100 WBC (ref 0–0.2)
PLATELET # BLD AUTO: 472 10*3/MM3 (ref 140–450)
PMV BLD AUTO: 8 FL (ref 6–12)
POTASSIUM SERPL-SCNC: 4.5 MMOL/L (ref 3.5–5.2)
PROT SERPL-MCNC: 7.3 G/DL (ref 6–8.5)
RBC # BLD AUTO: 4.52 10*6/MM3 (ref 4.14–5.8)
SODIUM SERPL-SCNC: 129 MMOL/L (ref 136–145)
TRIGL SERPL-MCNC: 171 MG/DL (ref 0–150)
TSH SERPL DL<=0.05 MIU/L-ACNC: 1.3 UIU/ML (ref 0.27–4.2)
VLDLC SERPL-MCNC: 28 MG/DL (ref 5–40)
WBC NRBC COR # BLD AUTO: 7.2 10*3/MM3 (ref 3.4–10.8)

## 2024-03-21 PROCEDURE — 80061 LIPID PANEL: CPT | Performed by: NURSE PRACTITIONER

## 2024-03-21 PROCEDURE — 36415 COLL VENOUS BLD VENIPUNCTURE: CPT | Performed by: NURSE PRACTITIONER

## 2024-03-21 PROCEDURE — 80053 COMPREHEN METABOLIC PANEL: CPT | Performed by: NURSE PRACTITIONER

## 2024-03-21 PROCEDURE — 85025 COMPLETE CBC W/AUTO DIFF WBC: CPT | Performed by: NURSE PRACTITIONER

## 2024-03-21 PROCEDURE — 84443 ASSAY THYROID STIM HORMONE: CPT | Performed by: NURSE PRACTITIONER

## 2024-03-21 PROCEDURE — 99396 PREV VISIT EST AGE 40-64: CPT | Performed by: NURSE PRACTITIONER

## 2024-03-21 PROCEDURE — 82306 VITAMIN D 25 HYDROXY: CPT | Performed by: NURSE PRACTITIONER

## 2024-03-21 NOTE — PROGRESS NOTES
Male Physical Note      Patient Name: Luis Antonio Fairchild  : 1960   MRN: [id number]     Chief Complaint:    Chief Complaint   Patient presents with    Annual Exam       History of Present Illness: Luis Antonio Fairchild is a 63 y.o. male who is here today for their annual health maintenance and physical.      Are you currently seeing any other doctors or specialists?   [facility]-Head and Neck specialist  [facility]-pulmonologist   Neurosurgery    Are you currently taking any OTC medications or herbal medications?  vitamin d   Regular dental visits:  Yes, every 6 months  Regular eye exams: Yes, yearly, wears glasses     Sunscreen: advised  Seatbelt: advised      He has been falling asleep during the day if he sits still. He does snore. He does have pauses in his sleeping and gasping for air. He does not feel rested. He denies any headaches or falls other than when his sodium dropped. He has an appointment with sleep medicine 24.     He has been using his rescue inhaler about once a day for his COPD. He also uses Breztri twice a day. He has not had any emergency visits since his last visit. He denies any wheezing or sputum.     He follows with Dr. Sellers at  for his neck and throat cancer and has an appointment with him in 2024. His routine soft tissue and chest CTs have not showed any recurrence.     He has seen  and MRI completed. He would like to consider surgery if possible.  He has a CT scheduled to evaluate nerves. He is taking celebrix daily.     Hyperlipidemia  The patient is taking pravastatin 40 mg for hyperlipidemia. He is tolerating it well.    Hypertension   He is currently taking amlodipine 5 mg. He denies there is no chest pain, shortness of breath, or palpitations.    He has a long history of hyponatremia. He is taking over-the-counter vitamin D.  Lab Results   Component Value Date    GLUCOSE 107 (H) 2024    CALCIUM 9.5 2024     (L) 2024    K 5.1 2024    CO2 24.0 2024     CL 90 (L) 2024    BUN 13 2024    CREATININE 1.09 2024    EGFR 75.8 2024    BCR 11.9 2024    ANIONGAP 12.0 2024      An MRI of his head was ordered  to evaluate hyponatremia; but insurance denied.  He had a trial of sodium tablets as well as Lasix and his sodium has remained stable.      Subjective      Review of Systems:   Review of Systems   Respiratory:  Negative for shortness of breath.    Cardiovascular:  Negative for chest pain and palpitations.       Past Medical History:   Past Medical History:   Diagnosis Date    Alcoholism 1998    Anxiety 2016    Arthritis     Asthma     Cancer 2018    Squamous carcinoma oropharynx    Carpal tunnel syndrome 2016    Cervical disc disorder     Chronic pain disorder     COPD (chronic obstructive pulmonary disease)     Extremity pain     Fractures 23    Rt wrist    GERD (gastroesophageal reflux disease)     History of IBS     Hyperlipidemia     Hypertension     Irritable bowel syndrome     Joint pain     Low back pain     Lumbosacral disc disease     Lumbosacral disc disease     Neck pain     Osteoarthritis     Peripheral neuropathy     Pneumonia     Shingles     Spinal stenosis        Past Surgical History:   Past Surgical History:   Procedure Laterality Date    COLONOSCOPY      FRACTURE SURGERY      Lt wrist    ORTHOPEDIC SURGERY      Lft shoulder    SHOULDER SURGERY      Onset Date     THROAT SURGERY      WRIST SURGERY      Onset Date:        Family History:   Family History   Problem Relation Age of Onset    Anxiety disorder Mother     Arthritis Mother     Stroke Father             Hypertension Father     Cancer Brother     Anxiety disorder Brother     Cancer Brother                Social History:   Social History     Socioeconomic History    Marital status:    Tobacco Use    Smoking status: Every Day     Current packs/day: 0.50     Average packs/day: 0.5 packs/day for 50.2 years  (25.1 ttl pk-yrs)     Types: Cigarettes     Start date: 1/1/1974    Smokeless tobacco: Never   Vaping Use    Vaping status: Never Used   Substance and Sexual Activity    Alcohol use: Yes     Alcohol/week: 35.0 standard drinks of alcohol     Types: 35 Cans of beer per week     Comment: nightly    Drug use: Never    Sexual activity: Not Currently     Partners: Female     Birth control/protection: None     Comment:         Medications:     Current Outpatient Medications:     acetaminophen (TYLENOL) 500 MG tablet, Take 1 tablet by mouth Every 6 (Six) Hours As Needed for Mild Pain., Disp: 120 tablet, Rfl: 1    albuterol sulfate HFA (ProAir HFA) 108 (90 Base) MCG/ACT inhaler, Inhale 1-2 puffs every 4-6 hours PRN, Disp: 1 g, Rfl: 5    amLODIPine (NORVASC) 5 MG tablet, Take 1 tablet by mouth Daily., Disp: 90 tablet, Rfl: 0    Budeson-Glycopyrrol-Formoterol (BREZTRI) 160-9-4.8 MCG/ACT aerosol inhaler, Inhale 2 puffs 2 (Two) Times a Day., Disp: 10.7 g, Rfl: 11    Calcium Carbonate-Vit D-Min (CALCIUM 1200 PO), Take  by mouth., Disp: , Rfl:     celecoxib (CeleBREX) 200 MG capsule, Take 1 capsule by mouth twice daily, Disp: 180 capsule, Rfl: 1    citalopram (CeleXA) 40 MG tablet, Take 1 tablet by mouth once daily, Disp: 90 tablet, Rfl: 0    ezetimibe (ZETIA) 10 MG tablet, Take 1 tablet by mouth Daily., Disp: 30 tablet, Rfl: 0    furosemide (LASIX) 20 MG tablet, Take 1 tablet by mouth Daily., Disp: 90 tablet, Rfl: 1    ipratropium-albuterol (DUO-NEB) 0.5-2.5 mg/3 ml nebulizer, USE 1 AMPULE IN NEBULIZER EVERY 4 HOURS AS NEEDED FOR WHEEZING, Disp: 360 mL, Rfl: 0    nystatin (MYCOSTATIN) 100,000 unit/mL suspension, Swish and swallow 5 mL 4 (Four) Times a Day., Disp: 473 mL, Rfl: 0    pantoprazole (PROTONIX) 40 MG EC tablet, Take 1 tablet by mouth 2 (Two) Times a Day., Disp: 180 tablet, Rfl: 0    pravastatin (PRAVACHOL) 40 MG tablet, Take 1 tablet by mouth once daily, Disp: 90 tablet, Rfl: 0    sodium chloride 1 g tablet, Take  "1 tablet by mouth 2 (Two) Times a Day., Disp: 180 tablet, Rfl: 1    Sod Picosulfate-Mag Ox-Cit Acd (Clenpiq) 10-3.5-12 MG-GM -GM/160ML solution, Take 350 mL by mouth Take As Directed., Disp: 350 mL, Rfl: 0    Allergies:   No Known Allergies    Immunizations:  “Discussed risks/benefits to vaccination, reviewed components of the vaccine, discussed VIS, discussed informed consent, informed consent obtained. Patient/Parent was allowed to accept or refuse vaccine. Questions answered to satisfactory state of patient/Parent. We reviewed typical age appropriate and seasonally appropriate vaccinations. Reviewed immunization history and updated state vaccination form as needed. Patient was counseled on COVID-19  Zoster  rsv    Colorectal Screening:     Last Completed Colonoscopy       This patient has no relevant Health Maintenance data.              Depression: PHQ-2/9 Depression Screening  Little interest or pleasure in doing things?     Feeling down, depressed, or hopeless?     PHQ-2 Total Score     PHQ-9 Total Score 0         Objective     Physical Exam:  Vital Signs:   Vitals:    03/21/24 0937   BP: 144/78   BP Location: Right arm   Patient Position: Sitting   Cuff Size: Adult   Pulse: 100   Resp: 18   Temp: 97.3 °F (36.3 °C)   TempSrc: Infrared   Weight: 61.5 kg (135 lb 9.6 oz)   Height: 175.3 cm (69\")   PainSc:   8     Body mass index is 20.02 kg/m². BMI is within normal parameters. No other follow-up for BMI required.      Physical Exam  Constitutional:       General: He is not in acute distress.     Appearance: He is not ill-appearing.   HENT:      Head: Normocephalic.   Cardiovascular:      Rate and Rhythm: Normal rate and regular rhythm.      Heart sounds: Normal heart sounds. No murmur heard.  Pulmonary:      Breath sounds: Normal breath sounds.   Abdominal:      General: Bowel sounds are normal.      Tenderness: There is no abdominal tenderness.   Neurological:      General: No focal deficit present.      Mental " Status: He is oriented to person, place, and time.   Psychiatric:         Mood and Affect: Mood normal.         Procedures    Assessment / Plan      Assessment/Plan:   Diagnoses and all orders for this visit:    1. Wellness examination (Primary)    2. Primary hypertension  -     Comprehensive Metabolic Panel; Future  -     TSH; Future  -     CBC & Differential; Future  -     Comprehensive Metabolic Panel  -     TSH  -     CBC & Differential    3. Hyperlipidemia, unspecified hyperlipidemia type  -     Lipid Panel; Future  -     Lipid Panel    4. Vitamin D deficiency  -     Vitamin D,25-Hydroxy; Future  -     Vitamin D,25-Hydroxy    5. Encounter for immunization  -     Varicella-Zoster Vaccine Subcutaneous; Future  -     Arexvy Vaccine (RSV for Adults > 60); Future    6. DDD (degenerative disc disease), lumbar    7. Chronic obstructive pulmonary disease with acute exacerbation        1. Hyperlipidemia  I will evaluate labs today. He will continue pravastatin 40 mg.    2. Hypertension  He will continue amlodipine 5 mg. He will continue ambulatory monitoring.    3. COPD  Continue breztri.     3. Health Maintenance   He will follow up with  for low back pain. I have sent in RSV and Shingrix to his pharmacy.      I explained and discussed patient's condition and plan of care.  Discussed when to follow-up.  Discussed possible red flags and how to follow-up with those.  Viewed patient's medications and discussed common side effects. Patient to continue current medications as advised.  Be compliant with medications. Patient to let me know if she has any changes in health, does not tolerate medication, or any future concerns about treatment. Patient verbalized understanding and agreement with plan of care.     Follow Up:   Return in about 3 months (around 6/21/2024) for Recheck.    Healthcare Maintenance:   Counseling provided on     Health Maintenance, Male  A healthy lifestyle and preventive care is important for your  health and wellness. Ask your health care provider about what schedule of regular examinations is right for you.  What should I know about weight and diet?    Eat a Healthy Diet  Eat plenty of vegetables, fruits, whole grains, low-fat dairy products, and lean protein.  Do not eat a lot of foods high in solid fats, added sugars, or salt.     Maintain a Healthy Weight  Regular exercise can help you achieve or maintain a healthy weight. You should:  Do at least 150 minutes of exercise each week. The exercise should increase your heart rate and make you sweat (moderate-intensity exercise).  Do strength-training exercises at least twice a week.     Watch Your Levels of Cholesterol and Blood Lipids  Have your blood tested for lipids and cholesterol every 5 years starting at 35 years of age. If you are at high risk for heart disease, you should start having your blood tested when you are 20 years old. You may need to have your cholesterol levels checked more often if:  Your lipid or cholesterol levels are high.  You are older than 50 years of age.  You are at high risk for heart disease.     What should I know about cancer screening?  Many types of cancers can be detected early and may often be prevented.  Lung Cancer  You should be screened every year for lung cancer if:  You are a current smoker who has smoked for at least 30 years.  You are a former smoker who has quit within the past 15 years.  Talk to your health care provider about your screening options, when you should start screening, and how often you should be screened.     Colorectal Cancer  Routine colorectal cancer screening usually begins at 50 years of age and should be repeated every 5-10 years until you are 75 years old. You may need to be screened more often if early forms of precancerous polyps or small growths are found. Your health care provider may recommend screening at an earlier age if you have risk factors for colon cancer.  Your health care  provider may recommend using home test kits to check for hidden blood in the stool.  A small camera at the end of a tube can be used to examine your colon (sigmoidoscopy or colonoscopy). This checks for the earliest forms of colorectal cancer.     Prostate and Testicular Cancer  Depending on your age and overall health, your health care provider may do certain tests to screen for prostate and testicular cancer.  Talk to your health care provider about any symptoms or concerns you have about testicular or prostate cancer.     Skin Cancer  Check your skin from head to toe regularly.  Tell your health care provider about any new moles or changes in moles, especially if:  There is a change in a mole’s size, shape, or color.  You have a mole that is larger than a pencil eraser.  Always use sunscreen. Apply sunscreen liberally and repeat throughout the day.  Protect yourself by wearing long sleeves, pants, a wide-brimmed hat, and sunglasses when outside.     What should I know about heart disease, diabetes, and high blood pressure?  If you are 18-39 years of age, have your blood pressure checked every 3-5 years. If you are 40 years of age or older, have your blood pressure checked every year. You should have your blood pressure measured twice--once when you are at a hospital or clinic, and once when you are not at a hospital or clinic. Record the average of the two measurements. To check your blood pressure when you are not at a hospital or clinic, you can use:  An automated blood pressure machine at a pharmacy.  A home blood pressure monitor.  Talk to your health care provider about your target blood pressure.  If you are between 45-79 years old, ask your health care provider if you should take aspirin to prevent heart disease.  Have regular diabetes screenings by checking your fasting blood sugar level.  If you are at a normal weight and have a low risk for diabetes, have this test once every three years after the age  of 45.  If you are overweight and have a high risk for diabetes, consider being tested at a younger age or more often.  A one-time screening for abdominal aortic aneurysm (AAA) by ultrasound is recommended for men aged 65-75 years who are current or former smokers.  What should I know about preventing infection?  Hepatitis B  If you have a higher risk for hepatitis B, you should be screened for this virus. Talk with your health care provider to find out if you are at risk for hepatitis B infection.  Hepatitis C  Blood testing is recommended for:  Everyone born from 1945 through 1965.  Anyone with known risk factors for hepatitis C.     Sexually Transmitted Diseases (STDs)  You should be screened each year for STDs including gonorrhea and chlamydia if:  You are sexually active and are younger than 24 years of age.  You are older than 24 years of age and your health care provider tells you that you are at risk for this type of infection.  Your sexual activity has changed since you were last screened and you are at an increased risk for chlamydia or gonorrhea. Ask your health care provider if you are at risk.  Talk with your health care provider about whether you are at high risk of being infected with HIV. Your health care provider may recommend a prescription medicine to help prevent HIV infection.     What else can I do?    Schedule regular health, dental, and eye exams.  Stay current with your vaccines (immunizations).  Do not use any tobacco products, such as cigarettes, chewing tobacco, and e-cigarettes. If you need help quitting, ask your health care provider.  Limit alcohol intake to no more than 2 drinks per day. One drink equals 12 ounces of beer, 5 ounces of wine, or 1½ ounces of hard liquor.  Do not use street drugs.  Do not share needles.  Ask your health care provider for help if you need support or information about quitting drugs.  Tell your health care provider if you often feel depressed.  Tell your  health care provider if you have ever been abused or do not feel safe at home.      This information is not intended to replace advice given to you by your health care provider. Make sure you discuss any questions you have with your health care provider.  Document Released: 06/15/2009 Document Revised: 08/16/2017 Document Reviewed: 09/20/2016  [employer] Patient Education © 2018 [employer]      Luis Antonio Fairchild voices understanding and acceptance of this advice and will call back with any further questions or concerns. AVS with preventive healthcare tips printed for patient.     KALEB Barrios   [facility]       Transcribed from ambient dictation for KALEB Barrios by Woody Marcelino.  03/21/24   11:33 EDT    Patient or patient representative verbalized consent to the visit recording.  I have personally performed the services described in this document as transcribed by the above individual, and it is both accurate and complete.

## 2024-03-31 ENCOUNTER — PATIENT MESSAGE (OUTPATIENT)
Dept: INTERNAL MEDICINE | Facility: CLINIC | Age: 64
End: 2024-03-31
Payer: COMMERCIAL

## 2024-04-01 NOTE — TELEPHONE ENCOUNTER
From: Sulma Woodward  To: Luis Antonio Fairchild  Sent: 3/31/2024 7:02 PM EDT  Subject: Low dose CT of lung    It looks like low dose CT for lung cancer screening is due. Do you want me to order this?

## 2024-04-09 ENCOUNTER — TELEPHONE (OUTPATIENT)
Dept: INTERNAL MEDICINE | Facility: CLINIC | Age: 64
End: 2024-04-09
Payer: COMMERCIAL

## 2024-04-09 NOTE — TELEPHONE ENCOUNTER
I spoke with patient to let him know Sulma will not be back in the office until Monday. He states he wants a response before then due to how bad his pain is. I offered him an appointment. He declined and asked if I would send a message to another provider for advice.

## 2024-04-09 NOTE — TELEPHONE ENCOUNTER
PATIENT STATES HE HAD A REFERRAL TO SEE A SURGEON A COUPLE WEEKS AGO. SURGEON RECOMMENDED PHYSICAL THERAPY. PATIENT IS NOT SATISFIED WITH THAT DECISION AND IS REQUESTING IF PCP CAN PUT IN A NEW REFERRAL FOR ANOTHER SURGEON TO GET A SECOND OPINION. PATIENT IS REQUESTING A CALL BACK TO ADVISE IF ANOTHER REFERRAL CAN BE PLACED.    CALL BACK NUMBER [phone]

## 2024-04-10 NOTE — TELEPHONE ENCOUNTER
Patient has been scheduled with Sulma on 4/16/2024 for evaluation. He demonstrates understanding and appreciation

## 2024-04-10 NOTE — TELEPHONE ENCOUNTER
If patient's pain is severe he will need see to address this issue and if that provider feels he needs a new referral it can be placed otherwise decision will be made by his PCP who is familiar with his case.     Please forward to PCP after informing patient

## 2024-04-22 DIAGNOSIS — Z12.2 ENCOUNTER FOR SCREENING FOR LUNG CANCER: Primary | ICD-10-CM

## 2024-04-29 ENCOUNTER — TELEPHONE (OUTPATIENT)
Dept: PAIN MEDICINE | Facility: CLINIC | Age: 64
End: 2024-04-29
Payer: COMMERCIAL

## 2024-04-29 DIAGNOSIS — K21.9 GASTROESOPHAGEAL REFLUX DISEASE, UNSPECIFIED WHETHER ESOPHAGITIS PRESENT: ICD-10-CM

## 2024-04-29 NOTE — TELEPHONE ENCOUNTER
Called the patient and explained that he will need to contact his PCP for a referral outside [facility].Explained he will need to complete PT and have his debt paid to [facility] if he wishes to have a procedure with Dr Jackson.

## 2024-04-29 NOTE — TELEPHONE ENCOUNTER
Caller: Luis Antonio Fairchild    Relationship: Self    Best call back number: [phone]    What is the medical concern/diagnosis: SPINE     What specialty or service is being requested: PAIN MANAGEMENT    What is the provider, practice or medical service name: [facility]    What is the office location: [address] [place] [address]    What is the office phone number: [phone]    Any additional details: PT CALLED IN STATING THAT THE OFFICE IS STILL WAITING ON AN REFERRAL TO BE SENT OVER FROM DR ALICEA.. PT IS WANTING TO KNOW THE STATUS OF THAT.. PLEASE ADVISE..

## 2024-04-30 ENCOUNTER — TELEPHONE (OUTPATIENT)
Dept: PAIN MEDICINE | Facility: CLINIC | Age: 64
End: 2024-04-30
Payer: COMMERCIAL

## 2024-04-30 RX ORDER — PANTOPRAZOLE SODIUM 40 MG/1
40 TABLET, DELAYED RELEASE ORAL 2 TIMES DAILY
Qty: 180 TABLET | Refills: 0 | Status: SHIPPED | OUTPATIENT
Start: 2024-04-30

## 2024-04-30 NOTE — TELEPHONE ENCOUNTER
I spoke with the patient and explained that he would need to either self pay any procedure at [facility] due to the fact his insurance has not paid the previous claims, or he would need to change insurance companies. Explained to the patient that DONNA Hobson informed me that another injection would be unlikely to help him, and he would be a candidate for an SCS trial. Patient stated he would contact [facility].

## 2024-04-30 NOTE — TELEPHONE ENCOUNTER
----- Message from Briseida ASHTON sent at 4/29/2024 12:34 PM EDT -----  If patient has paid off his debt at [facility], and completed physical therapy then we could contemplate doing the spinal cord stimulator trial as originally planned.  He did not have much benefit from the previous injection, and do not expect him to have a better response to a second injection.  ----- Message -----  From: Briseida Bowman MA  Sent: 4/29/2024  12:33 PM EDT  To: Pete Guardado PA-C    I called the patient back and explained that he would need to contact his PCP in order to get a referral. He then stated he has paid his debt with [facility], and wants to know if Dr Jackson could do an injection for him. I called [facility] and left a message with their billing department to inquire as to whether or not he still has any debt that would prevent him form having a procedure there.

## 2024-05-02 ENCOUNTER — PATIENT MESSAGE (OUTPATIENT)
Dept: INTERNAL MEDICINE | Facility: CLINIC | Age: 64
End: 2024-05-02
Payer: COMMERCIAL

## 2024-05-02 DIAGNOSIS — J44.9 COPD, SEVERE: ICD-10-CM

## 2024-05-03 RX ORDER — EZETIMIBE 10 MG/1
10 TABLET ORAL DAILY
Qty: 30 TABLET | Refills: 0 | Status: SHIPPED | OUTPATIENT
Start: 2024-05-03

## 2024-05-05 DIAGNOSIS — M19.90 OSTEOARTHRITIS, UNSPECIFIED OSTEOARTHRITIS TYPE, UNSPECIFIED SITE: Chronic | ICD-10-CM

## 2024-05-06 RX ORDER — CELECOXIB 200 MG/1
200 CAPSULE ORAL 2 TIMES DAILY
Qty: 180 CAPSULE | Refills: 0 | Status: SHIPPED | OUTPATIENT
Start: 2024-05-06

## 2024-05-06 RX ORDER — ALBUTEROL SULFATE 90 UG/1
AEROSOL, METERED RESPIRATORY (INHALATION)
Qty: 1 G | Refills: 5 | Status: SHIPPED | OUTPATIENT
Start: 2024-05-06

## 2024-05-08 DIAGNOSIS — E78.5 HYPERLIPIDEMIA, UNSPECIFIED HYPERLIPIDEMIA TYPE: ICD-10-CM

## 2024-05-08 RX ORDER — PRAVASTATIN SODIUM 40 MG
40 TABLET ORAL DAILY
Qty: 90 TABLET | Refills: 0 | Status: SHIPPED | OUTPATIENT
Start: 2024-05-08

## 2024-05-31 RX ORDER — EZETIMIBE 10 MG/1
10 TABLET ORAL DAILY
Qty: 30 TABLET | Refills: 0 | Status: SHIPPED | OUTPATIENT
Start: 2024-05-31

## 2024-06-01 ENCOUNTER — PATIENT MESSAGE (OUTPATIENT)
Dept: INTERNAL MEDICINE | Facility: CLINIC | Age: 64
End: 2024-06-01
Payer: COMMERCIAL

## 2024-06-03 NOTE — TELEPHONE ENCOUNTER
From: Luis Antonio Fairchild  To: Sulma Woodward  Sent: 6/1/2024 8:46 PM EDT  Subject: Lorraineglenys     I have been out of the [facility] for a while. You were getting me samples. Do you have any more?

## 2024-06-04 DIAGNOSIS — J44.9 COPD, SEVERE: ICD-10-CM

## 2024-06-04 RX ORDER — ALBUTEROL SULFATE 90 UG/1
AEROSOL, METERED RESPIRATORY (INHALATION)
Qty: 1 G | Refills: 0 | Status: SHIPPED | OUTPATIENT
Start: 2024-06-04

## 2024-06-04 NOTE — TELEPHONE ENCOUNTER
We do not have any samples I did call the drug rep to see if she can send us some I left a voicemail for her to return our call. I can let you know when she calls me back

## 2024-06-05 DIAGNOSIS — I10 PRIMARY HYPERTENSION: ICD-10-CM

## 2024-06-05 RX ORDER — AMLODIPINE BESYLATE 5 MG/1
5 TABLET ORAL DAILY
Qty: 90 TABLET | Refills: 0 | Status: SHIPPED | OUTPATIENT
Start: 2024-06-05

## 2024-06-06 ENCOUNTER — OFFICE VISIT (OUTPATIENT)
Dept: PULMONOLOGY | Facility: CLINIC | Age: 64
End: 2024-06-06
Payer: COMMERCIAL

## 2024-06-06 VITALS
OXYGEN SATURATION: 94 % | HEIGHT: 69 IN | BODY MASS INDEX: 19.49 KG/M2 | HEART RATE: 85 BPM | DIASTOLIC BLOOD PRESSURE: 58 MMHG | TEMPERATURE: 98 F | SYSTOLIC BLOOD PRESSURE: 106 MMHG | WEIGHT: 131.6 LBS

## 2024-06-06 DIAGNOSIS — J44.9 CHRONIC OBSTRUCTIVE PULMONARY DISEASE, UNSPECIFIED COPD TYPE: Primary | ICD-10-CM

## 2024-06-06 DIAGNOSIS — J42 CHRONIC BRONCHITIS, UNSPECIFIED CHRONIC BRONCHITIS TYPE: ICD-10-CM

## 2024-06-06 DIAGNOSIS — F17.200 TOBACCO USE DISORDER: ICD-10-CM

## 2024-06-06 PROBLEM — J18.9 PNEUMONIA: Status: RESOLVED | Noted: 2023-05-18 | Resolved: 2024-06-06

## 2024-06-06 RX ORDER — NALOXONE HCL 8 MG/.1ML
8 SPRAY NASAL
COMMUNITY
Start: 2024-03-04 | End: 2024-06-06

## 2024-06-06 RX ORDER — DIAZEPAM 5 MG/1
TABLET ORAL
COMMUNITY
Start: 2024-03-04 | End: 2024-06-06

## 2024-06-06 RX ORDER — KETOROLAC TROMETHAMINE 10 MG/1
TABLET, FILM COATED ORAL
COMMUNITY
Start: 2024-03-05 | End: 2024-06-06

## 2024-06-06 RX ORDER — FLUTICASONE FUROATE, UMECLIDINIUM BROMIDE AND VILANTEROL TRIFENATATE 200; 62.5; 25 UG/1; UG/1; UG/1
1 POWDER RESPIRATORY (INHALATION) DAILY
Qty: 1 EACH | Refills: 5 | Status: SHIPPED | OUTPATIENT
Start: 2024-06-06 | End: 2024-06-07

## 2024-06-06 RX ORDER — CYCLOBENZAPRINE HCL 10 MG
TABLET ORAL
COMMUNITY
Start: 2024-03-05

## 2024-06-06 NOTE — PROGRESS NOTES
"[facility] Pulmonary Follow up      Chief Complaint  Shortness of Breath (Follow up)    Subjective          Luis Antonio Fairchild presents to [facility] for follow-up on some worsening shortness of breath.  He was last seen here in the office about a year ago by Dr. Maldonado.  He does have COPD and has been using some Breztri, he required samples due to the cost.  He is also been using his DuoNebs 3 times a day.    Recently he has noticed quite a bit of worsening shortness of breath with even minimal activity.  He also has a chronic cough and congestion.  He has not had any recent acute exacerbations.  No use of antibiotics or steroids.    He is still smoking about half a pack a day, he was about a pack a day smoker for 40+ years.    He does get annual CT scans for his history of throat cancer diagnosed 2018.  His last CT scan was in April with some emphysema and some groundglass opacity in the right base and some endobronchial debris.        Objective     Vital Signs:   /58 (BP Location: Left arm, Patient Position: Sitting, Cuff Size: Adult)   Pulse 85   Temp 98 °F (36.7 °C)   Ht 175.3 cm (69\")   Wt 59.7 kg (131 lb 9.6 oz)   SpO2 94% Comment: Room air at rest  BMI 19.43 kg/m²       Immunization History   Administered Date(s) Administered    Pneumococcal Conjugate 20-Valent (PCV20) 03/31/2023    Pneumococcal Polysaccharide (PPSV23) 06/26/2019    Shingrix 04/27/2022    Tdap 06/26/2019       Physical Exam  Vitals reviewed.   Constitutional:       General: He is not in acute distress.     Appearance: He is well-developed.   HENT:      Head: Normocephalic and atraumatic.   Eyes:      Pupils: Pupils are equal, round, and reactive to light.   Cardiovascular:      Rate and Rhythm: Normal rate and regular rhythm.      Heart sounds: No murmur heard.  Pulmonary:      Effort: Pulmonary effort is normal. No respiratory distress.      Breath sounds: Wheezing present. No rales. "   Abdominal:      General: Bowel sounds are normal. There is no distension.      Palpations: Abdomen is soft.   Musculoskeletal:         General: Normal range of motion.      Cervical back: Normal range of motion and neck supple.   Skin:     General: Skin is warm and dry.      Findings: No erythema.   Neurological:      Mental Status: He is alert and oriented to person, place, and time.   Psychiatric:         Behavior: Behavior normal.          Result Review :            Lungs: Mild centrilobular emphysema. Groundglass opacities in the right lung base with associated bronchial wall thickening and endobronchial debris. Stable 3 mm right lower lobe and 4 mm left lower lobe nodules (series 4 image 230 and 325). No new or suspicious pulmonary nodules.       PFTs done in the office today:  Very severe obstructive airway disease with an FEV1 of 1.33, 48% predicted evidence of air trapping with an elevated residual volume decreased DLCO.    In March 2023 his FEV1 is 2.27, 68% predicted.                   Assessment and Plan    Problem List Items Addressed This Visit          Pulmonary and Pneumonias    COLD (chronic obstructive lung disease) - Primary    Relevant Medications    Fluticasone-Umeclidin-Vilant (Trelegy Ellipta) 200-62.5-25 MCG/ACT inhaler    Other Relevant Orders    Spirometry with Diffusion Capacity & Lung Volumes (Completed)    Chronic bronchitis    Relevant Medications    Fluticasone-Umeclidin-Vilant (Trelegy Ellipta) 200-62.5-25 MCG/ACT inhaler       Tobacco    Tobacco use disorder     We reviewed his PFTs today.  He has had significant decline, around 20% in the last year.    We discussed that he does need to be on chronic maintenance therapy.  Will see if Trelegy is better covered by his insurance.  If not we discussed switching to nebulizers that he can use DuoNebs 4 times a day and budesonide twice daily.  He will call back and let me know if the Trelegy is not covered.    I would like for him to start  checking his oxygen at home and monitoring it closely, on his follow-up will evaluate if he has been less than 88%, the other thing he may need is a overnight pulse oximetry study.    We went over the importance of smoking cessation.    I would like to see him back in a few weeks to see how he is doing, and follow-up on his oxygen.    Follow Up     Return in about 4 weeks (around 7/4/2024).  Patient was given instructions and counseling regarding his condition or for health maintenance advice. Please see specific information pulled into the AVS if appropriate.     I spent 36 minutes caring for Luis Antonio on this date of service. This time includes time spent by me in the following activities:preparing for the visit, reviewing tests, obtaining and/or reviewing a separately obtained history, performing a medically appropriate examination and/or evaluation , counseling and educating the patient/family/caregiver, ordering medications, tests, or procedures, referring and communicating with other health care professionals , documenting information in the medical record, and independently interpreting results and communicating that information with the patient/family/caregiver    Excluding time spent on other separate services such as performing procedures or test interpretation, if applicable    Moderate level of Medical Decision Making complexity based on 2 or more chronic stable illnesses and prescription drug management.    KALEB Candelario, ACNP  [facility]  Electronically signed

## 2024-06-07 RX ORDER — BUDESONIDE 0.5 MG/2ML
0.5 INHALANT ORAL 2 TIMES DAILY
Qty: 60 EACH | Refills: 11 | Status: SHIPPED | OUTPATIENT
Start: 2024-06-07

## 2024-06-21 ENCOUNTER — OFFICE VISIT (OUTPATIENT)
Dept: INTERNAL MEDICINE | Facility: CLINIC | Age: 64
End: 2024-06-21
Payer: COMMERCIAL

## 2024-06-21 ENCOUNTER — TELEPHONE (OUTPATIENT)
Dept: INTERNAL MEDICINE | Facility: CLINIC | Age: 64
End: 2024-06-21

## 2024-06-21 VITALS
BODY MASS INDEX: 19.81 KG/M2 | SYSTOLIC BLOOD PRESSURE: 132 MMHG | DIASTOLIC BLOOD PRESSURE: 72 MMHG | WEIGHT: 134.13 LBS | TEMPERATURE: 97.6 F | RESPIRATION RATE: 16 BRPM | HEART RATE: 88 BPM

## 2024-06-21 DIAGNOSIS — M51.9 LUMBOSACRAL DISC DISEASE: ICD-10-CM

## 2024-06-21 DIAGNOSIS — M54.31 SCIATICA OF RIGHT SIDE: ICD-10-CM

## 2024-06-21 DIAGNOSIS — M54.31 SCIATICA OF RIGHT SIDE: Primary | ICD-10-CM

## 2024-06-21 DIAGNOSIS — F41.9 ANXIETY: ICD-10-CM

## 2024-06-21 DIAGNOSIS — E87.1 HYPONATREMIA: Primary | ICD-10-CM

## 2024-06-21 PROCEDURE — 36415 COLL VENOUS BLD VENIPUNCTURE: CPT | Performed by: NURSE PRACTITIONER

## 2024-06-21 PROCEDURE — 99214 OFFICE O/P EST MOD 30 MIN: CPT | Performed by: NURSE PRACTITIONER

## 2024-06-21 PROCEDURE — 80048 BASIC METABOLIC PNL TOTAL CA: CPT | Performed by: NURSE PRACTITIONER

## 2024-06-21 RX ORDER — DULOXETIN HYDROCHLORIDE 30 MG/1
CAPSULE, DELAYED RELEASE ORAL
Qty: 180 CAPSULE | Refills: 0 | Status: SHIPPED | OUTPATIENT
Start: 2024-06-21 | End: 2024-06-21

## 2024-06-21 RX ORDER — DULOXETIN HYDROCHLORIDE 30 MG/1
30 CAPSULE, DELAYED RELEASE ORAL DAILY
Qty: 7 CAPSULE | Refills: 0 | Status: SHIPPED | OUTPATIENT
Start: 2024-06-21

## 2024-06-21 RX ORDER — ACETYLCYSTEINE 200 MG/ML
4 SOLUTION ORAL; RESPIRATORY (INHALATION)
COMMUNITY

## 2024-06-21 RX ORDER — DULOXETIN HYDROCHLORIDE 60 MG/1
60 CAPSULE, DELAYED RELEASE ORAL DAILY
Qty: 180 CAPSULE | Refills: 0 | Status: SHIPPED | OUTPATIENT
Start: 2024-06-21

## 2024-06-21 NOTE — TELEPHONE ENCOUNTER
Linda from [facility] pharmacy called. Insurance will not cover 2 a day on: DULoxetine (CYMBALTA) 30 MG capsule   Can this be split in to 2 prescriptions with the higher strength?   Please call: [phone]

## 2024-06-22 LAB
ANION GAP SERPL CALCULATED.3IONS-SCNC: 9 MMOL/L (ref 5–15)
BUN SERPL-MCNC: 12 MG/DL (ref 8–23)
BUN/CREAT SERPL: 15.4 (ref 7–25)
CALCIUM SPEC-SCNC: 9.6 MG/DL (ref 8.6–10.5)
CHLORIDE SERPL-SCNC: 91 MMOL/L (ref 98–107)
CO2 SERPL-SCNC: 29 MMOL/L (ref 22–29)
CREAT SERPL-MCNC: 0.78 MG/DL (ref 0.76–1.27)
EGFRCR SERPLBLD CKD-EPI 2021: 99.6 ML/MIN/1.73
GLUCOSE SERPL-MCNC: 96 MG/DL (ref 65–99)
POTASSIUM SERPL-SCNC: 5.1 MMOL/L (ref 3.5–5.2)
SODIUM SERPL-SCNC: 129 MMOL/L (ref 136–145)

## 2024-06-23 NOTE — PROGRESS NOTES
Patient Name: Luis Antonio Fairchild  : 1960   MRN: [id number]     Chief Complaint:    Chief Complaint   Patient presents with    Hypertension     FOLLOW UP       History of Present Illness: Luis Antonio Fairchild is a 63 y.o. male.  History of Present Illness  The patient presents for evaluation of multiple medical concerns. He is accompanied by his wife.    The patient, previously under the care of , was referred to the pain clinic in [place], Dr. Sheffield. On Monday, he received a series of injections in his upper hip area, which provided some relief on Tuesday. However, he experienced severe pain on Wednesday and yesterday, which he attributes to overexertion. He has a follow-up appointment with Dr. Sheffield on 07/10/2023. His wife expresses concern about his sodium levels, as he has been experiencing confusion, forgetfulness, and poor balance, which intensifies when his sodium levels are low. He has been consuming more alcohol than usual to manage his pain. His sleep is disrupted due to the pain. Dr. Sheffield suggested that if the injections proved ineffective, the possibility of a spinal stimulator may be considered. He underwent an EMG of his right L5 nerve, which took a year to identify which nerve was affected. His wife reports that he was not prescribed any pain medication, not even Extra Strength Tylenol, and was in tears due to the pain. He nearly went to the emergency room last night due to the pain.    Supplemental Information  His appointment with his oncologist went well. His COPD has progressed. He went to see Dr. Oconnor, pulmonology. She suggested trying Trelegy, but it was too costly. She told him to try the nebulizer 3 to 4 times a day. He has a follow-up appointment with her in 2024.   He is still smoking.         Subjective     Review of System: Review of Systems     Medications:     Current Outpatient Medications:     acetaminophen (TYLENOL) 500 MG tablet, Take 1 tablet by mouth Every 6 (Six) Hours As Needed  for Mild Pain., Disp: 120 tablet, Rfl: 1    acetylcysteine (MUCOMYST) 20 % nebulizer solution, Take 4 mL by nebulization Every 4 (Four) Hours. 3-4 times Daily, Disp: , Rfl:     albuterol sulfate HFA (ProAir HFA) 108 (90 Base) MCG/ACT inhaler, Inhale 1-2 puffs every 4-6 hours PRN, Disp: 1 g, Rfl: 0    amLODIPine (NORVASC) 5 MG tablet, Take 1 tablet by mouth once daily, Disp: 90 tablet, Rfl: 0    budesonide (PULMICORT) 0.5 MG/2ML nebulizer solution, Take 2 mL by nebulization 2 (Two) Times a Day., Disp: 60 each, Rfl: 11    Calcium Carbonate-Vit D-Min (CALCIUM 1200 PO), Take  by mouth., Disp: , Rfl:     celecoxib (CeleBREX) 200 MG capsule, Take 1 capsule by mouth twice daily, Disp: 180 capsule, Rfl: 0    cyclobenzaprine (FLEXERIL) 10 MG tablet, TAKE 1 TABLET BY MOUTH THREE TIMES DAILY AS NEEDED FOR MUSCLE SPASMS FOR UP TO 10 DAYS, Disp: , Rfl:     ezetimibe (ZETIA) 10 MG tablet, Take 1 tablet by mouth once daily, Disp: 30 tablet, Rfl: 0    furosemide (LASIX) 20 MG tablet, Take 1 tablet by mouth Daily., Disp: 90 tablet, Rfl: 1    ipratropium-albuterol (DUO-NEB) 0.5-2.5 mg/3 ml nebulizer, USE 1 AMPULE IN NEBULIZER EVERY 4 HOURS AS NEEDED FOR WHEEZING, Disp: 360 mL, Rfl: 0    nystatin (MYCOSTATIN) 100,000 unit/mL suspension, Swish and swallow 5 mL 4 (Four) Times a Day., Disp: 473 mL, Rfl: 0    pantoprazole (PROTONIX) 40 MG EC tablet, Take 1 tablet by mouth 2 (Two) Times a Day., Disp: 180 tablet, Rfl: 0    pravastatin (PRAVACHOL) 40 MG tablet, Take 1 tablet by mouth once daily, Disp: 90 tablet, Rfl: 0    Sod Picosulfate-Mag Ox-Cit Acd (Clenpiq) 10-3.5-12 MG-GM -GM/160ML solution, Take 350 mL by mouth Take As Directed., Disp: 350 mL, Rfl: 0    sodium chloride 1 g tablet, Take 1 tablet by mouth 2 (Two) Times a Day., Disp: 180 tablet, Rfl: 1    DULoxetine (CYMBALTA) 30 MG capsule, Take 1 capsule by mouth Daily., Disp: 7 capsule, Rfl: 0    DULoxetine (CYMBALTA) 60 MG capsule, Take 1 capsule by mouth Daily. Start one week after  one week of duloxetine 30 mg daily., Disp: 180 capsule, Rfl: 0    Allergies:   No Known Allergies    Objective     Physical Exam:   Vital Signs:   Vitals:    06/21/24 1134   BP: 132/72   BP Location: Right arm   Patient Position: Sitting   Cuff Size: Adult   Pulse: 88   Resp: 16   Temp: 97.6 °F (36.4 °C)   TempSrc: Infrared   Weight: 60.8 kg (134 lb 2 oz)   PainSc:   8   PainLoc: Hip     Body mass index is 19.81 kg/m². BMI is within normal parameters. No other follow-up for BMI required.      Physical Exam  Constitutional:       General: He is not in acute distress.     Appearance: He is not ill-appearing.   HENT:      Head: Normocephalic.   Cardiovascular:      Rate and Rhythm: Normal rate and regular rhythm.      Heart sounds: Normal heart sounds. No murmur heard.  Pulmonary:      Breath sounds: Normal breath sounds.   Neurological:      General: No focal deficit present.      Mental Status: He is oriented to person, place, and time.      Gait: Gait abnormal.   Psychiatric:         Mood and Affect: Mood normal.              Results  Laboratory Studies  Sodium level was 129 in March.     Assessment / Plan      Assessment/Plan:   Diagnoses and all orders for this visit:    1. Hyponatremia (Primary)  -BMP    2. Lumbosacral disc disease  DULoxetine (CYMBALTA) 30 MG capsule; Take 1 tablet daily for 1 week then increase to 2 tablets daily on week 2 (patient will be taking citalopram 20 mg week 1)  Dispense: 180 capsule; Refill: 0    3. Sciatica of right side           Assessment & Plan  1. Neuropathic pain.  A trial of duloxetine 30 mg for one week, after which the dosage will be increased to 60 mg.  He will simultaneously decrease citalopram to 20 mg daily and discontinue at this accurately as he starts the duloxetine.  I discussed common side effects and adverse reactions.  He will let me know if he does not tolerate the duloxetine.  He will also add Tylenol 1000 mg 3 times a day to his regimen of Celebrex.  He will  continue to follow with Dr. Sheffield.    Patient has been evaluated by neurosurgery [facility] and deemed not a surgical candidate due to comorbidities and ongoing chronic smoking as well as alcoholism.  He has had pain management with Dr. Addison; 3 epidural injection which worsened pain.  He has had a second opinion with neurosurgery at ; not a surgical candidate. Referred him to Dr. Bux.     2. Hyponatremia.  The patient's has chronically low sodium levels. It was slightly low in 03/2024, 129. A sodium level check will be conducted today.  Previous workup for hyponatremia include CT of abdomen pelvis without contrast, CT of the soft neck tissue with contrast, CT angiogram of chest, osmolality serum, sodium urine, random urine clean-catch, urine osmolality of urine.  Alcohol use contributing factor.  He limits his fluids.  He also takes sodium tablets.           Follow Up:   Return in about 6 weeks (around 8/2/2024) for Recheck.  Patient or patient representative verbalized consent for the use of Ambient Listening during the visit with  KALEB Barrios for chart documentation. 6/23/2024  19:08 EDT    KALEB Barrios  [facility]

## 2024-06-26 ENCOUNTER — TELEPHONE (OUTPATIENT)
Dept: PULMONOLOGY | Facility: CLINIC | Age: 64
End: 2024-06-26
Payer: COMMERCIAL

## 2024-06-27 ENCOUNTER — OFFICE VISIT (OUTPATIENT)
Dept: PULMONOLOGY | Facility: CLINIC | Age: 64
End: 2024-06-27
Payer: COMMERCIAL

## 2024-06-27 VITALS
OXYGEN SATURATION: 94 % | DIASTOLIC BLOOD PRESSURE: 72 MMHG | WEIGHT: 134 LBS | HEART RATE: 88 BPM | TEMPERATURE: 98 F | HEIGHT: 69 IN | SYSTOLIC BLOOD PRESSURE: 124 MMHG | BODY MASS INDEX: 19.85 KG/M2

## 2024-06-27 DIAGNOSIS — F17.200 TOBACCO USE DISORDER: ICD-10-CM

## 2024-06-27 DIAGNOSIS — J44.9 CHRONIC OBSTRUCTIVE PULMONARY DISEASE, UNSPECIFIED COPD TYPE: Primary | ICD-10-CM

## 2024-06-27 DIAGNOSIS — J42 CHRONIC BRONCHITIS, UNSPECIFIED CHRONIC BRONCHITIS TYPE: ICD-10-CM

## 2024-06-27 DIAGNOSIS — J44.9 COPD, SEVERE: ICD-10-CM

## 2024-06-27 PROCEDURE — 99214 OFFICE O/P EST MOD 30 MIN: CPT | Performed by: NURSE PRACTITIONER

## 2024-06-27 RX ORDER — PREDNISONE 10 MG/1
10 TABLET ORAL DAILY
COMMUNITY
End: 2024-06-27

## 2024-06-27 RX ORDER — ALBUTEROL SULFATE 90 UG/1
AEROSOL, METERED RESPIRATORY (INHALATION)
Qty: 18 G | Refills: 5 | Status: SHIPPED | OUTPATIENT
Start: 2024-06-27

## 2024-06-27 NOTE — PROGRESS NOTES
"[facility] Pulmonary Follow up      Chief Complaint  COPD and Follow-up    Subjective          Luis Antonio Fairchild presents to [facility] for routine follow-up on his COPD.  Unfortunately his insurance does not cover the inhalers, so he is on DuoNebs and budesonide nebulizers.  He has not been using those lately secondary to his machine is not functioning properly.    He has been monitoring his oxygen saturations with activity he will drop down to the 90s but will reduce down quickly.  He is trying to slow down and take his time when he is active.    He does continue to have acute coughing episodes where he even gets dizzy and has had some urinary incontinence.    Unfortunately is also having significant back pain, he is following up with the pain treatment center and had a steroid injection that did not help any.  It is causing quite a bit of lower extremity weakness and activity intolerance.    He does continue to smoke.      Objective     Vital Signs:   /72   Pulse 88   Temp 98 °F (36.7 °C)   Ht 175.3 cm (69\")   Wt 60.8 kg (134 lb)   SpO2 94% Comment: resting, room air  BMI 19.79 kg/m²       Immunization History   Administered Date(s) Administered    Pneumococcal Conjugate 20-Valent (PCV20) 03/31/2023    Pneumococcal Polysaccharide (PPSV23) 06/26/2019    Shingrix 04/27/2022    Tdap 06/26/2019       Physical Exam  Vitals reviewed.   Constitutional:       Appearance: He is well-developed.   HENT:      Head: Normocephalic and atraumatic.   Eyes:      Pupils: Pupils are equal, round, and reactive to light.   Cardiovascular:      Rate and Rhythm: Normal rate and regular rhythm.      Heart sounds: No murmur heard.  Pulmonary:      Effort: Pulmonary effort is normal. No respiratory distress.      Breath sounds: Normal breath sounds. No wheezing or rales.   Abdominal:      General: Bowel sounds are normal. There is no distension.      Palpations: Abdomen is soft. "   Musculoskeletal:         General: Normal range of motion.      Cervical back: Normal range of motion and neck supple.   Skin:     General: Skin is warm and dry.      Findings: No erythema.   Neurological:      Mental Status: He is alert and oriented to person, place, and time.   Psychiatric:         Behavior: Behavior normal.          Result Review :            CT 4/19/2024  Lungs: Mild centrilobular emphysema. Groundglass opacities in the right lung base with associated bronchial wall thickening and endobronchial debris. Stable 3 mm right lower lobe and 4 mm left lower lobe nodules (series 4 image 230 and 325). No new or suspicious pulmonary nodules.                Assessment and Plan    Problem List Items Addressed This Visit          Pulmonary and Pneumonias    COLD (chronic obstructive lung disease) - Primary    Relevant Medications    albuterol sulfate HFA (ProAir HFA) 108 (90 Base) MCG/ACT inhaler    Chronic bronchitis    Relevant Medications    albuterol sulfate HFA (ProAir HFA) 108 (90 Base) MCG/ACT inhaler       Tobacco    Tobacco use disorder     Other Visit Diagnoses       COPD, severe        Relevant Medications    albuterol sulfate HFA (ProAir HFA) 108 (90 Base) MCG/ACT inhaler          Mr. Fairchild does have COPD with chronic bronchitis and ongoing tobacco use.    We have discussed smoking cessation in the office, unfortunately with his pain in activity intolerance he was finding it very hard to stop smoking.    He certainly needs to get a nebulizer machine that works, his is not quite a year old.  I have asked him to reach out to his DME for a repair or new machine.  He also needs new nebulizer tubing.    He needs to continue on the DuoNebs 2-3 times a day as well as budesonide nebulizer twice daily.    Ms. Calixmarquise his primary care provider has already ordered his low-dose screening CT scan.  He did have a CT scan in April of this year at  with some groundglass changes in the left lower lobe possibly  related to some aspiration.    Follow Up     No follow-ups on file.  Patient was given instructions and counseling regarding his condition or for health maintenance advice. Please see specific information pulled into the AVS if appropriate.       Moderate level of Medical Decision Making complexity based on 2 or more chronic stable illnesses and prescription drug management.    Abby Frank APRN, ACNP  [facility]  Electronically signed

## 2024-07-29 ENCOUNTER — TELEPHONE (OUTPATIENT)
Age: 64
End: 2024-07-29
Payer: COMMERCIAL

## 2024-07-29 NOTE — TELEPHONE ENCOUNTER
Pt called asking for refill of albuterol inhaler. Called pharmacy to see what was going on as it looked like he already had fills of that. Pharmacy confirmed that he does and they are filling it. Called pt and left vcm informing.

## 2024-07-30 ENCOUNTER — TELEPHONE (OUTPATIENT)
Dept: INTERNAL MEDICINE | Facility: CLINIC | Age: 64
End: 2024-07-30
Payer: COMMERCIAL

## 2024-07-30 NOTE — TELEPHONE ENCOUNTER
Caller: Luis Antonio Fairchild    Relationship: Self    Best call back number: [phone]     Which medication are you concerned about: NEBULIZER MACHINE     Who prescribed you this medication: PCP    What are your concerns: PATIENT STATES HIS NEBULIZER MACHINE HAS STOPPED WORKING AND WILL EITHER NEED A NEW MACHINE OR HAVE THE MACHINE REPAIRED. PATIENT STATES THE MACHINE IS STILL UNDER WARRANTY.

## 2024-08-01 DIAGNOSIS — J44.9 COPD, SEVERE: Primary | ICD-10-CM

## 2024-08-09 DIAGNOSIS — F41.9 ANXIETY: ICD-10-CM

## 2024-08-09 RX ORDER — CITALOPRAM 40 MG/1
40 TABLET ORAL DAILY
Qty: 90 TABLET | Refills: 0 | OUTPATIENT
Start: 2024-08-09

## 2024-08-22 DIAGNOSIS — E78.5 HYPERLIPIDEMIA, UNSPECIFIED HYPERLIPIDEMIA TYPE: Chronic | ICD-10-CM

## 2024-08-22 DIAGNOSIS — I10 PRIMARY HYPERTENSION: ICD-10-CM

## 2024-08-22 DIAGNOSIS — E87.1 HYPONATREMIA: ICD-10-CM

## 2024-08-22 DIAGNOSIS — M51.9 LUMBOSACRAL DISC DISEASE: Primary | ICD-10-CM

## 2024-08-23 NOTE — TELEPHONE ENCOUNTER
LOV 06/21/2024  NOV Not scheduled yet    Follow Up:   Return in about 6 weeks (around 8/2/2024) for Recheck    We recently received your message to refill your medication(s).  Our records indicate that you did not schedule a follow up appointment with your provider at your last visit on 06/21/2024. The medication that you are on requires a follow up appointment for additional refills. Patient was to schedule on or around 08/02/2024 for 6 week follow up

## 2024-08-26 RX ORDER — FUROSEMIDE 20 MG
20 TABLET ORAL DAILY
Qty: 90 TABLET | Refills: 1 | Status: SHIPPED | OUTPATIENT
Start: 2024-08-26

## 2024-08-26 RX ORDER — SODIUM CHLORIDE 1 G/1
1 TABLET ORAL 2 TIMES DAILY
Qty: 180 TABLET | Refills: 1 | Status: SHIPPED | OUTPATIENT
Start: 2024-08-26

## 2024-08-26 RX ORDER — CYCLOBENZAPRINE HCL 10 MG
10 TABLET ORAL 2 TIMES DAILY PRN
Qty: 30 TABLET | Refills: 0 | Status: SHIPPED | OUTPATIENT
Start: 2024-08-26

## 2024-08-26 RX ORDER — EZETIMIBE 10 MG/1
10 TABLET ORAL DAILY
Qty: 30 TABLET | Refills: 0 | Status: SHIPPED | OUTPATIENT
Start: 2024-08-26

## 2024-08-26 RX ORDER — AMLODIPINE BESYLATE 5 MG/1
5 TABLET ORAL DAILY
Qty: 90 TABLET | Refills: 0 | Status: SHIPPED | OUTPATIENT
Start: 2024-08-26

## 2024-09-06 ENCOUNTER — OFFICE VISIT (OUTPATIENT)
Dept: INTERNAL MEDICINE | Facility: CLINIC | Age: 64
End: 2024-09-06
Payer: MEDICARE

## 2024-09-06 VITALS
WEIGHT: 136.5 LBS | RESPIRATION RATE: 20 BRPM | HEART RATE: 92 BPM | SYSTOLIC BLOOD PRESSURE: 130 MMHG | BODY MASS INDEX: 20.16 KG/M2 | TEMPERATURE: 97.5 F | OXYGEN SATURATION: 98 % | DIASTOLIC BLOOD PRESSURE: 80 MMHG

## 2024-09-06 DIAGNOSIS — J44.9 COPD, SEVERE: Primary | ICD-10-CM

## 2024-09-06 DIAGNOSIS — E87.1 HYPONATREMIA: ICD-10-CM

## 2024-09-06 LAB
ALBUMIN SERPL-MCNC: 4.5 G/DL (ref 3.5–5.2)
ALBUMIN/GLOB SERPL: 1.6 G/DL
ALP SERPL-CCNC: 95 U/L (ref 39–117)
ALT SERPL W P-5'-P-CCNC: 13 U/L (ref 1–41)
ANION GAP SERPL CALCULATED.3IONS-SCNC: 14.6 MMOL/L (ref 5–15)
AST SERPL-CCNC: 14 U/L (ref 1–40)
BILIRUB SERPL-MCNC: 0.4 MG/DL (ref 0–1.2)
BUN SERPL-MCNC: 11 MG/DL (ref 8–23)
BUN/CREAT SERPL: 13.9 (ref 7–25)
CALCIUM SPEC-SCNC: 9.8 MG/DL (ref 8.6–10.5)
CHLORIDE SERPL-SCNC: 90 MMOL/L (ref 98–107)
CO2 SERPL-SCNC: 24.4 MMOL/L (ref 22–29)
CREAT SERPL-MCNC: 0.79 MG/DL (ref 0.76–1.27)
EGFRCR SERPLBLD CKD-EPI 2021: 98.6 ML/MIN/1.73
GLOBULIN UR ELPH-MCNC: 2.9 GM/DL
GLUCOSE SERPL-MCNC: 66 MG/DL (ref 65–99)
POTASSIUM SERPL-SCNC: 4.5 MMOL/L (ref 3.5–5.2)
PROT SERPL-MCNC: 7.4 G/DL (ref 6–8.5)
SODIUM SERPL-SCNC: 129 MMOL/L (ref 136–145)

## 2024-09-06 PROCEDURE — 3079F DIAST BP 80-89 MM HG: CPT | Performed by: NURSE PRACTITIONER

## 2024-09-06 PROCEDURE — 99214 OFFICE O/P EST MOD 30 MIN: CPT | Performed by: NURSE PRACTITIONER

## 2024-09-06 PROCEDURE — 36415 COLL VENOUS BLD VENIPUNCTURE: CPT | Performed by: NURSE PRACTITIONER

## 2024-09-06 PROCEDURE — 3075F SYST BP GE 130 - 139MM HG: CPT | Performed by: NURSE PRACTITIONER

## 2024-09-06 PROCEDURE — 1160F RVW MEDS BY RX/DR IN RCRD: CPT | Performed by: NURSE PRACTITIONER

## 2024-09-06 PROCEDURE — 1125F AMNT PAIN NOTED PAIN PRSNT: CPT | Performed by: NURSE PRACTITIONER

## 2024-09-06 PROCEDURE — 1159F MED LIST DOCD IN RCRD: CPT | Performed by: NURSE PRACTITIONER

## 2024-09-06 PROCEDURE — 80053 COMPREHEN METABOLIC PANEL: CPT | Performed by: NURSE PRACTITIONER

## 2024-09-06 RX ORDER — SODIUM CHLORIDE 1 G/1
1 TABLET ORAL 2 TIMES DAILY
Qty: 180 TABLET | Refills: 1 | Status: SHIPPED | OUTPATIENT
Start: 2024-09-06

## 2024-09-06 RX ORDER — PREDNISONE 20 MG/1
1 TABLET ORAL DAILY
COMMUNITY
Start: 2024-06-19

## 2024-09-06 NOTE — PROGRESS NOTES
Patient Name: Luis Antonio Fairchild  : 1960   MRN: [id number]     Chief Complaint:    Chief Complaint   Patient presents with    Med Refill       History of Present Illness: Luis Antonio Fairchild is a 64 y.o. male.  History of Present Illness  The patient is a 64-year-old male who presents for a follow-up of COPD.    He has been managing his COPD symptoms with Breztri, which is covered by his insurance. He attempted to use Trelegy but found it too expensive. He was advised to use budesonide nebulizers and DuoNeb if he couldn't use his inhaler. His chronic symptoms include shortness of breath and cough, but they are not severe. His oxygen levels have been satisfactory. Despite his condition, he continues to smoke and expresses no interest in quitting at this time.    He is under the care of pain management,  Dr. Sheffield in [place] for his back pain. Previous injections did not provide relief. He is not considered a candidate for surgery due to his co morbidities.  He has reduced his alcohol intake from 1-2 drinks per night.    His blood pressure is well-managed with a daily dose of amlodipine 5 mg. He reports no chest pains, palpitations, headaches, shortness of breath at night, or swelling.    His hyponatremia has remained stable. He takes sodium chloride tablets twice a day and Lasix 20 mg daily. He does not exhibit any symptoms of low sodium levels.    SOCIAL HISTORY  He continues to smoke. He says he can not quit. He has tried multiple things and has not been able to quit and is not interested in quitting at this time.        Subjective     Review of System: Review of Systems     Medications:     Current Outpatient Medications:     acetaminophen (TYLENOL) 500 MG tablet, Take 1 tablet by mouth Every 6 (Six) Hours As Needed for Mild Pain., Disp: 120 tablet, Rfl: 1    albuterol sulfate HFA (ProAir HFA) 108 (90 Base) MCG/ACT inhaler, Inhale 1-2 puffs every 4-6 hours PRN, Disp: 18 g, Rfl: 5    amLODIPine (NORVASC) 5 MG tablet,  Take 1 tablet by mouth Daily., Disp: 90 tablet, Rfl: 0    budesonide (PULMICORT) 0.5 MG/2ML nebulizer solution, Take 2 mL by nebulization 2 (Two) Times a Day., Disp: 60 each, Rfl: 11    Calcium Carbonate-Vit D-Min (CALCIUM 1200 PO), Take  by mouth., Disp: , Rfl:     celecoxib (CeleBREX) 200 MG capsule, Take 1 capsule by mouth twice daily, Disp: 180 capsule, Rfl: 0    cyclobenzaprine (FLEXERIL) 10 MG tablet, Take 1 tablet by mouth 2 (Two) Times a Day As Needed for Muscle Spasms., Disp: 30 tablet, Rfl: 0    DULoxetine (CYMBALTA) 60 MG capsule, Take 1 capsule by mouth Daily. Start one week after one week of duloxetine 30 mg daily., Disp: 180 capsule, Rfl: 0    ezetimibe (ZETIA) 10 MG tablet, Take 1 tablet by mouth Daily., Disp: 30 tablet, Rfl: 0    furosemide (LASIX) 20 MG tablet, Take 1 tablet by mouth Daily., Disp: 90 tablet, Rfl: 1    ipratropium-albuterol (DUO-NEB) 0.5-2.5 mg/3 ml nebulizer, USE 1 AMPULE IN NEBULIZER EVERY 4 HOURS AS NEEDED FOR WHEEZING, Disp: 360 mL, Rfl: 0    nystatin (MYCOSTATIN) 100,000 unit/mL suspension, Swish and swallow 5 mL 4 (Four) Times a Day., Disp: 473 mL, Rfl: 0    pantoprazole (PROTONIX) 40 MG EC tablet, Take 1 tablet by mouth 2 (Two) Times a Day., Disp: 180 tablet, Rfl: 0    pravastatin (PRAVACHOL) 40 MG tablet, Take 1 tablet by mouth once daily, Disp: 90 tablet, Rfl: 0    predniSONE (DELTASONE) 20 MG tablet, Take 1 tablet by mouth Daily., Disp: , Rfl:     Sod Picosulfate-Mag Ox-Cit Acd (Clenpiq) 10-3.5-12 MG-GM -GM/160ML solution, Take 350 mL by mouth Take As Directed., Disp: 350 mL, Rfl: 0    sodium chloride 1 g tablet, Take 1 tablet by mouth 2 (Two) Times a Day., Disp: 180 tablet, Rfl: 1    Budeson-Glycopyrrol-Formoterol (BREZTRI) 160-9-4.8 MCG/ACT aerosol inhaler, Inhale 2 puffs 2 (Two) Times a Day., Disp: 10.7 g, Rfl: 5    Allergies:   No Known Allergies    Objective     Physical Exam:   Vital Signs:   Vitals:    09/06/24 1012   BP: 130/80   BP Location: Right arm   Patient  Position: Sitting   Cuff Size: Adult   Pulse: 92   Resp: 20   Temp: 97.5 °F (36.4 °C)   TempSrc: Temporal   SpO2: 98%   Weight: 61.9 kg (136 lb 8 oz)   PainSc:   8   PainLoc: Hip     Body mass index is 20.16 kg/m². BMI is within normal parameters. No other follow-up for BMI required.      Physical Exam  Physical Exam  Patient appears well.  No respiratory distress. Effort is normal. Breath sounds are clear.  Heart rate is regular.  No deficits. Patient is alert and oriented.  Mood is normal.       Results       Assessment / Plan      Assessment/Plan:   Diagnoses and all orders for this visit:    1. COPD, severe (Primary)  -     Budeson-Glycopyrrol-Formoterol (BREZTRI) 160-9-4.8 MCG/ACT aerosol inhaler; Inhale 2 puffs 2 (Two) Times a Day.  Dispense: 10.7 g; Refill: 5    2. Hyponatremia  -     sodium chloride 1 g tablet; Take 1 tablet by mouth 2 (Two) Times a Day.  Dispense: 180 tablet; Refill: 1  -     Comprehensive metabolic panel       Assessment & Plan  1. Hyponatremia.  The hyponatremia is likely due to alcohol and fluid intake (previous work up for hyponatremia). Sodium chloride tablets are taken twice a day along with Lasix 20 mg daily.  Sodium levels will be rechecked today.    2. Chronic Obstructive Pulmonary Disease (COPD).  COPD symptoms are managed with Breztri, which has been refilled. He has chronic dyspnea and cough but no severe symptoms. Oxygenation has been good. He continues to smoke and is not interested in quitting at this time.     3. Hypertension.  Blood pressure is controlled with amlodipine 5 mg daily. He will continue this medication.    Follow-up  He will follow up in 3 months for his wellness visit or sooner if any worsening symptoms.         Follow Up:   Return in about 3 months (around 12/6/2024) for Medicare Wellness.  Patient or patient representative verbalized consent for the use of Ambient Listening during the visit with  KALEB Barrios for chart documentation. 9/6/2024   13:32 TAYLORT    KALEB Barrios  [facility]

## 2024-09-08 ENCOUNTER — TELEPHONE (OUTPATIENT)
Dept: INTERNAL MEDICINE | Facility: CLINIC | Age: 64
End: 2024-09-08
Payer: MEDICARE

## 2024-09-08 NOTE — TELEPHONE ENCOUNTER
Patient is overdue for recheck on colonoscopy. He should call to schedule his colonoscopy with Dr. Bueno.

## 2024-09-12 ENCOUNTER — PATIENT MESSAGE (OUTPATIENT)
Dept: INTERNAL MEDICINE | Facility: CLINIC | Age: 64
End: 2024-09-12
Payer: MEDICARE

## 2024-09-12 DIAGNOSIS — R41.0 CONFUSION: ICD-10-CM

## 2024-09-12 DIAGNOSIS — R41.3 MEMORY CHANGE: Primary | ICD-10-CM

## 2024-09-12 NOTE — TELEPHONE ENCOUNTER
From: Luis Antonio Fairchild  To: Sulma Woodward  Sent: 9/12/2024 8:31 AM EDT  Subject: Memory    This is Jada, I wish I had come to Luis Antonio's last visit with you. Luis Antonio has been experiencing alot of short term memory loss and confusion, as well as depression and frustration. He hasn't smiled in ages and he stays in bed most of the time. He has agreed something is wrong. I believe he may need an appt with a neurologist. Please advise

## 2024-09-26 NOTE — PROGRESS NOTES
Follow Up Office Visit      Date: 10/18/2022   Patient Name: Luis Antonio Fairchild  : 1960   MRN: [id number]     Chief Complaint:    Chief Complaint   Patient presents with   • Arm Pain     [address] [place] falling tried to save.       History of Present Illness: Luis Antonio Fairchild is a 62 y.o. male who is here today to follow up with arm pain.    Right Arm Pain  - injury occurred about 1 month ago  - notes that pain in shoulder is the same since onset  - notes that sleep makes it worse  - denies any intervention thus far  - notes intermittent numbness in hand  - denies any pallness    Tobacco Use Disorder  - notes 1 pack per day  - has tried patches and gum without relief  - has tried chantix in the past without relief        Subjective      Review of Systems:   Review of Systems   Constitutional: Negative for activity change, appetite change, fatigue and fever.   Eyes: Negative for blurred vision, photophobia and visual disturbance.   Respiratory: Negative for cough, chest tightness and shortness of breath.    Cardiovascular: Negative for chest pain and palpitations.   Gastrointestinal: Negative for abdominal distention, abdominal pain, blood in stool, constipation, diarrhea, nausea and vomiting.   Genitourinary: Negative for dysuria and hematuria.   Musculoskeletal: Positive for arthralgias (right shoulder). Negative for back pain and joint swelling.   Skin: Negative for rash and wound.   Neurological: Negative for weakness, headache and confusion.       I have reviewed the patients family history, social history, past medical history, past surgical history and have updated it as appropriate.     Medications:     Current Outpatient Medications:   •  albuterol (PROAIR HFA) 108 (90 BASE) MCG/ACT inhaler, Inhale 1-2 puffs every 4-6 hours PRN, Disp: 1 inhaler, Rfl: 5  •  amLODIPine (NORVASC) 5 MG tablet, Take 1 tablet by mouth Daily., Disp: 30 tablet, Rfl: 5  •  celecoxib (CeleBREX) 200 MG capsule, Take 1 capsule by  mouth 2 (Two) Times a Day., Disp: 180 capsule, Rfl: 0  •  citalopram (CeleXA) 40 MG tablet, Take 1 tablet by mouth Daily., Disp: 90 tablet, Rfl: 1  •  ezetimibe (Zetia) 10 MG tablet, Take 1 tablet by mouth Daily., Disp: 30 tablet, Rfl: 5  •  Fluticasone-Salmeterol (ADVAIR/WIXELA) 100-50 MCG/ACT DISKUS, Inhale 1 puff 2 (Two) Times a Day., Disp: 1 each, Rfl: 3  •  pantoprazole (PROTONIX) 40 MG EC tablet, Take 1 tablet by mouth 2 (Two) Times a Day., Disp: 60 tablet, Rfl: 5  •  pravastatin (PRAVACHOL) 40 MG tablet, Take 1 tablet by mouth Daily., Disp: 90 tablet, Rfl: 1  •  sodium bicarbonate 325 MG tablet, Take 1 tablet by mouth twice daily, Disp: 60 tablet, Rfl: 3  •  buPROPion XL (WELLBUTRIN XL) 150 MG 24 hr tablet, Take 1 tablet by mouth Every Morning., Disp: 30 tablet, Rfl: 1  •  Diclofenac Sodium (VOLTAREN) 1 % gel gel, Apply 4 g topically to the appropriate area as directed 4 (Four) Times a Day As Needed (shoulder pain)., Disp: 50 g, Rfl: 0  •  famotidine (PEPCID) 20 MG tablet, Take 1 tablet by mouth 2 (Two) Times a Day As Needed for Heartburn., Disp: 180 tablet, Rfl: 1  •  lidocaine (LIDODERM) 5 %, Place 1 patch on the skin as directed by provider Daily. Remove & Discard patch within 12 hours or as directed by MD, Disp: 30 each, Rfl: 0    Allergies:   No Known Allergies    Objective     Physical Exam: Please see above  Vital Signs:   Vitals:    10/18/22 0818   BP: 108/58   BP Location: Right arm   Patient Position: Sitting   Cuff Size: Adult   Pulse: 71   Resp: 18   Temp: 98.2 °F (36.8 °C)   TempSrc: Temporal   SpO2: 96%   Weight: 68.5 kg (151 lb)   PainSc:   8   PainLoc: Arm     Body mass index is 22.3 kg/m².  BMI is within normal parameters. No other follow-up for BMI required.       Physical Exam  Vitals and nursing note reviewed.   Constitutional:       General: He is not in acute distress.     Appearance: Normal appearance. He is normal weight. He is not ill-appearing or toxic-appearing.   HENT:      Nose:  No congestion or rhinorrhea.   Eyes:      General:         Right eye: No discharge.         Left eye: No discharge.      Conjunctiva/sclera: Conjunctivae normal.   Pulmonary:      Effort: Pulmonary effort is normal. No respiratory distress.   Abdominal:      General: Abdomen is flat. There is no distension.   Musculoskeletal:         General: Tenderness (Tenderness and decreased strength with empty can testing and external rotation of the right shoulder) present.      Cervical back: Normal range of motion.   Skin:     Coloration: Skin is not jaundiced.      Findings: No rash.   Neurological:      General: No focal deficit present.      Mental Status: He is alert. Mental status is at baseline.      Coordination: Coordination normal.      Gait: Gait normal.   Psychiatric:         Mood and Affect: Mood normal.         Behavior: Behavior normal.         Thought Content: Thought content normal.         Judgment: Judgment normal.         Procedures    Results:   Labs:   TSH   Date Value Ref Range Status   08/12/2022 1.510 0.270 - 4.200 uIU/mL Final        Imaging:   No valid procedures specified.     Assessment / Plan      Assessment/Plan:   Problem List Items Addressed This Visit        Musculoskeletal and Injuries    Rotator cuff arthropathy of right shoulder - Primary    Current Assessment & Plan     -   Injury occurred approximately 1 month prior to clinical exam on 10/18/2022  -   Patient with persistent right shoulder pain and decreased functionality  -   Physical exam indicative of right rotator cuff arthropathy with associated pain and weakness with empty can testing and external rotation  Plan:  -   Ambulatory referral to physical therapy ordered on 10/18/2022 for additional evaluation and management  -   Initiate 10/18/2022: Lidocaine transdermal patch and diclofenac gel for pain relief acutely  -   Continue ibuprofen/Tylenol as needed         Relevant Medications    lidocaine (LIDODERM) 5 %    Diclofenac Sodium  (VOLTAREN) 1 % gel gel    Other Relevant Orders    Ambulatory Referral to Physical Therapy Evaluate and treat       Tobacco    Tobacco use disorder    Current Assessment & Plan     Luis Antonio Fairchild  reports that he has been smoking cigarettes. He has a 46.00 pack-year smoking history. He has never used smokeless tobacco.. I have educated him on the risk of diseases from using tobacco products such as cancer, COPD and heart disease.     I advised him to quit and he is willing to quit. We have discussed the following method/s for tobacco cessation:  Counseling Prescription Medicaiton.  Together we have set a quit date for 1 month from today.  He will follow up with me in 4 months or sooner to check on his progress.  -   Initiate 10/18/2022: Bupropion 150 mg extended release every morning  -   Continue transdermal nicotine patches as previously prescribed    I spent 5 minutes counseling the patient.                Relevant Medications    buPROPion XL (WELLBUTRIN XL) 150 MG 24 hr tablet         Follow Up:   Return in about 4 months (around 2/6/2023) for Next scheduled follow up.          Nazario Cole MD  [facility] [place] GRISELDA Diaz     no

## 2024-10-03 ENCOUNTER — HOSPITAL ENCOUNTER (OUTPATIENT)
Facility: HOSPITAL | Age: 64
Discharge: HOME | End: 2024-10-03
Admitting: NURSE PRACTITIONER
Payer: MEDICARE

## 2024-10-03 DIAGNOSIS — R41.0 CONFUSION: ICD-10-CM

## 2024-10-03 DIAGNOSIS — R41.3 MEMORY CHANGE: ICD-10-CM

## 2024-10-03 PROCEDURE — A9577 INJ MULTIHANCE: HCPCS | Performed by: NURSE PRACTITIONER

## 2024-10-03 PROCEDURE — 0 GADOBENATE DIMEGLUMINE 529 MG/ML SOLUTION: Performed by: NURSE PRACTITIONER

## 2024-10-03 PROCEDURE — 70553 MRI BRAIN STEM W/O & W/DYE: CPT

## 2024-10-03 RX ADMIN — GADOBENATE DIMEGLUMINE 20 ML: 529 INJECTION, SOLUTION INTRAVENOUS at 17:59

## 2024-10-04 ENCOUNTER — TELEPHONE (OUTPATIENT)
Dept: INTERNAL MEDICINE | Facility: CLINIC | Age: 64
End: 2024-10-04
Payer: MEDICARE

## 2024-10-04 NOTE — TELEPHONE ENCOUNTER
----- Message from Sulma Woodward sent at 10/4/2024 11:42 AM EDT -----  MRI showed no acute intracranial abnormality to explain patient's symptoms.  Mild changes commonly seen with changing or ischemic vessel disease.

## 2024-10-04 NOTE — TELEPHONE ENCOUNTER
I left a message on the patients voicemail to call our office back, office number provided.     HUB relay:    MRI showed no acute intracranial abnormality to explain patient's symptoms.  Mild changes commonly seen with changing or ischemic vessel disease.

## 2024-10-07 NOTE — TELEPHONE ENCOUNTER
Name: Luis Antonio Fairchild      Relationship: Self      Best Callback Number: [phone]       HUB PROVIDED THE RELAY MESSAGE FROM THE OFFICE      PATIENT: HAS FURTHER QUESTIONS AND WOULD LIKE A CALL BACK AT THE FOLLOWING PHONE [phone]    ADDITIONAL INFORMATION: PT WOULD LIKE A CALL TO DISCUSS THE TEST RESULTS. PT DOESN'T UNDERSTAND THE MSG.

## 2024-10-07 NOTE — TELEPHONE ENCOUNTER
Let patient know that the MRI shows no intracranial abnormalities.  This means that there is no recent or sudden issues within the brain such as bleeding, tumors or severe swelling which is reassuring.  There are some mild changes suggestive of chronic ischemic vessel disease.  This is associated with aging or reduced blood blood flow.  These changes are generally common especially in people with some age.

## 2024-10-07 NOTE — TELEPHONE ENCOUNTER
Attempt #2  I left a message on the patients voicemail to call our office back, office number provided.     HUB relay:    MRI showed no acute intracranial abnormality to explain patient's symptoms.  Mild changes commonly seen with changing or ischemic vessel disease.    No

## 2024-10-17 DIAGNOSIS — J44.9 COPD, SEVERE: ICD-10-CM

## 2024-10-17 NOTE — TELEPHONE ENCOUNTER
Caller: Luis Antonio Fairchild    Relationship: Self    Best call back number:      Requested Prescriptions:   Requested Prescriptions     Pending Prescriptions Disp Refills    albuterol sulfate HFA (ProAir HFA) 108 (90 Base) MCG/ACT inhaler 18 g 5     Sig: Inhale 1-2 puffs every 4-6 hours PRN        Pharmacy where request should be sent: [facility] [phone] [place], [place] [phone]      Last office visit with prescribing clinician: 9/6/2024   Last telemedicine visit with prescribing clinician: Visit date not found   Next office visit with prescribing clinician: Visit date not found     Additional details provided by patient: THE ONE CALLED IN THE OTHER DAY WAS THE ONE WITH THE MEDICINE, THIS IS HIS RESCUE INHALER    Does the patient have less than a 3 day supply:  [x] Yes  [] No      Sandra Mead Rep   10/17/24 14:16 EDT

## 2024-10-21 ENCOUNTER — PATIENT MESSAGE (OUTPATIENT)
Dept: PULMONOLOGY | Facility: CLINIC | Age: 64
End: 2024-10-21
Payer: MEDICARE

## 2024-10-21 DIAGNOSIS — J44.9 COPD, SEVERE: ICD-10-CM

## 2024-10-21 RX ORDER — ALBUTEROL SULFATE 90 UG/1
INHALANT RESPIRATORY (INHALATION)
Qty: 18 G | Refills: 1 | Status: SHIPPED | OUTPATIENT
Start: 2024-10-21 | End: 2024-10-22 | Stop reason: SDUPTHER

## 2024-10-21 NOTE — TELEPHONE ENCOUNTER
Last office visit (LOV) for chronic condition 09/06/24  Next office visit (NOV) not scheduled yet

## 2024-10-22 RX ORDER — ALBUTEROL SULFATE 90 UG/1
INHALANT RESPIRATORY (INHALATION)
Qty: 18 G | Refills: 3 | Status: SHIPPED | OUTPATIENT
Start: 2024-10-22

## 2024-12-13 ENCOUNTER — OFFICE VISIT (OUTPATIENT)
Dept: NEUROLOGY | Facility: CLINIC | Age: 64
End: 2024-12-13
Payer: MEDICARE

## 2024-12-13 VITALS
HEART RATE: 76 BPM | DIASTOLIC BLOOD PRESSURE: 54 MMHG | BODY MASS INDEX: 21 KG/M2 | OXYGEN SATURATION: 98 % | SYSTOLIC BLOOD PRESSURE: 122 MMHG | WEIGHT: 141.8 LBS | HEIGHT: 69 IN

## 2024-12-13 DIAGNOSIS — R41.3 MEMORY LOSS: Primary | ICD-10-CM

## 2024-12-13 DIAGNOSIS — F10.10 ALCOHOL ABUSE: ICD-10-CM

## 2024-12-13 PROBLEM — C09.9 TONSIL CANCER: Status: ACTIVE | Noted: 2022-04-19

## 2024-12-13 PROCEDURE — 3074F SYST BP LT 130 MM HG: CPT | Performed by: NURSE PRACTITIONER

## 2024-12-13 PROCEDURE — 3078F DIAST BP <80 MM HG: CPT | Performed by: NURSE PRACTITIONER

## 2024-12-13 PROCEDURE — 99214 OFFICE O/P EST MOD 30 MIN: CPT | Performed by: NURSE PRACTITIONER

## 2024-12-13 PROCEDURE — 1159F MED LIST DOCD IN RCRD: CPT | Performed by: NURSE PRACTITIONER

## 2024-12-13 PROCEDURE — 1160F RVW MEDS BY RX/DR IN RCRD: CPT | Performed by: NURSE PRACTITIONER

## 2024-12-13 RX ORDER — OXYCODONE AND ACETAMINOPHEN 5; 325 MG/1; MG/1
1 TABLET ORAL ONCE AS NEEDED
COMMUNITY

## 2024-12-13 RX ORDER — OXYCODONE AND ACETAMINOPHEN 5; 325 MG/1; MG/1
1 TABLET ORAL EVERY 6 HOURS
COMMUNITY
Start: 2024-11-25 | End: 2024-12-13

## 2024-12-13 NOTE — PROGRESS NOTES
Neuro Office Visit      Encounter Date: 2024   Patient Name: Luis Antonio Fairchild  : 1960   MRN: [id number]   PCP: KALEB Bowers  Chief Complaint:    Chief Complaint   Patient presents with    Memory Loss       History of Present Illness: Luis Antonio Fairchild is a 64 y.o. male who is here today in Neurology for  memory loss    History of Present Illness  The patient presents for evaluation of memory loss. He is accompanied by his wife.    He has been experiencing memory issues for the past few years, characterized by frequent forgetfulness such as misplacing items, entering rooms without recalling the purpose, and forgetting recent conversations or plans. Despite these challenges, he manages to perform daily activities independently, including cooking, using a TV remote, microwave, and smartphone for calls and texts. He occasionally drives but has experienced instances of disorientation while driving. He does not report any hallucinations or unusual behaviors. He occasionally experiences choking on food, which is attributed to a history of throat cancer and subsequent surgery 7 years ago. He reports no nightmares or acting out dreams but has a long-standing issue of restless sleep. He does not use a CPAP machine or have a diagnosis of sleep apnea. He reports no hallucinations or unusual behaviors. He has a family history of Alzheimer's disease in his uncle. An MRI was conducted in October due to these memory concerns.    He has been dealing with chronic sciatica for the past 2 years, which has led to significant inactivity and potential depression. He attributes his occasional stumbling to back pain rather than balance issues. He has a scheduled procedure for a pain pump in 2025. He takes Flexeril for his back pain and oxycodone 325 mg as needed every 2 or 3 days for severe pain, prescribed by Dr. [place].    He has a smoking history of 40 to 50 years and consumes 4 to 7 beers nightly, which he  believes helps manage his pain.    SOCIAL HISTORY  The patient is retired from a [employer]. He admits to smoking for 40 to 50 years. He drinks a few beers a night, sometimes four to seven.    FAMILY HISTORY  His uncle had Alzheimer's disease.    MEDICATIONS  Tylenol, ProAir, amlodipine, [place] inhaler, Pulmicort inhaler, calcium, Celebrex, Flexeril, duloxetine, Zetia, Lasix, Protonix, Prevacid, oxycodone.   Memory Loss  MMSE 29/30  Meds:oxycodone  Appetite:fair  Sleep:sleep walking  ADLs:independent  Gait/Falls:none  Language:no slurred speech  Dysphagia:occ due to history of tonsil cancer  Driving:not lost but has driven to wrong destination  Hallucinations:none  Behavior:none  Living situation:w wife  Finances: wife manages      MRI Brain With & Without Contrast (10/03/2024 17:58)-mild T2 flair consistent with aging and HTN, Tob abuse and etoh use      PMH:HTN, HLD, Anderson's GRD, BPH, hyponatremia, anxiety, alcohol abuse, CTS, DDD, chronic back pain, throat cancer,  FH:anxiety, OA,   SH:+TOB 30-40 years, +etoh 7/night.  Subjective      Past Medical History:   Past Medical History:   Diagnosis Date    Alcoholism 1998    Anxiety 2016    Arthritis     Asthma     Cancer 2018    Squamous carcinoma oropharynx    Carpal tunnel syndrome 05/18/2016    Cervical disc disorder     Chronic pain disorder     COPD (chronic obstructive pulmonary disease) 2021    Extremity pain     Fractures 06/14/23    Rt wrist    GERD (gastroesophageal reflux disease) 2014    History of IBS     Hyperlipidemia     Hypertension     Irritable bowel syndrome     Joint pain     Low back pain     Lumbosacral disc disease     Lumbosacral disc disease     Memory loss     Neck pain     Osteoarthritis     Peripheral neuropathy     Pneumonia 2021    Shingles     Sleep apnea     Spinal stenosis        Past Surgical History:   Past Surgical History:   Procedure Laterality Date    COLONOSCOPY      FRACTURE SURGERY      Lt wrist    ORTHOPEDIC SURGERY       Lft shoulder    SHOULDER SURGERY      Onset Date     THROAT SURGERY      WRIST SURGERY      Onset Date:        Family History:   Family History   Problem Relation Age of Onset    Anxiety disorder Mother     Arthritis Mother     Stroke Father             Hypertension Father     Cancer Brother     Anxiety disorder Brother     Cancer Brother                Social History:   Social History     Socioeconomic History    Marital status:    Tobacco Use    Smoking status: Every Day     Current packs/day: 0.50     Average packs/day: 1 pack/day for 50.9 years (49.5 ttl pk-yrs)     Types: Cigarettes     Start date: 1974    Smokeless tobacco: Never   Vaping Use    Vaping status: Never Used   Substance and Sexual Activity    Alcohol use: Yes     Alcohol/week: 35.0 standard drinks of alcohol     Types: 35 Cans of beer per week     Comment: nightly    Drug use: Never    Sexual activity: Not Currently     Partners: Female     Birth control/protection: None     Comment:         Medications:     Current Outpatient Medications:     acetaminophen (TYLENOL) 500 MG tablet, Take 1 tablet by mouth Every 6 (Six) Hours As Needed for Mild Pain., Disp: 120 tablet, Rfl: 1    albuterol sulfate HFA (ProAir HFA) 108 (90 Base) MCG/ACT inhaler, Inhale 1-2 puffs every 4-6 hours PRN, Disp: 18 g, Rfl: 3    amLODIPine (NORVASC) 5 MG tablet, Take 1 tablet by mouth Daily., Disp: 90 tablet, Rfl: 0    Budeson-Glycopyrrol-Formoterol (BREZTRI) 160-9-4.8 MCG/ACT aerosol inhaler, Inhale 2 puffs 2 (Two) Times a Day., Disp: 10.7 g, Rfl: 5    budesonide (PULMICORT) 0.5 MG/2ML nebulizer solution, Take 2 mL by nebulization 2 (Two) Times a Day., Disp: 60 each, Rfl: 11    Calcium Carbonate-Vit D-Min (CALCIUM 1200 PO), Take  by mouth., Disp: , Rfl:     celecoxib (CeleBREX) 200 MG capsule, Take 1 capsule by mouth twice daily, Disp: 180 capsule, Rfl: 0    cyclobenzaprine (FLEXERIL) 10 MG tablet, Take 1 tablet by mouth 2 (Two)  Times a Day As Needed for Muscle Spasms., Disp: 30 tablet, Rfl: 0    DULoxetine (CYMBALTA) 60 MG capsule, Take 1 capsule by mouth Daily. Start one week after one week of duloxetine 30 mg daily., Disp: 180 capsule, Rfl: 0    ezetimibe (ZETIA) 10 MG tablet, Take 1 tablet by mouth Daily., Disp: 30 tablet, Rfl: 0    furosemide (LASIX) 20 MG tablet, Take 1 tablet by mouth Daily., Disp: 90 tablet, Rfl: 1    ipratropium-albuterol (DUO-NEB) 0.5-2.5 mg/3 ml nebulizer, USE 1 AMPULE IN NEBULIZER EVERY 4 HOURS AS NEEDED FOR WHEEZING, Disp: 360 mL, Rfl: 0    oxyCODONE-acetaminophen (PERCOCET) 5-325 MG per tablet, Take 1 tablet by mouth 1 (One) Time As Needed for Severe Pain., Disp: , Rfl:     pantoprazole (PROTONIX) 40 MG EC tablet, Take 1 tablet by mouth 2 (Two) Times a Day., Disp: 180 tablet, Rfl: 0    pravastatin (PRAVACHOL) 40 MG tablet, Take 1 tablet by mouth once daily, Disp: 90 tablet, Rfl: 0    sodium chloride 1 g tablet, Take 1 tablet by mouth 2 (Two) Times a Day., Disp: 180 tablet, Rfl: 1    Allergies:   No Known Allergies    PHQ-9 Total Score:     STEADI Fall Risk Assessment was completed, and patient is at HIGH risk for falls. Assessment completed on:12/13/2024    Objective     Physical Exam:   Physical Exam  Vitals and nursing note reviewed.   Constitutional:       General: He is not in acute distress.     Appearance: He is well-developed.   HENT:      Head: Normocephalic and atraumatic.      Right Ear: External ear normal.      Left Ear: External ear normal.      Nose: Nose normal.   Eyes:      General: No scleral icterus.        Right eye: No discharge.         Left eye: No discharge.      Conjunctiva/sclera: Conjunctivae normal.   Cardiovascular:      Rate and Rhythm: Normal rate.   Pulmonary:      Effort: Pulmonary effort is normal.   Musculoskeletal:         General: No deformity.      Cervical back: Neck supple.   Skin:     General: Skin is warm and dry.      Findings: No rash.   Neurological:      General: No  "focal deficit present.      Mental Status: He is alert and oriented to person, place, and time. Mental status is at baseline.      Cranial Nerves: No cranial nerve deficit.      Sensory: No sensory deficit.      Motor: No weakness or abnormal muscle tone.      Coordination: Coordination normal.      Gait: Gait abnormal.      Deep Tendon Reflexes: Reflexes normal.      Comments: + romberg w truncal sway. Unsteady tandem   Psychiatric:         Mood and Affect: Mood normal.         Behavior: Behavior normal.         Thought Content: Thought content normal.         Judgment: Judgment normal.         Neurological Exam  Mental Status  Alert. Oriented to person, place, and time.    Gait   Abnormal gait.     Physical Exam  Neurologic exam was performed.      Vital Signs:   Vitals:    12/13/24 0950   BP: 122/54   Pulse: 76   SpO2: 98%   Weight: 64.3 kg (141 lb 12.8 oz)   Height: 175.3 cm (69.02\")     Body mass index is 20.93 kg/m².         Assessment / Plan      Assessment/Plan:   Diagnoses and all orders for this visit:    1. Memory loss (Primary)  Comments:  MMSE reassuring    2. Alcohol abuse  Comments:  Recommend  decreased etoh use       Assessment & Plan  1. Memory loss.  His memory test results were satisfactory, scoring 29 out of 30, which does not meet the criteria for dementia. The observed memory lapses, such as forgetting the purpose of entering a room or misplacing items, are consistent with normal aging processes. However, instances of forgetting recent conversations or driving to incorrect locations may be attributed to inattention or distraction, potentially due to chronic pain, medication effects, or alcohol consumption. His memory loss appears to be more related to his chronic pain and lifestyle factors rather than dementia. The MRI results were unremarkable, showing no evidence of cancer spread. He was advised to reduce his alcohol intake due to its potential contribution to nerve damage and balance " issues. Communication strategies were suggested, such as maintaining eye contact during conversations and repeating important information to ensure understanding. The use of a large calendar for scheduling was also recommended.    2. Chronic pain.  He has been experiencing chronic pain, particularly sciatica, for the past two years, which has significantly affected his activity levels and possibly contributed to depression. He is currently taking Flexeril, which can cause brain fog, and oxycodone 325 mg as needed every two to three days for severe pain. He is scheduled for a pain pump procedure in early March 2025. He was advised to continue his current pain management regimen and to follow up with his pain specialist as needed.    3. Alcohol use.  He reported drinking four to seven beers nightly, which is excessive and can contribute to memory loss and neuropathy. He was advised to cut back on his alcohol consumption to mitigate these risks.    PROCEDURE  The patient underwent throat cancer surgery 7 years ago.        Patient Education:       Reviewed medications, potential side effects and signs and symptoms to report. Discussed risk versus benefits of treatment plan with patient and/or family-including medications, labs and radiology that may be ordered. Addressed questions and concerns during visit. Patient and/or family verbalized understanding and agree with plan. Instructed to call the office with any questions and report to ER with any life-threatening symptoms.     Follow Up:   Return if symptoms worsen or fail to improve.    During this visit the following were done:  Labs Reviewed [x]    Labs Ordered [x]    Radiology Reports Reviewed []    Radiology Ordered []    PCP Records Reviewed []    Referring Provider Records Reviewed []    ER Records Reviewed []    Hospital Records Reviewed []    History Obtained From Family []    Radiology Images Reviewed [x]    Other Reviewed [x]    Records Requested []       Patient or patient representative verbalized consent for the use of Ambient Listening during the visit with  Juany Garcia DNP, APRN for chart documentation. 12/13/2024  10:12 EST      Juany Garcia DNP, APRN

## 2024-12-13 NOTE — LETTER
2024     KALEB Barrios  [address]  [address]  [facility] [address]    Patient: Luis Antonio Fairchild   YOB: 1960   Date of Visit: 2024     Dear KALEB Barrios:       Thank you for referring Luis Antonio Fairchild to me for evaluation. Below are the relevant portions of my assessment and plan of care.    If you have questions, please do not hesitate to call me. I look forward to following Luis Antonio along with you.         Sincerely,        Juany Garcia DNP, APRN        CC: No Recipients    Juany Garcia DNP, APRN  [phone]  Signed     Neuro Office Visit      Encounter Date: 2024   Patient Name: Luis Antonio Fairchild  : 1960   MRN: [id number]   PCP: KALEB Bowers  Chief Complaint:    Chief Complaint   Patient presents with   • Memory Loss       History of Present Illness: Luis Antonio Fairchild is a 64 y.o. male who is here today in Neurology for  memory loss    History of Present Illness  The patient presents for evaluation of memory loss. He is accompanied by his wife.    He has been experiencing memory issues for the past few years, characterized by frequent forgetfulness such as misplacing items, entering rooms without recalling the purpose, and forgetting recent conversations or plans. Despite these challenges, he manages to perform daily activities independently, including cooking, using a TV remote, microwave, and smartphone for calls and texts. He occasionally drives but has experienced instances of disorientation while driving. He does not report any hallucinations or unusual behaviors. He occasionally experiences choking on food, which is attributed to a history of throat cancer and subsequent surgery 7 years ago. He reports no nightmares or acting out dreams but has a long-standing issue of restless sleep. He does not use a CPAP machine or have a diagnosis of sleep apnea. He reports no hallucinations or unusual behaviors. He has a family  history of Alzheimer's disease in his uncle. An MRI was conducted in October due to these memory concerns.    He has been dealing with chronic sciatica for the past 2 years, which has led to significant inactivity and potential depression. He attributes his occasional stumbling to back pain rather than balance issues. He has a scheduled procedure for a pain pump in March 2025. He takes Flexeril for his back pain and oxycodone 325 mg as needed every 2 or 3 days for severe pain, prescribed by Dr. Colorado.    He has a smoking history of 40 to 50 years and consumes 4 to 7 beers nightly, which he believes helps manage his pain.    SOCIAL HISTORY  The patient is retired from a [employer]. He admits to smoking for 40 to 50 years. He drinks a few beers a night, sometimes four to seven.    FAMILY HISTORY  His uncle had Alzheimer's disease.    MEDICATIONS  Tylenol, ProAir, amlodipine, [place] inhaler, Pulmicort inhaler, calcium, Celebrex, Flexeril, duloxetine, Zetia, Lasix, Protonix, Prevacid, oxycodone.   Memory Loss  MMSE 29/30  Meds:oxycodone  Appetite:fair  Sleep:sleep walking  ADLs:independent  Gait/Falls:none  Language:no slurred speech  Dysphagia:occ due to history of tonsil cancer  Driving:not lost but has driven to wrong destination  Hallucinations:none  Behavior:none  Living situation:w wife  Finances: wife manages      MRI Brain With & Without Contrast (10/03/2024 17:58)-mild T2 flair consistent with aging and HTN, Tob abuse and etoh use      PMH:HTN, HLD, Anderson's GRD, BPH, hyponatremia, anxiety, alcohol abuse, CTS, DDD, chronic back pain, throat cancer,  FH:anxiety, OA,   SH:+TOB 30-40 years, +etoh 7/night.  Subjective      Past Medical History:   Past Medical History:   Diagnosis Date   • Alcoholism 1998   • Anxiety 2016   • Arthritis    • Asthma    • Cancer 2018    Squamous carcinoma oropharynx   • Carpal tunnel syndrome 05/18/2016   • Cervical disc disorder    • Chronic pain disorder    • COPD (chronic  obstructive pulmonary disease)    • Extremity pain    • Fractures 23    Rt wrist   • GERD (gastroesophageal reflux disease)    • History of IBS    • Hyperlipidemia    • Hypertension    • Irritable bowel syndrome    • Joint pain    • Low back pain    • Lumbosacral disc disease    • Lumbosacral disc disease    • Memory loss    • Neck pain    • Osteoarthritis    • Peripheral neuropathy    • Pneumonia    • Shingles    • Sleep apnea    • Spinal stenosis        Past Surgical History:   Past Surgical History:   Procedure Laterality Date   • COLONOSCOPY     • FRACTURE SURGERY      Lt wrist   • ORTHOPEDIC SURGERY      Lft shoulder   • SHOULDER SURGERY      Onset Date    • THROAT SURGERY     • WRIST SURGERY      Onset Date:        Family History:   Family History   Problem Relation Age of Onset   • Anxiety disorder Mother    • Arthritis Mother    • Stroke Father            • Hypertension Father    • Cancer Brother    • Anxiety disorder Brother    • Cancer Brother                Social History:   Social History     Socioeconomic History   • Marital status:    Tobacco Use   • Smoking status: Every Day     Current packs/day: 0.50     Average packs/day: 1 pack/day for 50.9 years (49.5 ttl pk-yrs)     Types: Cigarettes     Start date: 1974   • Smokeless tobacco: Never   Vaping Use   • Vaping status: Never Used   Substance and Sexual Activity   • Alcohol use: Yes     Alcohol/week: 35.0 standard drinks of alcohol     Types: 35 Cans of beer per week     Comment: nightly   • Drug use: Never   • Sexual activity: Not Currently     Partners: Female     Birth control/protection: None     Comment:         Medications:     Current Outpatient Medications:   •  acetaminophen (TYLENOL) 500 MG tablet, Take 1 tablet by mouth Every 6 (Six) Hours As Needed for Mild Pain., Disp: 120 tablet, Rfl: 1  •  albuterol sulfate HFA (ProAir HFA) 108 (90 Base) MCG/ACT inhaler, Inhale 1-2 puffs every  4-6 hours PRN, Disp: 18 g, Rfl: 3  •  amLODIPine (NORVASC) 5 MG tablet, Take 1 tablet by mouth Daily., Disp: 90 tablet, Rfl: 0  •  Budeson-Glycopyrrol-Formoterol (BREZTRI) 160-9-4.8 MCG/ACT aerosol inhaler, Inhale 2 puffs 2 (Two) Times a Day., Disp: 10.7 g, Rfl: 5  •  budesonide (PULMICORT) 0.5 MG/2ML nebulizer solution, Take 2 mL by nebulization 2 (Two) Times a Day., Disp: 60 each, Rfl: 11  •  Calcium Carbonate-Vit D-Min (CALCIUM 1200 PO), Take  by mouth., Disp: , Rfl:   •  celecoxib (CeleBREX) 200 MG capsule, Take 1 capsule by mouth twice daily, Disp: 180 capsule, Rfl: 0  •  cyclobenzaprine (FLEXERIL) 10 MG tablet, Take 1 tablet by mouth 2 (Two) Times a Day As Needed for Muscle Spasms., Disp: 30 tablet, Rfl: 0  •  DULoxetine (CYMBALTA) 60 MG capsule, Take 1 capsule by mouth Daily. Start one week after one week of duloxetine 30 mg daily., Disp: 180 capsule, Rfl: 0  •  ezetimibe (ZETIA) 10 MG tablet, Take 1 tablet by mouth Daily., Disp: 30 tablet, Rfl: 0  •  furosemide (LASIX) 20 MG tablet, Take 1 tablet by mouth Daily., Disp: 90 tablet, Rfl: 1  •  ipratropium-albuterol (DUO-NEB) 0.5-2.5 mg/3 ml nebulizer, USE 1 AMPULE IN NEBULIZER EVERY 4 HOURS AS NEEDED FOR WHEEZING, Disp: 360 mL, Rfl: 0  •  oxyCODONE-acetaminophen (PERCOCET) 5-325 MG per tablet, Take 1 tablet by mouth 1 (One) Time As Needed for Severe Pain., Disp: , Rfl:   •  pantoprazole (PROTONIX) 40 MG EC tablet, Take 1 tablet by mouth 2 (Two) Times a Day., Disp: 180 tablet, Rfl: 0  •  pravastatin (PRAVACHOL) 40 MG tablet, Take 1 tablet by mouth once daily, Disp: 90 tablet, Rfl: 0  •  sodium chloride 1 g tablet, Take 1 tablet by mouth 2 (Two) Times a Day., Disp: 180 tablet, Rfl: 1    Allergies:   No Known Allergies    PHQ-9 Total Score:     STEADI Fall Risk Assessment was completed, and patient is at HIGH risk for falls. Assessment completed on:12/13/2024    Objective     Physical Exam:   Physical Exam  Vitals and nursing note reviewed.   Constitutional:        "General: He is not in acute distress.     Appearance: He is well-developed.   HENT:      Head: Normocephalic and atraumatic.      Right Ear: External ear normal.      Left Ear: External ear normal.      Nose: Nose normal.   Eyes:      General: No scleral icterus.        Right eye: No discharge.         Left eye: No discharge.      Conjunctiva/sclera: Conjunctivae normal.   Cardiovascular:      Rate and Rhythm: Normal rate.   Pulmonary:      Effort: Pulmonary effort is normal.   Musculoskeletal:         General: No deformity.      Cervical back: Neck supple.   Skin:     General: Skin is warm and dry.      Findings: No rash.   Neurological:      General: No focal deficit present.      Mental Status: He is alert and oriented to person, place, and time. Mental status is at baseline.      Cranial Nerves: No cranial nerve deficit.      Sensory: No sensory deficit.      Motor: No weakness or abnormal muscle tone.      Coordination: Coordination normal.      Gait: Gait abnormal.      Deep Tendon Reflexes: Reflexes normal.      Comments: + romberg w truncal sway. Unsteady tandem   Psychiatric:         Mood and Affect: Mood normal.         Behavior: Behavior normal.         Thought Content: Thought content normal.         Judgment: Judgment normal.         Neurological Exam  Mental Status  Alert. Oriented to person, place, and time.    Gait   Abnormal gait.     Physical Exam  Neurologic exam was performed.      Vital Signs:   Vitals:    12/13/24 0950   BP: 122/54   Pulse: 76   SpO2: 98%   Weight: 64.3 kg (141 lb 12.8 oz)   Height: 175.3 cm (69.02\")     Body mass index is 20.93 kg/m².         Assessment / Plan      Assessment/Plan:   Diagnoses and all orders for this visit:    1. Memory loss (Primary)  Comments:  MMSE reassuring    2. Alcohol abuse  Comments:  Recommend  decreased etoh use       Assessment & Plan  1. Memory loss.  His memory test results were satisfactory, scoring 29 out of 30, which does not meet the criteria " for dementia. The observed memory lapses, such as forgetting the purpose of entering a room or misplacing items, are consistent with normal aging processes. However, instances of forgetting recent conversations or driving to incorrect locations may be attributed to inattention or distraction, potentially due to chronic pain, medication effects, or alcohol consumption. His memory loss appears to be more related to his chronic pain and lifestyle factors rather than dementia. The MRI results were unremarkable, showing no evidence of cancer spread. He was advised to reduce his alcohol intake due to its potential contribution to nerve damage and balance issues. Communication strategies were suggested, such as maintaining eye contact during conversations and repeating important information to ensure understanding. The use of a large calendar for scheduling was also recommended.    2. Chronic pain.  He has been experiencing chronic pain, particularly sciatica, for the past two years, which has significantly affected his activity levels and possibly contributed to depression. He is currently taking Flexeril, which can cause brain fog, and oxycodone 325 mg as needed every two to three days for severe pain. He is scheduled for a pain pump procedure in early March 2025. He was advised to continue his current pain management regimen and to follow up with his pain specialist as needed.    3. Alcohol use.  He reported drinking four to seven beers nightly, which is excessive and can contribute to memory loss and neuropathy. He was advised to cut back on his alcohol consumption to mitigate these risks.    PROCEDURE  The patient underwent throat cancer surgery 7 years ago.        Patient Education:       Reviewed medications, potential side effects and signs and symptoms to report. Discussed risk versus benefits of treatment plan with patient and/or family-including medications, labs and radiology that may be ordered. Addressed  questions and concerns during visit. Patient and/or family verbalized understanding and agree with plan. Instructed to call the office with any questions and report to ER with any life-threatening symptoms.     Follow Up:   Return if symptoms worsen or fail to improve.    During this visit the following were done:  Labs Reviewed [x]    Labs Ordered [x]    Radiology Reports Reviewed []    Radiology Ordered []    PCP Records Reviewed []    Referring Provider Records Reviewed []    ER Records Reviewed []    Hospital Records Reviewed []    History Obtained From Family []    Radiology Images Reviewed [x]    Other Reviewed [x]    Records Requested []      Patient or patient representative verbalized consent for the use of Ambient Listening during the visit with  Juany Garcia DNP, APRN for chart documentation. 12/13/2024  10:12 EST      Juany Garcia DNP, APRN

## 2024-12-19 ENCOUNTER — PATIENT ROUNDING (BHMG ONLY) (OUTPATIENT)
Dept: NEUROLOGY | Facility: CLINIC | Age: 64
End: 2024-12-19
Payer: MEDICARE

## 2024-12-26 ENCOUNTER — OFFICE VISIT (OUTPATIENT)
Dept: INTERNAL MEDICINE | Facility: CLINIC | Age: 64
End: 2024-12-26
Payer: MEDICARE

## 2024-12-26 VITALS
WEIGHT: 136 LBS | HEART RATE: 124 BPM | OXYGEN SATURATION: 89 % | HEIGHT: 69 IN | BODY MASS INDEX: 20.14 KG/M2 | RESPIRATION RATE: 32 BRPM | TEMPERATURE: 97.7 F | SYSTOLIC BLOOD PRESSURE: 172 MMHG | DIASTOLIC BLOOD PRESSURE: 98 MMHG

## 2024-12-26 DIAGNOSIS — E87.1 HYPONATREMIA: ICD-10-CM

## 2024-12-26 DIAGNOSIS — R09.02 HYPOXIA: Primary | ICD-10-CM

## 2024-12-26 DIAGNOSIS — R05.9 COUGH, UNSPECIFIED TYPE: ICD-10-CM

## 2024-12-26 LAB
EXPIRATION DATE: NORMAL
FLUAV AG UPPER RESP QL IA.RAPID: NOT DETECTED
FLUBV AG UPPER RESP QL IA.RAPID: NOT DETECTED
INTERNAL CONTROL: NORMAL
Lab: NORMAL
SARS-COV-2 AG UPPER RESP QL IA.RAPID: NOT DETECTED

## 2024-12-26 PROCEDURE — 3077F SYST BP >= 140 MM HG: CPT | Performed by: NURSE PRACTITIONER

## 2024-12-26 PROCEDURE — 1159F MED LIST DOCD IN RCRD: CPT | Performed by: NURSE PRACTITIONER

## 2024-12-26 PROCEDURE — 1125F AMNT PAIN NOTED PAIN PRSNT: CPT | Performed by: NURSE PRACTITIONER

## 2024-12-26 PROCEDURE — 87428 SARSCOV & INF VIR A&B AG IA: CPT | Performed by: NURSE PRACTITIONER

## 2024-12-26 PROCEDURE — 1160F RVW MEDS BY RX/DR IN RCRD: CPT | Performed by: NURSE PRACTITIONER

## 2024-12-26 PROCEDURE — 3080F DIAST BP >= 90 MM HG: CPT | Performed by: NURSE PRACTITIONER

## 2024-12-26 PROCEDURE — 99213 OFFICE O/P EST LOW 20 MIN: CPT | Performed by: NURSE PRACTITIONER

## 2024-12-26 NOTE — PROGRESS NOTES
Patient Name: Luis Antonio Fairchild  : 1960   MRN: [id number]     Chief Complaint:    Chief Complaint   Patient presents with    Cough    Chills    Generalized Body Aches    Vomiting    Shortness of Breath       History of Present Illness: Luis Antonio Fairchild is a 64 y.o. male.  History of Present Illness  The patient presents for evaluation of chills, cough, and hyponatremia.    He began experiencing symptoms yesterday, which have significantly worsened today. He reports severe chills, body aches, and a persistent cough. He has not consumed any fluids today and has experienced several episodes of vomiting. His last intake of alcohol was yesterday. He has been feeling weak since yesterday but does not report any confusion beyond his usual state. He does not have supplemental oxygen at home and has not used his breathing machine for the past 2 days. He has been producing sputum with his cough. He has not used albuterol today.    SOCIAL HISTORY  He had alcohol yesterday.    MEDICATIONS  Current: albuterol       Past Medical History:   Diagnosis Date    Alcoholism     Anxiety 2016    Arthritis     Asthma     Cancer 2018    Squamous carcinoma oropharynx    Carpal tunnel syndrome 2016    Cervical disc disorder     Chronic pain disorder     COPD (chronic obstructive pulmonary disease)     Extremity pain     Fractures 23    Rt wrist    GERD (gastroesophageal reflux disease)     History of IBS     Hyperlipidemia     Hypertension     Irritable bowel syndrome     Joint pain     Low back pain     Lumbosacral disc disease     Lumbosacral disc disease     Memory loss     Neck pain     Osteoarthritis     Peripheral neuropathy     Pneumonia     Shingles     Sleep apnea     Spinal stenosis        Subjective     Review of System: Review of Systems     Medications:     Current Outpatient Medications:     acetaminophen (TYLENOL) 500 MG tablet, Take 1 tablet by mouth Every 6 (Six) Hours As Needed for Mild Pain.,  Disp: 120 tablet, Rfl: 1    albuterol sulfate HFA (ProAir HFA) 108 (90 Base) MCG/ACT inhaler, Inhale 1-2 puffs every 4-6 hours PRN, Disp: 18 g, Rfl: 3    amLODIPine (NORVASC) 5 MG tablet, Take 1 tablet by mouth Daily., Disp: 90 tablet, Rfl: 0    Budeson-Glycopyrrol-Formoterol (BREZTRI) 160-9-4.8 MCG/ACT aerosol inhaler, Inhale 2 puffs 2 (Two) Times a Day., Disp: 10.7 g, Rfl: 5    budesonide (PULMICORT) 0.5 MG/2ML nebulizer solution, Take 2 mL by nebulization 2 (Two) Times a Day., Disp: 60 each, Rfl: 11    Calcium Carbonate-Vit D-Min (CALCIUM 1200 PO), Take  by mouth., Disp: , Rfl:     celecoxib (CeleBREX) 200 MG capsule, Take 1 capsule by mouth twice daily, Disp: 180 capsule, Rfl: 0    cyclobenzaprine (FLEXERIL) 10 MG tablet, Take 1 tablet by mouth 2 (Two) Times a Day As Needed for Muscle Spasms., Disp: 30 tablet, Rfl: 0    DULoxetine (CYMBALTA) 60 MG capsule, Take 1 capsule by mouth Daily. Start one week after one week of duloxetine 30 mg daily., Disp: 180 capsule, Rfl: 0    ezetimibe (ZETIA) 10 MG tablet, Take 1 tablet by mouth Daily., Disp: 30 tablet, Rfl: 0    furosemide (LASIX) 20 MG tablet, Take 1 tablet by mouth Daily., Disp: 90 tablet, Rfl: 1    ipratropium-albuterol (DUO-NEB) 0.5-2.5 mg/3 ml nebulizer, USE 1 AMPULE IN NEBULIZER EVERY 4 HOURS AS NEEDED FOR WHEEZING, Disp: 360 mL, Rfl: 0    oxyCODONE-acetaminophen (PERCOCET) 5-325 MG per tablet, Take 1 tablet by mouth 1 (One) Time As Needed for Severe Pain., Disp: , Rfl:     pantoprazole (PROTONIX) 40 MG EC tablet, Take 1 tablet by mouth 2 (Two) Times a Day., Disp: 180 tablet, Rfl: 0    pravastatin (PRAVACHOL) 40 MG tablet, Take 1 tablet by mouth once daily, Disp: 90 tablet, Rfl: 0    sodium chloride 1 g tablet, Take 1 tablet by mouth 2 (Two) Times a Day., Disp: 180 tablet, Rfl: 1    Allergies:   No Known Allergies    Objective     Physical Exam:   Vital Signs:   Vitals:    12/26/24 1451   BP: 172/98   BP Location: Right arm   Patient Position: Sitting   Cuff  "Size: Adult   Pulse: (!) 124   Resp: (!) 32   Temp: 97.7 °F (36.5 °C)   TempSrc: Infrared   SpO2: (!) 89%   Weight: 61.7 kg (136 lb)   Height: 175.3 cm (69\")   PainSc: 10-Worst pain ever     Body mass index is 20.08 kg/m². BMI is within normal parameters. No other follow-up for BMI required.      Physical Exam  Constitutional:       Appearance: He is ill-appearing.   HENT:      Nose: Congestion and rhinorrhea present.   Cardiovascular:      Rate and Rhythm: Regular rhythm. Tachycardia present.      Heart sounds: Normal heart sounds.   Pulmonary:      Comments: Diminished breath sounds, tachypneic  Abdominal:      Palpations: Abdomen is soft.      Tenderness: There is no abdominal tenderness.   Lymphadenopathy:      Cervical: No cervical adenopathy.     Physical Exam      Vital Signs  Oxygen saturation is at 90 percent. Patient is tachycardic. Blood pressure is elevated.       Results  Laboratory Studies  COVID-19 and influenza tests were negative.     Assessment / Plan      Assessment/Plan:   Diagnoses and all orders for this visit:    1. Hypoxia (Primary)    2. Cough, unspecified type  -     POCT SARS-CoV-2 + Flu Antigen SHAUN    3. Hyponatremia       Assessment & Plan  1. Chills.  He reports experiencing chills since yesterday, accompanied by body aches and vomiting. His oxygen saturation is borderline at 88- 90%, and he is tachycardic. He has not consumed any fluids today, which may contribute to his symptoms. Given his respiratory status and history of hyponatremia, he is advised to go to the ER for further evaluation.    2. Cough.  He has a persistent cough that has worsened, with associated decreased air movement in the lungs. COVID-19 and influenza tests were negative. An x-ray is recommended to assess his lung condition. He is also advised to continue using his breathing machine and albuterol as needed.    3. Hyponatremia.  He has a history of hyponatremia, and his sodium levels have not been checked recently. " His current symptoms, including weakness and confusion, may be related to this condition. He is advised to go to the ER for a comprehensive evaluation, including blood work to assess his sodium levels.     Report called to Roberth MANUEL.   Patient called back and said that he was closer to [facility] and that he may go there. .  I recommended that he go to [facility] and they are aware he is on the way.    Follow Up:   Per ED  Patient or patient representative verbalized consent for the use of Ambient Listening during the visit with  KALEB Barrios for chart documentation. 12/26/2024  16:25 EST    KALEB Barrios  [facility]

## 2024-12-27 ENCOUNTER — OFFICE VISIT (OUTPATIENT)
Dept: PULMONOLOGY | Facility: CLINIC | Age: 64
End: 2024-12-27
Payer: MEDICARE

## 2024-12-27 VITALS
TEMPERATURE: 98 F | BODY MASS INDEX: 20.29 KG/M2 | SYSTOLIC BLOOD PRESSURE: 120 MMHG | DIASTOLIC BLOOD PRESSURE: 70 MMHG | WEIGHT: 137 LBS | HEART RATE: 82 BPM | HEIGHT: 69 IN | OXYGEN SATURATION: 93 %

## 2024-12-27 DIAGNOSIS — F17.200 TOBACCO USE DISORDER: ICD-10-CM

## 2024-12-27 DIAGNOSIS — J42 CHRONIC BRONCHITIS, UNSPECIFIED CHRONIC BRONCHITIS TYPE: ICD-10-CM

## 2024-12-27 DIAGNOSIS — J44.9 CHRONIC OBSTRUCTIVE PULMONARY DISEASE, UNSPECIFIED COPD TYPE: Primary | ICD-10-CM

## 2024-12-27 RX ORDER — PREDNISONE 10 MG/1
TABLET ORAL
Qty: 31 TABLET | Refills: 0 | Status: SHIPPED | OUTPATIENT
Start: 2024-12-27

## 2024-12-27 NOTE — PROGRESS NOTES
"[facility] Pulmonary Follow up      Chief Complaint  Cough and Shortness of Breath    Subjective          Luis Antonio Fairchild presents to [facility] for routine follow-up on his COPD with chronic bronchitis.    He continues to use his DuoNebs a couple times a day.  He had been on budesonide nebs in the past but not currently.  He has taken his Breztri twice daily.    He and his wife developed a bit of worsening cough and congestion, Mr. Veronica ended up in the emergency department at [facility] yesterday due to worsening shortness of breath and his oxygen levels being below 90.  He said there he had a CT scan which was negative.  They gave him a dose of IV antibiotics and steroids.  He did not get a steroid taper or continued antibiotics at discharge.  They did recommend admission due to his oxygen levels.    He does continue have quite a bit of cough with dark green to almost brown-colored sputum.  He has had some chills but no fevers.  His oxygen saturations this morning were 89 to 91%.  He does not use oxygen at home.      Objective     Vital Signs:   /70   Pulse 82   Temp 98 °F (36.7 °C)   Ht 175.3 cm (69.02\")   Wt 62.1 kg (137 lb)   SpO2 93% Comment: room air at rest  BMI 20.22 kg/m²       Immunization History   Administered Date(s) Administered    Pneumococcal Conjugate 20-Valent (PCV20) 03/31/2023    Pneumococcal Polysaccharide (PPSV23) 06/26/2019    Shingrix 04/27/2022    Tdap 06/26/2019       Physical Exam  Vitals reviewed.   Constitutional:       General: He is not in acute distress.     Appearance: He is well-developed.   HENT:      Head: Normocephalic and atraumatic.   Eyes:      Pupils: Pupils are equal, round, and reactive to light.   Cardiovascular:      Rate and Rhythm: Normal rate and regular rhythm.      Heart sounds: No murmur heard.  Pulmonary:      Effort: Pulmonary effort is normal. No respiratory distress.      Breath sounds: Wheezing and " rhonchi present. No rales.   Abdominal:      General: Bowel sounds are normal. There is no distension.      Palpations: Abdomen is soft.   Musculoskeletal:         General: Normal range of motion.      Cervical back: Normal range of motion and neck supple.   Skin:     General: Skin is warm and dry.      Findings: No erythema.   Neurological:      Mental Status: He is alert and oriented to person, place, and time.   Psychiatric:         Behavior: Behavior normal.          Result Review :                           Assessment and Plan    Problem List Items Addressed This Visit          Pulmonary and Pneumonias    COLD (chronic obstructive lung disease) - Primary    Relevant Medications    predniSONE (DELTASONE) 10 MG tablet    Chronic bronchitis       Tobacco    Tobacco use disorder       Mr. Reed is having exacerbation of his COPD and bronchitis with worsening shortness of breath and cough likely due to exposure to a viral infection.  He did receive a dose of IV antibiotics and steroids in the emerged department at [facility] last night, go ahead and give him a prednisone taper and a course of Augmentin.    Continue on his DuoNebs 2-3 times a day to help with secretion clearance.  We also discussed he could start on Mucinex to help with the cough and secretions.    Continue on his Breztri twice daily.    His low-dose screening CT scan has been ordered by his primary care, and is due next year.  He does continue to smoke.    He defers influenza and pneumonia vaccines.    Routine follow-up in 6 months, sooner if he has any continued acute issues or does not improve.  We also discussed going to the emergency department again over the weekend if his hypoxemia worsens.      Follow Up     Return in about 6 months (around 6/27/2025).  Patient was given instructions and counseling regarding his condition or for health maintenance advice. Please see specific information pulled into the AVS if appropriate.       Moderate level of  Medical Decision Making complexity based on 2 or more chronic stable illnesses and prescription drug management.    Abby Frank APRN, ACNP  [facility]  Electronically signed

## 2025-02-20 ENCOUNTER — OFFICE VISIT (OUTPATIENT)
Dept: INTERNAL MEDICINE | Facility: CLINIC | Age: 65
End: 2025-02-20
Payer: MEDICARE

## 2025-02-20 ENCOUNTER — HOSPITAL ENCOUNTER (OUTPATIENT)
Dept: GENERAL RADIOLOGY | Facility: HOSPITAL | Age: 65
Discharge: HOME | End: 2025-02-20
Admitting: NURSE PRACTITIONER
Payer: MEDICARE

## 2025-02-20 VITALS
TEMPERATURE: 97.1 F | OXYGEN SATURATION: 93 % | HEART RATE: 98 BPM | HEIGHT: 69 IN | RESPIRATION RATE: 18 BRPM | WEIGHT: 137.4 LBS | BODY MASS INDEX: 20.35 KG/M2 | DIASTOLIC BLOOD PRESSURE: 80 MMHG | SYSTOLIC BLOOD PRESSURE: 136 MMHG

## 2025-02-20 DIAGNOSIS — I10 PRIMARY HYPERTENSION: ICD-10-CM

## 2025-02-20 DIAGNOSIS — J44.1 CHRONIC OBSTRUCTIVE PULMONARY DISEASE WITH ACUTE EXACERBATION: ICD-10-CM

## 2025-02-20 DIAGNOSIS — R09.02 HYPOXIA: Primary | ICD-10-CM

## 2025-02-20 DIAGNOSIS — R09.02 HYPOXIA: ICD-10-CM

## 2025-02-20 DIAGNOSIS — J45.51 SEVERE PERSISTENT ASTHMA WITH ACUTE EXACERBATION: ICD-10-CM

## 2025-02-20 DIAGNOSIS — I10 PRIMARY HYPERTENSION: Chronic | ICD-10-CM

## 2025-02-20 LAB
BASOPHILS # BLD AUTO: 0.05 10*3/MM3 (ref 0–0.2)
BASOPHILS NFR BLD AUTO: 0.8 % (ref 0–1.5)
DEPRECATED RDW RBC AUTO: 45.1 FL (ref 37–54)
EOSINOPHIL # BLD AUTO: 0.16 10*3/MM3 (ref 0–0.4)
EOSINOPHIL NFR BLD AUTO: 2.4 % (ref 0.3–6.2)
ERYTHROCYTE [DISTWIDTH] IN BLOOD BY AUTOMATED COUNT: 13 % (ref 12.3–15.4)
EXPIRATION DATE: NORMAL
FLUAV AG UPPER RESP QL IA.RAPID: NOT DETECTED
FLUBV AG UPPER RESP QL IA.RAPID: NOT DETECTED
HCT VFR BLD AUTO: 43.2 % (ref 37.5–51)
HGB BLD-MCNC: 15.5 G/DL (ref 13–17.7)
IMM GRANULOCYTES # BLD AUTO: 0.03 10*3/MM3 (ref 0–0.05)
IMM GRANULOCYTES NFR BLD AUTO: 0.5 % (ref 0–0.5)
INTERNAL CONTROL: NORMAL
LYMPHOCYTES # BLD AUTO: 1.82 10*3/MM3 (ref 0.7–3.1)
LYMPHOCYTES NFR BLD AUTO: 27.5 % (ref 19.6–45.3)
Lab: NORMAL
MCH RBC QN AUTO: 34.3 PG (ref 26.6–33)
MCHC RBC AUTO-ENTMCNC: 35.9 G/DL (ref 31.5–35.7)
MCV RBC AUTO: 95.6 FL (ref 79–97)
MONOCYTES # BLD AUTO: 0.76 10*3/MM3 (ref 0.1–0.9)
MONOCYTES NFR BLD AUTO: 11.5 % (ref 5–12)
NEUTROPHILS NFR BLD AUTO: 3.79 10*3/MM3 (ref 1.7–7)
NEUTROPHILS NFR BLD AUTO: 57.3 % (ref 42.7–76)
NRBC BLD AUTO-RTO: 0 /100 WBC (ref 0–0.2)
PLATELET # BLD AUTO: 511 10*3/MM3 (ref 140–450)
PMV BLD AUTO: 8.3 FL (ref 6–12)
RBC # BLD AUTO: 4.52 10*6/MM3 (ref 4.14–5.8)
SARS-COV-2 AG UPPER RESP QL IA.RAPID: NOT DETECTED
WBC NRBC COR # BLD AUTO: 6.61 10*3/MM3 (ref 3.4–10.8)

## 2025-02-20 PROCEDURE — 85025 COMPLETE CBC W/AUTO DIFF WBC: CPT | Performed by: NURSE PRACTITIONER

## 2025-02-20 PROCEDURE — 3079F DIAST BP 80-89 MM HG: CPT | Performed by: NURSE PRACTITIONER

## 2025-02-20 PROCEDURE — 87428 SARSCOV & INF VIR A&B AG IA: CPT | Performed by: NURSE PRACTITIONER

## 2025-02-20 PROCEDURE — 84443 ASSAY THYROID STIM HORMONE: CPT | Performed by: NURSE PRACTITIONER

## 2025-02-20 PROCEDURE — 1125F AMNT PAIN NOTED PAIN PRSNT: CPT | Performed by: NURSE PRACTITIONER

## 2025-02-20 PROCEDURE — 71046 X-RAY EXAM CHEST 2 VIEWS: CPT

## 2025-02-20 PROCEDURE — 80053 COMPREHEN METABOLIC PANEL: CPT | Performed by: NURSE PRACTITIONER

## 2025-02-20 PROCEDURE — 36415 COLL VENOUS BLD VENIPUNCTURE: CPT | Performed by: NURSE PRACTITIONER

## 2025-02-20 PROCEDURE — 99214 OFFICE O/P EST MOD 30 MIN: CPT | Performed by: NURSE PRACTITIONER

## 2025-02-20 PROCEDURE — 3075F SYST BP GE 130 - 139MM HG: CPT | Performed by: NURSE PRACTITIONER

## 2025-02-20 RX ORDER — PREDNISONE 10 MG/1
TABLET ORAL
Qty: 30 TABLET | Refills: 0 | Status: SHIPPED | OUTPATIENT
Start: 2025-02-20

## 2025-02-20 RX ORDER — DOXYCYCLINE 100 MG/1
100 CAPSULE ORAL 2 TIMES DAILY
Qty: 20 CAPSULE | Refills: 0 | Status: SHIPPED | OUTPATIENT
Start: 2025-02-20

## 2025-02-20 NOTE — PROGRESS NOTES
Patient Name: Luis Antonio Fairchild  : 1960   MRN: [id number]     Chief Complaint:    Chief Complaint   Patient presents with    Shortness of Breath       History of Present Illness: Luis Antonio Fairchild is a 64 y.o. male.  History of Present Illness  The patient is a 64-year-old male who presents for evaluation of COPD. He is accompanied by her stepdaughter.    COPD  - Experiencing intermittent episodes of dyspnea, which have escalated in severity over the past few weeks, particularly during cold weather.  - Reports no febrile symptoms or expectoration but acknowledges the presence of wheezing.  - Suspects a potential cold infection but has not been exposed to influenza or COVID-19.  - Reports no sinus pain, pressure, sore throat, otalgia, nausea, vomiting, or diarrhea.  - Has not consulted her pulmonologist recently.  - Reports no chest pain, syncope, or falls.  - Admits to smoking and consuming 2 to 3 alcoholic beverages nightly.  - Symptoms are exacerbated by outdoor exposure and prolonged indoor ambulation.  - Current medication regimen includes Breztri, administered as 2 doses in the morning and 2 at night, and albuterol.  - Has not been prescribed any recent courses of steroids or antibiotics.  - Had a flare-up and  recommended hospitalization in December; left AMA .    Supplemental Information: None.    SOCIAL HISTORY  The patient admits to smoking. The patient drinks maybe 2 to 3 drinks at night.    MEDICATIONS  Breztri, albuterol         Subjective     Review of System: Review of Systems     Medications:     Current Outpatient Medications:     acetaminophen (TYLENOL) 500 MG tablet, Take 1 tablet by mouth Every 6 (Six) Hours As Needed for Mild Pain., Disp: 120 tablet, Rfl: 1    albuterol sulfate HFA (ProAir HFA) 108 (90 Base) MCG/ACT inhaler, Inhale 1-2 puffs every 4-6 hours PRN, Disp: 18 g, Rfl: 3    amLODIPine (NORVASC) 5 MG tablet, Take 1 tablet by mouth Daily., Disp: 90 tablet, Rfl: 0     Budeson-Glycopyrrol-Formoterol (BREZTRI) 160-9-4.8 MCG/ACT aerosol inhaler, Inhale 2 puffs 2 (Two) Times a Day., Disp: 10.7 g, Rfl: 5    budesonide (PULMICORT) 0.5 MG/2ML nebulizer solution, Take 2 mL by nebulization 2 (Two) Times a Day., Disp: 60 each, Rfl: 11    Calcium Carbonate-Vit D-Min (CALCIUM 1200 PO), Take  by mouth., Disp: , Rfl:     celecoxib (CeleBREX) 200 MG capsule, Take 1 capsule by mouth twice daily, Disp: 180 capsule, Rfl: 0    cyclobenzaprine (FLEXERIL) 10 MG tablet, Take 1 tablet by mouth 2 (Two) Times a Day As Needed for Muscle Spasms., Disp: 30 tablet, Rfl: 0    DULoxetine (CYMBALTA) 60 MG capsule, Take 1 capsule by mouth Daily. Start one week after one week of duloxetine 30 mg daily., Disp: 180 capsule, Rfl: 0    ipratropium-albuterol (DUO-NEB) 0.5-2.5 mg/3 ml nebulizer, USE 1 AMPULE IN NEBULIZER EVERY 4 HOURS AS NEEDED FOR WHEEZING, Disp: 360 mL, Rfl: 0    oxyCODONE-acetaminophen (PERCOCET) 5-325 MG per tablet, Take 1 tablet by mouth 1 (One) Time As Needed for Severe Pain., Disp: , Rfl:     pantoprazole (PROTONIX) 40 MG EC tablet, Take 1 tablet by mouth 2 (Two) Times a Day., Disp: 180 tablet, Rfl: 0    pravastatin (PRAVACHOL) 40 MG tablet, Take 1 tablet by mouth once daily, Disp: 90 tablet, Rfl: 0    sodium chloride 1 g tablet, Take 1 tablet by mouth 2 (Two) Times a Day., Disp: 180 tablet, Rfl: 1    Albuterol-Budesonide 90-80 MCG/ACT aerosol, Inhale 2 puffs Every 2 (Two) Hours As Needed (wheezing)., Disp: 10.7 g, Rfl: 5    doxycycline (VIBRAMYCIN) 100 MG capsule, Take 1 capsule by mouth 2 (Two) Times a Day., Disp: 20 capsule, Rfl: 0    predniSONE (DELTASONE) 10 MG tablet, take 4 tablets by mouth  daily for 3 days, then 3 tablets daily for 3 days, then 2 tablet for 3 days, then 1 tablet 3 days  Dispense: 30  tablet; Refill: 0, Disp: 30 tablet, Rfl: 0    Allergies:   No Known Allergies    Objective     Physical Exam:   Vital Signs:   Vitals:    02/20/25 1354   BP: 136/80   BP Location: Right arm  "  Patient Position: Sitting   Cuff Size: Adult   Pulse: 98   Resp: 18   Temp: 97.1 °F (36.2 °C)   TempSrc: Infrared   SpO2: 93%   Weight: 62.3 kg (137 lb 6.4 oz)   Height: 175.3 cm (69\")   PainSc: 8      Body mass index is 20.29 kg/m². BMI is within normal parameters. No other follow-up for BMI required.      Physical Exam  Constitutional:       General: He is not in acute distress.     Appearance: He is not ill-appearing.   HENT:      Head: Normocephalic.      Nose: Congestion present.   Cardiovascular:      Rate and Rhythm: Normal rate and regular rhythm.      Heart sounds: Normal heart sounds. No murmur heard.  Pulmonary:      Breath sounds: Wheezing present.      Comments: Slight difficulty with complete sentences  Abdominal:      General: Bowel sounds are normal.      Tenderness: There is no abdominal tenderness.   Lymphadenopathy:      Cervical: No cervical adenopathy.   Neurological:      General: No focal deficit present.      Mental Status: He is oriented to person, place, and time.   Psychiatric:         Mood and Affect: Mood normal.       Physical Exam  Wheezing noted in the lungs.    Vital Signs  Oxygen saturation was 93 on arrival, which dropped to 87 during the visit.       Results       Assessment / Plan      Assessment/Plan:   Diagnoses and all orders for this visit:    1. Hypoxia (Primary)  Comments:  87%  Orders:  -     XR Chest PA & Lateral; Future  -     TSH Rfx On Abnormal To Free T4; Future  -     Comprehensive Metabolic Panel; Future  -     CBC & Differential; Future  -     POCT SARS-CoV-2 + Flu Antigen SHAUN; Future  -     Oxygen Therapy  -     TSH Rfx On Abnormal To Free T4  -     Comprehensive Metabolic Panel  -     CBC & Differential    2. Primary hypertension  -     XR Chest PA & Lateral; Future  -     TSH Rfx On Abnormal To Free T4; Future  -     Comprehensive Metabolic Panel; Future  -     CBC & Differential; Future  -     TSH Rfx On Abnormal To Free T4  -     Comprehensive Metabolic " Panel  -     CBC & Differential    3. Chronic obstructive pulmonary disease with acute exacerbation  -     XR Chest PA & Lateral; Future  -     TSH Rfx On Abnormal To Free T4; Future  -     Comprehensive Metabolic Panel; Future  -     CBC & Differential; Future  -     Oxygen Therapy  -     TSH Rfx On Abnormal To Free T4  -     Comprehensive Metabolic Panel  -     CBC & Differential  -     doxycycline (VIBRAMYCIN) 100 MG capsule; Take 1 capsule by mouth 2 (Two) Times a Day.  Dispense: 20 capsule; Refill: 0    4. Severe persistent asthma with acute exacerbation  -     Albuterol-Budesonide 90-80 MCG/ACT aerosol; Inhale 2 puffs Every 2 (Two) Hours As Needed (wheezing).  Dispense: 10.7 g; Refill: 5    Other orders  -     predniSONE (DELTASONE) 10 MG tablet; take 4 tablets by mouth  daily for 3 days, then 3 tablets daily for 3 days, then 2 tablet for 3 days, then 1 tablet 3 days  Dispense: 30  tablet; Refill: 0  Dispense: 30 tablet; Refill: 0       Assessment & Plan  1. Chronic Obstructive Pulmonary Disease (COPD)  - Severe wheezing indicating potential exacerbation of COPD  - Differential diagnoses: cold, viral infection, or pneumonia  - Oxygen saturation level: 93 upon arrival, dropping to 87 during the visit at 4 minute walk test   - Rapid test for COVID-19 and influenza to be conducted  - Prescription for antibiotics and steroids issued  - Chest x-ray to be performed today  - Blood work to be ordered  - Advised to use DuoNebs 2 to 3 times daily  - Albuterol inhaler with steroid to be provided for use during exacerbations, pending insurance approval  - Instructed to avoid smoking especially  while on oxygen therapy; discussed risk of burns/fire  - If COVID-19 test is positive, Paxlovid to be prescribed (3 pills in the morning and 3 at night for 5 days)  - Advised to seek immediate medical attention at the ER if respiratory condition deteriorates   -Recommend complete smoking cessation    A six minute walking test was  completed on room air.  Oxygen saturation 87 at   4 minutes. Patient placed on 2 L NC and oxygen saturation  95 %.    Explained and discussed patient's condition and plan of care including labs and radiology that may be ordered.  Discussed when to follow-up.  Discussed possible red flags and how to follow-up with those.  Viewed patient's medications and discussed common side effects and symptoms to report. Patient to continue current medications as advised.  Be compliant with medications. Patient to call clinic if they have any worsening, no improvement, does not tolerate medication, or any future concerns about treatment.  Questions and concerns addressed at this appointment.  Patient to report to ER for emergencies.  Patient verbalized an understanding and agreement with plan of care.      Follow Up:   2 weeks   Patient or patient representative verbalized consent for the use of Ambient Listening during the visit with  KALEB Barrios for chart documentation. 2/20/2025  14:39 EST    KALEB Barrios  [facility]

## 2025-02-21 ENCOUNTER — TELEPHONE (OUTPATIENT)
Dept: INTERNAL MEDICINE | Facility: CLINIC | Age: 65
End: 2025-02-21
Payer: MEDICARE

## 2025-02-21 LAB
ALBUMIN SERPL-MCNC: 4.3 G/DL (ref 3.5–5.2)
ALBUMIN/GLOB SERPL: 1.4 G/DL
ALP SERPL-CCNC: 88 U/L (ref 39–117)
ALT SERPL W P-5'-P-CCNC: 15 U/L (ref 1–41)
ANION GAP SERPL CALCULATED.3IONS-SCNC: 12 MMOL/L (ref 5–15)
AST SERPL-CCNC: 20 U/L (ref 1–40)
BILIRUB SERPL-MCNC: 0.3 MG/DL (ref 0–1.2)
BUN SERPL-MCNC: 10 MG/DL (ref 8–23)
BUN/CREAT SERPL: 13.3 (ref 7–25)
CALCIUM SPEC-SCNC: 9.6 MG/DL (ref 8.6–10.5)
CHLORIDE SERPL-SCNC: 93 MMOL/L (ref 98–107)
CO2 SERPL-SCNC: 25 MMOL/L (ref 22–29)
CREAT SERPL-MCNC: 0.75 MG/DL (ref 0.76–1.27)
EGFRCR SERPLBLD CKD-EPI 2021: 100.1 ML/MIN/1.73
GLOBULIN UR ELPH-MCNC: 3 GM/DL
GLUCOSE SERPL-MCNC: 100 MG/DL (ref 65–99)
POTASSIUM SERPL-SCNC: 4.9 MMOL/L (ref 3.5–5.2)
PROT SERPL-MCNC: 7.3 G/DL (ref 6–8.5)
SODIUM SERPL-SCNC: 130 MMOL/L (ref 136–145)
TSH SERPL DL<=0.05 MIU/L-ACNC: 0.97 UIU/ML (ref 0.27–4.2)

## 2025-02-21 NOTE — TELEPHONE ENCOUNTER
PATIENT HAS CALLED REQUESTING A CALL BACK ASAP WITH STATUS OF REQUEST FOR OXYGEN.    CALL BACK NUMBER [phone]

## 2025-02-21 NOTE — TELEPHONE ENCOUNTER
----- Message from Sulma Woodward sent at 2/21/2025  1:01 PM EST -----  Sodium continues to be slightly low but is stable.  Recommend avoiding alcohol.  The other test results show that your labs are in the normal range, stable, or mild abnormalities which do not require any further work-up at this time.  We will continue to monitor labs over time.  Continue plan of care discussed at your appointment and follow-up if worsening or new concerns.

## 2025-02-24 NOTE — TELEPHONE ENCOUNTER
I reached out to patient. I spoke to patients wife Jada. She states they have not received his oxygen yet. I gave them christian's number. They will call and check on the status and let us know if there are any problems

## 2025-04-23 ENCOUNTER — TELEPHONE (OUTPATIENT)
Dept: INTERNAL MEDICINE | Facility: CLINIC | Age: 65
End: 2025-04-23

## 2025-04-24 ENCOUNTER — TELEPHONE (OUTPATIENT)
Dept: INTERNAL MEDICINE | Facility: CLINIC | Age: 65
End: 2025-04-24

## 2025-04-24 NOTE — TELEPHONE ENCOUNTER
Please call patient and let him know that he has missed his CT of the chest; it looks like he had a recent CT at  .  Please schedule patient for a follow-up appointment for his wellness . I have reached out to Abby Frank to let her know since he has an upcoming appointment with her.

## 2025-04-24 NOTE — TELEPHONE ENCOUNTER
I left a message on the patients voicemail to call our office back, office number provided.     HUB relay:    Please call patient and let him know that he has missed his CT of the chest; it looks like he had a recent CT at  .  Please schedule patient for a follow-up appointment for his wellness . I have reached out to Abby Frank to let her know since he has an upcoming appointment with her.

## 2025-05-19 DIAGNOSIS — J45.51 SEVERE PERSISTENT ASTHMA WITH ACUTE EXACERBATION: ICD-10-CM

## 2025-05-29 ENCOUNTER — OFFICE VISIT (OUTPATIENT)
Dept: PULMONOLOGY | Facility: CLINIC | Age: 65
End: 2025-05-29
Payer: MEDICARE

## 2025-05-29 VITALS
HEIGHT: 69 IN | OXYGEN SATURATION: 95 % | HEART RATE: 74 BPM | BODY MASS INDEX: 20.44 KG/M2 | WEIGHT: 138 LBS | DIASTOLIC BLOOD PRESSURE: 80 MMHG | SYSTOLIC BLOOD PRESSURE: 138 MMHG | TEMPERATURE: 98.4 F

## 2025-05-29 DIAGNOSIS — J42 CHRONIC BRONCHITIS, UNSPECIFIED CHRONIC BRONCHITIS TYPE: ICD-10-CM

## 2025-05-29 DIAGNOSIS — J44.9 CHRONIC OBSTRUCTIVE PULMONARY DISEASE, UNSPECIFIED COPD TYPE: Primary | ICD-10-CM

## 2025-05-29 DIAGNOSIS — F17.200 TOBACCO USE DISORDER: ICD-10-CM

## 2025-05-29 NOTE — PROGRESS NOTES
"[facility] Pulmonary Follow up      Chief Complaint  Chronic obstructive pulmonary disease, unspecified COPD type    Subjective          Luis Antonio Fairchild presents to [facility] for routine follow-up on his COPD with chronic respiratory failure.    He does continue on his Breztri twice daily.  Does feel like that works well for him.  He uses his albuterol several times a day.  He has noticed some worsening shortness of breath with activity.  He does have quite a bit of cough especially in the mornings.  He denies any recent acute exacerbation or respiratory illness.    He continues on his oxygen at night on most days.  He does find it very hard to sleep with his that sometimes he goes without it.  He will occasionally use it during the day if he is having more shortness of breath.    He is still smoking.          Objective     Vital Signs:   /80   Pulse 74   Temp 98.4 °F (36.9 °C)   Ht 175.3 cm (69.02\")   Wt 62.6 kg (138 lb)   SpO2 95% Comment: room air at rest  BMI 20.37 kg/m²       Immunization History   Administered Date(s) Administered    Pneumococcal Conjugate 20-Valent (PCV20) 03/31/2023    Pneumococcal Polysaccharide (PPSV23) 06/26/2019    Shingrix 04/27/2022    Tdap 06/26/2019       Physical Exam  Vitals reviewed.   Constitutional:       Appearance: He is well-developed.   HENT:      Head: Normocephalic and atraumatic.   Eyes:      Pupils: Pupils are equal, round, and reactive to light.   Cardiovascular:      Rate and Rhythm: Normal rate and regular rhythm.      Heart sounds: No murmur heard.  Pulmonary:      Effort: Pulmonary effort is normal. No respiratory distress.      Breath sounds: Normal breath sounds. No wheezing or rales.   Abdominal:      General: Bowel sounds are normal. There is no distension.      Palpations: Abdomen is soft.   Musculoskeletal:         General: Normal range of motion.      Cervical back: Normal range of motion and neck " supple.   Skin:     General: Skin is warm and dry.      Findings: No erythema.   Neurological:      Mental Status: He is alert and oriented to person, place, and time.   Psychiatric:         Behavior: Behavior normal.          Result Review :            PFTs done in the office today:  Severe obstructive airway disease with evidence of air trapping and decreased DLCO.  He has declined from last year last year his FEV1 is 1.33, 48% predicted today he is 1.08, 33% predicted.    6-minute walk test done in the office today:  He did desaturate with activity of 5 minutes to 87% required 2 L pulsed dose.       CT 4/26/2025  FINDINGS:   Mediastinum and Pleura: 14 mm right hilar lymph node is stable. No new or enlarging mediastinal or hilar adenopathy. No pleural or pericardial effusion. Mild coronary atherosclerosis.     Lungs: Mild centrilobular emphysema. No new or suspicious pulmonary nodules. Some mucous plugging in the left lower lobe. Debris in the trachea and left lung bronchus is concerning for aspiration.     Upper Abdomen: Stable thickening of the bilateral adrenal glands.     Musculoskeletal: No suspicious lytic or sclerotic lesion.                  Assessment and Plan    Problem List Items Addressed This Visit          Pulmonary and Pneumonias    COLD (chronic obstructive lung disease) - Primary    Chronic bronchitis       Tobacco    Tobacco use disorder     We went over his PFTs.  He has had a bit of a decline and has been having some worsening dyspnea with activity and congestion lately.    We did discuss the importance of smoking cessation.  Continue on his Breztri 2 puffs twice daily.    His wife notes his cough in the mornings have gotten little bit worse lately.  We discussed using his nebulizer in the morning to help with secretion clearance.    Continue to wear his oxygen at night and as needed during the day.  We did go over the 6-minute walk test that did indicate he needed to wear his oxygen if he is  walking more than 5 or 6 minutes or with increased activity.  He does have portable oxygen to use at home.    He does get routine CT scans at  for his history of cancer, if those were to stop we did discuss following up on annual low-dose screening CT scans for his tobacco use.      Follow Up     Return in about 6 months (around 11/29/2025).  Patient was given instructions and counseling regarding his condition or for health maintenance advice. Please see specific information pulled into the AVS if appropriate.     I spent 36 minutes caring for Luis Antonio on this date of service. This time includes time spent by me in the following activities:preparing for the visit, reviewing tests, obtaining and/or reviewing a separately obtained history, performing a medically appropriate examination and/or evaluation , counseling and educating the patient/family/caregiver, ordering medications, tests, or procedures, referring and communicating with other health care professionals , documenting information in the medical record, and independently interpreting results and communicating that information with the patient/family/caregiver    Excluding time spent on other separate services such as performing procedures or test interpretation, if applicable    Moderate level of Medical Decision Making complexity based on 2 or more chronic stable illnesses and prescription drug management.    KALEB Candelario, ACNP  [facility]  Electronically signed

## 2025-06-08 DIAGNOSIS — K21.9 GASTROESOPHAGEAL REFLUX DISEASE, UNSPECIFIED WHETHER ESOPHAGITIS PRESENT: ICD-10-CM

## 2025-06-08 DIAGNOSIS — E78.5 HYPERLIPIDEMIA, UNSPECIFIED HYPERLIPIDEMIA TYPE: ICD-10-CM

## 2025-06-08 DIAGNOSIS — I10 PRIMARY HYPERTENSION: ICD-10-CM

## 2025-06-08 DIAGNOSIS — M19.90 OSTEOARTHRITIS, UNSPECIFIED OSTEOARTHRITIS TYPE, UNSPECIFIED SITE: Chronic | ICD-10-CM

## 2025-06-09 RX ORDER — CELECOXIB 200 MG/1
200 CAPSULE ORAL 2 TIMES DAILY
Qty: 180 CAPSULE | Refills: 0 | Status: SHIPPED | OUTPATIENT
Start: 2025-06-09

## 2025-06-09 RX ORDER — AMLODIPINE BESYLATE 5 MG/1
5 TABLET ORAL DAILY
Qty: 90 TABLET | Refills: 0 | Status: SHIPPED | OUTPATIENT
Start: 2025-06-09

## 2025-06-18 RX ORDER — PRAVASTATIN SODIUM 40 MG
40 TABLET ORAL DAILY
Qty: 90 TABLET | Refills: 0 | Status: SHIPPED | OUTPATIENT
Start: 2025-06-18

## 2025-06-18 RX ORDER — PANTOPRAZOLE SODIUM 40 MG/1
40 TABLET, DELAYED RELEASE ORAL 2 TIMES DAILY
Qty: 180 TABLET | Refills: 0 | Status: SHIPPED | OUTPATIENT
Start: 2025-06-18

## 2025-07-04 DIAGNOSIS — J44.9 COPD, SEVERE: ICD-10-CM

## 2025-07-07 RX ORDER — BUDESONIDE, GLYCOPYRROLATE, AND FORMOTEROL FUMARATE 160; 9; 4.8 UG/1; UG/1; UG/1
2 AEROSOL, METERED RESPIRATORY (INHALATION) 2 TIMES DAILY
Qty: 11 G | Refills: 0 | Status: SHIPPED | OUTPATIENT
Start: 2025-07-07

## 2025-07-11 ENCOUNTER — OFFICE VISIT (OUTPATIENT)
Dept: INTERNAL MEDICINE | Facility: CLINIC | Age: 65
End: 2025-07-11
Payer: MEDICARE

## 2025-07-11 VITALS
TEMPERATURE: 97.5 F | WEIGHT: 134.4 LBS | HEIGHT: 67 IN | RESPIRATION RATE: 16 BRPM | HEART RATE: 88 BPM | DIASTOLIC BLOOD PRESSURE: 78 MMHG | SYSTOLIC BLOOD PRESSURE: 126 MMHG | BODY MASS INDEX: 21.09 KG/M2 | OXYGEN SATURATION: 93 %

## 2025-07-11 DIAGNOSIS — Z00.00 MEDICARE WELCOME EXAM: Primary | ICD-10-CM

## 2025-07-11 DIAGNOSIS — Z12.12 ENCOUNTER FOR COLORECTAL CANCER SCREENING: ICD-10-CM

## 2025-07-11 DIAGNOSIS — R11.0 NAUSEA: ICD-10-CM

## 2025-07-11 DIAGNOSIS — I10 PRIMARY HYPERTENSION: ICD-10-CM

## 2025-07-11 DIAGNOSIS — Z12.11 ENCOUNTER FOR COLORECTAL CANCER SCREENING: ICD-10-CM

## 2025-07-11 DIAGNOSIS — J44.9 CHRONIC OBSTRUCTIVE PULMONARY DISEASE, UNSPECIFIED COPD TYPE: ICD-10-CM

## 2025-07-11 DIAGNOSIS — Z12.5 ENCOUNTER FOR PROSTATE CANCER SCREENING: ICD-10-CM

## 2025-07-11 DIAGNOSIS — E87.1 HYPONATREMIA: ICD-10-CM

## 2025-07-11 DIAGNOSIS — E55.9 VITAMIN D DEFICIENCY: ICD-10-CM

## 2025-07-11 DIAGNOSIS — E78.2 MIXED HYPERLIPIDEMIA: Chronic | ICD-10-CM

## 2025-07-11 DIAGNOSIS — J44.1 COPD WITH EXACERBATION: ICD-10-CM

## 2025-07-11 PROCEDURE — G0103 PSA SCREENING: HCPCS | Performed by: NURSE PRACTITIONER

## 2025-07-11 PROCEDURE — 82306 VITAMIN D 25 HYDROXY: CPT | Performed by: NURSE PRACTITIONER

## 2025-07-11 PROCEDURE — 84443 ASSAY THYROID STIM HORMONE: CPT | Performed by: NURSE PRACTITIONER

## 2025-07-11 PROCEDURE — 80061 LIPID PANEL: CPT | Performed by: NURSE PRACTITIONER

## 2025-07-11 PROCEDURE — 80053 COMPREHEN METABOLIC PANEL: CPT | Performed by: NURSE PRACTITIONER

## 2025-07-11 PROCEDURE — 85025 COMPLETE CBC W/AUTO DIFF WBC: CPT | Performed by: NURSE PRACTITIONER

## 2025-07-11 RX ORDER — ONDANSETRON 4 MG/1
4 TABLET, ORALLY DISINTEGRATING ORAL EVERY 8 HOURS PRN
Qty: 30 TABLET | Refills: 0 | Status: SHIPPED | OUTPATIENT
Start: 2025-07-11

## 2025-07-11 RX ORDER — GUAIFENESIN 600 MG/1
1200 TABLET, EXTENDED RELEASE ORAL 2 TIMES DAILY
Qty: 120 TABLET | Refills: 0 | Status: SHIPPED | OUTPATIENT
Start: 2025-07-11

## 2025-07-11 RX ORDER — PREDNISONE 20 MG/1
20 TABLET ORAL 2 TIMES DAILY
Qty: 10 TABLET | Refills: 0 | Status: SHIPPED | OUTPATIENT
Start: 2025-07-11

## 2025-07-11 NOTE — PROGRESS NOTES
Subjective   The ABCs of the Annual Wellness Visit  Medicare Wellness Visit      Luis Antonio Fairchild is a 64 y.o. patient who presents for a Medicare Wellness Visit.    The following portions of the patient's history were reviewed and   updated as appropriate: allergies, current medications, past family history, past medical history, past social history, past surgical history, and problem list.    Compared to one year ago, the patient's physical   health is worse.  Compared to one year ago, the patient's mental   health is the same.    Recent Hospitalizations:  He was not admitted to the hospital during the last year.     Current Medical Providers:  Patient Care Team:  Sulma Woodward APRN as PCP - General (Nurse Practitioner)  Aleksandar Maldonado MD as Consulting Physician (Pulmonary Disease)  Azael Jackson MD as Consulting Physician (Pain Medicine)  Pete Guardado PA-C as Physician Assistant (Physical Medicine and Rehabilitation)  Hillerman, Kristen B, APRN (Nurse Practitioner)  Abby Frank APRN as Nurse Practitioner (Pulmonary Disease)  Ej Sellers Jr., MD (Otolaryngology)    Outpatient Medications Prior to Visit   Medication Sig Dispense Refill    acetaminophen (TYLENOL) 500 MG tablet Take 1 tablet by mouth Every 6 (Six) Hours As Needed for Mild Pain. 120 tablet 1    albuterol sulfate HFA (ProAir HFA) 108 (90 Base) MCG/ACT inhaler Inhale 1-2 puffs every 4-6 hours PRN 18 g 3    Albuterol-Budesonide 90-80 MCG/ACT aerosol Inhale 2 puffs Every 2 (Two) Hours As Needed (wheezing). 10.7 g 5    amLODIPine (NORVASC) 5 MG tablet Take 1 tablet by mouth once daily 90 tablet 0    Breztri Aerosphere 160-9-4.8 MCG/ACT aerosol inhaler Inhale 2 puffs by mouth twice daily 11 g 0    budesonide (PULMICORT) 0.5 MG/2ML nebulizer solution Take 2 mL by nebulization 2 (Two) Times a Day. 60 each 11    celecoxib (CeleBREX) 200 MG capsule Take 1 capsule by mouth twice daily 180 capsule 0    cyclobenzaprine (FLEXERIL) 10  MG tablet Take 1 tablet by mouth 2 (Two) Times a Day As Needed for Muscle Spasms. 30 tablet 0    DULoxetine (CYMBALTA) 60 MG capsule Take 1 capsule by mouth Daily. Start one week after one week of duloxetine 30 mg daily. 180 capsule 0    ipratropium-albuterol (DUO-NEB) 0.5-2.5 mg/3 ml nebulizer USE 1 AMPULE IN NEBULIZER EVERY 4 HOURS AS NEEDED FOR WHEEZING 360 mL 0    pantoprazole (PROTONIX) 40 MG EC tablet Take 1 tablet by mouth twice daily 180 tablet 0    pravastatin (PRAVACHOL) 40 MG tablet Take 1 tablet by mouth once daily 90 tablet 0    sodium chloride 1 g tablet Take 1 tablet by mouth 2 (Two) Times a Day. 180 tablet 1    Calcium Carbonate-Vit D-Min (CALCIUM 1200 PO) Take  by mouth.       No facility-administered medications prior to visit.     No opioid medication identified on active medication list. I have reviewed chart for other potential  high risk medication/s and harmful drug interactions in the elderly.      Aspirin is not on active medication list.  Aspirin use is not indicated based on review of current medical condition/s. Risk of harm outweighs potential benefits.  .    Patient Active Problem List   Diagnosis    Anxiety    Anderson's esophagus    Carpal tunnel syndrome    COLD (chronic obstructive lung disease)    Dysfunction of eustachian tube    Gastroesophageal reflux disease without esophagitis    Hyperlipidemia    Hypertension    Neck pain    Osteoarthritis    Fluid level behind tympanic membrane of right ear    Tobacco use disorder    Right shoulder pain    Encounter for tobacco use cessation counseling    Rotator cuff arthropathy of right shoulder    Benign prostatic hyperplasia with urinary frequency    Hyponatremia    Right hip pain    Alcohol abuse    Low back pain    Lumbosacral disc disease    DDD (degenerative disc disease), lumbar    Chronic bronchitis    Tonsil cancer     Advance Care Planning Advance Directive is not on file.  ACP discussion was held with the patient during this  "visit. Patient has an advance directive (not in EMR), copy requested.            Objective   Vitals:    07/11/25 1454   BP: 126/78   BP Location: Right arm   Patient Position: Sitting   Cuff Size: Adult   Pulse: 88   Resp: 16   Temp: 97.5 °F (36.4 °C)   TempSrc: Infrared   SpO2: 93%   Weight: 61 kg (134 lb 6.4 oz)   Height: 170.2 cm (67\")   PainSc: 0-No pain       Estimated body mass index is 21.05 kg/m² as calculated from the following:    Height as of this encounter: 170.2 cm (67\").    Weight as of this encounter: 61 kg (134 lb 6.4 oz).    BMI is within normal parameters. No other follow-up for BMI required.        Gait and Balance Evaluation:  Slow Tentative Pace    Does the patient have evidence of cognitive impairment? No  Lab Results   Component Value Date    TRIG 56 07/11/2025    HDL 96 (H) 07/11/2025     (H) 07/11/2025    VLDL 10 07/11/2025       ECG 12 Lead    Date/Time: 7/11/2025 4:33 PM  Performed by: Sulma Woodward APRN    Authorized by: Sulma Woodwadr APRN  Comparison: compared with previous ECG from 11/28/2023  Similar to previous ECG  Rhythm: sinus rhythm  Rate: normal  Conduction: conduction normal  QRS axis: left    Clinical impression: abnormal EKG                                                                                                Health  Risk Assessment    Smoking Status:  Social History     Tobacco Use   Smoking Status Every Day    Current packs/day: 0.50    Average packs/day: 1 pack/day for 51.5 years (49.8 ttl pk-yrs)    Types: Cigarettes    Start date: 1/1/1974   Smokeless Tobacco Never     Alcohol Consumption:  Social History     Substance and Sexual Activity   Alcohol Use Yes    Alcohol/week: 35.0 standard drinks of alcohol    Types: 35 Cans of beer per week    Comment: nightly       Fall Risk Screen  STEADI Fall Risk Assessment was completed, and patient is at LOW risk for falls.Assessment completed on:7/11/2025    Depression Screening   Little interest or pleasure " in doing things? Several days   Feeling down, depressed, or hopeless? Not at all   PHQ-2 Total Score 1      Health Habits and Functional and Cognitive Screenin/11/2025     2:58 PM   Functional & Cognitive Status   Do you have difficulty preparing food and eating? No   Do you have difficulty bathing yourself, getting dressed or grooming yourself? No   Do you have difficulty using the toilet? No   Do you have difficulty moving around from place to place? No   Do you have trouble with steps or getting out of a bed or a chair? No   Current Diet Well Balanced Diet   Dental Exam Not up to date   Eye Exam Not up to date   Exercise (times per week) 0 times per week   Current Exercises Include No Regular Exercise   Do you need help using the phone?  No   Are you deaf or do you have serious difficulty hearing?  No   Do you need help to go to places out of walking distance? No   Do you need help shopping? No   Do you need help preparing meals?  No   Do you need help with housework?  No   Do you need help with laundry? No   Do you need help taking your medications? No   Do you need help managing money? No   Do you ever drive or ride in a car without wearing a seat belt? No   Have you felt unusual fatigue (could be tiredness), stress, anger or loneliness in the last month? No   Who do you live with? Spouse   If you need help, do you have trouble finding someone available to you? No   Have you been bothered in the last four weeks by sexual problems? No   Do you have difficulty concentrating, remembering or making decisions? Yes   Visual Acuity:  Vision Screening    Right eye Left eye Both eyes   Without correction      With correction 20/20 20/25 20/20     Age-appropriate Screening Schedule:  Refer to the list below for future screening recommendations based on patient's age, sex and/or medical conditions. Orders for these recommended tests are listed in the plan section. The patient has been provided with a written  plan.    Health Maintenance List  Health Maintenance   Topic Date Due    ANNUAL WELLNESS VISIT  Never done    ZOSTER VACCINE (2 of 2) 06/22/2022    COLORECTAL CANCER SCREENING  03/03/2024    COVID-19 Vaccine (1 - 2024-25 season) Never done    INFLUENZA VACCINE  10/01/2025    LUNG CANCER SCREENING  04/16/2026    LIPID PANEL  07/11/2026    TDAP/TD VACCINES (2 - Td or Tdap) 06/26/2029    HEPATITIS C SCREENING  Completed    Pneumococcal Vaccine 50+  Completed    AAA SCREEN ONCE  Completed                                                                                                                                                CMS Preventative Services Quick Reference  Risk Factors Identified During Encounter  Alcohol Misuse: Patient encouraged to limit alcohol use to no more than 1 standard alcoholic beverage per day. (12 ounce beer, 6 ounce wine, one shot liquor)  Chronic Pain: FOLLOWS WITH PAIN MANAGEMENT   Depression/Dysphoria: Current medication is effective, no change recommended  Fall Risk-High or Moderate: Discussed Fall Prevention in the home and Information on Fall Prevention Shared in After Visit Summary  Immunizations Discussed/Encouraged: Shingrix and COVID19  Polypharmacy: Medication List reviewed and Medications are appropriate for patient  Vision Screening Recommended  Tobacco Use: High Risk (7/11/2025)    Patient History     Smoking Tobacco Use: Every Day     Smokeless Tobacco Use: Never     Passive Exposure: Not on file   Luis Antonio Fairchild  reports that he has been smoking cigarettes. He started smoking about 51 years ago. He has a 49.8 pack-year smoking history. He has never used smokeless tobacco. I have educated him on the risk of diseases from using tobacco products such as cancer, COPD, and heart disease.     I advised him to quit and he is not willing to quit.    I spent 3  minutes counseling the patient.            The above risks/problems have been discussed with the patient.  Pertinent  "information has been shared with the patient in the After Visit Summary.  An After Visit Summary and PPPS were made available to the patient.      Next Medicare Wellness visit to be scheduled in 1 year.         Additional E&M Note during same encounter follows:  Patient has additional, significant, and separately identifiable condition(s)/problem(s) that require work above and beyond the Medicare Wellness Visit     Chief Complaint  Cough and wheezing    Subjective    HPI  Luis Antonio is also being seen today for an annual adult preventative physical exam.  and Luis Antonio is also being seen today for additional medical problem/s.    Chronic obstructive pulmonary disease: Rescue inhaler usage every 4-6 hours prn; uses it 1-2 times a day. He is using Breztri daily . The patient reports no known triggers.  Since the last office visit, the patient has not sought emergency care.He has chronic dyspnea with wheezing and productive sputum. He denies hemoptysis. He is a smoker. He does not use oxygen as needed. Follows with Gayathri reardon, Abby Frank.     Hx of tonsil cancer, follows with Dr. Sellers at . [place] with no recurrent disease 4/25 .    Chronic hyponatremia secondary to alcohol abuse. He stopped taking his sodium tablets.      Immunizations  Td/Tdap(Booster Q 10 yrs): advised  Flu (Yearly): Advised    Colorectal Screening:  ordered  PSA(Over age 50):  advised                 Objective   Vital Signs:  /78 (BP Location: Right arm, Patient Position: Sitting, Cuff Size: Adult)   Pulse 88   Temp 97.5 °F (36.4 °C) (Infrared)   Resp 16   Ht 170.2 cm (67\")   Wt 61 kg (134 lb 6.4 oz)   SpO2 93%   BMI 21.05 kg/m²   Physical Exam  Vitals and nursing note reviewed.   Constitutional:       General: He is not in acute distress.     Appearance: Normal appearance. He is not ill-appearing.   HENT:      Head: Normocephalic.      Right Ear: Tympanic membrane, ear canal and external ear normal. There is no impacted cerumen.      " Left Ear: Tympanic membrane, ear canal and external ear normal. There is no impacted cerumen.      Nose: No nasal tenderness or rhinorrhea.      Mouth/Throat:      Mouth: Mucous membranes are moist.      Pharynx: Oropharynx is clear. No oropharyngeal exudate or posterior oropharyngeal erythema.   Eyes:      General:         Right eye: No discharge.         Left eye: No discharge.      Extraocular Movements: Extraocular movements intact.      Conjunctiva/sclera: Conjunctivae normal.      Pupils: Pupils are equal, round, and reactive to light.   Neck:      Thyroid: No thyromegaly.      Vascular: No carotid bruit.   Cardiovascular:      Rate and Rhythm: Normal rate and regular rhythm.      Pulses: Normal pulses.      Heart sounds: Normal heart sounds. No murmur heard.     No gallop.   Pulmonary:      Effort: Pulmonary effort is normal.      Breath sounds: Wheezing present. No rhonchi or rales.   Abdominal:      General: Bowel sounds are normal.      Palpations: Abdomen is soft. There is no mass.      Tenderness: There is no abdominal tenderness. There is no right CVA tenderness or left CVA tenderness.   Genitourinary:     Comments: Politely declined  Musculoskeletal:         General: No swelling or tenderness. Normal range of motion.      Cervical back: Normal range of motion.      Right lower leg: No edema.      Left lower leg: No edema.   Lymphadenopathy:      Cervical: No cervical adenopathy.   Skin:     General: Skin is warm and dry.      Capillary Refill: Capillary refill takes less than 2 seconds.      Findings: No erythema or rash.      Comments: No suspicious skin lesions   Neurological:      General: No focal deficit present.      Mental Status: He is alert and oriented to person, place, and time.      Motor: No weakness.   Psychiatric:         Mood and Affect: Mood normal.         Behavior: Behavior is cooperative.         Cognition and Memory: He does not exhibit impaired recent memory.                 CMP           9/6/2024    10:33 2/20/2025    14:30 7/11/2025    15:46   CMP   Glucose 66  100  104    BUN 11  10  8.0    Creatinine 0.79  0.75  0.82    EGFR 98.6  100.1  97.5    Sodium 129  130  127    Potassium 4.5  4.9  4.7    Chloride 90  93  88    Calcium 9.8  9.6  9.6    Total Protein 7.4  7.3  7.7    Albumin 4.5  4.3  4.3    Globulin 2.9  3.0  3.4    Total Bilirubin 0.4  0.3  0.4    Alkaline Phosphatase 95  88  100    AST (SGOT) 14  20  18    ALT (SGPT) 13  15  13    Albumin/Globulin Ratio 1.6  1.4  1.3    BUN/Creatinine Ratio 13.9  13.3  9.8    Anion Gap 14.6  12.0  14.0      CBC w/diff          2/20/2025    14:30   CBC w/Diff   WBC 6.61    RBC 4.52    Hemoglobin 15.5    Hematocrit 43.2    MCV 95.6    MCH 34.3    MCHC 35.9    RDW 13.0    Platelets 511    Neutrophil Rel % 57.3    Immature Granulocyte Rel % 0.5    Lymphocyte Rel % 27.5    Monocyte Rel % 11.5    Eosinophil Rel % 2.4    Basophil Rel % 0.8      Lipid Panel          7/11/2025    15:46   Lipid Panel   Total Cholesterol 224    Triglycerides 56    HDL Cholesterol 96    VLDL Cholesterol 10    LDL Cholesterol  118    LDL/HDL Ratio 1.22      TSH          2/20/2025    14:30 7/11/2025    15:46   TSH   TSH 0.966  1.560          Results     CT Soft Tissue Neck With Contrast  Result Date: 4/17/2025  1.  Visualization of the tonsillar pillars is limited given streak artifact from dental amalgam.  No appreciable residual or recurrent mass in this location.  No appreciable new pharyngeal or laryngeal mass. 2.  No enlarged or necrotic cervical adenopathy. The results were faxed/finalized only (12:33 AM ET). If you would like to discuss this case directly please call [phone]  to review. Marley Prabhakar M.D. This report has been electronically signed and verified by the Radiologist whose name is printed above. This report contains privileged and confidential information and is intended solely for the use of the individual or entity to which it is addressed. If you  are not the intended recipient of this report, you are hereby notified that any copying, distribution, dissemination or action taken in relation to the contents of this report is strictly prohibited and may be unlawful. If you have received this report in error, please notify the sender immediately at [phone] and permanently delete the original report and destroy any copies or printouts.    CT Chest With Contrast Diagnostic  Result Date: 4/16/2025  No evidence of thoracic progression. Debris in the left bronchus with mucous plugging in the left lower lobe concerning for aspiration. CRITICAL RESULT: No. COMMUNICATION: Per this written report. Drafted by Araseli Head MD on 4/16/2025 10:20 AM Final report signed by Araseli Head MD on 4/16/2025 10:24 AM            Assessment and Plan     Additional age appropriate preventative wellness advice topics were discussed during today's preventative wellness exam(some topics already addressed during AWV portion of the note above):    Physical Activity: Advised cardiovascular activity 150 minutes per week as tolerated. (example brisk walk for 30 minutes, 5 days a week).     Nutrition: Discussed nutrition plan with patient. Information shared in after visit summary. Goal is for a well balanced diet to enhance overall health.     Gun Safety Awareness Discussion: Lock guns.      Motor Vehicle Safety Discussion:  Wearing Seatbelt While in Motor Vehicle recommendation. Adhering to posted speed limit recommendation.  Problem List Items Addressed This Visit          Cardiac and Vasculature    Hyperlipidemia (Chronic)    Relevant Orders    Lipid Panel (Completed)    Hypertension (Chronic)    Relevant Orders    TSH Rfx On Abnormal To Free T4 (Completed)    Comprehensive Metabolic Panel (Completed)    CBC & Differential (Completed)       Genitourinary and Reproductive     Hyponatremia    Overview   Patient has had low sodium since 1/26/22.   Associated with alcohol  use.  Hyponatremia work up:   CT Abdomen Pelvis Without Contrast (05/17/2023 12:40)   CT SOFT TISSUE NECK W CONTRAST (03/16/2023 10:46)   CT Angiogram Chest (05/18/2023 22:53)   Osmolality, Serum (05/18/2023 20:37)   Sodium, Urine, Random - Urine, Clean Catch (05/18/2023 22:02)   Osmolality, Urine - Urine, Clean Catch (05/18/2023 22:02)   MRI Brain With & Without Contrast (10/03/2024 17:58)   CT Soft Tissue Neck With Contrast  Result Date: 4/17/2025  1.  Visualization of the tonsillar pillars is limited given streak artifact from dental amalgam.  No appreciable residual or recurrent mass in this location.  No appreciable new pharyngeal or laryngeal mass. 2.  No enlarged or necrotic cervical adenopathy. The results were faxed/finalized only (12:33 AM ET). If you would like to discuss this case directly please call [phone]  to review. Marley Prabhakar M.D. This report has been electronically signed and verified by the Radiologist whose name is printed above. This report contains privileged and confidential information and is intended solely for the use of the individual or entity to which it is addressed. If you are not the intended recipient of this report, you are hereby notified that any copying, distribution, dissemination or action taken in relation to the contents of this report is strictly prohibited and may be unlawful. If you have received this report in error, please notify the sender immediately at [phone] and permanently delete the original report and destroy any copies or printouts.    CT Chest With Contrast Diagnostic  Result Date: 4/16/2025  No evidence of thoracic progression. Debris in the left bronchus with mucous plugging in the left lower lobe concerning for aspiration. CRITICAL RESULT: No. COMMUNICATION: Per this written report. Drafted by Araseli Head MD on 4/16/2025 10:20 AM Final report signed by Araseli Head MD on 4/16/2025 10:24 AM    Started of lasix 20 mg daily and Na CL 1 gm BID  6/26/23           Relevant Orders    Comprehensive Metabolic Panel (Completed)       Pulmonary and Pneumonias    COLD (chronic obstructive lung disease)    Overview   Albuterol HFA  Breztri          Current Assessment & Plan   COPD is worsening.    Plan:  Continue taking the following medication/s;  Breztri.   and Begin taking the following medication/s; doxycycline, prednisone and guaifensin.    Luis Antonio Fairchild  reports that he has been smoking cigarettes. He started smoking about 51 years ago. He has a 49.8 pack-year smoking history. He has never used smokeless tobacco. I have educated him on the risk of diseases from using tobacco products such as cancer, COPD, and heart disease.     I advised him to quit and he is not willing to quit.    I spent 3  minutes counseling the patient.  .    Patient Treatment Goals:   symptom prevention, minimizing limitation in activity, prevention of exacerbations and use of ER/inpatient care, maintenance of optimal pulmonary function, and minimization of adverse effects of treatment    Followup in 6 months.         Relevant Medications    guaiFENesin (Mucinex) 600 MG 12 hr tablet    predniSONE (DELTASONE) 20 MG tablet    amoxicillin-clavulanate (AUGMENTIN) 875-125 MG per tablet     Other Visit Diagnoses         Medicare welcome exam    -  Primary    Relevant Orders    ECG 12 Lead      Encounter for prostate cancer screening        Relevant Orders    PSA SCREENING (Completed)      Vitamin D deficiency        Relevant Orders    Vitamin D,25-Hydroxy (Completed)      Encounter for colorectal cancer screening        Relevant Orders    Ambulatory Referral For Screening Colonoscopy      Nausea        Relevant Medications    ondansetron ODT (ZOFRAN-ODT) 4 MG disintegrating tablet      COPD with exacerbation        Relevant Medications    guaiFENesin (Mucinex) 600 MG 12 hr tablet    predniSONE (DELTASONE) 20 MG tablet    amoxicillin-clavulanate (AUGMENTIN) 875-125 MG per tablet                      Follow Up 6 months  Patient was given instructions and counseling regarding his condition or for health maintenance advice. Please see specific information pulled into the AVS if appropriate.

## 2025-07-12 LAB
25(OH)D3 SERPL-MCNC: 43.6 NG/ML (ref 30–100)
ALBUMIN SERPL-MCNC: 4.3 G/DL (ref 3.5–5.2)
ALBUMIN/GLOB SERPL: 1.3 G/DL
ALP SERPL-CCNC: 100 U/L (ref 39–117)
ALT SERPL W P-5'-P-CCNC: 13 U/L (ref 1–41)
ANION GAP SERPL CALCULATED.3IONS-SCNC: 14 MMOL/L (ref 5–15)
AST SERPL-CCNC: 18 U/L (ref 1–40)
BASOPHILS # BLD AUTO: 0.06 10*3/MM3 (ref 0–0.2)
BASOPHILS NFR BLD AUTO: 0.8 % (ref 0–1.5)
BILIRUB SERPL-MCNC: 0.4 MG/DL (ref 0–1.2)
BUN SERPL-MCNC: 8 MG/DL (ref 8–23)
BUN/CREAT SERPL: 9.8 (ref 7–25)
CALCIUM SPEC-SCNC: 9.6 MG/DL (ref 8.6–10.5)
CHLORIDE SERPL-SCNC: 88 MMOL/L (ref 98–107)
CHOLEST SERPL-MCNC: 224 MG/DL (ref 0–200)
CO2 SERPL-SCNC: 25 MMOL/L (ref 22–29)
CREAT SERPL-MCNC: 0.82 MG/DL (ref 0.76–1.27)
DEPRECATED RDW RBC AUTO: 45.7 FL (ref 37–54)
EGFRCR SERPLBLD CKD-EPI 2021: 97.5 ML/MIN/1.73
EOSINOPHIL # BLD AUTO: 0.05 10*3/MM3 (ref 0–0.4)
EOSINOPHIL NFR BLD AUTO: 0.7 % (ref 0.3–6.2)
ERYTHROCYTE [DISTWIDTH] IN BLOOD BY AUTOMATED COUNT: 12.4 % (ref 12.3–15.4)
GLOBULIN UR ELPH-MCNC: 3.4 GM/DL
GLUCOSE SERPL-MCNC: 104 MG/DL (ref 65–99)
HCT VFR BLD AUTO: 46.2 % (ref 37.5–51)
HDLC SERPL-MCNC: 96 MG/DL (ref 40–60)
HGB BLD-MCNC: 15.6 G/DL (ref 13–17.7)
IMM GRANULOCYTES # BLD AUTO: 0.02 10*3/MM3 (ref 0–0.05)
IMM GRANULOCYTES NFR BLD AUTO: 0.3 % (ref 0–0.5)
LDLC SERPL CALC-MCNC: 118 MG/DL (ref 0–100)
LDLC/HDLC SERPL: 1.22 {RATIO}
LYMPHOCYTES # BLD AUTO: 1.31 10*3/MM3 (ref 0.7–3.1)
LYMPHOCYTES NFR BLD AUTO: 17.3 % (ref 19.6–45.3)
MCH RBC QN AUTO: 33.8 PG (ref 26.6–33)
MCHC RBC AUTO-ENTMCNC: 33.8 G/DL (ref 31.5–35.7)
MCV RBC AUTO: 100.2 FL (ref 79–97)
MONOCYTES # BLD AUTO: 0.62 10*3/MM3 (ref 0.1–0.9)
MONOCYTES NFR BLD AUTO: 8.2 % (ref 5–12)
NEUTROPHILS NFR BLD AUTO: 5.51 10*3/MM3 (ref 1.7–7)
NEUTROPHILS NFR BLD AUTO: 72.7 % (ref 42.7–76)
NRBC BLD AUTO-RTO: 0 /100 WBC (ref 0–0.2)
PLATELET # BLD AUTO: 415 10*3/MM3 (ref 140–450)
PMV BLD AUTO: 8.5 FL (ref 6–12)
POTASSIUM SERPL-SCNC: 4.7 MMOL/L (ref 3.5–5.2)
PROT SERPL-MCNC: 7.7 G/DL (ref 6–8.5)
PSA SERPL-MCNC: 0.36 NG/ML (ref 0–4)
RBC # BLD AUTO: 4.61 10*6/MM3 (ref 4.14–5.8)
SODIUM SERPL-SCNC: 127 MMOL/L (ref 136–145)
TRIGL SERPL-MCNC: 56 MG/DL (ref 0–150)
TSH SERPL DL<=0.05 MIU/L-ACNC: 1.56 UIU/ML (ref 0.27–4.2)
VLDLC SERPL-MCNC: 10 MG/DL (ref 5–40)
WBC NRBC COR # BLD AUTO: 7.57 10*3/MM3 (ref 3.4–10.8)

## 2025-07-13 NOTE — ASSESSMENT & PLAN NOTE
COPD is worsening.    Plan:  Continue taking the following medication/s;  Breztri.   and Begin taking the following medication/s; doxycycline, prednisone and guaifensin.    Luis Antonio Fairchild  reports that he has been smoking cigarettes. He started smoking about 51 years ago. He has a 49.8 pack-year smoking history. He has never used smokeless tobacco. I have educated him on the risk of diseases from using tobacco products such as cancer, COPD, and heart disease.     I advised him to quit and he is not willing to quit.    I spent 3  minutes counseling the patient.  .    Patient Treatment Goals:   symptom prevention, minimizing limitation in activity, prevention of exacerbations and use of ER/inpatient care, maintenance of optimal pulmonary function, and minimization of adverse effects of treatment    Followup in 6 months.

## 2025-08-06 DIAGNOSIS — J44.9 COPD, SEVERE: ICD-10-CM

## 2025-08-06 RX ORDER — SODIUM PICOSULFATE, MAGNESIUM OXIDE, AND ANHYDROUS CITRIC ACID 10; 3.5; 12 MG/160ML; G/160ML; G/160ML
350 LIQUID ORAL TAKE AS DIRECTED
Qty: 350 ML | Refills: 0 | Status: SHIPPED | OUTPATIENT
Start: 2025-08-06

## 2025-08-07 RX ORDER — BUDESONIDE, GLYCOPYRROLATE, AND FORMOTEROL FUMARATE 160; 9; 4.8 UG/1; UG/1; UG/1
2 AEROSOL, METERED RESPIRATORY (INHALATION) 2 TIMES DAILY
Qty: 11 G | Refills: 0 | Status: SHIPPED | OUTPATIENT
Start: 2025-08-07

## 2025-08-20 ENCOUNTER — OUTSIDE FACILITY SERVICE (OUTPATIENT)
Dept: GASTROENTEROLOGY | Facility: CLINIC | Age: 65
End: 2025-08-20
Payer: MEDICARE

## 2025-08-20 ENCOUNTER — LAB REQUISITION (OUTPATIENT)
Dept: LAB | Facility: HOSPITAL | Age: 65
End: 2025-08-20
Payer: MEDICARE

## 2025-08-20 DIAGNOSIS — D12.5 BENIGN NEOPLASM OF SIGMOID COLON: ICD-10-CM

## 2025-08-20 DIAGNOSIS — D12.3 BENIGN NEOPLASM OF TRANSVERSE COLON: ICD-10-CM

## 2025-08-20 DIAGNOSIS — Z12.11 ENCOUNTER FOR SCREENING FOR MALIGNANT NEOPLASM OF COLON: ICD-10-CM

## 2025-08-20 DIAGNOSIS — K57.30 DIVERTICULOSIS OF LARGE INTESTINE WITHOUT PERFORATION OR ABSCESS WITHOUT BLEEDING: ICD-10-CM

## 2025-08-20 DIAGNOSIS — K64.8 OTHER HEMORRHOIDS: ICD-10-CM

## 2025-08-20 PROCEDURE — 45388 COLONOSCOPY W/ABLATION: CPT | Performed by: INTERNAL MEDICINE

## 2025-08-20 PROCEDURE — 88305 TISSUE EXAM BY PATHOLOGIST: CPT | Performed by: INTERNAL MEDICINE

## 2025-08-20 PROCEDURE — 45385 COLONOSCOPY W/LESION REMOVAL: CPT | Performed by: INTERNAL MEDICINE

## 2025-08-22 ENCOUNTER — RESULTS FOLLOW-UP (OUTPATIENT)
Dept: LAB | Facility: HOSPITAL | Age: 65
End: 2025-08-22
Payer: MEDICARE
